# Patient Record
Sex: MALE | Race: WHITE | Employment: OTHER | URBAN - METROPOLITAN AREA
[De-identification: names, ages, dates, MRNs, and addresses within clinical notes are randomized per-mention and may not be internally consistent; named-entity substitution may affect disease eponyms.]

---

## 2017-02-20 DIAGNOSIS — Z00.00 WELL ADULT EXAM: Primary | ICD-10-CM

## 2017-02-20 DIAGNOSIS — E78.2 MIXED HYPERLIPIDEMIA: ICD-10-CM

## 2017-02-20 DIAGNOSIS — Z12.5 PROSTATE CANCER SCREENING: ICD-10-CM

## 2017-02-20 DIAGNOSIS — E55.9 VITAMIN D DEFICIENCY: ICD-10-CM

## 2017-02-21 LAB
A/G RATIO: 1.9 (ref 1.1–2.2)
ALBUMIN SERPL-MCNC: 4.3 G/DL (ref 3.4–5)
ALP BLD-CCNC: 76 U/L (ref 40–129)
ALT SERPL-CCNC: 23 U/L (ref 10–40)
ANION GAP SERPL CALCULATED.3IONS-SCNC: 15 MMOL/L (ref 3–16)
AST SERPL-CCNC: 19 U/L (ref 15–37)
BASOPHILS ABSOLUTE: 0 K/UL (ref 0–0.2)
BASOPHILS RELATIVE PERCENT: 0.6 %
BILIRUB SERPL-MCNC: 1 MG/DL (ref 0–1)
BUN BLDV-MCNC: 10 MG/DL (ref 7–20)
CALCIUM SERPL-MCNC: 9.5 MG/DL (ref 8.3–10.6)
CHLORIDE BLD-SCNC: 104 MMOL/L (ref 99–110)
CHOLESTEROL, TOTAL: 183 MG/DL (ref 0–199)
CO2: 25 MMOL/L (ref 21–32)
CREAT SERPL-MCNC: 0.9 MG/DL (ref 0.8–1.3)
EOSINOPHILS ABSOLUTE: 0.1 K/UL (ref 0–0.6)
EOSINOPHILS RELATIVE PERCENT: 1.2 %
GFR AFRICAN AMERICAN: >60
GFR NON-AFRICAN AMERICAN: >60
GLOBULIN: 2.3 G/DL
GLUCOSE BLD-MCNC: 96 MG/DL (ref 70–99)
HCT VFR BLD CALC: 45.4 % (ref 40.5–52.5)
HDLC SERPL-MCNC: 60 MG/DL (ref 40–60)
HEMOGLOBIN: 15.1 G/DL (ref 13.5–17.5)
LDL CHOLESTEROL CALCULATED: 97 MG/DL
LYMPHOCYTES ABSOLUTE: 1.3 K/UL (ref 1–5.1)
LYMPHOCYTES RELATIVE PERCENT: 26.6 %
MCH RBC QN AUTO: 30.1 PG (ref 26–34)
MCHC RBC AUTO-ENTMCNC: 33.2 G/DL (ref 31–36)
MCV RBC AUTO: 90.6 FL (ref 80–100)
MONOCYTES ABSOLUTE: 0.5 K/UL (ref 0–1.3)
MONOCYTES RELATIVE PERCENT: 10.9 %
NEUTROPHILS ABSOLUTE: 2.9 K/UL (ref 1.7–7.7)
NEUTROPHILS RELATIVE PERCENT: 60.7 %
PDW BLD-RTO: 13.6 % (ref 12.4–15.4)
PLATELET # BLD: 203 K/UL (ref 135–450)
PMV BLD AUTO: 7.8 FL (ref 5–10.5)
POTASSIUM SERPL-SCNC: 4.7 MMOL/L (ref 3.5–5.1)
PROSTATE SPECIFIC ANTIGEN: 0.88 NG/ML (ref 0–4)
RBC # BLD: 5.01 M/UL (ref 4.2–5.9)
SODIUM BLD-SCNC: 144 MMOL/L (ref 136–145)
TOTAL CK: 63 U/L (ref 39–308)
TOTAL PROTEIN: 6.6 G/DL (ref 6.4–8.2)
TRIGL SERPL-MCNC: 131 MG/DL (ref 0–150)
VITAMIN D 25-HYDROXY: 70.7 NG/ML
VLDLC SERPL CALC-MCNC: 26 MG/DL
WBC # BLD: 4.8 K/UL (ref 4–11)

## 2017-03-17 ENCOUNTER — OFFICE VISIT (OUTPATIENT)
Dept: FAMILY MEDICINE CLINIC | Age: 72
End: 2017-03-17

## 2017-03-17 VITALS
BODY MASS INDEX: 30.57 KG/M2 | HEIGHT: 66 IN | OXYGEN SATURATION: 97 % | DIASTOLIC BLOOD PRESSURE: 80 MMHG | WEIGHT: 190.2 LBS | SYSTOLIC BLOOD PRESSURE: 130 MMHG | HEART RATE: 75 BPM

## 2017-03-17 DIAGNOSIS — Z00.00 ANNUAL PHYSICAL EXAM: ICD-10-CM

## 2017-03-17 DIAGNOSIS — Z00.00 WELL ADULT EXAM: Primary | ICD-10-CM

## 2017-03-17 PROCEDURE — G0439 PPPS, SUBSEQ VISIT: HCPCS | Performed by: FAMILY MEDICINE

## 2017-03-17 PROCEDURE — 93000 ELECTROCARDIOGRAM COMPLETE: CPT | Performed by: FAMILY MEDICINE

## 2017-05-17 ENCOUNTER — NURSE ONLY (OUTPATIENT)
Dept: ORTHOPEDIC SURGERY | Age: 72
End: 2017-05-17

## 2017-05-17 DIAGNOSIS — M17.12 PRIMARY OSTEOARTHRITIS OF LEFT KNEE: Primary | ICD-10-CM

## 2017-05-17 PROCEDURE — 20611 DRAIN/INJ JOINT/BURSA W/US: CPT | Performed by: PHYSICIAN ASSISTANT

## 2017-06-23 ENCOUNTER — OFFICE VISIT (OUTPATIENT)
Dept: FAMILY MEDICINE CLINIC | Age: 72
End: 2017-06-23

## 2017-06-23 VITALS
SYSTOLIC BLOOD PRESSURE: 136 MMHG | OXYGEN SATURATION: 96 % | HEART RATE: 79 BPM | WEIGHT: 193.3 LBS | DIASTOLIC BLOOD PRESSURE: 80 MMHG | HEIGHT: 66 IN | BODY MASS INDEX: 31.07 KG/M2

## 2017-06-23 DIAGNOSIS — Z23 NEED FOR PROPHYLACTIC VACCINATION AGAINST DIPHTHERIA-TETANUS-PERTUSSIS (DTP): ICD-10-CM

## 2017-06-23 DIAGNOSIS — Z11.59 NEED FOR HEPATITIS C SCREENING TEST: ICD-10-CM

## 2017-06-23 DIAGNOSIS — R10.32 ABDOMINAL PAIN, LEFT LOWER QUADRANT: ICD-10-CM

## 2017-06-23 PROCEDURE — 99213 OFFICE O/P EST LOW 20 MIN: CPT | Performed by: FAMILY MEDICINE

## 2017-06-23 PROCEDURE — G8510 SCR DEP NEG, NO PLAN REQD: HCPCS | Performed by: FAMILY MEDICINE

## 2017-06-23 PROCEDURE — 3288F FALL RISK ASSESSMENT DOCD: CPT | Performed by: FAMILY MEDICINE

## 2017-06-23 ASSESSMENT — ENCOUNTER SYMPTOMS
DIARRHEA: 0
HEMATOCHEZIA: 0
VOMITING: 0
ABDOMINAL PAIN: 1
BELCHING: 0
FLATUS: 0
CONSTIPATION: 0
NAUSEA: 0

## 2017-06-23 ASSESSMENT — PATIENT HEALTH QUESTIONNAIRE - PHQ9
SUM OF ALL RESPONSES TO PHQ9 QUESTIONS 1 & 2: 0
SUM OF ALL RESPONSES TO PHQ QUESTIONS 1-9: 0
2. FEELING DOWN, DEPRESSED OR HOPELESS: 0
1. LITTLE INTEREST OR PLEASURE IN DOING THINGS: 0

## 2017-06-23 ASSESSMENT — CROHNS DISEASE ACTIVITY INDEX (CDAI): CDAI SCORE: 0

## 2017-07-26 ENCOUNTER — TELEPHONE (OUTPATIENT)
Dept: FAMILY MEDICINE CLINIC | Age: 72
End: 2017-07-26

## 2017-08-08 ENCOUNTER — NURSE ONLY (OUTPATIENT)
Dept: ORTHOPEDIC SURGERY | Age: 72
End: 2017-08-08

## 2017-08-08 VITALS
WEIGHT: 193.34 LBS | SYSTOLIC BLOOD PRESSURE: 145 MMHG | DIASTOLIC BLOOD PRESSURE: 85 MMHG | HEART RATE: 56 BPM | HEIGHT: 66 IN | BODY MASS INDEX: 31.07 KG/M2

## 2017-08-08 DIAGNOSIS — M17.12 PRIMARY OSTEOARTHRITIS OF LEFT KNEE: Primary | ICD-10-CM

## 2017-08-08 PROCEDURE — 73564 X-RAY EXAM KNEE 4 OR MORE: CPT | Performed by: PHYSICIAN ASSISTANT

## 2017-08-08 PROCEDURE — 99213 OFFICE O/P EST LOW 20 MIN: CPT | Performed by: PHYSICIAN ASSISTANT

## 2017-08-08 PROCEDURE — 20611 DRAIN/INJ JOINT/BURSA W/US: CPT | Performed by: PHYSICIAN ASSISTANT

## 2017-08-11 RX ORDER — PRAVASTATIN SODIUM 40 MG
TABLET ORAL
Qty: 90 TABLET | Refills: 2 | Status: SHIPPED | OUTPATIENT
Start: 2017-08-11 | End: 2018-01-18

## 2017-08-17 ENCOUNTER — TELEPHONE (OUTPATIENT)
Dept: FAMILY MEDICINE CLINIC | Age: 72
End: 2017-08-17

## 2017-10-02 ENCOUNTER — TELEPHONE (OUTPATIENT)
Dept: FAMILY MEDICINE CLINIC | Age: 72
End: 2017-10-02

## 2017-10-02 DIAGNOSIS — R79.89 LOW TESTOSTERONE: Primary | ICD-10-CM

## 2017-10-02 NOTE — TELEPHONE ENCOUNTER
Had health screening. Testosterone was low  Level is 263    Wants to know if he should take some sort of supplement? What do you recommend?     [phone]

## 2017-10-05 LAB
SEX HORMONE BINDING GLOBULIN: 106 NMOL/L (ref 11–80)
TESTOSTERONE FREE-NONMALE: 48.1 PG/ML (ref 47–244)
TESTOSTERONE TOTAL: 550 NG/DL (ref 220–1000)

## 2017-11-17 ENCOUNTER — HOSPITAL ENCOUNTER (OUTPATIENT)
Dept: ENDOSCOPY | Age: 72
Discharge: HOME | End: 2017-11-17
Attending: INTERNAL MEDICINE | Admitting: INTERNAL MEDICINE

## 2017-11-17 VITALS
SYSTOLIC BLOOD PRESSURE: 148 MMHG | WEIGHT: 185 LBS | TEMPERATURE: 97.7 F | DIASTOLIC BLOOD PRESSURE: 75 MMHG | HEIGHT: 66 IN | HEART RATE: 76 BPM | BODY MASS INDEX: 29.73 KG/M2 | RESPIRATION RATE: 20 BRPM | OXYGEN SATURATION: 96 %

## 2017-11-17 ASSESSMENT — PAIN - FUNCTIONAL ASSESSMENT: PAIN_FUNCTIONAL_ASSESSMENT: 0-10

## 2017-11-17 NOTE — PLAN OF CARE
ADMISSION PRE-PROCEDURE INTRA-PROCEDURE POST-PROCEDURE: RECOVERY/ DISCHARGE   ASSESSMENT &  EVALUATION CONSULTS [x] Verify patient identification, allergies, vital signs, NPO, IV, & SPO2  [x] Complete the VIKI SCORE  [x] Consent form to treat signed  [x] History and Physical [x] Reassess patient after pre- procedure medication given  [x] GI physician evaluates pt  [x] Verify patient's name, allergies [x] Continuous monitoring of vital  signs, SPO2, LOC  [x] Emotional status  [x] Patient comfort level [x] Total system admission assessment with appropriate intervention  [x] Pain evaluation and management  [x] Discharge criteria met  [x] Discharge assessment with appropriate intervention  [x] Compare with pre-procedure status  [x] Discharge by appropriate physician   DIAGNOSTIC / TESTS [x]  Lab work ordered  [x]  Obtain and attach lab work to patient's chart  [x]  Report abnormals and F/U with physician [x] Assure needed test results are present [x] Diagnostic testing as indicated  [x] Obtained specimens sent to lab [x] Diagnostic testing as indicated     MEDICATIONS [x]  Conscious sedation medications  explained to patient  [x]  Start IV per physician's order  and/or protocol  [x]  Verify compliance of the colon prep  []  Pre-procedure med. as ordered  [x] Verify compliance of colon prep. [x] Re-check IV access [x] Assist with administration of IV conscious sedation medication  [x] Start O2  per  nasal cannula, if needed   [x] IV fluids as indicated/ordered  [x] Administration of medications as ordered  [x] Medications as prescribed  [x] D/C O2 therapy as ordered   PROCEDURE/TREATMENT [x] Specific order by GI physician  [x] Specific procedure as scheduled  [x]  Verify procedure as ordered [x] Time out/procedure verification checklist complete.  [x] EGD/Colonoscopy and related procedures  [x] Assist physician with the procedure [x] Treatment as indicated   NUTRITION / DIET [x] NPO after midnight, as ordered [x] Verify NPO status [x] IV fluids as support [x] Clear liquids and/or ice chips as ordered  [x] Tolerating clear liquids  [x] Special diet as ordered  [x] D/C IV fluids   ACTIVITY  [x] Assess level of function  [x]  Specified by physician  [x]  Activity as tolerated [x] Position on left side [x] Position on left side and reposition patient as physician ordered [x] Gradually elevate HOB to vazquezs position  [x] Position changes as patient tolerated  [x] Ambulate as pre-procedure   PATIENT / S.O. EDUCATION [x] Pre, Intra Post-procedure  teaching appropriate to procedure  [x] Conscious Sedation Teaching  [x] Pain Management - instructed [x] Encourage questions  [x] Clarify any concerns [x] Safety devices maintain to  prevent patient injury  [x] Assist and support patient  [x] Observe standard precautions [x] Physician confers with the family/S.O. [x] Short visit from family in [place] area  [x] Physician specific post-procedure orders  [x] S/S complications with proper [x]F/U; office visits F/U  [x] Review discharge instructions, medicine to family /S. O. [x] Medication/ special diet information given to family/ S.O. [x]  Copy of discharge instructions givn to family/S.O.   OUTCOME PLANNING  DISCHARGE PLAN [x] Patient/S. O. will verbalize understanding of admission procedure & expectations of outcome in realistic terms   [x] Patient verbalize the role of family/S. O. in plan of care  [x] Patient will have designated responsible person available for discharge.  [x] Patient will demonstrate an  understanding of the planned  procedure and its related procedures and conscious sedation [x] Patient will:  - receive services according to the           standards of care  - receive standards for conscious       sedation  - remain free of injury [x] Patient will:  - have stable vital signs based on Naomi Score   - be pain free or tolerable, have no bleeding  - have minimal abdominal distention   -  return to pre-procedure level of orientation   -  tolerate fluid with no nausea and vomiting  -  ambulate as pre-procedure ADL   - verbalize understanding of the discharge instructions     Electronically signed by Deneen Zelaya RN on 11/17/2017 at 10:04 AM     Hailey Shaw

## 2017-12-27 ENCOUNTER — TELEPHONE (OUTPATIENT)
Dept: FAMILY MEDICINE CLINIC | Age: 72
End: 2017-12-27

## 2017-12-28 ENCOUNTER — TELEPHONE (OUTPATIENT)
Dept: FAMILY MEDICINE CLINIC | Age: 72
End: 2017-12-28

## 2017-12-28 NOTE — TELEPHONE ENCOUNTER
Pt went to ER on 12/27/17 he was told to follow up with Dr. Julien Jessica with in a week.     Please advise  Cassandra Escobar [phone]

## 2018-01-05 ENCOUNTER — OFFICE VISIT (OUTPATIENT)
Dept: FAMILY MEDICINE CLINIC | Age: 73
End: 2018-01-05

## 2018-01-05 VITALS
OXYGEN SATURATION: 95 % | SYSTOLIC BLOOD PRESSURE: 138 MMHG | DIASTOLIC BLOOD PRESSURE: 80 MMHG | BODY MASS INDEX: 31.47 KG/M2 | WEIGHT: 195.8 LBS | HEIGHT: 66 IN | HEART RATE: 91 BPM

## 2018-01-05 DIAGNOSIS — G43.009 MIGRAINE WITHOUT AURA AND WITHOUT STATUS MIGRAINOSUS, NOT INTRACTABLE: ICD-10-CM

## 2018-01-05 PROCEDURE — 99213 OFFICE O/P EST LOW 20 MIN: CPT | Performed by: FAMILY MEDICINE

## 2018-01-05 RX ORDER — HYDROCHLOROTHIAZIDE 25 MG/1
25 TABLET ORAL DAILY
COMMUNITY
End: 2021-02-09 | Stop reason: ALTCHOICE

## 2018-01-05 ASSESSMENT — ENCOUNTER SYMPTOMS
FACIAL SWEATING: 0
ABDOMINAL PAIN: 0
PHOTOPHOBIA: 0
RHINORRHEA: 0
BACK PAIN: 0
SWOLLEN GLANDS: 0
EYE PAIN: 0
VOMITING: 0
EYE REDNESS: 0
EYE WATERING: 0
SORE THROAT: 0
BLURRED VISION: 1
SCALP TENDERNESS: 0
NAUSEA: 1
VISUAL CHANGE: 0
COUGH: 0
SINUS PRESSURE: 0

## 2018-01-05 NOTE — PROGRESS NOTES
Constitutional: He is oriented to person, place, and time. He appears well-developed and well-nourished. No distress. HENT:   Mouth/Throat: Oropharynx is clear and moist.   Eyes: Conjunctivae are normal. Pupils are equal, round, and reactive to light. Neck: No JVD present. No tracheal deviation present. No thyromegaly present. Cardiovascular: Normal rate, regular rhythm, normal heart sounds and intact distal pulses. Exam reveals no gallop. No murmur heard. Pulmonary/Chest: Effort normal and breath sounds normal. No stridor. No respiratory distress. He has no wheezes. He has no rales. He exhibits no tenderness. Abdominal: Soft. Bowel sounds are normal. He exhibits no distension and no mass. There is no tenderness. Musculoskeletal: He exhibits no edema or tenderness. Lymphadenopathy:     He has no cervical adenopathy. Neurological: He is alert and oriented to person, place, and time. He displays normal reflexes. No cranial nerve deficit. He exhibits normal muscle tone. Coordination normal.   Skin: Skin is warm and dry. No rash noted. No erythema. No pallor. Psychiatric: He has a normal mood and affect. His behavior is normal. Judgment and thought content normal.   Vitals reviewed.       Assessment:      Migraine without aura and without status migrainosus, not intractable  PRN NSAID          Plan:      above

## 2018-01-18 ENCOUNTER — OFFICE VISIT (OUTPATIENT)
Dept: SURGERY | Age: 73
End: 2018-01-18

## 2018-01-18 VITALS — BODY MASS INDEX: 30.99 KG/M2 | WEIGHT: 192 LBS | SYSTOLIC BLOOD PRESSURE: 145 MMHG | DIASTOLIC BLOOD PRESSURE: 70 MMHG

## 2018-01-18 DIAGNOSIS — T14.8XXA MUSCLE STRAIN: Primary | ICD-10-CM

## 2018-01-18 DIAGNOSIS — Z87.19 HISTORY OF LEFT INGUINAL HERNIA: ICD-10-CM

## 2018-01-18 PROCEDURE — 99202 OFFICE O/P NEW SF 15 MIN: CPT | Performed by: SURGERY

## 2018-01-18 RX ORDER — RANITIDINE 300 MG/1
300 TABLET ORAL NIGHTLY
COMMUNITY
End: 2021-02-09 | Stop reason: ALTCHOICE

## 2018-01-18 RX ORDER — FLUTICASONE PROPIONATE 50 MCG
1 SPRAY, SUSPENSION (ML) NASAL DAILY PRN
COMMUNITY
End: 2021-02-22 | Stop reason: SDUPTHER

## 2018-01-18 ASSESSMENT — ENCOUNTER SYMPTOMS
RESPIRATORY NEGATIVE: 1
ABDOMINAL PAIN: 1
EYES NEGATIVE: 1
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
## 2018-02-06 ENCOUNTER — CARE COORDINATION (OUTPATIENT)
Dept: CARE COORDINATION | Age: 73
End: 2018-02-06

## 2018-02-06 NOTE — CARE COORDINATION
Pt calling to report BP this am after taking medications was 131/83. Also wanted to report went to Urgent Care on 1/26 and was diagnosed with the flu. Reports just wanted us to be aware of this.

## 2018-02-22 ENCOUNTER — CARE COORDINATION (OUTPATIENT)
Dept: CARE COORDINATION | Age: 73
End: 2018-02-22

## 2018-02-23 ENCOUNTER — CARE COORDINATION (OUTPATIENT)
Dept: CARE COORDINATION | Age: 73
End: 2018-02-23

## 2018-03-16 ENCOUNTER — CARE COORDINATION (OUTPATIENT)
Dept: CARE COORDINATION | Age: 73
End: 2018-03-16

## 2018-03-19 ENCOUNTER — CARE COORDINATOR VISIT (OUTPATIENT)
Dept: CARE COORDINATION | Age: 73
End: 2018-03-19

## 2018-03-19 DIAGNOSIS — Z12.5 SCREENING FOR MALIGNANT NEOPLASM OF PROSTATE: ICD-10-CM

## 2018-03-19 DIAGNOSIS — E78.2 MIXED HYPERLIPIDEMIA: Primary | ICD-10-CM

## 2018-03-19 LAB
A/G RATIO: 2.2 (ref 1.1–2.2)
ALBUMIN SERPL-MCNC: 4.7 G/DL (ref 3.4–5)
ALP BLD-CCNC: 74 U/L (ref 40–129)
ALT SERPL-CCNC: 23 U/L (ref 10–40)
ANION GAP SERPL CALCULATED.3IONS-SCNC: 17 MMOL/L (ref 3–16)
AST SERPL-CCNC: 31 U/L (ref 15–37)
BASOPHILS ABSOLUTE: 0 K/UL (ref 0–0.2)
BASOPHILS RELATIVE PERCENT: 0.5 %
BILIRUB SERPL-MCNC: 1.1 MG/DL (ref 0–1)
BUN BLDV-MCNC: 11 MG/DL (ref 7–20)
CALCIUM SERPL-MCNC: 9.1 MG/DL (ref 8.3–10.6)
CHLORIDE BLD-SCNC: 102 MMOL/L (ref 99–110)
CHOLESTEROL, TOTAL: 184 MG/DL (ref 0–199)
CO2: 25 MMOL/L (ref 21–32)
CREAT SERPL-MCNC: 0.8 MG/DL (ref 0.8–1.3)
EOSINOPHILS ABSOLUTE: 0.1 K/UL (ref 0–0.6)
EOSINOPHILS RELATIVE PERCENT: 1.7 %
GFR AFRICAN AMERICAN: >60
GFR NON-AFRICAN AMERICAN: >60
GLOBULIN: 2.1 G/DL
GLUCOSE BLD-MCNC: 92 MG/DL (ref 70–99)
HCT VFR BLD CALC: 45.2 % (ref 40.5–52.5)
HDLC SERPL-MCNC: 57 MG/DL (ref 40–60)
HEMOGLOBIN: 15.6 G/DL (ref 13.5–17.5)
LDL CHOLESTEROL CALCULATED: 104 MG/DL
LYMPHOCYTES ABSOLUTE: 1.7 K/UL (ref 1–5.1)
LYMPHOCYTES RELATIVE PERCENT: 36.7 %
MCH RBC QN AUTO: 30.5 PG (ref 26–34)
MCHC RBC AUTO-ENTMCNC: 34.5 G/DL (ref 31–36)
MCV RBC AUTO: 88.3 FL (ref 80–100)
MONOCYTES ABSOLUTE: 0.6 K/UL (ref 0–1.3)
MONOCYTES RELATIVE PERCENT: 13.8 %
NEUTROPHILS ABSOLUTE: 2.2 K/UL (ref 1.7–7.7)
NEUTROPHILS RELATIVE PERCENT: 47.3 %
PDW BLD-RTO: 13.6 % (ref 12.4–15.4)
PLATELET # BLD: 221 K/UL (ref 135–450)
PMV BLD AUTO: 8.1 FL (ref 5–10.5)
POTASSIUM SERPL-SCNC: 4.5 MMOL/L (ref 3.5–5.1)
RBC # BLD: 5.12 M/UL (ref 4.2–5.9)
SODIUM BLD-SCNC: 144 MMOL/L (ref 136–145)
TOTAL CK: 142 U/L (ref 39–308)
TOTAL PROTEIN: 6.8 G/DL (ref 6.4–8.2)
TRIGL SERPL-MCNC: 116 MG/DL (ref 0–150)
VLDLC SERPL CALC-MCNC: 23 MG/DL
WBC # BLD: 4.6 K/UL (ref 4–11)

## 2018-03-19 NOTE — CARE COORDINATION
Ambulatory Care Coordination Note  3/19/2018  CM Risk Score: 0  Aquilino Mortality Risk Score: 6.53    ACC: Elli Parker, RN    Summary Note: Pt came in today. Reports is doing good but c/o's eyeglasses he got from the [place]. Plans to take them back to see if they can be adjusted. Reports still wants to lose belly fat. Plans on starting senior softball in the next few weeks and this will help with weight loss as well. Care Coordination Interventions    Referral from Primary Care Provider:  No  Suggested Interventions and Community Resources  Disease Association:  Completed  Medication Assistance Program:  (Comment: Can get meds through [place])         Goals Addressed     None          Prior to Admission medications    Medication Sig Start Date End Date Taking? Authorizing Provider   ranitidine (ZANTAC) 300 MG tablet Take 300 mg by mouth nightly    Historical Provider, MD   fluticasone (FLONASE) 50 MCG/ACT nasal spray 1 spray by Nasal route daily    Historical Provider, MD   hydrochlorothiazide (HYDRODIURIL) 25 MG tablet Take 25 mg by mouth daily    Historical Provider, MD   pravastatin (PRAVACHOL) 40 MG tablet TAKE 1 TABLET BY MOUTH IN THE EVENING 8/9/16   Elana Rich MD   KRILL OIL PO Take by mouth    Historical Provider, MD   Cholecalciferol (VITAMIN D3) 2000 UNITS CAPS Take 5,000 Units by mouth     Historical Provider, MD   Coenzyme Q10 (CO Q-10) 200 MG CAPS Take  by mouth. Historical Provider, MD   CALCIUM LACTATE PO Take  by mouth.     Historical Provider, MD       Future Appointments  Date Time Provider Kristel Dangelo   4/18/2018 10:00 AM Elana Rich MD [facility]

## 2018-03-20 LAB — PROSTATE SPECIFIC ANTIGEN: 0.54 NG/ML (ref 0–4)

## 2018-04-18 ENCOUNTER — CARE COORDINATION (OUTPATIENT)
Dept: CARE COORDINATION | Age: 73
End: 2018-04-18

## 2018-04-18 ENCOUNTER — OFFICE VISIT (OUTPATIENT)
Dept: FAMILY MEDICINE CLINIC | Age: 73
End: 2018-04-18

## 2018-04-18 VITALS
DIASTOLIC BLOOD PRESSURE: 80 MMHG | OXYGEN SATURATION: 97 % | SYSTOLIC BLOOD PRESSURE: 120 MMHG | WEIGHT: 190.3 LBS | BODY MASS INDEX: 30.58 KG/M2 | HEART RATE: 60 BPM | HEIGHT: 66 IN

## 2018-04-18 DIAGNOSIS — E78.2 MIXED HYPERLIPIDEMIA: ICD-10-CM

## 2018-04-18 DIAGNOSIS — I70.0 ATHEROSCLEROSIS OF AORTA (HCC): ICD-10-CM

## 2018-04-18 DIAGNOSIS — Z00.00 WELL ADULT EXAM: Primary | ICD-10-CM

## 2018-04-18 DIAGNOSIS — I65.23 CAROTID STENOSIS, BILATERAL: ICD-10-CM

## 2018-04-18 PROCEDURE — G0439 PPPS, SUBSEQ VISIT: HCPCS | Performed by: FAMILY MEDICINE

## 2018-04-19 ENCOUNTER — CARE COORDINATION (OUTPATIENT)
Dept: CARE COORDINATION | Age: 73
End: 2018-04-19

## 2018-04-20 ENCOUNTER — HOSPITAL ENCOUNTER (OUTPATIENT)
Dept: VASCULAR LAB | Age: 73
Discharge: HOME | End: 2018-04-20
Attending: FAMILY MEDICINE | Admitting: FAMILY MEDICINE

## 2018-04-20 DIAGNOSIS — I65.23 OCCLUSION AND STENOSIS OF BILATERAL CAROTID ARTERIES: ICD-10-CM

## 2018-04-20 DIAGNOSIS — I65.23 CAROTID STENOSIS, BILATERAL: Primary | ICD-10-CM

## 2018-04-24 ENCOUNTER — TELEPHONE (OUTPATIENT)
Dept: FAMILY MEDICINE CLINIC | Age: 73
End: 2018-04-24

## 2018-04-24 ENCOUNTER — CARE COORDINATION (OUTPATIENT)
Dept: CARE COORDINATION | Age: 73
End: 2018-04-24

## 2018-04-27 ENCOUNTER — OFFICE VISIT (OUTPATIENT)
Dept: INTERNAL MEDICINE | Age: 73
End: 2018-04-27

## 2018-04-27 VITALS
HEIGHT: 66 IN | HEART RATE: 80 BPM | SYSTOLIC BLOOD PRESSURE: 124 MMHG | DIASTOLIC BLOOD PRESSURE: 76 MMHG | OXYGEN SATURATION: 98 % | WEIGHT: 187 LBS | BODY MASS INDEX: 30.05 KG/M2

## 2018-04-27 DIAGNOSIS — M54.31 SCIATICA OF RIGHT SIDE: Primary | ICD-10-CM

## 2018-04-27 PROCEDURE — 99213 OFFICE O/P EST LOW 20 MIN: CPT | Performed by: NURSE PRACTITIONER

## 2018-04-27 RX ORDER — PREDNISONE 10 MG/1
TABLET ORAL
Qty: 30 TABLET | Refills: 0 | Status: SHIPPED | OUTPATIENT
Start: 2018-04-27 | End: 2018-05-17 | Stop reason: ALTCHOICE

## 2018-04-27 ASSESSMENT — ENCOUNTER SYMPTOMS
GASTROINTESTINAL NEGATIVE: 1
WHEEZING: 0
SHORTNESS OF BREATH: 0
BACK PAIN: 1
COUGH: 0

## 2018-05-03 ENCOUNTER — TELEPHONE (OUTPATIENT)
Dept: FAMILY MEDICINE CLINIC | Age: 73
End: 2018-05-03

## 2018-05-08 ENCOUNTER — CARE COORDINATION (OUTPATIENT)
Dept: CARE COORDINATION | Age: 73
End: 2018-05-08

## 2018-05-10 ENCOUNTER — CARE COORDINATION (OUTPATIENT)
Dept: CARE COORDINATION | Age: 73
End: 2018-05-10

## 2018-05-11 RX ORDER — PRAVASTATIN SODIUM 40 MG
TABLET ORAL
Qty: 90 TABLET | Refills: 1 | Status: SHIPPED | OUTPATIENT
Start: 2018-05-11 | End: 2018-11-10 | Stop reason: SDUPTHER

## 2018-05-14 ENCOUNTER — TELEPHONE (OUTPATIENT)
Dept: PAIN MANAGEMENT | Age: 73
End: 2018-05-14

## 2018-05-14 ENCOUNTER — CARE COORDINATION (OUTPATIENT)
Dept: CARE COORDINATION | Age: 73
End: 2018-05-14

## 2018-05-16 ENCOUNTER — NURSE ONLY (OUTPATIENT)
Dept: ORTHOPEDIC SURGERY | Age: 73
End: 2018-05-16

## 2018-05-16 DIAGNOSIS — M17.12 PRIMARY OSTEOARTHRITIS OF LEFT KNEE: Primary | ICD-10-CM

## 2018-05-16 PROCEDURE — 20611 DRAIN/INJ JOINT/BURSA W/US: CPT | Performed by: PHYSICIAN ASSISTANT

## 2018-05-17 ENCOUNTER — OFFICE VISIT (OUTPATIENT)
Dept: PAIN MANAGEMENT | Age: 73
End: 2018-05-17

## 2018-05-17 ENCOUNTER — CARE COORDINATION (OUTPATIENT)
Dept: CARE COORDINATION | Age: 73
End: 2018-05-17

## 2018-05-17 ENCOUNTER — TELEPHONE (OUTPATIENT)
Dept: FAMILY MEDICINE CLINIC | Age: 73
End: 2018-05-17

## 2018-05-17 VITALS
DIASTOLIC BLOOD PRESSURE: 77 MMHG | SYSTOLIC BLOOD PRESSURE: 128 MMHG | HEART RATE: 62 BPM | HEIGHT: 66 IN | BODY MASS INDEX: 28.93 KG/M2 | WEIGHT: 180 LBS

## 2018-05-17 DIAGNOSIS — G89.4 CHRONIC PAIN SYNDROME: Primary | ICD-10-CM

## 2018-05-17 DIAGNOSIS — M48.061 SPINAL STENOSIS OF LUMBAR REGION, UNSPECIFIED WHETHER NEUROGENIC CLAUDICATION PRESENT: Primary | ICD-10-CM

## 2018-05-17 PROCEDURE — 99203 OFFICE O/P NEW LOW 30 MIN: CPT | Performed by: NURSE PRACTITIONER

## 2018-05-22 ENCOUNTER — CARE COORDINATION (OUTPATIENT)
Dept: CARE COORDINATION | Age: 73
End: 2018-05-22

## 2018-05-29 ENCOUNTER — HOSPITAL ENCOUNTER (OUTPATIENT)
Dept: CARDIAC CATH/INVASIVE PROCEDURES | Age: 73
Discharge: HOME | End: 2018-05-29
Attending: FAMILY MEDICINE | Admitting: FAMILY MEDICINE

## 2018-05-29 DIAGNOSIS — M48.061 BILATERAL STENOSIS OF LATERAL RECESS OF LUMBAR SPINE: ICD-10-CM

## 2018-06-08 ENCOUNTER — TELEPHONE (OUTPATIENT)
Dept: FAMILY MEDICINE CLINIC | Age: 73
End: 2018-06-08

## 2018-06-20 ENCOUNTER — CARE COORDINATION (OUTPATIENT)
Dept: CARE COORDINATION | Age: 73
End: 2018-06-20

## 2018-06-29 ENCOUNTER — OFFICE VISIT (OUTPATIENT)
Dept: FAMILY MEDICINE CLINIC | Age: 73
End: 2018-06-29

## 2018-06-29 VITALS
BODY MASS INDEX: 30.86 KG/M2 | WEIGHT: 192 LBS | SYSTOLIC BLOOD PRESSURE: 136 MMHG | HEART RATE: 64 BPM | HEIGHT: 66 IN | DIASTOLIC BLOOD PRESSURE: 78 MMHG | RESPIRATION RATE: 16 BRPM | OXYGEN SATURATION: 98 %

## 2018-06-29 DIAGNOSIS — N40.1 BENIGN PROSTATIC HYPERPLASIA WITH URINARY OBSTRUCTION: ICD-10-CM

## 2018-06-29 DIAGNOSIS — R53.83 OTHER FATIGUE: ICD-10-CM

## 2018-06-29 DIAGNOSIS — N13.8 BENIGN PROSTATIC HYPERPLASIA WITH URINARY OBSTRUCTION: ICD-10-CM

## 2018-06-29 PROCEDURE — G8510 SCR DEP NEG, NO PLAN REQD: HCPCS | Performed by: FAMILY MEDICINE

## 2018-06-29 PROCEDURE — 99213 OFFICE O/P EST LOW 20 MIN: CPT | Performed by: FAMILY MEDICINE

## 2018-06-29 RX ORDER — TAMSULOSIN HYDROCHLORIDE 0.4 MG/1
0.4 CAPSULE ORAL DAILY
Qty: 30 CAPSULE | Refills: 3 | Status: SHIPPED | OUTPATIENT
Start: 2018-06-29 | End: 2018-10-27 | Stop reason: SDUPTHER

## 2018-06-29 ASSESSMENT — PATIENT HEALTH QUESTIONNAIRE - PHQ9
SUM OF ALL RESPONSES TO PHQ QUESTIONS 1-9: 0
SUM OF ALL RESPONSES TO PHQ9 QUESTIONS 1 & 2: 0
1. LITTLE INTEREST OR PLEASURE IN DOING THINGS: 0
2. FEELING DOWN, DEPRESSED OR HOPELESS: 0

## 2018-06-29 ASSESSMENT — ENCOUNTER SYMPTOMS
VISUAL CHANGE: 0
NAUSEA: 0
COUGH: 0
SORE THROAT: 0
ABDOMINAL PAIN: 0
SWOLLEN GLANDS: 0
VOMITING: 0
CHANGE IN BOWEL HABIT: 0

## 2018-07-20 ENCOUNTER — CARE COORDINATION (OUTPATIENT)
Dept: CARE COORDINATION | Age: 73
End: 2018-07-20

## 2018-08-08 ENCOUNTER — CARE COORDINATION (OUTPATIENT)
Dept: CARE COORDINATION | Age: 73
End: 2018-08-08

## 2018-08-08 DIAGNOSIS — N52.9 ERECTILE DYSFUNCTION, UNSPECIFIED ERECTILE DYSFUNCTION TYPE: Primary | ICD-10-CM

## 2018-08-08 NOTE — CARE COORDINATION
Ambulatory Care Coordination Note  8/8/2018  CM Risk Score: 1  Aquilino Mortality Risk Score: 7.07    ACC: Elli Parker, RN    Summary Note: Pt calling to request a referral to urologist d/t sexual dysfunction. Spoke with Dr Stephany Galvan and recommending Falguni Francisco,urologist. informed Kandace and she will put in referral. Called pt and informed of same with information given. Reports no changes in medications. Offered dietician but declined at this time. Wants to try it on his own first.  Reports is cutting out bread, decreased carbs, and drinking unsweetened tea instead of pop. Pt has 2 lbs to go to reach his goal.    Encouraged to call with any questions. Care Coordination Interventions    Program Enrollment:  Rising Risk  Referral from Primary Care Provider:  No  Suggested Interventions and Community Resources  Disease Association:  Completed  Medication Assistance Program:  (Comment: Can get meds through [facility])  Registered Dietician:  Declined (Comment:  not yet.)         Goals Addressed             Most Recent     Patient Stated (pt-stated)   Improving (8/8/2018)             Wants to lose 10 #    Barriers: none  Plan for overcoming my barriers: Plans to decrease carbs  Confidence: 9/10  Anticipated Goal Completion Date: 11/8/18    Tried Nutri system but didn't work            Prior to Admission medications    Medication Sig Start Date End Date Taking?  Authorizing Provider   tamsulosin (FLOMAX) 0.4 MG capsule Take 1 capsule by mouth daily 6/29/18   Masood Dutton MD   Naproxen Sodium (ALEVE PO) Take by mouth daily    Historical Provider, MD   pravastatin (PRAVACHOL) 40 MG tablet TAKE 1 TABLET BY MOUTH IN THE EVENING  5/11/18   Masood Dutton MD   aspirin 81 MG tablet Take 81 mg by mouth daily    Historical Provider, MD   ranitidine (ZANTAC) 300 MG tablet Take 300 mg by mouth nightly    Historical Provider, MD   fluticasone (FLONASE) 50 MCG/ACT nasal spray 1 spray by Nasal route daily Historical Provider, MD   hydrochlorothiazide (HYDRODIURIL) 25 MG tablet Take 25 mg by mouth daily    Historical Provider, MD   KRILL OIL PO Take by mouth    Historical Provider, MD   Cholecalciferol (VITAMIN D3) 2000 UNITS CAPS Take 5,000 Units by mouth     Historical Provider, MD   Coenzyme Q10 (CO Q-10) 200 MG CAPS Take  by mouth. Historical Provider, MD   CALCIUM LACTATE PO Take  by mouth. Historical Provider, MD       No future appointments.

## 2018-08-22 ENCOUNTER — CARE COORDINATION (OUTPATIENT)
Dept: CARE COORDINATION | Age: 73
End: 2018-08-22

## 2018-09-14 ENCOUNTER — CARE COORDINATION (OUTPATIENT)
Dept: CARE COORDINATION | Age: 73
End: 2018-09-14

## 2018-09-14 NOTE — CARE COORDINATION
Ambulatory Care Coordination Note  9/14/2018  CM Risk Score: 1  Aquilino Mortality Risk Score: 7.07      ACC: Elli Parker RN    Summary Note: Returning call. Spoke with pt. Goals met and no c/o's needed at this time. Reports no changes in meds and [facility]. Will dc pt from [facility]. Care Coordination Interventions    Program Enrollment:  Rising Risk  Referral from Primary Care Provider:  No  Suggested Interventions and Community Resources  Disease Association:  Completed  Medication Assistance Program:  (Comment: Can get meds through [place])  Registered Dietician:  Declined (Comment:  not yet.)         Goals Addressed     None          Prior to Admission medications    Medication Sig Start Date End Date Taking? Authorizing Provider   tamsulosin (FLOMAX) 0.4 MG capsule Take 1 capsule by mouth daily 6/29/18   Hemant Gallegos MD   Naproxen Sodium (ALEVE PO) Take by mouth daily    Historical Provider, MD   pravastatin (PRAVACHOL) 40 MG tablet TAKE 1 TABLET BY MOUTH IN THE EVENING  5/11/18   Hemant Gallegos MD   aspirin 81 MG tablet Take 81 mg by mouth daily    Historical Provider, MD   ranitidine (ZANTAC) 300 MG tablet Take 300 mg by mouth nightly    Historical Provider, MD   fluticasone (FLONASE) 50 MCG/ACT nasal spray 1 spray by Nasal route daily    Historical Provider, MD   hydrochlorothiazide (HYDRODIURIL) 25 MG tablet Take 25 mg by mouth daily    Historical Provider, MD   KRILL OIL PO Take by mouth    Historical Provider, MD   Cholecalciferol (VITAMIN D3) 2000 UNITS CAPS Take 5,000 Units by mouth     Historical Provider, MD   Coenzyme Q10 (CO Q-10) 200 MG CAPS Take  by mouth. Historical Provider, MD   CALCIUM LACTATE PO Take  by mouth. Historical Provider, MD       No future appointments.

## 2018-09-14 NOTE — CARE COORDINATION
Ambulatory Care Coordination Note  9/14/2018  CM Risk Score: 1  Aquilino Mortality Risk Score: 7.07    ACC: Elli Parker RN    Summary Note: Attempted to call. Left message to return call. Pt dc'd from [facility] d/t goals met. Will mail graduation letter. Care Coordination Interventions    Program Enrollment:  Rising Risk  Referral from Primary Care Provider:  No  Suggested Interventions and Community Resources  Disease Association:  Completed  Medication Assistance Program:  (Comment: Can get meds through [place])  Registered Dietician:  Declined (Comment:  not yet.)         Goals Addressed     None          Prior to Admission medications    Medication Sig Start Date End Date Taking? Authorizing Provider   tamsulosin (FLOMAX) 0.4 MG capsule Take 1 capsule by mouth daily 6/29/18   Prashanth Walters MD   Naproxen Sodium (ALEVE PO) Take by mouth daily    Historical Provider, MD   pravastatin (PRAVACHOL) 40 MG tablet TAKE 1 TABLET BY MOUTH IN THE EVENING  5/11/18   Prashanth Walters MD   aspirin 81 MG tablet Take 81 mg by mouth daily    Historical Provider, MD   ranitidine (ZANTAC) 300 MG tablet Take 300 mg by mouth nightly    Historical Provider, MD   fluticasone (FLONASE) 50 MCG/ACT nasal spray 1 spray by Nasal route daily    Historical Provider, MD   hydrochlorothiazide (HYDRODIURIL) 25 MG tablet Take 25 mg by mouth daily    Historical Provider, MD   KRILL OIL PO Take by mouth    Historical Provider, MD   Cholecalciferol (VITAMIN D3) 2000 UNITS CAPS Take 5,000 Units by mouth     Historical Provider, MD   Coenzyme Q10 (CO Q-10) 200 MG CAPS Take  by mouth. Historical Provider, MD   CALCIUM LACTATE PO Take  by mouth. Historical Provider, MD       No future appointments.

## 2018-10-11 ENCOUNTER — OFFICE VISIT (OUTPATIENT)
Dept: ORTHOPEDIC SURGERY | Age: 73
End: 2018-10-11
Payer: COMMERCIAL

## 2018-10-11 VITALS — WEIGHT: 192.02 LBS | BODY MASS INDEX: 30.86 KG/M2 | HEIGHT: 66 IN

## 2018-10-11 DIAGNOSIS — M17.12 PRIMARY OSTEOARTHRITIS OF LEFT KNEE: ICD-10-CM

## 2018-10-11 DIAGNOSIS — M25.562 LEFT KNEE PAIN, UNSPECIFIED CHRONICITY: Primary | ICD-10-CM

## 2018-10-11 PROCEDURE — 99214 OFFICE O/P EST MOD 30 MIN: CPT | Performed by: PHYSICIAN ASSISTANT

## 2018-10-11 PROCEDURE — L1830 KO IMMOB CANVAS LONG PRE OTS: HCPCS | Performed by: PHYSICIAN ASSISTANT

## 2018-10-11 NOTE — PROGRESS NOTES
Chief Complaint    Knee Pain (LEFT global knee pain increased with ADL's; cortisone in May helpful for a month; brace also helps; would like to stay active in [place]; discuss TKR)      History of Present Illness:  Waqar Ortega III is a 68 y.o. male returns today for treatment regarding his left knee osteoarthritis. The patient reports continual left lateral knee pain secondary to the osteoarthritis. We've previously tried cortisone injections and viscous supplementations with the patient and perform the past and this provides temporary relief but unfortunately his symptoms are worsening. He is still a fairly active individual moist participate in softball but his level of activity has decreased significantly in the last year. He is a history of a left lateral tibial plateau fracture 11+ years ago. He reports that his pain at rest as a 4/10 but increases throughout the day with aggravating factors such as walking, standing, stairs, etc.  Reports mainly lateral knee pain with posterior knee pain is present as well. He also wears a knee brace which helps alleviate some of his symptoms. It does cause sleep disturbances at night. PAIN:   Pain Assessment  Location of Pain: Knee  Location Modifiers: Left, Lateral  Severity of Pain: 4  Quality of Pain: Dull, Aching  Duration of Pain: Persistent  Frequency of Pain: Intermittent  Aggravating Factors: Walking, Standing, Exercise, Stairs, Squatting  Limiting Behavior: Some  Relieving Factors: Rest    The patients chronic pain has gradually worsening over the last 12+ years. The patient rates pain on a level of 8/10. Pain impacts patients ability to drive, sleep and climb stairs. Medical History:     Past Medical History:   Diagnosis Date    Allergic rhinitis     Anxiety 9/15/2010    BPH (benign prostatic hypertrophy) with urinary obstruction     Chronic back pain     ?  SS    Colon polyp 08    Diverticulosis of colon 9/15/2010    Environmental

## 2018-10-11 NOTE — LETTER
Attention    These are medical screening labs, not pre-admission surgery labs. Via Michael Burnette M.D.  [phone]  [facility], [address] [place] [address]    Date:  10/11/2018    Name: Fabiola Araya III    : 1945    Please take this form with you.       THE FOLLOWING LABS NEED TO BE COMPLETED:    _x__UA  _x__URINE C/S (THIS NEEDS TO BE DONE EVEN IF THE UA IS NORMAL)  _x__CBC W/ DIFF  _x__PT/INR  _x__PTT  _x__TRANSFERRIN  _x__ALBUMIN  _x__CHEM 7  _x__HEMAGLOBIN A1-C  ____OTHER: _____________________________________________                         Diagnosis: LT KNEE OSTEOARTHRITIS            RT KNEE OA M17.11  LT KNEE OA M17.12         RT HIP OA  M16.11     LT HIP OA M16.12             Z01.812  (Pre-op examination code)      10/11/2018 4:44 PM  Mikala Ball PA-C
A) Patient is a minor ______________ years of age. B) Patient is unable to sign because_________________________________________________    The undersigned represents that he or she is duly authorized to execute to this consent for and on the behalf of the above named patient.    ________________________________             __________________________________________  Witness                                                                         Parent/Spouse/Guardian/Other:_________________    Medical Record#  Insurance  Smartphone:  Yes   Or   No  Email:                 You have signed a consent to have a total joint replacement surgery performed. Before you can proceed with surgery the following things must be done. Please use this form as a checklist.      _____   Please schedule your Physical Therapy functional evaluation. At the location on your script.    _____   Please take your lab orders and get your blood work done at a [facility].    _____  Yahir Gandara will need to go to [employer] to complete registration and the medical questionnaire prior to your physical therapy evaluation. Do not schedule an appointment with your primary care physician until you have a surgery date. This pre-op exam has to be within 30 days of the surgery. _____  CT Scans will be scheduled by our office.    _____  Information about the pre-op class will be in your surgery packet that will be mailed to you after you are scheduled for surgery. Once you have completed both the labs and the evaluation please call Nydia Law @ [phone] to let her know. Once verification of the PT Evaluation and completed labs has been determined you will be called and set up for surgery. This may take 1-2 days to check results and return your phone call.

## 2018-10-12 ENCOUNTER — OFFICE VISIT (OUTPATIENT)
Dept: FAMILY MEDICINE CLINIC | Age: 73
End: 2018-10-12
Payer: COMMERCIAL

## 2018-10-12 VITALS
WEIGHT: 196.8 LBS | HEIGHT: 66 IN | DIASTOLIC BLOOD PRESSURE: 80 MMHG | SYSTOLIC BLOOD PRESSURE: 130 MMHG | OXYGEN SATURATION: 96 % | HEART RATE: 91 BPM | BODY MASS INDEX: 31.63 KG/M2

## 2018-10-12 DIAGNOSIS — R19.5 LOOSE BOWEL MOVEMENTS: ICD-10-CM

## 2018-10-12 PROCEDURE — 99213 OFFICE O/P EST LOW 20 MIN: CPT | Performed by: FAMILY MEDICINE

## 2018-10-12 ASSESSMENT — ENCOUNTER SYMPTOMS
JAUNDICE: 0
CONSTIPATION: 0
TENESMUS: 0
HEMATEMESIS: 0
BACK PAIN: 0
BLOATING: 0
HEMATOCHEZIA: 0
DIARRHEA: 1
ABDOMINAL PAIN: 0
NAUSEA: 0
VOMITING: 0

## 2018-10-16 ENCOUNTER — CARE COORDINATION (OUTPATIENT)
Dept: FAMILY MEDICINE CLINIC | Age: 73
End: 2018-10-16

## 2018-10-16 NOTE — CARE COORDINATION
Ambulatory Care Coordination Note  10/16/2018  CM Risk Score: 1  Aquilino Mortality Risk Score: 7.07    ACC: Elli Parker, RN    Summary Note: Pt calling. Reports has had some bouts of loose stools. Discussed BRAT diet, increase fluids, and drink gatorade. Has not taken metamucil since Friday. Had 2 loose stools today. Reports plans to send Dr Selma Huang a message in my chart to update him if I can find my password. Informed to call office if unable to find password. Verbalizes understanding of same. Reports will be having a total knee replacement in January and hopes to be able to play baseball in April. Reports just wanted to call and talk with me. Goals Addressed     None          Prior to Admission medications    Medication Sig Start Date End Date Taking? Authorizing Provider   tamsulosin (FLOMAX) 0.4 MG capsule Take 1 capsule by mouth daily 6/29/18   Carter Smith MD   Naproxen Sodium (ALEVE PO) Take by mouth daily    Historical Provider, MD   pravastatin (PRAVACHOL) 40 MG tablet TAKE 1 TABLET BY MOUTH IN THE EVENING  5/11/18   Carter Smith MD   aspirin 81 MG tablet Take 81 mg by mouth daily    Historical Provider, MD   ranitidine (ZANTAC) 300 MG tablet Take 300 mg by mouth nightly    Historical Provider, MD   fluticasone (FLONASE) 50 MCG/ACT nasal spray 1 spray by Nasal route daily    Historical Provider, MD   hydrochlorothiazide (HYDRODIURIL) 25 MG tablet Take 25 mg by mouth daily    Historical Provider, MD   Cholecalciferol (VITAMIN D3) 2000 UNITS CAPS Take 5,000 Units by mouth     Historical Provider, MD   Coenzyme Q10 (CO Q-10) 200 MG CAPS Take  by mouth. Historical Provider, MD   CALCIUM LACTATE PO Take  by mouth.     Historical Provider, MD       Future Appointments  Date Time Provider Kristel Dangelo   11/1/2018 2:10 PM Alejandra Cockayne, MD [facility]

## 2018-10-25 DIAGNOSIS — M25.562 LEFT KNEE PAIN, UNSPECIFIED CHRONICITY: Primary | ICD-10-CM

## 2018-10-26 ENCOUNTER — HOSPITAL ENCOUNTER (OUTPATIENT)
Dept: PHYSICAL THERAPY | Age: 73
Setting detail: THERAPIES SERIES
Discharge: HOME | End: 2018-10-26
Payer: COMMERCIAL

## 2018-10-26 PROCEDURE — G8979 MOBILITY GOAL STATUS: HCPCS | Performed by: PHYSICAL THERAPIST

## 2018-10-26 PROCEDURE — 97110 THERAPEUTIC EXERCISES: CPT | Performed by: PHYSICAL THERAPIST

## 2018-10-26 PROCEDURE — G8978 MOBILITY CURRENT STATUS: HCPCS | Performed by: PHYSICAL THERAPIST

## 2018-10-26 PROCEDURE — G8980 MOBILITY D/C STATUS: HCPCS | Performed by: PHYSICAL THERAPIST

## 2018-10-26 PROCEDURE — 97161 PT EVAL LOW COMPLEX 20 MIN: CPT | Performed by: PHYSICAL THERAPIST

## 2018-10-26 NOTE — PLAN OF CARE
that may include: thermal agents, E-stim, Biofeedback, US, iontophoresis as indicated  [x] Patient education on joint protection, postural re-education, activity modification, progression of HEP. [x] Patient appears to be functionally prepared for surgery and will continue with a home exercise program until surgery date. [] Patient will be ready for surgery with a short period of structured physical therapy  [] Patient does not appear to be functionally ready for surgery and requires a prolonged  structure program    At this point, it appears patient's discharge status from hospital should be:   [x] Home with home exercise program  [] Home with home health care  [] Skilled nursing facility    HEP instruction: (see scanned forms)    GOALS:  Patient stated goal: prep for surgery. Therapist goals for Patient:   Short Term Goals: To be achieved in: 2 weeks  1. Independent in HEP and progression per patient tolerance, in order to prevent re-injury. 2. Patient will have a decrease in pain to facilitate improvement in movement, function, and ADLs as indicated by Functional Deficits.     Electronically signed by:  Collette Bart, PT

## 2018-10-26 NOTE — FLOWSHEET NOTE
purpose of modulating pain, promoting relaxation,  increasing ROM, reducing/eliminating soft tissue swelling/inflammation/restriction, improving soft tissue extensibility and allowing for proper ROM for normal function with self care, mobility, lifting and ambulation. Modalities:      Charges:  Timed Code Treatment Minutes: 20   Total Treatment Minutes: 50     [x] EVAL (LOW) 24825 (typically 20 minutes face-to-face)  [] EVAL (MOD) 42995 (typically 30 minutes face-to-face)  [] EVAL (HIGH) 70523 (typically 45 minutes face-to-face)  [] RE-EVAL     [x] OB(03629) x      [] IONTO  [] NMR (83064) x      [] VASO  [] Manual (23645) x       [] Other:  [] TA x       [] Mech Traction (38835)  [] ES(attended) (89103)      [] ES (un) (11710):     GOALS:   Patient stated goal: To be prepared for surgery      Therapist goals for Patient:    Short Term Goals: To be achieved in: 1 weeks  1. Independent in HEP and progression per patient tolerance, in order to prevent re-injury and to prepare for surgery by strength and flexibility therex . Progression Towards Functional goals:  [] Patient is progressing as expected towards functional goals listed. [] Progression is slowed due to complexities listed. [] Progression has been slowed due to co-morbidities.   [x] Plan just implemented, too soon to assess goals progression  [] Other:     ASSESSMENT:  See eval    Treatment/Activity Tolerance:  [x] Patient tolerated treatment well [] Patient limited by fatique  [] Patient limited by pain  [] Patient limited by other medical complications  [] Other:     Prognosis: [x] Good [] Fair  [] Poor          Patient Requires Follow-up: [x] Yes  [] No    PLAN: See eval       [] Continue per plan of care [] Alter current plan (see comments)  [x] Plan of care initiated [] Discharge     Electronically signed by: Filipe Becker PT

## 2018-10-29 RX ORDER — TAMSULOSIN HYDROCHLORIDE 0.4 MG/1
CAPSULE ORAL
Qty: 30 CAPSULE | Refills: 2 | Status: SHIPPED | OUTPATIENT
Start: 2018-10-29 | End: 2020-07-27 | Stop reason: ALTCHOICE

## 2018-11-01 ENCOUNTER — OFFICE VISIT (OUTPATIENT)
Dept: ORTHOPEDIC SURGERY | Age: 73
End: 2018-11-01

## 2018-11-01 DIAGNOSIS — M17.12 PRIMARY OSTEOARTHRITIS OF LEFT KNEE: Primary | ICD-10-CM

## 2018-11-01 PROCEDURE — 99999 PR OFFICE/OUTPT VISIT,PROCEDURE ONLY: CPT | Performed by: ORTHOPAEDIC SURGERY

## 2018-11-01 NOTE — PROGRESS NOTES
treated with NSAIDs, Steroids and Analgesics with Minimal relief for 12+ years.     Review of Systems:  Relevant review of systems reviewed and available in the patient's chart     Vital Signs: There were no vitals filed for this visit.     Body mass index is 31.01 kg/m².     Limitation in Activities of Daily Living (ADLs)  The patient is able to ambulate without the assistance of cane and walker for 6 - 10 steps  steps/feet.       Walking distance 1 to 2 blocks     Patient needs assistance with activities of daily living bathing and cooking.      [facility]: No     Safety Issues: Difficulty with daily activities and Risk of falls  Patient is at risk for falls/fall history: Yes     General Exam:   Constitutional: Patient is adequately groomed with no evidence of malnutrition  DTRs: Deep tendon reflexes are intact  Mental Status: The patient is oriented to time, place and person. The patient's mood and affect are appropriate. Lymphatic: The lymphatic examination bilaterally reveals all areas to be without enlargement or induration. Vascular: Examination reveals no swelling or calf tenderness. Peripheral pulses are palpable and 2+. Neurological: The patient has good coordination. There is no weakness or sensory deficit.     Left Knee Examination:     Inspection:  No erythema or signs of infection. Large lateral knee incision from previous surgery. Positive knee effusion. There are no cutaneous lesions     Palpation:  There is tenderness to palpation along the lateral joint line.     Range of Motion:  5 extension to 120 of active flexion. Crepitation present throughout range of motion     Strength:  4+/5 quadriceps and hamstrings     Special Tests: The knee is stable to varus valgus stressing/anterior posterior drawer.  Negative Homans test.                                  Skin: There are no rashes, ulcerations or lesions.     Gait: mildly antalgic favoring the left dystrophy and anesthetic complications that would include a stroke, cardiopulmonary pathology, and even death. We also discussed the rehabilitation process involved with this operation and options that involved not only the hospitalization but outpatient physical therapy and independent home exercise program.  The patient also realizes the need for a knee brace and ambulatory aids in the rehabilitation process as well as the very significant role that the patient plays in terms of rehabilitation after this type of operation. The patient also understands that although the patient typically functional by 6-8 weeks postop that it may take 9 months to year for full recovery.   All questions were answered.

## 2018-11-06 ENCOUNTER — TELEPHONE (OUTPATIENT)
Dept: FAMILY MEDICINE CLINIC | Age: 73
End: 2018-11-06

## 2018-11-12 ENCOUNTER — HOSPITAL ENCOUNTER (OUTPATIENT)
Age: 73
Discharge: HOME | End: 2018-11-12
Payer: COMMERCIAL

## 2018-11-12 LAB
ALBUMIN SERPL-MCNC: 4.5 G/DL (ref 3.4–5)
ANION GAP SERPL CALCULATED.3IONS-SCNC: 14 MMOL/L (ref 3–16)
APTT: 32.9 SEC (ref 26–36)
BASOPHILS ABSOLUTE: 0 K/UL (ref 0–0.2)
BASOPHILS RELATIVE PERCENT: 0.5 %
BILIRUBIN URINE: NEGATIVE
BLOOD, URINE: NEGATIVE
BUN BLDV-MCNC: 12 MG/DL (ref 7–20)
CALCIUM SERPL-MCNC: 9.7 MG/DL (ref 8.3–10.6)
CHLORIDE BLD-SCNC: 104 MMOL/L (ref 99–110)
CLARITY: CLEAR
CO2: 25 MMOL/L (ref 21–32)
COLOR: YELLOW
CREAT SERPL-MCNC: 0.9 MG/DL (ref 0.8–1.3)
EOSINOPHILS ABSOLUTE: 0.1 K/UL (ref 0–0.6)
EOSINOPHILS RELATIVE PERCENT: 1.9 %
ESTIMATED AVERAGE GLUCOSE: 108.3 MG/DL
GFR AFRICAN AMERICAN: >60
GFR NON-AFRICAN AMERICAN: >60
GLUCOSE BLD-MCNC: 98 MG/DL (ref 70–99)
GLUCOSE URINE: NEGATIVE MG/DL
HBA1C MFR BLD: 5.4 %
HCT VFR BLD CALC: 45.6 % (ref 40.5–52.5)
HEMOGLOBIN: 15.7 G/DL (ref 13.5–17.5)
INR BLD: 0.96 (ref 0.86–1.14)
KETONES, URINE: NEGATIVE MG/DL
LEUKOCYTE ESTERASE, URINE: NEGATIVE
LYMPHOCYTES ABSOLUTE: 1.4 K/UL (ref 1–5.1)
LYMPHOCYTES RELATIVE PERCENT: 31.6 %
MCH RBC QN AUTO: 29.9 PG (ref 26–34)
MCHC RBC AUTO-ENTMCNC: 34.5 G/DL (ref 31–36)
MCV RBC AUTO: 86.5 FL (ref 80–100)
MICROSCOPIC EXAMINATION: NORMAL
MONOCYTES ABSOLUTE: 0.7 K/UL (ref 0–1.3)
MONOCYTES RELATIVE PERCENT: 14.6 %
NEUTROPHILS ABSOLUTE: 2.3 K/UL (ref 1.7–7.7)
NEUTROPHILS RELATIVE PERCENT: 51.4 %
NITRITE, URINE: NEGATIVE
PDW BLD-RTO: 13 % (ref 12.4–15.4)
PH UA: 5.5
PLATELET # BLD: 203 K/UL (ref 135–450)
PMV BLD AUTO: 7.1 FL (ref 5–10.5)
POTASSIUM SERPL-SCNC: 4.8 MMOL/L (ref 3.5–5.1)
PROTEIN UA: NEGATIVE MG/DL
PROTHROMBIN TIME: 10.9 SEC (ref 9.8–13)
RBC # BLD: 5.27 M/UL (ref 4.2–5.9)
SODIUM BLD-SCNC: 143 MMOL/L (ref 136–145)
SPECIFIC GRAVITY UA: 1.01
TRANSFERRIN: 230 MG/DL (ref 200–360)
URINE TYPE: NORMAL
UROBILINOGEN, URINE: 0.2 E.U./DL
WBC # BLD: 4.5 K/UL (ref 4–11)

## 2018-11-12 PROCEDURE — 84466 ASSAY OF TRANSFERRIN: CPT

## 2018-11-12 PROCEDURE — 83036 HEMOGLOBIN GLYCOSYLATED A1C: CPT

## 2018-11-12 PROCEDURE — 85025 COMPLETE CBC W/AUTO DIFF WBC: CPT

## 2018-11-12 PROCEDURE — 85730 THROMBOPLASTIN TIME PARTIAL: CPT

## 2018-11-12 PROCEDURE — 85610 PROTHROMBIN TIME: CPT

## 2018-11-12 PROCEDURE — 87086 URINE CULTURE/COLONY COUNT: CPT

## 2018-11-12 PROCEDURE — 81003 URINALYSIS AUTO W/O SCOPE: CPT

## 2018-11-12 PROCEDURE — 80048 BASIC METABOLIC PNL TOTAL CA: CPT

## 2018-11-12 PROCEDURE — 82040 ASSAY OF SERUM ALBUMIN: CPT

## 2018-11-12 PROCEDURE — 36415 COLL VENOUS BLD VENIPUNCTURE: CPT

## 2018-11-12 RX ORDER — PRAVASTATIN SODIUM 40 MG
TABLET ORAL
Qty: 90 TABLET | Refills: 0 | Status: SHIPPED | OUTPATIENT
Start: 2018-11-12 | End: 2019-02-17

## 2018-11-12 RX ORDER — PRAVASTATIN SODIUM 40 MG
TABLET ORAL
Qty: 90 TABLET | Refills: 0 | Status: SHIPPED | OUTPATIENT
Start: 2018-11-12 | End: 2018-11-12

## 2018-11-13 LAB — URINE CULTURE, ROUTINE: NORMAL

## 2019-01-02 ENCOUNTER — OFFICE VISIT (OUTPATIENT)
Dept: FAMILY MEDICINE CLINIC | Age: 74
End: 2019-01-02
Payer: COMMERCIAL

## 2019-01-02 VITALS
SYSTOLIC BLOOD PRESSURE: 126 MMHG | BODY MASS INDEX: 31.98 KG/M2 | RESPIRATION RATE: 14 BRPM | DIASTOLIC BLOOD PRESSURE: 74 MMHG | WEIGHT: 199 LBS | HEIGHT: 66 IN | HEART RATE: 84 BPM | OXYGEN SATURATION: 96 %

## 2019-01-02 DIAGNOSIS — M17.12 PRIMARY OSTEOARTHRITIS OF LEFT KNEE: ICD-10-CM

## 2019-01-02 DIAGNOSIS — Z01.818 PRE-OP EXAM: ICD-10-CM

## 2019-01-02 DIAGNOSIS — I70.0 ATHEROSCLEROSIS OF AORTA (HCC): ICD-10-CM

## 2019-01-02 PROCEDURE — 99214 OFFICE O/P EST MOD 30 MIN: CPT | Performed by: FAMILY MEDICINE

## 2019-01-02 PROCEDURE — 93000 ELECTROCARDIOGRAM COMPLETE: CPT | Performed by: FAMILY MEDICINE

## 2019-01-04 ENCOUNTER — TELEPHONE (OUTPATIENT)
Dept: ORTHOPEDIC SURGERY | Age: 74
End: 2019-01-04

## 2019-01-07 ENCOUNTER — TELEPHONE (OUTPATIENT)
Dept: ORTHOPEDIC SURGERY | Age: 74
End: 2019-01-07

## 2019-01-08 ENCOUNTER — HOSPITAL ENCOUNTER (OUTPATIENT)
Dept: CT IMAGING | Age: 74
Discharge: HOME | End: 2019-01-08
Payer: COMMERCIAL

## 2019-01-08 ENCOUNTER — TELEPHONE (OUTPATIENT)
Dept: ORTHOPEDIC SURGERY | Age: 74
End: 2019-01-08

## 2019-01-08 DIAGNOSIS — M25.562 LEFT KNEE PAIN, UNSPECIFIED CHRONICITY: ICD-10-CM

## 2019-01-08 PROCEDURE — 73700 CT LOWER EXTREMITY W/O DYE: CPT

## 2019-01-10 ENCOUNTER — TELEPHONE (OUTPATIENT)
Dept: ORTHOPEDIC SURGERY | Age: 74
End: 2019-01-10

## 2019-01-21 ENCOUNTER — ANESTHESIA EVENT (OUTPATIENT)
Dept: OPERATING ROOM | Age: 74
DRG: 470 | End: 2019-01-21
Payer: COMMERCIAL

## 2019-01-22 ENCOUNTER — HOSPITAL ENCOUNTER (INPATIENT)
Age: 74
LOS: 2 days | Discharge: HOME | DRG: 470 | End: 2019-01-24
Attending: ORTHOPAEDIC SURGERY | Admitting: ORTHOPAEDIC SURGERY
Payer: COMMERCIAL

## 2019-01-22 ENCOUNTER — ANESTHESIA (OUTPATIENT)
Dept: OPERATING ROOM | Age: 74
DRG: 470 | End: 2019-01-22
Payer: COMMERCIAL

## 2019-01-22 VITALS — OXYGEN SATURATION: 98 % | SYSTOLIC BLOOD PRESSURE: 110 MMHG | DIASTOLIC BLOOD PRESSURE: 50 MMHG

## 2019-01-22 DIAGNOSIS — Z96.652 STATUS POST TOTAL LEFT KNEE REPLACEMENT: Primary | ICD-10-CM

## 2019-01-22 DIAGNOSIS — M17.12 PRIMARY OSTEOARTHRITIS OF LEFT KNEE: ICD-10-CM

## 2019-01-22 LAB
ABO/RH: NORMAL
ANION GAP SERPL CALCULATED.3IONS-SCNC: 11 MMOL/L (ref 3–16)
ANTIBODY SCREEN: NORMAL
BUN BLDV-MCNC: 11 MG/DL (ref 7–20)
CALCIUM SERPL-MCNC: 9 MG/DL (ref 8.3–10.6)
CHLORIDE BLD-SCNC: 103 MMOL/L (ref 99–110)
CO2: 24 MMOL/L (ref 21–32)
CREAT SERPL-MCNC: 0.7 MG/DL (ref 0.8–1.3)
GFR AFRICAN AMERICAN: >60
GFR NON-AFRICAN AMERICAN: >60
GLUCOSE BLD-MCNC: 106 MG/DL (ref 70–99)
GLUCOSE BLD-MCNC: 99 MG/DL (ref 70–99)
HCT VFR BLD CALC: 46.8 % (ref 40.5–52.5)
HEMOGLOBIN: 15.9 G/DL (ref 13.5–17.5)
MCH RBC QN AUTO: 29.9 PG (ref 26–34)
MCHC RBC AUTO-ENTMCNC: 34 G/DL (ref 31–36)
MCV RBC AUTO: 88 FL (ref 80–100)
PDW BLD-RTO: 13.1 % (ref 12.4–15.4)
PERFORMED ON: NORMAL
PLATELET # BLD: 212 K/UL (ref 135–450)
PMV BLD AUTO: 7 FL (ref 5–10.5)
POTASSIUM REFLEX MAGNESIUM: 4.5 MMOL/L (ref 3.5–5.1)
RBC # BLD: 5.32 M/UL (ref 4.2–5.9)
SODIUM BLD-SCNC: 138 MMOL/L (ref 136–145)
WBC # BLD: 4.5 K/UL (ref 4–11)

## 2019-01-22 PROCEDURE — S2900 ROBOTIC SURGICAL SYSTEM: HCPCS | Performed by: ORTHOPAEDIC SURGERY

## 2019-01-22 PROCEDURE — 0SRD0J9 REPLACEMENT OF LEFT KNEE JOINT WITH SYNTHETIC SUBSTITUTE, CEMENTED, OPEN APPROACH: ICD-10-PCS | Performed by: ORTHOPAEDIC SURGERY

## 2019-01-22 PROCEDURE — 6360000002 HC RX W HCPCS: Performed by: ORTHOPAEDIC SURGERY

## 2019-01-22 PROCEDURE — 80048 BASIC METABOLIC PNL TOTAL CA: CPT

## 2019-01-22 PROCEDURE — 6370000000 HC RX 637 (ALT 250 FOR IP): Performed by: INTERNAL MEDICINE

## 2019-01-22 PROCEDURE — 6360000002 HC RX W HCPCS: Performed by: NURSE ANESTHETIST, CERTIFIED REGISTERED

## 2019-01-22 PROCEDURE — 6370000000 HC RX 637 (ALT 250 FOR IP): Performed by: PHYSICIAN ASSISTANT

## 2019-01-22 PROCEDURE — 6360000002 HC RX W HCPCS: Performed by: PHYSICIAN ASSISTANT

## 2019-01-22 PROCEDURE — 88311 DECALCIFY TISSUE: CPT

## 2019-01-22 PROCEDURE — 1200000000 HC SEMI PRIVATE

## 2019-01-22 PROCEDURE — 85027 COMPLETE CBC AUTOMATED: CPT

## 2019-01-22 PROCEDURE — 6370000000 HC RX 637 (ALT 250 FOR IP): Performed by: ANESTHESIOLOGY

## 2019-01-22 PROCEDURE — C1776 JOINT DEVICE (IMPLANTABLE): HCPCS | Performed by: ORTHOPAEDIC SURGERY

## 2019-01-22 PROCEDURE — 6360000002 HC RX W HCPCS: Performed by: ANESTHESIOLOGY

## 2019-01-22 PROCEDURE — 7100000000 HC PACU RECOVERY - FIRST 15 MIN: Performed by: ORTHOPAEDIC SURGERY

## 2019-01-22 PROCEDURE — 86901 BLOOD TYPING SEROLOGIC RH(D): CPT

## 2019-01-22 PROCEDURE — 3600000019 HC SURGERY ROBOT ADDTL 15MIN: Performed by: ORTHOPAEDIC SURGERY

## 2019-01-22 PROCEDURE — 64999 UNLISTED PX NERVOUS SYSTEM: CPT | Performed by: ANESTHESIOLOGY

## 2019-01-22 PROCEDURE — 8E0Y0CZ ROBOTIC ASSISTED PROCEDURE OF LOWER EXTREMITY, OPEN APPROACH: ICD-10-PCS | Performed by: ORTHOPAEDIC SURGERY

## 2019-01-22 PROCEDURE — 7100000001 HC PACU RECOVERY - ADDTL 15 MIN: Performed by: ORTHOPAEDIC SURGERY

## 2019-01-22 PROCEDURE — 64447 NJX AA&/STRD FEMORAL NRV IMG: CPT | Performed by: ANESTHESIOLOGY

## 2019-01-22 PROCEDURE — 88305 TISSUE EXAM BY PATHOLOGIST: CPT

## 2019-01-22 PROCEDURE — 2720000010 HC SURG SUPPLY STERILE: Performed by: ORTHOPAEDIC SURGERY

## 2019-01-22 PROCEDURE — 2500000003 HC RX 250 WO HCPCS: Performed by: ORTHOPAEDIC SURGERY

## 2019-01-22 PROCEDURE — 3600000009 HC SURGERY ROBOT BASE: Performed by: ORTHOPAEDIC SURGERY

## 2019-01-22 PROCEDURE — C9290 INJ, BUPIVACAINE LIPOSOME: HCPCS | Performed by: ORTHOPAEDIC SURGERY

## 2019-01-22 PROCEDURE — 3700000001 HC ADD 15 MINUTES (ANESTHESIA): Performed by: ORTHOPAEDIC SURGERY

## 2019-01-22 PROCEDURE — 3700000000 HC ANESTHESIA ATTENDED CARE: Performed by: ORTHOPAEDIC SURGERY

## 2019-01-22 PROCEDURE — C1713 ANCHOR/SCREW BN/BN,TIS/BN: HCPCS | Performed by: ORTHOPAEDIC SURGERY

## 2019-01-22 PROCEDURE — 2580000003 HC RX 258: Performed by: ORTHOPAEDIC SURGERY

## 2019-01-22 PROCEDURE — 86850 RBC ANTIBODY SCREEN: CPT

## 2019-01-22 PROCEDURE — 2580000003 HC RX 258: Performed by: PHYSICIAN ASSISTANT

## 2019-01-22 PROCEDURE — 6370000000 HC RX 637 (ALT 250 FOR IP): Performed by: ORTHOPAEDIC SURGERY

## 2019-01-22 PROCEDURE — 2580000003 HC RX 258: Performed by: ANESTHESIOLOGY

## 2019-01-22 PROCEDURE — 86900 BLOOD TYPING SEROLOGIC ABO: CPT

## 2019-01-22 PROCEDURE — 2709999900 HC NON-CHARGEABLE SUPPLY: Performed by: ORTHOPAEDIC SURGERY

## 2019-01-22 PROCEDURE — 3E0T3BZ INTRODUCTION OF ANESTHETIC AGENT INTO PERIPHERAL NERVES AND PLEXI, PERCUTANEOUS APPROACH: ICD-10-PCS | Performed by: ANESTHESIOLOGY

## 2019-01-22 DEVICE — BASEPLATE TIB SZ 5 KNEE TRITANIUM CEM PRI LO PROF TRIATHLON: Type: IMPLANTABLE DEVICE | Site: KNEE | Status: FUNCTIONAL

## 2019-01-22 DEVICE — CEMENT BNE 40 GM RADIOPAQUE BA SIMPLEX P: Type: IMPLANTABLE DEVICE | Site: KNEE | Status: FUNCTIONAL

## 2019-01-22 DEVICE — COMPONENT PAT DIA33MM THK9MM KNEE X3 SYMMETRIC TRIATHLON: Type: IMPLANTABLE DEVICE | Site: KNEE | Status: FUNCTIONAL

## 2019-01-22 DEVICE — IMPLANTABLE DEVICE: Type: IMPLANTABLE DEVICE | Site: KNEE | Status: FUNCTIONAL

## 2019-01-22 DEVICE — INSERT TIB SZ 5 THK9MM KNEE X3 CNDYL STBL BEAR TECHNOLOGY: Type: IMPLANTABLE DEVICE | Site: KNEE | Status: FUNCTIONAL

## 2019-01-22 RX ORDER — TAMSULOSIN HYDROCHLORIDE 0.4 MG/1
0.4 CAPSULE ORAL DAILY
Status: DISCONTINUED | OUTPATIENT
Start: 2019-01-22 | End: 2019-01-24 | Stop reason: HOSPADM

## 2019-01-22 RX ORDER — FLUTICASONE PROPIONATE 50 MCG
1 SPRAY, SUSPENSION (ML) NASAL DAILY PRN
Status: DISCONTINUED | OUTPATIENT
Start: 2019-01-22 | End: 2019-01-24 | Stop reason: HOSPADM

## 2019-01-22 RX ORDER — KETOROLAC TROMETHAMINE 30 MG/ML
15 INJECTION, SOLUTION INTRAMUSCULAR; INTRAVENOUS EVERY 6 HOURS PRN
Status: DISCONTINUED | OUTPATIENT
Start: 2019-01-22 | End: 2019-01-24 | Stop reason: HOSPADM

## 2019-01-22 RX ORDER — HYDRALAZINE HYDROCHLORIDE 20 MG/ML
5 INJECTION INTRAMUSCULAR; INTRAVENOUS EVERY 10 MIN PRN
Status: DISCONTINUED | OUTPATIENT
Start: 2019-01-22 | End: 2019-01-22 | Stop reason: HOSPADM

## 2019-01-22 RX ORDER — CYCLOBENZAPRINE HCL 10 MG
10 TABLET ORAL 3 TIMES DAILY PRN
Status: DISCONTINUED | OUTPATIENT
Start: 2019-01-22 | End: 2019-01-24 | Stop reason: HOSPADM

## 2019-01-22 RX ORDER — SODIUM CHLORIDE 0.9 % (FLUSH) 0.9 %
10 SYRINGE (ML) INJECTION PRN
Status: DISCONTINUED | OUTPATIENT
Start: 2019-01-22 | End: 2019-01-24 | Stop reason: HOSPADM

## 2019-01-22 RX ORDER — MEPERIDINE HYDROCHLORIDE 50 MG/ML
12.5 INJECTION INTRAMUSCULAR; INTRAVENOUS; SUBCUTANEOUS EVERY 5 MIN PRN
Status: DISCONTINUED | OUTPATIENT
Start: 2019-01-22 | End: 2019-01-22 | Stop reason: HOSPADM

## 2019-01-22 RX ORDER — OXYCODONE HYDROCHLORIDE AND ACETAMINOPHEN 5; 325 MG/1; MG/1
1 TABLET ORAL PRN
Status: DISCONTINUED | OUTPATIENT
Start: 2019-01-22 | End: 2019-01-22 | Stop reason: HOSPADM

## 2019-01-22 RX ORDER — SODIUM CHLORIDE, SODIUM LACTATE, POTASSIUM CHLORIDE, CALCIUM CHLORIDE 600; 310; 30; 20 MG/100ML; MG/100ML; MG/100ML; MG/100ML
INJECTION, SOLUTION INTRAVENOUS CONTINUOUS
Status: DISCONTINUED | OUTPATIENT
Start: 2019-01-22 | End: 2019-01-24 | Stop reason: HOSPADM

## 2019-01-22 RX ORDER — PROPOFOL 10 MG/ML
INJECTION, EMULSION INTRAVENOUS CONTINUOUS PRN
Status: DISCONTINUED | OUTPATIENT
Start: 2019-01-22 | End: 2019-01-22 | Stop reason: SDUPTHER

## 2019-01-22 RX ORDER — CYCLOBENZAPRINE HCL 10 MG
5 TABLET ORAL ONCE
Status: COMPLETED | OUTPATIENT
Start: 2019-01-22 | End: 2019-01-22

## 2019-01-22 RX ORDER — MIDAZOLAM HYDROCHLORIDE 1 MG/ML
INJECTION INTRAMUSCULAR; INTRAVENOUS PRN
Status: DISCONTINUED | OUTPATIENT
Start: 2019-01-22 | End: 2019-01-22 | Stop reason: SDUPTHER

## 2019-01-22 RX ORDER — SENNA AND DOCUSATE SODIUM 50; 8.6 MG/1; MG/1
1 TABLET, FILM COATED ORAL DAILY
Status: DISCONTINUED | OUTPATIENT
Start: 2019-01-22 | End: 2019-01-24 | Stop reason: HOSPADM

## 2019-01-22 RX ORDER — MORPHINE SULFATE 4 MG/ML
4 INJECTION, SOLUTION INTRAMUSCULAR; INTRAVENOUS
Status: DISCONTINUED | OUTPATIENT
Start: 2019-01-22 | End: 2019-01-24 | Stop reason: HOSPADM

## 2019-01-22 RX ORDER — SODIUM CHLORIDE, SODIUM LACTATE, POTASSIUM CHLORIDE, CALCIUM CHLORIDE 600; 310; 30; 20 MG/100ML; MG/100ML; MG/100ML; MG/100ML
INJECTION, SOLUTION INTRAVENOUS CONTINUOUS
Status: DISCONTINUED | OUTPATIENT
Start: 2019-01-22 | End: 2019-01-22

## 2019-01-22 RX ORDER — SODIUM CHLORIDE 0.9 % (FLUSH) 0.9 %
10 SYRINGE (ML) INJECTION EVERY 12 HOURS SCHEDULED
Status: DISCONTINUED | OUTPATIENT
Start: 2019-01-22 | End: 2019-01-24 | Stop reason: HOSPADM

## 2019-01-22 RX ORDER — HYDROMORPHONE HCL 110MG/55ML
PATIENT CONTROLLED ANALGESIA SYRINGE INTRAVENOUS
Status: DISPENSED
Start: 2019-01-22 | End: 2019-01-23

## 2019-01-22 RX ORDER — OXYCODONE HYDROCHLORIDE AND ACETAMINOPHEN 5; 325 MG/1; MG/1
2 TABLET ORAL PRN
Status: DISCONTINUED | OUTPATIENT
Start: 2019-01-22 | End: 2019-01-22 | Stop reason: HOSPADM

## 2019-01-22 RX ORDER — NICOTINE POLACRILEX 4 MG
15 LOZENGE BUCCAL PRN
Status: DISCONTINUED | OUTPATIENT
Start: 2019-01-22 | End: 2019-01-24 | Stop reason: HOSPADM

## 2019-01-22 RX ORDER — LABETALOL HYDROCHLORIDE 5 MG/ML
5 INJECTION, SOLUTION INTRAVENOUS EVERY 10 MIN PRN
Status: DISCONTINUED | OUTPATIENT
Start: 2019-01-22 | End: 2019-01-22 | Stop reason: HOSPADM

## 2019-01-22 RX ORDER — OXYCODONE HYDROCHLORIDE AND ACETAMINOPHEN 5; 325 MG/1; MG/1
2 TABLET ORAL EVERY 4 HOURS PRN
Status: DISCONTINUED | OUTPATIENT
Start: 2019-01-22 | End: 2019-01-24 | Stop reason: HOSPADM

## 2019-01-22 RX ORDER — FAMOTIDINE 20 MG/1
40 TABLET, FILM COATED ORAL EVERY EVENING
Status: DISCONTINUED | OUTPATIENT
Start: 2019-01-22 | End: 2019-01-24 | Stop reason: HOSPADM

## 2019-01-22 RX ORDER — DOCUSATE SODIUM 100 MG/1
100 CAPSULE, LIQUID FILLED ORAL 2 TIMES DAILY
Status: DISCONTINUED | OUTPATIENT
Start: 2019-01-22 | End: 2019-01-24 | Stop reason: HOSPADM

## 2019-01-22 RX ORDER — HYDROCHLOROTHIAZIDE 25 MG/1
25 TABLET ORAL DAILY
Status: DISCONTINUED | OUTPATIENT
Start: 2019-01-22 | End: 2019-01-24 | Stop reason: HOSPADM

## 2019-01-22 RX ORDER — PRAVASTATIN SODIUM 40 MG
40 TABLET ORAL NIGHTLY
Status: DISCONTINUED | OUTPATIENT
Start: 2019-01-22 | End: 2019-01-24 | Stop reason: HOSPADM

## 2019-01-22 RX ORDER — DEXTROSE MONOHYDRATE 25 G/50ML
12.5 INJECTION, SOLUTION INTRAVENOUS PRN
Status: DISCONTINUED | OUTPATIENT
Start: 2019-01-22 | End: 2019-01-24 | Stop reason: HOSPADM

## 2019-01-22 RX ORDER — ONDANSETRON 2 MG/ML
4 INJECTION INTRAMUSCULAR; INTRAVENOUS EVERY 6 HOURS PRN
Status: DISCONTINUED | OUTPATIENT
Start: 2019-01-22 | End: 2019-01-24 | Stop reason: HOSPADM

## 2019-01-22 RX ORDER — ACETAMINOPHEN 325 MG/1
650 TABLET ORAL EVERY 4 HOURS PRN
Status: DISCONTINUED | OUTPATIENT
Start: 2019-01-22 | End: 2019-01-24 | Stop reason: HOSPADM

## 2019-01-22 RX ORDER — ACETAMINOPHEN 500 MG
1000 TABLET ORAL ONCE
Status: COMPLETED | OUTPATIENT
Start: 2019-01-22 | End: 2019-01-22

## 2019-01-22 RX ORDER — FAMOTIDINE 20 MG/1
20 TABLET, FILM COATED ORAL 2 TIMES DAILY
Status: DISCONTINUED | OUTPATIENT
Start: 2019-01-22 | End: 2019-01-22 | Stop reason: SDUPTHER

## 2019-01-22 RX ORDER — ONDANSETRON 2 MG/ML
4 INJECTION INTRAMUSCULAR; INTRAVENOUS EVERY 10 MIN PRN
Status: DISCONTINUED | OUTPATIENT
Start: 2019-01-22 | End: 2019-01-22 | Stop reason: HOSPADM

## 2019-01-22 RX ORDER — MORPHINE SULFATE 2 MG/ML
2 INJECTION, SOLUTION INTRAMUSCULAR; INTRAVENOUS
Status: DISCONTINUED | OUTPATIENT
Start: 2019-01-22 | End: 2019-01-24 | Stop reason: HOSPADM

## 2019-01-22 RX ORDER — OXYCODONE HYDROCHLORIDE AND ACETAMINOPHEN 5; 325 MG/1; MG/1
1 TABLET ORAL EVERY 4 HOURS PRN
Status: DISCONTINUED | OUTPATIENT
Start: 2019-01-22 | End: 2019-01-24 | Stop reason: HOSPADM

## 2019-01-22 RX ORDER — DEXTROSE MONOHYDRATE 50 MG/ML
100 INJECTION, SOLUTION INTRAVENOUS PRN
Status: DISCONTINUED | OUTPATIENT
Start: 2019-01-22 | End: 2019-01-24 | Stop reason: HOSPADM

## 2019-01-22 RX ADMIN — MUPIROCIN: 20 OINTMENT TOPICAL at 10:23

## 2019-01-22 RX ADMIN — Medication 2 G: at 19:26

## 2019-01-22 RX ADMIN — DOCUSATE SODIUM 100 MG: 100 CAPSULE, LIQUID FILLED ORAL at 21:19

## 2019-01-22 RX ADMIN — Medication 2 G: at 11:38

## 2019-01-22 RX ADMIN — CYCLOBENZAPRINE HYDROCHLORIDE 5 MG: 10 TABLET, FILM COATED ORAL at 10:22

## 2019-01-22 RX ADMIN — MAGNESIUM GLUCONATE 500 MG ORAL TABLET 400 MG: 500 TABLET ORAL at 21:19

## 2019-01-22 RX ADMIN — SODIUM CHLORIDE, POTASSIUM CHLORIDE, SODIUM LACTATE AND CALCIUM CHLORIDE: 600; 310; 30; 20 INJECTION, SOLUTION INTRAVENOUS at 16:41

## 2019-01-22 RX ADMIN — TAMSULOSIN HYDROCHLORIDE 0.4 MG: 0.4 CAPSULE ORAL at 16:41

## 2019-01-22 RX ADMIN — SODIUM CHLORIDE, POTASSIUM CHLORIDE, SODIUM LACTATE AND CALCIUM CHLORIDE: 600; 310; 30; 20 INJECTION, SOLUTION INTRAVENOUS at 10:54

## 2019-01-22 RX ADMIN — HYDROMORPHONE HYDROCHLORIDE 0.5 MG: 1 INJECTION, SOLUTION INTRAMUSCULAR; INTRAVENOUS; SUBCUTANEOUS at 14:45

## 2019-01-22 RX ADMIN — ACETAMINOPHEN 1000 MG: 500 TABLET ORAL at 10:23

## 2019-01-22 RX ADMIN — OXYCODONE AND ACETAMINOPHEN 2 TABLET: 5; 325 TABLET ORAL at 18:07

## 2019-01-22 RX ADMIN — MIDAZOLAM HYDROCHLORIDE 1 MG: 2 INJECTION, SOLUTION INTRAMUSCULAR; INTRAVENOUS at 11:41

## 2019-01-22 RX ADMIN — KETOROLAC TROMETHAMINE 15 MG: 30 INJECTION, SOLUTION INTRAMUSCULAR; INTRAVENOUS at 19:22

## 2019-01-22 RX ADMIN — PRAVASTATIN SODIUM 40 MG: 40 TABLET ORAL at 21:19

## 2019-01-22 RX ADMIN — SODIUM CHLORIDE, POTASSIUM CHLORIDE, SODIUM LACTATE AND CALCIUM CHLORIDE: 600; 310; 30; 20 INJECTION, SOLUTION INTRAVENOUS at 10:23

## 2019-01-22 RX ADMIN — STANDARDIZED SENNA CONCENTRATE AND DOCUSATE SODIUM 1 TABLET: 8.6; 5 TABLET, FILM COATED ORAL at 18:07

## 2019-01-22 RX ADMIN — FAMOTIDINE 40 MG: 20 TABLET ORAL at 18:07

## 2019-01-22 RX ADMIN — SODIUM CHLORIDE, POTASSIUM CHLORIDE, SODIUM LACTATE AND CALCIUM CHLORIDE: 600; 310; 30; 20 INJECTION, SOLUTION INTRAVENOUS at 12:46

## 2019-01-22 RX ADMIN — MIDAZOLAM HYDROCHLORIDE 1 MG: 2 INJECTION, SOLUTION INTRAMUSCULAR; INTRAVENOUS at 11:35

## 2019-01-22 RX ADMIN — PROPOFOL 50 MCG/KG/MIN: 10 INJECTION, EMULSION INTRAVENOUS at 11:57

## 2019-01-22 ASSESSMENT — PULMONARY FUNCTION TESTS
PIF_VALUE: 1
PIF_VALUE: 0
PIF_VALUE: 1
PIF_VALUE: 0
PIF_VALUE: 1
PIF_VALUE: 0
PIF_VALUE: 0
PIF_VALUE: 1
PIF_VALUE: 0
PIF_VALUE: 1
PIF_VALUE: 1
PIF_VALUE: 0
PIF_VALUE: 1
PIF_VALUE: 0
PIF_VALUE: 1
PIF_VALUE: 0
PIF_VALUE: 1
PIF_VALUE: 0
PIF_VALUE: 1
PIF_VALUE: 0
PIF_VALUE: 1
PIF_VALUE: 0
PIF_VALUE: 1
PIF_VALUE: 0
PIF_VALUE: 1
PIF_VALUE: 0
PIF_VALUE: 1
PIF_VALUE: 0
PIF_VALUE: 0
PIF_VALUE: 1
PIF_VALUE: 0
PIF_VALUE: 1
PIF_VALUE: 0
PIF_VALUE: 1
PIF_VALUE: 0
PIF_VALUE: 1
PIF_VALUE: 0
PIF_VALUE: 1
PIF_VALUE: 0
PIF_VALUE: 1
PIF_VALUE: 2
PIF_VALUE: 0
PIF_VALUE: 1
PIF_VALUE: 0
PIF_VALUE: 1
PIF_VALUE: 0
PIF_VALUE: 1
PIF_VALUE: 1
PIF_VALUE: 0
PIF_VALUE: 0
PIF_VALUE: 1
PIF_VALUE: 0
PIF_VALUE: 0

## 2019-01-22 ASSESSMENT — PAIN SCALES - GENERAL
PAINLEVEL_OUTOF10: 6
PAINLEVEL_OUTOF10: 2
PAINLEVEL_OUTOF10: 0
PAINLEVEL_OUTOF10: 10
PAINLEVEL_OUTOF10: 9

## 2019-01-22 ASSESSMENT — PAIN - FUNCTIONAL ASSESSMENT: PAIN_FUNCTIONAL_ASSESSMENT: 0-10

## 2019-01-22 ASSESSMENT — PAIN DESCRIPTION - PAIN TYPE: TYPE: SURGICAL PAIN

## 2019-01-22 ASSESSMENT — PAIN DESCRIPTION - ORIENTATION: ORIENTATION: LEFT

## 2019-01-22 ASSESSMENT — PAIN DESCRIPTION - DESCRIPTORS: DESCRIPTORS: DISCOMFORT

## 2019-01-22 ASSESSMENT — PAIN DESCRIPTION - LOCATION: LOCATION: KNEE

## 2019-01-23 PROBLEM — Z96.652 STATUS POST TOTAL LEFT KNEE REPLACEMENT: Status: ACTIVE | Noted: 2019-01-23

## 2019-01-23 LAB
ANION GAP SERPL CALCULATED.3IONS-SCNC: 9 MMOL/L (ref 3–16)
BASOPHILS ABSOLUTE: 0 K/UL (ref 0–0.2)
BASOPHILS RELATIVE PERCENT: 0.4 %
BUN BLDV-MCNC: 15 MG/DL (ref 7–20)
CALCIUM SERPL-MCNC: 7.9 MG/DL (ref 8.3–10.6)
CHLORIDE BLD-SCNC: 102 MMOL/L (ref 99–110)
CO2: 24 MMOL/L (ref 21–32)
CREAT SERPL-MCNC: 0.8 MG/DL (ref 0.8–1.3)
EOSINOPHILS ABSOLUTE: 0 K/UL (ref 0–0.6)
EOSINOPHILS RELATIVE PERCENT: 0.4 %
GFR AFRICAN AMERICAN: >60
GFR NON-AFRICAN AMERICAN: >60
GLUCOSE BLD-MCNC: 111 MG/DL (ref 70–99)
GLUCOSE BLD-MCNC: 112 MG/DL (ref 70–99)
GLUCOSE BLD-MCNC: 137 MG/DL (ref 70–99)
GLUCOSE BLD-MCNC: 139 MG/DL (ref 70–99)
GLUCOSE BLD-MCNC: 141 MG/DL (ref 70–99)
GLUCOSE BLD-MCNC: 144 MG/DL (ref 70–99)
GLUCOSE BLD-MCNC: 161 MG/DL (ref 70–99)
HCT VFR BLD CALC: 34.7 % (ref 40.5–52.5)
HEMOGLOBIN: 12 G/DL (ref 13.5–17.5)
LYMPHOCYTES ABSOLUTE: 1.2 K/UL (ref 1–5.1)
LYMPHOCYTES RELATIVE PERCENT: 17.7 %
MCH RBC QN AUTO: 30.3 PG (ref 26–34)
MCHC RBC AUTO-ENTMCNC: 34.6 G/DL (ref 31–36)
MCV RBC AUTO: 87.5 FL (ref 80–100)
MONOCYTES ABSOLUTE: 1.1 K/UL (ref 0–1.3)
MONOCYTES RELATIVE PERCENT: 17.3 %
NEUTROPHILS ABSOLUTE: 4.2 K/UL (ref 1.7–7.7)
NEUTROPHILS RELATIVE PERCENT: 64.2 %
PDW BLD-RTO: 12.9 % (ref 12.4–15.4)
PERFORMED ON: ABNORMAL
PLATELET # BLD: 160 K/UL (ref 135–450)
PMV BLD AUTO: 6.9 FL (ref 5–10.5)
POTASSIUM REFLEX MAGNESIUM: 4 MMOL/L (ref 3.5–5.1)
RBC # BLD: 3.96 M/UL (ref 4.2–5.9)
SODIUM BLD-SCNC: 135 MMOL/L (ref 136–145)
WBC # BLD: 6.6 K/UL (ref 4–11)

## 2019-01-23 PROCEDURE — 97530 THERAPEUTIC ACTIVITIES: CPT

## 2019-01-23 PROCEDURE — 96374 THER/PROPH/DIAG INJ IV PUSH: CPT

## 2019-01-23 PROCEDURE — 36415 COLL VENOUS BLD VENIPUNCTURE: CPT

## 2019-01-23 PROCEDURE — 85025 COMPLETE CBC W/AUTO DIFF WBC: CPT

## 2019-01-23 PROCEDURE — 2580000003 HC RX 258: Performed by: PHYSICIAN ASSISTANT

## 2019-01-23 PROCEDURE — 97161 PT EVAL LOW COMPLEX 20 MIN: CPT

## 2019-01-23 PROCEDURE — 97116 GAIT TRAINING THERAPY: CPT

## 2019-01-23 PROCEDURE — 97110 THERAPEUTIC EXERCISES: CPT

## 2019-01-23 PROCEDURE — 6370000000 HC RX 637 (ALT 250 FOR IP): Performed by: PHYSICIAN ASSISTANT

## 2019-01-23 PROCEDURE — 1200000000 HC SEMI PRIVATE

## 2019-01-23 PROCEDURE — APPSS15 APP SPLIT SHARED TIME 0-15 MINUTES: Performed by: PHYSICIAN ASSISTANT

## 2019-01-23 PROCEDURE — 97165 OT EVAL LOW COMPLEX 30 MIN: CPT

## 2019-01-23 PROCEDURE — 97535 SELF CARE MNGMENT TRAINING: CPT

## 2019-01-23 PROCEDURE — 80048 BASIC METABOLIC PNL TOTAL CA: CPT

## 2019-01-23 PROCEDURE — 6370000000 HC RX 637 (ALT 250 FOR IP): Performed by: ANESTHESIOLOGY

## 2019-01-23 PROCEDURE — 96372 THER/PROPH/DIAG INJ SC/IM: CPT

## 2019-01-23 PROCEDURE — 6360000002 HC RX W HCPCS: Performed by: PHYSICIAN ASSISTANT

## 2019-01-23 PROCEDURE — 6370000000 HC RX 637 (ALT 250 FOR IP): Performed by: INTERNAL MEDICINE

## 2019-01-23 RX ADMIN — SODIUM CHLORIDE, POTASSIUM CHLORIDE, SODIUM LACTATE AND CALCIUM CHLORIDE: 600; 310; 30; 20 INJECTION, SOLUTION INTRAVENOUS at 03:00

## 2019-01-23 RX ADMIN — TAMSULOSIN HYDROCHLORIDE 0.4 MG: 0.4 CAPSULE ORAL at 07:54

## 2019-01-23 RX ADMIN — Medication 10 ML: at 20:09

## 2019-01-23 RX ADMIN — DOCUSATE SODIUM 100 MG: 100 CAPSULE, LIQUID FILLED ORAL at 07:54

## 2019-01-23 RX ADMIN — DOCUSATE SODIUM 100 MG: 100 CAPSULE, LIQUID FILLED ORAL at 20:08

## 2019-01-23 RX ADMIN — MAGNESIUM GLUCONATE 500 MG ORAL TABLET 400 MG: 500 TABLET ORAL at 20:08

## 2019-01-23 RX ADMIN — OXYCODONE AND ACETAMINOPHEN 2 TABLET: 5; 325 TABLET ORAL at 20:08

## 2019-01-23 RX ADMIN — ENOXAPARIN SODIUM 30 MG: 30 INJECTION SUBCUTANEOUS at 07:54

## 2019-01-23 RX ADMIN — INSULIN LISPRO 1 UNITS: 100 INJECTION, SOLUTION INTRAVENOUS; SUBCUTANEOUS at 07:58

## 2019-01-23 RX ADMIN — OXYCODONE AND ACETAMINOPHEN 2 TABLET: 5; 325 TABLET ORAL at 07:10

## 2019-01-23 RX ADMIN — FAMOTIDINE 40 MG: 20 TABLET ORAL at 18:24

## 2019-01-23 RX ADMIN — KETOROLAC TROMETHAMINE 15 MG: 30 INJECTION, SOLUTION INTRAMUSCULAR; INTRAVENOUS at 10:12

## 2019-01-23 RX ADMIN — Medication 10 ML: at 07:56

## 2019-01-23 RX ADMIN — HYDROCHLOROTHIAZIDE 25 MG: 25 TABLET ORAL at 07:54

## 2019-01-23 RX ADMIN — ENOXAPARIN SODIUM 30 MG: 30 INJECTION SUBCUTANEOUS at 20:08

## 2019-01-23 RX ADMIN — PRAVASTATIN SODIUM 40 MG: 40 TABLET ORAL at 20:08

## 2019-01-23 RX ADMIN — OXYCODONE AND ACETAMINOPHEN 2 TABLET: 5; 325 TABLET ORAL at 14:21

## 2019-01-23 RX ADMIN — CYCLOBENZAPRINE HYDROCHLORIDE 10 MG: 10 TABLET, FILM COATED ORAL at 23:29

## 2019-01-23 RX ADMIN — Medication 2 G: at 03:06

## 2019-01-23 RX ADMIN — STANDARDIZED SENNA CONCENTRATE AND DOCUSATE SODIUM 1 TABLET: 8.6; 5 TABLET, FILM COATED ORAL at 07:54

## 2019-01-23 RX ADMIN — INSULIN LISPRO 1 UNITS: 100 INJECTION, SOLUTION INTRAVENOUS; SUBCUTANEOUS at 20:18

## 2019-01-23 ASSESSMENT — PAIN DESCRIPTION - ORIENTATION
ORIENTATION: LEFT
ORIENTATION: LEFT

## 2019-01-23 ASSESSMENT — PAIN SCALES - GENERAL
PAINLEVEL_OUTOF10: 7
PAINLEVEL_OUTOF10: 3
PAINLEVEL_OUTOF10: 2
PAINLEVEL_OUTOF10: 7

## 2019-01-23 ASSESSMENT — PAIN DESCRIPTION - LOCATION
LOCATION: KNEE
LOCATION: KNEE

## 2019-01-23 ASSESSMENT — PAIN DESCRIPTION - PAIN TYPE
TYPE: SURGICAL PAIN
TYPE: SURGICAL PAIN

## 2019-01-24 VITALS
HEIGHT: 66 IN | TEMPERATURE: 97.8 F | OXYGEN SATURATION: 94 % | SYSTOLIC BLOOD PRESSURE: 137 MMHG | RESPIRATION RATE: 16 BRPM | WEIGHT: 199 LBS | DIASTOLIC BLOOD PRESSURE: 74 MMHG | BODY MASS INDEX: 31.98 KG/M2 | HEART RATE: 82 BPM

## 2019-01-24 LAB
BASOPHILS ABSOLUTE: 0 K/UL (ref 0–0.2)
BASOPHILS RELATIVE PERCENT: 0.4 %
EOSINOPHILS ABSOLUTE: 0.1 K/UL (ref 0–0.6)
EOSINOPHILS RELATIVE PERCENT: 1.3 %
GLUCOSE BLD-MCNC: 113 MG/DL (ref 70–99)
GLUCOSE BLD-MCNC: 144 MG/DL (ref 70–99)
HCT VFR BLD CALC: 29.2 % (ref 40.5–52.5)
HEMOGLOBIN: 10.2 G/DL (ref 13.5–17.5)
LYMPHOCYTES ABSOLUTE: 1.3 K/UL (ref 1–5.1)
LYMPHOCYTES RELATIVE PERCENT: 20.5 %
MCH RBC QN AUTO: 30.3 PG (ref 26–34)
MCHC RBC AUTO-ENTMCNC: 34.9 G/DL (ref 31–36)
MCV RBC AUTO: 86.6 FL (ref 80–100)
MONOCYTES ABSOLUTE: 1.2 K/UL (ref 0–1.3)
MONOCYTES RELATIVE PERCENT: 18.5 %
NEUTROPHILS ABSOLUTE: 3.8 K/UL (ref 1.7–7.7)
NEUTROPHILS RELATIVE PERCENT: 59.3 %
PDW BLD-RTO: 12.7 % (ref 12.4–15.4)
PERFORMED ON: ABNORMAL
PERFORMED ON: ABNORMAL
PLATELET # BLD: 146 K/UL (ref 135–450)
PMV BLD AUTO: 7 FL (ref 5–10.5)
RBC # BLD: 3.38 M/UL (ref 4.2–5.9)
WBC # BLD: 6.4 K/UL (ref 4–11)

## 2019-01-24 PROCEDURE — 85025 COMPLETE CBC W/AUTO DIFF WBC: CPT

## 2019-01-24 PROCEDURE — G8989 SELF CARE D/C STATUS: HCPCS

## 2019-01-24 PROCEDURE — 2580000003 HC RX 258: Performed by: PHYSICIAN ASSISTANT

## 2019-01-24 PROCEDURE — 96376 TX/PRO/DX INJ SAME DRUG ADON: CPT

## 2019-01-24 PROCEDURE — 97535 SELF CARE MNGMENT TRAINING: CPT

## 2019-01-24 PROCEDURE — 97110 THERAPEUTIC EXERCISES: CPT

## 2019-01-24 PROCEDURE — 97530 THERAPEUTIC ACTIVITIES: CPT

## 2019-01-24 PROCEDURE — G8987 SELF CARE CURRENT STATUS: HCPCS

## 2019-01-24 PROCEDURE — 6360000002 HC RX W HCPCS: Performed by: PHYSICIAN ASSISTANT

## 2019-01-24 PROCEDURE — 36415 COLL VENOUS BLD VENIPUNCTURE: CPT

## 2019-01-24 PROCEDURE — 96372 THER/PROPH/DIAG INJ SC/IM: CPT

## 2019-01-24 PROCEDURE — 97116 GAIT TRAINING THERAPY: CPT

## 2019-01-24 PROCEDURE — 6370000000 HC RX 637 (ALT 250 FOR IP): Performed by: PHYSICIAN ASSISTANT

## 2019-01-24 PROCEDURE — APPSS45 APP SPLIT SHARED TIME 31-45 MINUTES: Performed by: PHYSICIAN ASSISTANT

## 2019-01-24 PROCEDURE — G8988 SELF CARE GOAL STATUS: HCPCS

## 2019-01-24 RX ORDER — MELOXICAM 7.5 MG/1
7.5 TABLET ORAL DAILY
Qty: 30 TABLET | Refills: 0 | Status: SHIPPED | OUTPATIENT
Start: 2019-01-24 | End: 2019-03-27 | Stop reason: ALTCHOICE

## 2019-01-24 RX ORDER — ASPIRIN 325 MG
325 TABLET, DELAYED RELEASE (ENTERIC COATED) ORAL 2 TIMES DAILY
Qty: 60 TABLET | Refills: 0 | Status: SHIPPED | OUTPATIENT
Start: 2019-01-24 | End: 2019-05-09 | Stop reason: ALTCHOICE

## 2019-01-24 RX ORDER — OXYCODONE HYDROCHLORIDE AND ACETAMINOPHEN 5; 325 MG/1; MG/1
1-2 TABLET ORAL EVERY 4 HOURS PRN
Qty: 40 TABLET | Refills: 0 | Status: SHIPPED | OUTPATIENT
Start: 2019-01-24 | End: 2019-01-31

## 2019-01-24 RX ADMIN — KETOROLAC TROMETHAMINE 15 MG: 30 INJECTION, SOLUTION INTRAMUSCULAR; INTRAVENOUS at 02:18

## 2019-01-24 RX ADMIN — OXYCODONE AND ACETAMINOPHEN 2 TABLET: 5; 325 TABLET ORAL at 09:24

## 2019-01-24 RX ADMIN — DOCUSATE SODIUM 100 MG: 100 CAPSULE, LIQUID FILLED ORAL at 09:23

## 2019-01-24 RX ADMIN — ENOXAPARIN SODIUM 30 MG: 30 INJECTION SUBCUTANEOUS at 09:24

## 2019-01-24 RX ADMIN — Medication 10 ML: at 09:24

## 2019-01-24 RX ADMIN — Medication 10 ML: at 02:19

## 2019-01-24 RX ADMIN — STANDARDIZED SENNA CONCENTRATE AND DOCUSATE SODIUM 1 TABLET: 8.6; 5 TABLET, FILM COATED ORAL at 09:23

## 2019-01-24 RX ADMIN — HYDROCHLOROTHIAZIDE 25 MG: 25 TABLET ORAL at 09:23

## 2019-01-24 RX ADMIN — OXYCODONE AND ACETAMINOPHEN 2 TABLET: 5; 325 TABLET ORAL at 14:32

## 2019-01-24 RX ADMIN — TAMSULOSIN HYDROCHLORIDE 0.4 MG: 0.4 CAPSULE ORAL at 09:23

## 2019-01-24 ASSESSMENT — PAIN SCALES - WONG BAKER: WONGBAKER_NUMERICALRESPONSE: 4

## 2019-01-24 ASSESSMENT — PAIN SCALES - GENERAL
PAINLEVEL_OUTOF10: 7
PAINLEVEL_OUTOF10: 5
PAINLEVEL_OUTOF10: 6
PAINLEVEL_OUTOF10: 7
PAINLEVEL_OUTOF10: 5
PAINLEVEL_OUTOF10: 5

## 2019-01-24 ASSESSMENT — PAIN DESCRIPTION - PAIN TYPE: TYPE: SURGICAL PAIN

## 2019-01-24 ASSESSMENT — PAIN DESCRIPTION - ORIENTATION: ORIENTATION: LEFT

## 2019-01-24 ASSESSMENT — PAIN DESCRIPTION - LOCATION: LOCATION: KNEE

## 2019-01-25 ENCOUNTER — TELEPHONE (OUTPATIENT)
Dept: ORTHOPEDIC SURGERY | Age: 74
End: 2019-01-25

## 2019-01-25 DIAGNOSIS — Z96.652 STATUS POST TOTAL LEFT KNEE REPLACEMENT: ICD-10-CM

## 2019-01-26 ENCOUNTER — HOSPITAL ENCOUNTER (OUTPATIENT)
Dept: PHYSICAL THERAPY | Age: 74
Setting detail: THERAPIES SERIES
Discharge: HOME | End: 2019-01-26
Payer: COMMERCIAL

## 2019-01-26 PROCEDURE — G8979 MOBILITY GOAL STATUS: HCPCS | Performed by: PHYSICAL THERAPIST

## 2019-01-26 PROCEDURE — 97110 THERAPEUTIC EXERCISES: CPT | Performed by: PHYSICAL THERAPIST

## 2019-01-26 PROCEDURE — 97140 MANUAL THERAPY 1/> REGIONS: CPT | Performed by: PHYSICAL THERAPIST

## 2019-01-26 PROCEDURE — 97161 PT EVAL LOW COMPLEX 20 MIN: CPT | Performed by: PHYSICAL THERAPIST

## 2019-01-26 PROCEDURE — 97016 VASOPNEUMATIC DEVICE THERAPY: CPT | Performed by: PHYSICAL THERAPIST

## 2019-01-26 PROCEDURE — G8978 MOBILITY CURRENT STATUS: HCPCS | Performed by: PHYSICAL THERAPIST

## 2019-01-29 ENCOUNTER — HOSPITAL ENCOUNTER (OUTPATIENT)
Dept: PHYSICAL THERAPY | Age: 74
Setting detail: THERAPIES SERIES
Discharge: HOME | End: 2019-01-29
Payer: COMMERCIAL

## 2019-01-29 ENCOUNTER — OFFICE VISIT (OUTPATIENT)
Dept: ORTHOPEDIC SURGERY | Age: 74
End: 2019-01-29

## 2019-01-29 VITALS — WEIGHT: 199.08 LBS | BODY MASS INDEX: 31.99 KG/M2 | HEIGHT: 66 IN

## 2019-01-29 DIAGNOSIS — M25.562 PAIN IN JOINT OF LEFT KNEE: ICD-10-CM

## 2019-01-29 DIAGNOSIS — Z96.652 S/P TOTAL KNEE ARTHROPLASTY, LEFT: Primary | ICD-10-CM

## 2019-01-29 PROCEDURE — 99024 POSTOP FOLLOW-UP VISIT: CPT | Performed by: PHYSICIAN ASSISTANT

## 2019-01-29 RX ORDER — CEPHALEXIN 500 MG/1
500 CAPSULE ORAL 4 TIMES DAILY
Qty: 28 CAPSULE | Refills: 0 | Status: SHIPPED | OUTPATIENT
Start: 2019-01-29 | End: 2019-02-05

## 2019-01-30 ENCOUNTER — TELEPHONE (OUTPATIENT)
Dept: ORTHOPEDIC SURGERY | Age: 74
End: 2019-01-30

## 2019-01-31 ENCOUNTER — OFFICE VISIT (OUTPATIENT)
Dept: ORTHOPEDIC SURGERY | Age: 74
End: 2019-01-31

## 2019-01-31 VITALS — BODY MASS INDEX: 31.99 KG/M2 | HEIGHT: 66 IN | WEIGHT: 199.08 LBS

## 2019-01-31 DIAGNOSIS — Z96.652 STATUS POST TOTAL LEFT KNEE REPLACEMENT: Primary | ICD-10-CM

## 2019-01-31 DIAGNOSIS — T14.8XXA HEMATOMA: ICD-10-CM

## 2019-01-31 DIAGNOSIS — M17.12 PRIMARY OSTEOARTHRITIS OF LEFT KNEE: ICD-10-CM

## 2019-01-31 PROCEDURE — 99024 POSTOP FOLLOW-UP VISIT: CPT | Performed by: ORTHOPAEDIC SURGERY

## 2019-01-31 RX ORDER — OXYCODONE HYDROCHLORIDE AND ACETAMINOPHEN 5; 325 MG/1; MG/1
1 TABLET ORAL EVERY 6 HOURS PRN
Qty: 28 TABLET | Refills: 0 | Status: SHIPPED | OUTPATIENT
Start: 2019-01-31 | End: 2019-02-03

## 2019-02-01 ENCOUNTER — TELEPHONE (OUTPATIENT)
Dept: ORTHOPEDIC SURGERY | Age: 74
End: 2019-02-01

## 2019-02-01 DIAGNOSIS — Z96.652 STATUS POST TOTAL LEFT KNEE REPLACEMENT: ICD-10-CM

## 2019-02-01 PROBLEM — Z01.818 PRE-OP EXAM: Status: RESOLVED | Noted: 2019-01-02 | Resolved: 2019-02-01

## 2019-02-04 ENCOUNTER — OFFICE VISIT (OUTPATIENT)
Dept: ORTHOPEDIC SURGERY | Age: 74
End: 2019-02-04

## 2019-02-04 DIAGNOSIS — Z96.652 STATUS POST TOTAL LEFT KNEE REPLACEMENT: ICD-10-CM

## 2019-02-04 DIAGNOSIS — M17.12 PRIMARY OSTEOARTHRITIS OF LEFT KNEE: Primary | ICD-10-CM

## 2019-02-04 PROCEDURE — 99024 POSTOP FOLLOW-UP VISIT: CPT | Performed by: ORTHOPAEDIC SURGERY

## 2019-02-05 ENCOUNTER — HOSPITAL ENCOUNTER (OUTPATIENT)
Dept: PHYSICAL THERAPY | Age: 74
Setting detail: THERAPIES SERIES
Discharge: HOME | End: 2019-02-05
Payer: COMMERCIAL

## 2019-02-05 PROCEDURE — 97140 MANUAL THERAPY 1/> REGIONS: CPT | Performed by: PHYSICAL THERAPIST

## 2019-02-05 PROCEDURE — 97110 THERAPEUTIC EXERCISES: CPT | Performed by: PHYSICAL THERAPIST

## 2019-02-05 PROCEDURE — 97016 VASOPNEUMATIC DEVICE THERAPY: CPT | Performed by: PHYSICAL THERAPIST

## 2019-02-08 ENCOUNTER — HOSPITAL ENCOUNTER (OUTPATIENT)
Dept: PHYSICAL THERAPY | Age: 74
Setting detail: THERAPIES SERIES
Discharge: HOME | End: 2019-02-08
Payer: COMMERCIAL

## 2019-02-08 PROCEDURE — 97016 VASOPNEUMATIC DEVICE THERAPY: CPT | Performed by: PHYSICAL THERAPIST

## 2019-02-08 PROCEDURE — 97110 THERAPEUTIC EXERCISES: CPT | Performed by: PHYSICAL THERAPIST

## 2019-02-08 PROCEDURE — 97140 MANUAL THERAPY 1/> REGIONS: CPT | Performed by: PHYSICAL THERAPIST

## 2019-02-11 ENCOUNTER — OFFICE VISIT (OUTPATIENT)
Dept: ORTHOPEDIC SURGERY | Age: 74
End: 2019-02-11

## 2019-02-11 DIAGNOSIS — Z96.652 STATUS POST TOTAL LEFT KNEE REPLACEMENT: Primary | ICD-10-CM

## 2019-02-11 PROCEDURE — 99024 POSTOP FOLLOW-UP VISIT: CPT | Performed by: ORTHOPAEDIC SURGERY

## 2019-02-12 ENCOUNTER — HOSPITAL ENCOUNTER (OUTPATIENT)
Dept: PHYSICAL THERAPY | Age: 74
Setting detail: THERAPIES SERIES
Discharge: HOME | End: 2019-02-12
Payer: COMMERCIAL

## 2019-02-12 PROCEDURE — 97110 THERAPEUTIC EXERCISES: CPT | Performed by: PHYSICAL THERAPIST

## 2019-02-12 PROCEDURE — 97016 VASOPNEUMATIC DEVICE THERAPY: CPT | Performed by: PHYSICAL THERAPIST

## 2019-02-12 PROCEDURE — 97140 MANUAL THERAPY 1/> REGIONS: CPT | Performed by: PHYSICAL THERAPIST

## 2019-02-14 ENCOUNTER — HOSPITAL ENCOUNTER (OUTPATIENT)
Dept: PHYSICAL THERAPY | Age: 74
Setting detail: THERAPIES SERIES
Discharge: HOME | End: 2019-02-14
Payer: COMMERCIAL

## 2019-02-14 PROCEDURE — 97016 VASOPNEUMATIC DEVICE THERAPY: CPT

## 2019-02-14 PROCEDURE — 97110 THERAPEUTIC EXERCISES: CPT

## 2019-02-14 PROCEDURE — 97140 MANUAL THERAPY 1/> REGIONS: CPT

## 2019-02-15 ENCOUNTER — APPOINTMENT (OUTPATIENT)
Dept: PHYSICAL THERAPY | Age: 74
End: 2019-02-15
Payer: COMMERCIAL

## 2019-02-17 RX ORDER — PRAVASTATIN SODIUM 40 MG
TABLET ORAL
Qty: 90 TABLET | Refills: 0 | Status: SHIPPED | OUTPATIENT
Start: 2019-02-17 | End: 2019-08-01

## 2019-02-18 ENCOUNTER — TELEPHONE (OUTPATIENT)
Dept: ORTHOPEDIC SURGERY | Age: 74
End: 2019-02-18

## 2019-02-18 DIAGNOSIS — Z96.652 STATUS POST TOTAL LEFT KNEE REPLACEMENT: ICD-10-CM

## 2019-02-19 ENCOUNTER — HOSPITAL ENCOUNTER (OUTPATIENT)
Dept: PHYSICAL THERAPY | Age: 74
Setting detail: THERAPIES SERIES
Discharge: HOME | End: 2019-02-19
Payer: COMMERCIAL

## 2019-02-19 ENCOUNTER — TELEPHONE (OUTPATIENT)
Dept: ORTHOPEDIC SURGERY | Age: 74
End: 2019-02-19

## 2019-02-19 PROCEDURE — 97110 THERAPEUTIC EXERCISES: CPT | Performed by: PHYSICAL THERAPIST

## 2019-02-19 PROCEDURE — 97016 VASOPNEUMATIC DEVICE THERAPY: CPT | Performed by: PHYSICAL THERAPIST

## 2019-02-19 PROCEDURE — 97140 MANUAL THERAPY 1/> REGIONS: CPT | Performed by: PHYSICAL THERAPIST

## 2019-02-22 ENCOUNTER — APPOINTMENT (OUTPATIENT)
Dept: PHYSICAL THERAPY | Age: 74
End: 2019-02-22
Payer: COMMERCIAL

## 2019-02-22 ENCOUNTER — TELEPHONE (OUTPATIENT)
Dept: ORTHOPEDIC SURGERY | Age: 74
End: 2019-02-22

## 2019-02-22 ENCOUNTER — HOSPITAL ENCOUNTER (OUTPATIENT)
Dept: PHYSICAL THERAPY | Age: 74
Setting detail: THERAPIES SERIES
Discharge: HOME | End: 2019-02-22
Payer: COMMERCIAL

## 2019-02-22 PROCEDURE — 97110 THERAPEUTIC EXERCISES: CPT | Performed by: PHYSICAL THERAPIST

## 2019-02-22 PROCEDURE — 97140 MANUAL THERAPY 1/> REGIONS: CPT | Performed by: PHYSICAL THERAPIST

## 2019-02-22 PROCEDURE — 97016 VASOPNEUMATIC DEVICE THERAPY: CPT | Performed by: PHYSICAL THERAPIST

## 2019-02-25 ENCOUNTER — HOSPITAL ENCOUNTER (OUTPATIENT)
Dept: PHYSICAL THERAPY | Age: 74
Setting detail: THERAPIES SERIES
Discharge: HOME | End: 2019-02-25
Payer: COMMERCIAL

## 2019-02-25 ENCOUNTER — OFFICE VISIT (OUTPATIENT)
Dept: ORTHOPEDIC SURGERY | Age: 74
End: 2019-02-25

## 2019-02-25 DIAGNOSIS — Z96.652 STATUS POST TOTAL LEFT KNEE REPLACEMENT: Primary | ICD-10-CM

## 2019-02-25 PROCEDURE — 97110 THERAPEUTIC EXERCISES: CPT | Performed by: PHYSICAL THERAPIST

## 2019-02-25 PROCEDURE — 97016 VASOPNEUMATIC DEVICE THERAPY: CPT | Performed by: PHYSICAL THERAPIST

## 2019-02-25 PROCEDURE — 97140 MANUAL THERAPY 1/> REGIONS: CPT | Performed by: PHYSICAL THERAPIST

## 2019-02-25 PROCEDURE — 99024 POSTOP FOLLOW-UP VISIT: CPT | Performed by: ORTHOPAEDIC SURGERY

## 2019-02-27 ENCOUNTER — HOSPITAL ENCOUNTER (OUTPATIENT)
Dept: PHYSICAL THERAPY | Age: 74
Setting detail: THERAPIES SERIES
Discharge: HOME | End: 2019-02-27
Payer: COMMERCIAL

## 2019-02-27 PROCEDURE — 97140 MANUAL THERAPY 1/> REGIONS: CPT | Performed by: PHYSICAL THERAPIST

## 2019-02-27 PROCEDURE — 97016 VASOPNEUMATIC DEVICE THERAPY: CPT | Performed by: PHYSICAL THERAPIST

## 2019-02-27 PROCEDURE — 97110 THERAPEUTIC EXERCISES: CPT | Performed by: PHYSICAL THERAPIST

## 2019-03-01 ENCOUNTER — TELEPHONE (OUTPATIENT)
Dept: ORTHOPEDIC SURGERY | Age: 74
End: 2019-03-01

## 2019-03-05 ENCOUNTER — HOSPITAL ENCOUNTER (OUTPATIENT)
Dept: PHYSICAL THERAPY | Age: 74
Setting detail: THERAPIES SERIES
Discharge: HOME | End: 2019-03-05
Payer: COMMERCIAL

## 2019-03-05 PROCEDURE — 97110 THERAPEUTIC EXERCISES: CPT | Performed by: PHYSICAL THERAPIST

## 2019-03-05 PROCEDURE — G8979 MOBILITY GOAL STATUS: HCPCS | Performed by: PHYSICAL THERAPIST

## 2019-03-05 PROCEDURE — 97016 VASOPNEUMATIC DEVICE THERAPY: CPT | Performed by: PHYSICAL THERAPIST

## 2019-03-05 PROCEDURE — G8978 MOBILITY CURRENT STATUS: HCPCS | Performed by: PHYSICAL THERAPIST

## 2019-03-05 PROCEDURE — 97140 MANUAL THERAPY 1/> REGIONS: CPT | Performed by: PHYSICAL THERAPIST

## 2019-03-07 DIAGNOSIS — D64.9 POSTOPERATIVE ANEMIA: Primary | ICD-10-CM

## 2019-03-07 DIAGNOSIS — D64.9 POSTOPERATIVE ANEMIA: ICD-10-CM

## 2019-03-07 LAB
ALBUMIN SERPL-MCNC: 4.5 G/DL (ref 3.4–5)
ANION GAP SERPL CALCULATED.3IONS-SCNC: 13 MMOL/L (ref 3–16)
BASOPHILS ABSOLUTE: 0.1 K/UL (ref 0–0.2)
BASOPHILS RELATIVE PERCENT: 2.7 %
BUN BLDV-MCNC: 14 MG/DL (ref 7–20)
CALCIUM SERPL-MCNC: 9.7 MG/DL (ref 8.3–10.6)
CHLORIDE BLD-SCNC: 101 MMOL/L (ref 99–110)
CO2: 25 MMOL/L (ref 21–32)
CREAT SERPL-MCNC: 0.9 MG/DL (ref 0.8–1.3)
EOSINOPHILS ABSOLUTE: 0.1 K/UL (ref 0–0.6)
EOSINOPHILS RELATIVE PERCENT: 2.2 %
GFR AFRICAN AMERICAN: >60
GFR NON-AFRICAN AMERICAN: >60
GLUCOSE BLD-MCNC: 115 MG/DL (ref 70–99)
HCT VFR BLD CALC: 42.8 % (ref 40.5–52.5)
HEMOGLOBIN: 14.2 G/DL (ref 13.5–17.5)
LYMPHOCYTES ABSOLUTE: 1.2 K/UL (ref 1–5.1)
LYMPHOCYTES RELATIVE PERCENT: 26.5 %
MCH RBC QN AUTO: 29.4 PG (ref 26–34)
MCHC RBC AUTO-ENTMCNC: 33.1 G/DL (ref 31–36)
MCV RBC AUTO: 88.8 FL (ref 80–100)
MONOCYTES ABSOLUTE: 0.6 K/UL (ref 0–1.3)
MONOCYTES RELATIVE PERCENT: 13 %
NEUTROPHILS ABSOLUTE: 2.5 K/UL (ref 1.7–7.7)
NEUTROPHILS RELATIVE PERCENT: 55.6 %
PDW BLD-RTO: 13.9 % (ref 12.4–15.4)
PHOSPHORUS: 3.7 MG/DL (ref 2.5–4.9)
PLATELET # BLD: 260 K/UL (ref 135–450)
PMV BLD AUTO: 7.4 FL (ref 5–10.5)
POTASSIUM SERPL-SCNC: 4.8 MMOL/L (ref 3.5–5.1)
RBC # BLD: 4.82 M/UL (ref 4.2–5.9)
SODIUM BLD-SCNC: 139 MMOL/L (ref 136–145)
WBC # BLD: 4.5 K/UL (ref 4–11)

## 2019-03-08 ENCOUNTER — HOSPITAL ENCOUNTER (OUTPATIENT)
Dept: PHYSICAL THERAPY | Age: 74
Setting detail: THERAPIES SERIES
Discharge: HOME | End: 2019-03-08
Payer: COMMERCIAL

## 2019-03-08 PROCEDURE — 97140 MANUAL THERAPY 1/> REGIONS: CPT | Performed by: PHYSICAL THERAPIST

## 2019-03-08 PROCEDURE — 97110 THERAPEUTIC EXERCISES: CPT | Performed by: PHYSICAL THERAPIST

## 2019-03-11 ENCOUNTER — OFFICE VISIT (OUTPATIENT)
Dept: ORTHOPEDIC SURGERY | Age: 74
End: 2019-03-11

## 2019-03-11 DIAGNOSIS — M17.12 PRIMARY OSTEOARTHRITIS OF LEFT KNEE: ICD-10-CM

## 2019-03-11 DIAGNOSIS — Z96.652 STATUS POST TOTAL LEFT KNEE REPLACEMENT: Primary | ICD-10-CM

## 2019-03-11 DIAGNOSIS — Z96.652 S/P TOTAL KNEE ARTHROPLASTY, LEFT: ICD-10-CM

## 2019-03-11 PROCEDURE — 99024 POSTOP FOLLOW-UP VISIT: CPT | Performed by: ORTHOPAEDIC SURGERY

## 2019-03-12 ENCOUNTER — HOSPITAL ENCOUNTER (OUTPATIENT)
Dept: PHYSICAL THERAPY | Age: 74
Setting detail: THERAPIES SERIES
Discharge: HOME | End: 2019-03-12
Payer: COMMERCIAL

## 2019-03-12 PROCEDURE — 97140 MANUAL THERAPY 1/> REGIONS: CPT | Performed by: PHYSICAL THERAPIST

## 2019-03-12 PROCEDURE — 97016 VASOPNEUMATIC DEVICE THERAPY: CPT | Performed by: PHYSICAL THERAPIST

## 2019-03-12 PROCEDURE — 97110 THERAPEUTIC EXERCISES: CPT | Performed by: PHYSICAL THERAPIST

## 2019-03-15 ENCOUNTER — APPOINTMENT (OUTPATIENT)
Dept: PHYSICAL THERAPY | Age: 74
End: 2019-03-15
Payer: COMMERCIAL

## 2019-03-19 ENCOUNTER — HOSPITAL ENCOUNTER (OUTPATIENT)
Dept: PHYSICAL THERAPY | Age: 74
Setting detail: THERAPIES SERIES
Discharge: HOME | End: 2019-03-19
Payer: COMMERCIAL

## 2019-03-19 PROCEDURE — 97140 MANUAL THERAPY 1/> REGIONS: CPT | Performed by: PHYSICAL THERAPIST

## 2019-03-19 PROCEDURE — 97110 THERAPEUTIC EXERCISES: CPT | Performed by: PHYSICAL THERAPIST

## 2019-03-22 ENCOUNTER — HOSPITAL ENCOUNTER (OUTPATIENT)
Dept: PHYSICAL THERAPY | Age: 74
Setting detail: THERAPIES SERIES
Discharge: HOME | End: 2019-03-22
Payer: COMMERCIAL

## 2019-03-22 PROCEDURE — 97110 THERAPEUTIC EXERCISES: CPT | Performed by: PHYSICAL THERAPIST

## 2019-03-22 PROCEDURE — 97140 MANUAL THERAPY 1/> REGIONS: CPT | Performed by: PHYSICAL THERAPIST

## 2019-03-22 PROCEDURE — 97016 VASOPNEUMATIC DEVICE THERAPY: CPT | Performed by: PHYSICAL THERAPIST

## 2019-03-26 ENCOUNTER — APPOINTMENT (OUTPATIENT)
Dept: PHYSICAL THERAPY | Age: 74
End: 2019-03-26
Payer: COMMERCIAL

## 2019-03-27 ENCOUNTER — OFFICE VISIT (OUTPATIENT)
Dept: FAMILY MEDICINE CLINIC | Age: 74
End: 2019-03-27
Payer: COMMERCIAL

## 2019-03-27 VITALS
DIASTOLIC BLOOD PRESSURE: 82 MMHG | OXYGEN SATURATION: 97 % | HEIGHT: 65 IN | BODY MASS INDEX: 31.09 KG/M2 | HEART RATE: 85 BPM | SYSTOLIC BLOOD PRESSURE: 132 MMHG | WEIGHT: 186.6 LBS

## 2019-03-27 DIAGNOSIS — R73.9 HYPERGLYCEMIA: ICD-10-CM

## 2019-03-27 DIAGNOSIS — Z12.5 PROSTATE CANCER SCREENING: ICD-10-CM

## 2019-03-27 DIAGNOSIS — Z96.652 STATUS POST TOTAL LEFT KNEE REPLACEMENT: ICD-10-CM

## 2019-03-27 DIAGNOSIS — Z00.00 WELL ADULT EXAM: Primary | ICD-10-CM

## 2019-03-27 DIAGNOSIS — I70.0 ATHEROSCLEROSIS OF AORTA (HCC): ICD-10-CM

## 2019-03-27 DIAGNOSIS — I65.23 CAROTID STENOSIS, BILATERAL: ICD-10-CM

## 2019-03-27 DIAGNOSIS — K21.9 GASTROESOPHAGEAL REFLUX DISEASE WITHOUT ESOPHAGITIS: ICD-10-CM

## 2019-03-27 PROBLEM — R19.5 LOOSE BOWEL MOVEMENTS: Status: RESOLVED | Noted: 2018-10-12 | Resolved: 2019-03-27

## 2019-03-27 LAB
CHOLESTEROL, TOTAL: 184 MG/DL (ref 0–199)
HDLC SERPL-MCNC: 53 MG/DL (ref 40–60)
LDL CHOLESTEROL CALCULATED: 107 MG/DL
PROSTATE SPECIFIC ANTIGEN: 0.48 NG/ML (ref 0–4)
TRIGL SERPL-MCNC: 118 MG/DL (ref 0–150)
VLDLC SERPL CALC-MCNC: 24 MG/DL

## 2019-03-27 PROCEDURE — G0439 PPPS, SUBSEQ VISIT: HCPCS | Performed by: FAMILY MEDICINE

## 2019-03-27 ASSESSMENT — PATIENT HEALTH QUESTIONNAIRE - PHQ9
SUM OF ALL RESPONSES TO PHQ9 QUESTIONS 1 & 2: 0
1. LITTLE INTEREST OR PLEASURE IN DOING THINGS: 0
2. FEELING DOWN, DEPRESSED OR HOPELESS: 0
SUM OF ALL RESPONSES TO PHQ QUESTIONS 1-9: 0
SUM OF ALL RESPONSES TO PHQ QUESTIONS 1-9: 0

## 2019-03-28 LAB
ESTIMATED AVERAGE GLUCOSE: 99.7 MG/DL
HBA1C MFR BLD: 5.1 %

## 2019-03-29 ENCOUNTER — HOSPITAL ENCOUNTER (OUTPATIENT)
Dept: PHYSICAL THERAPY | Age: 74
Setting detail: THERAPIES SERIES
Discharge: HOME | End: 2019-03-29
Payer: COMMERCIAL

## 2019-03-29 PROCEDURE — 97140 MANUAL THERAPY 1/> REGIONS: CPT | Performed by: PHYSICAL THERAPIST

## 2019-03-29 PROCEDURE — 97110 THERAPEUTIC EXERCISES: CPT | Performed by: PHYSICAL THERAPIST

## 2019-03-29 PROCEDURE — 97016 VASOPNEUMATIC DEVICE THERAPY: CPT | Performed by: PHYSICAL THERAPIST

## 2019-04-05 ENCOUNTER — HOSPITAL ENCOUNTER (OUTPATIENT)
Dept: PHYSICAL THERAPY | Age: 74
Setting detail: THERAPIES SERIES
Discharge: HOME | End: 2019-04-05
Payer: COMMERCIAL

## 2019-04-05 PROCEDURE — 97140 MANUAL THERAPY 1/> REGIONS: CPT | Performed by: PHYSICAL THERAPIST

## 2019-04-05 PROCEDURE — 97110 THERAPEUTIC EXERCISES: CPT | Performed by: PHYSICAL THERAPIST

## 2019-04-05 PROCEDURE — 97016 VASOPNEUMATIC DEVICE THERAPY: CPT | Performed by: PHYSICAL THERAPIST

## 2019-04-05 NOTE — FLOWSHEET NOTE
[facility],  [address] [place]  Phone: [phone]  Fax [phone]      ATHLETIC TRAINING [address]  Date:  2019    Patient Name:  Franki Martin    :  1945  MRN: [id number]  Restrictions/Precautions:    Medical/Treatment Diagnosis Information:  ·   Diagnosis: left knee OA  M16.11   left  knee pain M25.562   s/p left TKR   DOS: 19  · Treatment Diagnosis: left knee OA  M16.11   left  knee pain M25.562  Physician Information:  Dr. Heber Adam Post-op  8 wks  12 wks 16 wks 20 wks   24 wks                            Activity Log                                                  DOS/DOI: 19                                                   Date: 3/5/19 3/12/19 3/22/19 03/29/19 04/05/19   ATC communication Needs ROM - reformer  VC glute/hip control Released by MD to play senior softball in May. IH workout Tried some batting practice today, lost balance and landed on butt (ok)  LBP (ache) today  Has noticeable limp in gait at 25% jog - little to no pain   Bike        Elliptical        Treadmill        Airdyne                Gastroc stretch        Soleus stretch        Hamstring stretch        ITB stretch        Hip Flexor stretch        Quad stretch        Adductor stretch                Weight Shifting sp     Lxx step to balance 15x3\"                             fp     xWx step to balance 15x3\"                             tp        Lateral walking (with/w/o TB)                Balance: PEP/Maria R board                       SLS              Star excursion load/explode              Extremity reach UE/LE                Leg Press Gideon. 100# 2x15 100# 2x15 100# 3x12 120# 3x10 140# 3x12                     Ecc. 80# 2x15 80# 2x15 80# 3x12 100# 3x10 120# 3x12                     Inv. Calf Press Gideon.  80# 2x15 80# 2x15 80# 3x12 100# 3x10 120# 3x12                      Ecc. Inv.                Daun Judy 45# R/L 2x15 45# R/L 2x15 45# R/L 2x15 45# R/L 3x12 60# R/L 2x10              Add                  TKE 45# 30x5\" 60#  20x5\" 60# 20x5\" 60# 30x5\" 75# 20x5\"              Ext 45# R/L 2x15 45# R/L 2x15 45# R/L 2x15 45# R/L 3x12 60# R/L 2x10           Steps Up                   Up and Over                   Down                   Lateral                   Rotation                Squats  mini                      wall                     BOSU                Lunges:  Lunge to Balance  Stationary lunge ant/lat/post R/L 10x ea Stationary lunge ant/lat/post R/L 10x ea                    Balance to Lunge                       Walking                 Knee Extension Bilat. Ecc.                                   Inv. Hamstring Curls Bilat. Ecc. 20# 3x10 35# 3x10 35# 3x12 40# 3x10 40# 3x12                              Inv.                Soleus Press Bilat. Ecc.                               Inv.                                         Ladders                    Square                   Jump/Hop  Low                          Med.                          High                                      Reformer Walking        Reformer Parallel Heel/Toe 1R1B 10x each       Reformer Wide Heel/Toe 1R1B 10x each                                                                 Modality GR 15' GR 15' GR 15' GR 15' GR 15'   Initials                             DB/DF LDD/JLW JLW EP EP   Time spent one on one (workers comp)        Time spent with PT assistant

## 2019-04-05 NOTE — OP NOTE
[facility], [address] [place] [address] [place]  Phone: [phone]  Fax [phone]    Date: 4/5/2019  Physician: Kateryna Barrios  Patient: Elder Diallo III Diagnosis: left TKR    Patient has received 16 sessions of Physical Therapy over a  week period. Functional Questionnaire Score: Initial 89%, Current 3%  Pain reported has decreased from 9/10 to 0-3/10       Test used Initial score Current Score   Pain Summary VAS 9 0- 3   Functional questionnaire LEFS 89   3   ROM flexion 68 117    extension 0 0   Strength quad fair Fair +    SLR indp with lag SLR  No lag with cues. Functional Activity Checklist: The patient continues to have moderate difficulty with the following:   [] Personal care  [] Reaching / overhead  [] Standing    [] Housework chores  [] Climbing  [] Driving / riding in a vehicle    [] Work  [] Squatting  [] Bed / vehicle mobility    [] Lifting  [] Walking  [] Sleeping    [] Pushing / pulling  [] Sitting  [] Concentrating / reading     Specific Functional Improvement and Impression:  Pt can have pain on stairs. Pt cont to feel limpt. Summary:   [x] Patient is progressing as expected for this condition   [] Patient is progressing, but slower than expected for this condition   [] Patient is not progressing  Clinician Recommendations:   [x] Continue rehabilitation due to objective improvement and continued functional deficits. [] Follow up periodically to advance home exercise program to match level of function. [] Continue rehabitation due to objective improvement and continued functional deficits with progression to work conditioning. [] Discharge to post-rehab program secondary to maximizing \"medical necessity\" of physical / occupational therapy.    [] Discharge independent in a home program as:  [] All goals achieved  [] Maximized \"medical necessity\" of physical / occupational therapy  [] No subjective or objective improvements    Plan: Progress to [facility] [place] when ROM reaches 120.   Electronically signed by: Vani Tao PT   License #: [id number]    Physician Recommendations:  [] Follow treatment plan as above [] Discontinue physical therapy  [] Change plan to: _______________________________________________________________

## 2019-04-05 NOTE — FLOWSHEET NOTE
[facility], [address] [place] [address]  Phone: [phone]  Fax [phone]    Physical Therapy Daily Treatment Note  Date:  2019    Patient Name:  Mariela Boggs    :  1945  MRN: [id number]  Restrictions/Precautions:    Physician Information:  Referring Practitioner: Dr. Jerrell Rios  Medical/Treatment Diagnosis Information:  Diagnosis: left knee OA  M16.11   left  knee pain M25.562   s/p left TKR   DOS: 19  · Treatment Diagnosis: left knee OA  M16.11   left  knee pain M25.562  [] Conservative / [x] Surgical - DOS: 19  Therapy Diagnosis/Practice Pattern:  Practice Pattern H: Joint Arthroplasty  Insurance/Certification information:  PT Insurance Information:  [address] [place] [address]  - $40CP-100%-PT/OT MED NEC---- ? Plan of care signed: [] YES  [] NO  Number of Comorbidities:  [x]0     []1-2    []3+  Date of Patient follow up with Physician:     G-Code (if applicable):      Date G-Code Applied:  19  PT G-Codes  Functional Assessment Tool Used: LEFS  Score: 89%  Functional Limitation: Mobility: Walking and moving around  Mobility: Walking and Moving Around Current Status (): At least 80 percent but less than 100 percent impaired, limited or restricted  Mobility: Walking and Moving Around Goal Status (): At least 20 percent but less than 40 percent impaired, limited or restricted    Progress Note: [x]  Yes  []  No  Next due by: Visit #10        Latex Allergy:  []NO      [x]YES  Preferred Language for Healthcare:   [x]English       []other:    Visit # Insurance Allowable Reporting Period   16 BMN Begin Date: 2019               End Date:      RECERT DUE BY:     SUBJECTIVE:  Pt cont to have medial knee pain. Pt did ride bike. He had difficulty make fast stops.      OBJECTIVE: See eval  Observation:  Palpation:     Test used Initial score Current Score   Pain Summary VAS 9 0- 3   Functional questionnaire LEFS 89   3   ROM flexion 68 117    extension 0 0   Strength quad fair Fair +    SLR indp with lag SLR  No lag with cues. RESTRICTIONS/PRECAUTIONS: blister near wound. Latex allergy    Exercises/Interventions:   atc  Therapeutic Ex Sets/reps Notes   Bike 8 min Able to go around bike   Knee ext hang 5#   2 x  1 min. QS X 10  :10 hep      SLR 2#  3  X 10 hep   SL hip abd 2#  2 x  10    Prone TKE X 20  :05    Prone quad s 4x  ;20     Seated heel slides with towel X 20 hep   Seated HS s 4x  ;30 hep   LAQ 5#   X 20               SLS 3x  :30     Incline s 4x  ;30     Leg Press  Gideon/ single atc   tke  alvin atc        Manual Intervention     PROM  supine 5 min    Man HS/  Man gastroc s 3 min. Prone quad s  4x  :20               NMR re-education                             Therapeutic Exercise and NMR EXR  [x] (83768) Provided verbal/tactile cueing for activities related to strengthening, flexibility, endurance, ROM for improvements in LE, proximal hip, and core control with self care, mobility, lifting, ambulation.  [] (30463) Provided verbal/tactile cueing for activities related to improving balance, coordination, kinesthetic sense, posture, motor skill, proprioception  to assist with LE, proximal hip, and core control in self care, mobility, lifting, ambulation and eccentric single leg control.      NMR and Therapeutic Activities:    [x] (45966 or 19640) Provided verbal/tactile cueing for activities related to improving balance, coordination, kinesthetic sense, posture, motor skill, proprioception and motor activation to allow for proper function of core, proximal hip and LE with self care and ADLs  [] (73155) Gait Re-education- Provided training and instruction to the patient for proper LE, core and proximal hip recruitment and positioning and eccentric body weight control with ambulation re-education including up and down stairs     Home Exercise Program:    [x] (69400) Reviewed/Progressed HEP activities related to strengthening, flexibility, endurance, ROM of core, proximal hip and LE for functional self-care, mobility, lifting and ambulation/stair navigation   [] (89979)Reviewed/Progressed HEP activities related to improving balance, coordination, kinesthetic sense, posture, motor skill, proprioception of core, proximal hip and LE for self care, mobility, lifting, and ambulation/stair navigation      Manual Treatments:  PROM / STM / Oscillations-Mobs:  G-I, II, III, IV (PA's, Inf., Post.)  [x] (34014) Provided manual therapy to mobilize LE, proximal hip and/or LS spine soft tissue/joints for the purpose of modulating pain, promoting relaxation,  increasing ROM, reducing/eliminating soft tissue swelling/inflammation/restriction, improving soft tissue extensibility and allowing for proper ROM for normal function with self care, mobility, lifting and ambulation. Modalities:       [] GR/ESU 15 min    [x] GR 15 min  [] ESU     [] CP    [] MHP    [] declined     Charges:  Timed Code Treatment Minutes: 45   Total Treatment Minutes: 60     [] EVAL (LOW) 57806 (typically 20 minutes face-to-face)  [] EVAL (MOD) 32062 (typically 30 minutes face-to-face)  [] EVAL (HIGH) 54811 (typically 45 minutes face-to-face)  [] RE-EVAL     [x] FC(86842) x  2   [] IONTO  [] NMR (31216) x      [x] VASO  [x] Manual (50733) x  1    [] Other:  [] TA x       [] Mech Traction (75990)  [] ES(attended) (05992)      [] ES (un) (27450):     GOALS:   Patient stated goal: return to normal living. Therapist goals for Patient:   Short Term Goals: To be achieved in: 2 weeks  1. Independent in HEP and progression per patient tolerance, in order to prevent re-injury. 2. Patient will have a decrease in pain to facilitate improvement in movement, function, and ADLs as indicated by Functional Deficits. Long Term Goals: To be achieved in: 8 weeks  1.  Disability index score of 25% or less for the LEFS to assist with reaching prior level of function. 2. Patient will demonstrate increased AROM from 0 - 125 to allow for proper joint functioning as indicated by patients Functional Deficits. 3. Patient will demonstrate an increase in Strength to good proximal hip strength and control, within 5lb HHD in LE to allow for proper functional mobility as indicated by patients Functional Deficits. 4. Patient will return to amb with reciprocal gait with descending and ascending stairs without increased symptoms or restriction. 5. Able to sleep 7-8 hours at night. New or Updated Goals (if applicable):  [x] No change to goals established upon initial eval/last progress note:  New Goals:    Progression Towards Functional goals:   [] Patient is progressing as expected towards functional goals listed. [x] Progression is slowed due to complexities listed. [] Progression has been slowed due to co-morbidities. [] Plan just implemented, too soon to assess goals progression  [] Other:     ASSESSMENT:  Pt required B UE support with SLS. [] Improvement noted relative to goals:  [] No Improvement noted related to goals:  Summary/Patient's response to treatment: ROM is improving. Pt benefits from verbal cues for proper form for exercises.      Treatment/Activity Tolerance:  [x] Patient tolerated treatment well [] Patient limited by fatique  [] Patient limited by pain  [] Patient limited by other medical complications  [] Other:     Prognosis: [x] Good [] Fair  [] Poor    Patient Requires Follow-up: [x] Yes  [] No    PLAN: See eval  [x] Continue per plan of care [] Alter current plan (see comments)  [] Plan of care initiated [] Hold pending MD visit [] Discharge    Electronically signed by: Krysten Davila PT

## 2019-04-08 ENCOUNTER — OFFICE VISIT (OUTPATIENT)
Dept: ORTHOPEDIC SURGERY | Age: 74
End: 2019-04-08

## 2019-04-08 VITALS — WEIGHT: 186.51 LBS | HEIGHT: 65 IN | BODY MASS INDEX: 31.07 KG/M2

## 2019-04-08 DIAGNOSIS — Z96.652 STATUS POST TOTAL LEFT KNEE REPLACEMENT: Primary | ICD-10-CM

## 2019-04-08 PROCEDURE — 99024 POSTOP FOLLOW-UP VISIT: CPT | Performed by: ORTHOPAEDIC SURGERY

## 2019-04-08 NOTE — PROGRESS NOTES
History of present illness: The patient returns today after left total knee replacement. Surgery was on 1/22/2019 Pain control has been satisfactory with oral medications. He had an issue over the weekend where several scabs fell off the incision and he had some serosanguineous drainage. He had previous issues with the soft tissue between the surgical incision an old incision. There have been no fevers or chills. Physical examination: The incision is clean, dry. There is 2 areas one over the central portion of the incision and one towards the distal and were small scabs on off and there is some granulation tissue and some serosanguineous drainage. Range of motion is 0 degrees of extension to 110 degrees of flexion. There is expected swelling with no evidence of DVT and a negative Homans sign. Neurovascular exam is intact. Assessment/plan: This time would recommend local wound care with gentle cleansing of soap and water and a wet-to-dry dressing. He does not need any antibiotics at this time. We'll see him back in one week for repeat clinical exam and wound check.

## 2019-04-12 ENCOUNTER — HOSPITAL ENCOUNTER (OUTPATIENT)
Dept: PHYSICAL THERAPY | Age: 74
Setting detail: THERAPIES SERIES
Discharge: HOME | End: 2019-04-12
Payer: COMMERCIAL

## 2019-04-12 PROCEDURE — 97016 VASOPNEUMATIC DEVICE THERAPY: CPT | Performed by: PHYSICAL THERAPIST

## 2019-04-12 PROCEDURE — 97140 MANUAL THERAPY 1/> REGIONS: CPT | Performed by: PHYSICAL THERAPIST

## 2019-04-12 PROCEDURE — 97110 THERAPEUTIC EXERCISES: CPT | Performed by: PHYSICAL THERAPIST

## 2019-04-12 NOTE — FLOWSHEET NOTE
[facility], [address]  Phone: [phone]  Fax [phone]    Physical Therapy Daily Treatment Note  Date:  2019    Patient Name:  Babs Cloud    :  1945  MRN: [id number]  Restrictions/Precautions:    Physician Information:  Referring Practitioner: Dr. Diallo Mcdonough  Medical/Treatment Diagnosis Information:  Diagnosis: left knee OA  M16.11   left  knee pain M25.562   s/p left TKR   DOS: 19  · Treatment Diagnosis: left knee OA  M16.11   left  knee pain M25.562  [] Conservative / [x] Surgical - DOS: 19  Therapy Diagnosis/Practice Pattern:  Practice Pattern H: Joint Arthroplasty  Insurance/Certification information:  PT Insurance Information:   [facility]  - $40CP-100%-PT/OT MED NEC---- ? Plan of care signed: [] YES  [] NO  Number of Comorbidities:  [x]0     []1-2    []3+  Date of Patient follow up with Physician:     G-Code (if applicable):      Date G-Code Applied:  19  PT G-Codes  Functional Assessment Tool Used: LEFS  Score: 89%  Functional Limitation: Mobility: Walking and moving around  Mobility: Walking and Moving Around Current Status (): At least 80 percent but less than 100 percent impaired, limited or restricted  Mobility: Walking and Moving Around Goal Status (): At least 20 percent but less than 40 percent impaired, limited or restricted    Progress Note: [x]  Yes  []  No  Next due by: Visit #10        Latex Allergy:  []NO      [x]YES  Preferred Language for Healthcare:   [x]English       []other:    Visit # Insurance Allowable Reporting Period   17 BMN Begin Date: 2019               End Date:      RECERT DUE BY:     SUBJECTIVE:  Pt cont to have wound issues. Pt was given new medicated cream by the Dr. Tobar Ka has been working on ROM at home. Easier to don and doff shoe and sock. Descending stairs is easier.      OBJECTIVE: See eval  Observation:  Palpation:     Test used Initial score Current Score   Pain Summary VAS 9 0- 3   Functional questionnaire LEFS 89   3   ROM flexion 68 117    extension 0 0   Strength quad fair Fair +    SLR indp with lag SLR  No lag with cues. RESTRICTIONS/PRECAUTIONS: blister near wound. Latex allergy    Exercises/Interventions:   atc  Therapeutic Ex Sets/reps Notes   Bike 8 min Able to go around bike   Knee ext hang 5#   2 x  1 min. QS X 10  :10 hep      SLR  3  X 10 hep   SL hip abd   2 x  10    Prone TKE X 20  :05    Prone quad s 4x  ;20     Seated heel slides with towel X 20 hep   Seated HS s 4x  ;30 hep   LAQ 5#   X 20               SLS 3x  :30     Incline s 4x  ;30     Leg Press  Gideon/ single atc   tke  alvin atc        Manual Intervention     PROM  supine 5 min    Man HS/  Man gastroc s 3 min. Prone quad s  4x  :20               NMR re-education                             Therapeutic Exercise and NMR EXR  [x] (97617) Provided verbal/tactile cueing for activities related to strengthening, flexibility, endurance, ROM for improvements in LE, proximal hip, and core control with self care, mobility, lifting, ambulation.  [] (59153) Provided verbal/tactile cueing for activities related to improving balance, coordination, kinesthetic sense, posture, motor skill, proprioception  to assist with LE, proximal hip, and core control in self care, mobility, lifting, ambulation and eccentric single leg control.      NMR and Therapeutic Activities:    [x] (38096 or 23699) Provided verbal/tactile cueing for activities related to improving balance, coordination, kinesthetic sense, posture, motor skill, proprioception and motor activation to allow for proper function of core, proximal hip and LE with self care and ADLs  [] (70992) Gait Re-education- Provided training and instruction to the patient for proper LE, core and proximal hip recruitment and positioning and eccentric body weight control with ambulation re-education including up and down stairs     Home Exercise Program:    [x] (19929) Reviewed/Progressed HEP activities related to strengthening, flexibility, endurance, ROM of core, proximal hip and LE for functional self-care, mobility, lifting and ambulation/stair navigation   [] (19165)Reviewed/Progressed HEP activities related to improving balance, coordination, kinesthetic sense, posture, motor skill, proprioception of core, proximal hip and LE for self care, mobility, lifting, and ambulation/stair navigation      Manual Treatments:  PROM / STM / Oscillations-Mobs:  G-I, II, III, IV (PA's, Inf., Post.)  [x] (68954) Provided manual therapy to mobilize LE, proximal hip and/or LS spine soft tissue/joints for the purpose of modulating pain, promoting relaxation,  increasing ROM, reducing/eliminating soft tissue swelling/inflammation/restriction, improving soft tissue extensibility and allowing for proper ROM for normal function with self care, mobility, lifting and ambulation. Modalities:       [] GR/ESU 15 min    [x] GR 15 min  [] ESU     [] CP    [] MHP    [] declined     Charges:  Timed Code Treatment Minutes: 45   Total Treatment Minutes: 60     [] EVAL (LOW) 44579 (typically 20 minutes face-to-face)  [] EVAL (MOD) 79224 (typically 30 minutes face-to-face)  [] EVAL (HIGH) 64153 (typically 45 minutes face-to-face)  [] RE-EVAL     [x] WI(52801) x  2   [] IONTO  [] NMR (19618) x      [x] VASO  [x] Manual (60412) x  1    [] Other:  [] TA x       [] Mech Traction (58228)  [] ES(attended) (63140)      [] ES (un) (34421):     GOALS:   Patient stated goal: return to normal living. Therapist goals for Patient:   Short Term Goals: To be achieved in: 2 weeks  1. Independent in HEP and progression per patient tolerance, in order to prevent re-injury. 2. Patient will have a decrease in pain to facilitate improvement in movement, function, and ADLs as indicated by Functional Deficits.     Long Term Goals: To be achieved in: 8 weeks  1. Disability index score of 25% or less for the LEFS to assist with reaching prior level of function. 2. Patient will demonstrate increased AROM from 0 - 125 to allow for proper joint functioning as indicated by patients Functional Deficits. 3. Patient will demonstrate an increase in Strength to good proximal hip strength and control, within 5lb HHD in LE to allow for proper functional mobility as indicated by patients Functional Deficits. 4. Patient will return to amb with reciprocal gait with descending and ascending stairs without increased symptoms or restriction. 5. Able to sleep 7-8 hours at night. New or Updated Goals (if applicable):  [x] No change to goals established upon initial eval/last progress note:  New Goals:    Progression Towards Functional goals:   [] Patient is progressing as expected towards functional goals listed. [x] Progression is slowed due to complexities listed. [] Progression has been slowed due to co-morbidities. [] Plan just implemented, too soon to assess goals progression  [] Other:     ASSESSMENT:  Pt required B UE support with SLS. [] Improvement noted relative to goals:  [] No Improvement noted related to goals:  Summary/Patient's response to treatment: ROM is improving. Pt benefits from verbal cues for proper form for exercises.      Treatment/Activity Tolerance:  [x] Patient tolerated treatment well [] Patient limited by fatique  [] Patient limited by pain  [] Patient limited by other medical complications  [] Other:     Prognosis: [x] Good [] Fair  [] Poor    Patient Requires Follow-up: [x] Yes  [] No    PLAN: See eval  [x] Continue per plan of care [] Alter current plan (see comments)  [] Plan of care initiated [] Hold pending MD visit [] Discharge    Electronically signed by: Anastasiia Schneider PT

## 2019-04-12 NOTE — FLOWSHEET NOTE
[facility],  [address] [place]  Phone: [phone]  Fax [phone]      ATHLETIC TRAINING [address]  Date:  2019    Patient Name:  Blanca Fragoso    :  1945  MRN: [id number]  Restrictions/Precautions:    Medical/Treatment Diagnosis Information:  ·   Diagnosis: left knee OA  M16.11   left  knee pain M25.562   s/p left TKR   DOS: 19  · Treatment Diagnosis: left knee OA  M16.11   left  knee pain M25.562  Physician Information:  Dr. Beto Iyer Post-op  8 wks  12 wks 16 wks 20 wks   24 wks                            Activity Log                                                  DOS/DOI: 19                                                   Date: 3/12/19 3/22/19 03/29/19 04/05/19 4/12/19   ATC communication Released by MD to play senior softball in May. IH workout Tried some batting practice today, lost balance and landed on butt (ok)  LBP (ache) today  Has noticeable limp in gait at 25% jog - little to no pain    Bike        Elliptical        Treadmill        Airdyne                Gastroc stretch        Soleus stretch        Hamstring stretch        ITB stretch        Hip Flexor stretch        Quad stretch        Adductor stretch                Weight Shifting sp    Lxx step to balance 15x3\"                              fp    xWx step to balance 15x3\"                              tp        Lateral walking (with/w/o TB)                Balance: PEP/Maria R board                       SLS              Star excursion load/explode              Extremity reach UE/LE                Leg Press Gideon. 100# 2x15 100# 3x12 120# 3x10 140# 3x12 140# 3x12                     Ecc. 80# 2x15 80# 3x12 100# 3x10 120# 3x12 120# 3x12                     Inv. Calf Press Gideon.  80# 2x15 80# 3x12 100# 3x10 120# 3x12 140# 2x15                      Ecc.                            Inv.                [facility] Flex                   ABd 45# R/L 2x15 45# R/L 2x15 45# R/L 3x12 60# R/L 2x10 60# R/L 2x15              Add                  TKE 60#  20x5\" 60# 20x5\" 60# 30x5\" 75# 20x5\" 75# 25x5\"              Ext 45# R/L 2x15 45# R/L 2x15 45# R/L 3x12 60# R/L 2x10 60# R/L 2x15           Steps Up                   Up and Over                   Down                   Lateral                   Rotation                Squats  mini                      wall                     BOSU                Lunges:  Lunge to Balance Stationary lunge ant/lat/post R/L 10x ea Stationary lunge ant/lat/post R/L 10x ea                     Balance to Lunge                       Walking                 Knee Extension Bilat. Ecc.                                   Inv. Hamstring Curls Bilat. Ecc. 35# 3x10 35# 3x12 40# 3x10 40# 3x12 35# (south) 2x15                              Inv.                Soleus Press Bilat. Ecc.                               Inv.                                         Ladders                    Square                   Jump/Hop  Low                          Med.                          High                                      Reformer Walking        Reformer Parallel Heel/Toe        Reformer Wide Heel/Toe                                                                  Modality GR 15' GR 15' GR 15' GR 13' GR 15'   Initials                             LDD/RAÚLW JLW EP EP DTM   Time spent one on one (workers comp)        Time spent with PT assistant

## 2019-04-17 ENCOUNTER — OFFICE VISIT (OUTPATIENT)
Dept: ORTHOPEDIC SURGERY | Age: 74
End: 2019-04-17

## 2019-04-17 DIAGNOSIS — Z96.652 STATUS POST TOTAL LEFT KNEE REPLACEMENT: Primary | ICD-10-CM

## 2019-04-17 PROCEDURE — 99024 POSTOP FOLLOW-UP VISIT: CPT | Performed by: PHYSICIAN ASSISTANT

## 2019-04-19 ENCOUNTER — HOSPITAL ENCOUNTER (OUTPATIENT)
Dept: PHYSICAL THERAPY | Age: 74
Setting detail: THERAPIES SERIES
Discharge: HOME | End: 2019-04-19
Payer: COMMERCIAL

## 2019-04-19 PROCEDURE — 97110 THERAPEUTIC EXERCISES: CPT | Performed by: PHYSICAL THERAPIST

## 2019-04-19 PROCEDURE — 97140 MANUAL THERAPY 1/> REGIONS: CPT | Performed by: PHYSICAL THERAPIST

## 2019-04-19 NOTE — FLOWSHEET NOTE
[facility],  [address] [place]  Phone: [phone]  Fax [phone]      ATHLETIC TRAINING [address]  Date:  2019    Patient Name:  Antoni Meza    :  1945  MRN: [id number]  Restrictions/Precautions:    Medical/Treatment Diagnosis Information:  ·   Diagnosis: left knee OA  M16.11   left  knee pain M25.562   s/p left TKR   DOS: 19  · Treatment Diagnosis: left knee OA  M16.11   left  knee pain M25.562  Physician Information:  Dr. Skyler Santana Post-op  8 wks  12 wks 16 wks 20 wks   24 wks                            Activity Log                                                  DOS/DOI: 19                                                   Date: 3/22/19 03/29/19 04/05/19 4/12/19 4/19/19   ATC communication Tried some batting practice today, lost balance and landed on butt (ok)  LBP (ache) today  Has noticeable limp in gait at 25% jog - little to no pain  Superficial infection- may have to have debridement   Bike        Elliptical        Treadmill        Airdyne                Gastroc stretch        Soleus stretch        Hamstring stretch        ITB stretch        Hip Flexor stretch        Quad stretch        Adductor stretch                Weight Shifting sp   Lxx step to balance 15x3\"                               fp   xWx step to balance 15x3\"                               tp        Lateral walking (with/w/o TB)                Balance: PEP/Maria R board                       SLS              Star excursion load/explode              Extremity reach UE/LE                Leg Press Gideon. 100# 3x12 120# 3x10 140# 3x12 140# 3x12 140# 3x15                     Ecc. 80# 3x12 100# 3x10 120# 3x12 120# 3x12 120# 3x15                     Inv. Calf Press Gideon.  80# 3x12 100# 3x10 120# 3x12 140# 2x15 140# 3x15                      Ecc.                            Inv.                [facility] Flex                   ABd 45# R/L 2x15 45# R/L 3x12 60# R/L 2x10 60# R/L 2x15 60# R/L 2x15              Add                  TKE 60# 20x5\" 60# 30x5\" 75# 20x5\" 75# 25x5\" 75# 30x5\"              Ext 45# R/L 2x15 45# R/L 3x12 60# R/L 2x10 60# R/L 2x15 60# R/L 2x15           Steps Up                   Up and Over                   Down                   Lateral                   Rotation                Squats  mini                      wall                     BOSU                Lunges:  Lunge to Balance Stationary lunge ant/lat/post R/L 10x ea                      Balance to Lunge                       Walking                Knee Extension Bilat. Ecc.                                   Inv. Hamstring Curls Bilat. Ecc. 35# 3x12 40# 3x10 40# 3x12 35# (south) 2x15 45# (north) 2x15                              Inv.                Soleus Press Bilat. Ecc.                               Inv.                                         Ladders                    Square                   Jump/Hop  Low                          Med.                          High                                      Reformer Walking        Reformer Parallel Heel/Toe        Reformer Wide Heel/Toe                                                                  Modality GR 15' GR 15' GR 15' GR 15' declined   Initials                             JLW EP EP DTM DTM   Time spent one on one (workers comp)        Time spent with PT assistant

## 2019-04-19 NOTE — FLOWSHEET NOTE
[facility],  [address] [place]  Phone: [phone]  Fax [phone]    Physical Therapy Daily Treatment Note  Date:  2019    Patient Name:  Renee Rosales    :  1945  MRN: [id number]  Restrictions/Precautions:    Physician Information:  Referring Practitioner: Dr. Yue Hodges  Medical/Treatment Diagnosis Information:  Diagnosis: left knee OA  M16.11   left  knee pain M25.562   s/p left TKR   DOS: 19  · Treatment Diagnosis: left knee OA  M16.11   left  knee pain M25.562  [] Conservative / [x] Surgical - DOS: 19  Therapy Diagnosis/Practice Pattern:  Practice Pattern H: Joint Arthroplasty  Insurance/Certification information:  PT Insurance Information:   [facility]  - $40CP-100%-PT/OT MED NEC---- ? Plan of care signed: [] YES  [] NO  Number of Comorbidities:  [x]0     []1-2    []3+  Date of Patient follow up with Physician:     G-Code (if applicable):      Date G-Code Applied:  19  PT G-Codes  Functional Assessment Tool Used: LEFS  Score: 89%  Functional Limitation: Mobility: Walking and moving around  Mobility: Walking and Moving Around Current Status (): At least 80 percent but less than 100 percent impaired, limited or restricted  Mobility: Walking and Moving Around Goal Status (): At least 20 percent but less than 40 percent impaired, limited or restricted    Progress Note: [x]  Yes  []  No  Next due by: Visit #10        Latex Allergy:  []NO      [x]YES  Preferred Language for Healthcare:   [x]English       []other:    Visit # Insurance Allowable Reporting Period   18 BMN Begin Date: 2019               End Date:      RECERT DUE BY:     SUBJECTIVE:  Pt battles stiffness. Pt has been working motion, but he states that knee stiffens whenever resting. Wound healing is slow.     OBJECTIVE: See eval  Observation:  Palpation:     Test used Initial score Current Score Pain Summary VAS 9 0- 3   Functional questionnaire LEFS 89   3   ROM flexion 68 117    extension 0 0   Strength quad fair Fair +    SLR indp with lag SLR  No lag with cues. RESTRICTIONS/PRECAUTIONS: blister near wound. Latex allergy    Exercises/Interventions:   atc  Therapeutic Ex Sets/reps Notes   Bike 8 min Able to go around bike   Knee ext hang 10#   2 x  2 min. QS X 10  :10 hep      SLR  2  X 10 hep   SL hip abd   2 x  10    Prone TKE X 20  :05    Prone quad s 4x  ;20     Seated heel slides with towel X 20 hep   Seated HS s 4x  ;30 hep   LAQ 5#   X 20               SLS 3x  :30     Incline s 4x  ;30     Leg Press  Gideon/ single atc   tke  alvin atc        Manual Intervention     PROM  supine 5 min    Man HS/  Man gastroc s 3 min. Prone quad s  4x  :20               NMR re-education                             Therapeutic Exercise and NMR EXR  [x] (12761) Provided verbal/tactile cueing for activities related to strengthening, flexibility, endurance, ROM for improvements in LE, proximal hip, and core control with self care, mobility, lifting, ambulation.  [] (86283) Provided verbal/tactile cueing for activities related to improving balance, coordination, kinesthetic sense, posture, motor skill, proprioception  to assist with LE, proximal hip, and core control in self care, mobility, lifting, ambulation and eccentric single leg control.      NMR and Therapeutic Activities:    [x] (49716 or 04550) Provided verbal/tactile cueing for activities related to improving balance, coordination, kinesthetic sense, posture, motor skill, proprioception and motor activation to allow for proper function of core, proximal hip and LE with self care and ADLs  [] (33013) Gait Re-education- Provided training and instruction to the patient for proper LE, core and proximal hip recruitment and positioning and eccentric body weight control with ambulation re-education including up and down stairs     Home Exercise Program:    [x] (01339) Reviewed/Progressed HEP activities related to strengthening, flexibility, endurance, ROM of core, proximal hip and LE for functional self-care, mobility, lifting and ambulation/stair navigation   [] (96041)Reviewed/Progressed HEP activities related to improving balance, coordination, kinesthetic sense, posture, motor skill, proprioception of core, proximal hip and LE for self care, mobility, lifting, and ambulation/stair navigation      Manual Treatments:  PROM / STM / Oscillations-Mobs:  G-I, II, III, IV (PA's, Inf., Post.)  [x] (68260) Provided manual therapy to mobilize LE, proximal hip and/or LS spine soft tissue/joints for the purpose of modulating pain, promoting relaxation,  increasing ROM, reducing/eliminating soft tissue swelling/inflammation/restriction, improving soft tissue extensibility and allowing for proper ROM for normal function with self care, mobility, lifting and ambulation. Modalities:       [] GR/ESU 15 min    [x] GR 15 min  [] ESU     [] CP    [] MHP    [] declined     Charges:  Timed Code Treatment Minutes: 45   Total Treatment Minutes: 60     [] EVAL (LOW) 74837 (typically 20 minutes face-to-face)  [] EVAL (MOD) 93571 (typically 30 minutes face-to-face)  [] EVAL (HIGH) 73928 (typically 45 minutes face-to-face)  [] RE-EVAL     [x] MW(27491) x  2   [] IONTO  [] NMR (46039) x      [x] VASO  [x] Manual (11115) x  1    [] Other:  [] TA x       [] Mech Traction (00109)  [] ES(attended) (80235)      [] ES (un) (97085):     GOALS:   Patient stated goal: return to normal living. Therapist goals for Patient:   Short Term Goals: To be achieved in: 2 weeks  1. Independent in HEP and progression per patient tolerance, in order to prevent re-injury. 2. Patient will have a decrease in pain to facilitate improvement in movement, function, and ADLs as indicated by Functional Deficits. Long Term Goals: To be achieved in: 8 weeks  1.  Disability index score of 25% or less for the LEFS to assist with reaching prior level of function. 2. Patient will demonstrate increased AROM from 0 - 125 to allow for proper joint functioning as indicated by patients Functional Deficits. 3. Patient will demonstrate an increase in Strength to good proximal hip strength and control, within 5lb HHD in LE to allow for proper functional mobility as indicated by patients Functional Deficits. 4. Patient will return to amb with reciprocal gait with descending and ascending stairs without increased symptoms or restriction. 5. Able to sleep 7-8 hours at night. New or Updated Goals (if applicable):  [x] No change to goals established upon initial eval/last progress note:  New Goals:    Progression Towards Functional goals:   [] Patient is progressing as expected towards functional goals listed. [x] Progression is slowed due to complexities listed. [] Progression has been slowed due to co-morbidities. [] Plan just implemented, too soon to assess goals progression  [] Other:     ASSESSMENT:  Pt required B UE support with SLS. [] Improvement noted relative to goals:  [] No Improvement noted related to goals:  Summary/Patient's response to treatment: ROM is improving. Pt benefits from verbal cues for proper form for exercises.      Treatment/Activity Tolerance:  [x] Patient tolerated treatment well [] Patient limited by fatique  [] Patient limited by pain  [] Patient limited by other medical complications  [] Other:     Prognosis: [x] Good [] Fair  [] Poor    Patient Requires Follow-up: [x] Yes  [] No    PLAN: See eval  [x] Continue per plan of care [] Alter current plan (see comments)  [] Plan of care initiated [] Hold pending MD visit [] Discharge    Electronically signed by: Janice Keys PT

## 2019-04-22 ENCOUNTER — OFFICE VISIT (OUTPATIENT)
Dept: ORTHOPEDIC SURGERY | Age: 74
End: 2019-04-22

## 2019-04-22 DIAGNOSIS — T81.30XA WOUND DEHISCENCE: Primary | ICD-10-CM

## 2019-04-22 PROCEDURE — 99024 POSTOP FOLLOW-UP VISIT: CPT | Performed by: ORTHOPAEDIC SURGERY

## 2019-04-22 NOTE — PROGRESS NOTES
History of present illness: The patient returns today after left total knee replacement. Surgery was on 1/22/2019. He had some issues with some areas of small wound dehiscence or suture abscesses developing. He is in today for a follow-up. He has continued with some additional drainage. Patient did present to an urgent care over the weekend. He had increased drainage. They placed him on clindamycin. Pain Assessment  Location of Pain: Knee  Location Modifiers: Left  Severity of Pain: 5  Frequency of Pain: Intermittent  Aggravating Factors: Walking, Standing, Bending  Limiting Behavior: Yes  Result of Injury: No  Work-Related Injury: No  Are there other pain locations you wish to document?: No]    Physical examination: The incision is clean, dry. There is 2 areas one over the central portion of the incision and one towards the distal with small scabs and granulation tissue and some serosanguineous drainage. These areas have started to heal.  He does have a area that more proximal.  Continues to be open. Range of motion is 0 degrees of extension to 115 degrees of flexion. There is expected swelling with no evidence of DVT and a negative Homans sign. Neurovascular exam is intact. Assessment/plan: Wound dehiscence and arthrofibrosis. Patient will continue to wash daily with antibacterial soap and water. He will stay on his clindamycin. We will schedule him for formal irrigation and debridement in operating room with closure with manipulation under anesthesia. We discussed the risk, benefits, and potential complications of total knee replacement arthroplasty surgery. The patient voiced their understanding to concerns that include infection, deep vein thrombosis, neurological injury, and delayed rehabilitation. The patient also realizes that there are concerns regarding the potential need for manipulation under anesthesia if range of motion proves to be problematic.  The patient also understands that there is always a chance of dystrophy and anesthetic complications that would include a stroke, cardiopulmonary pathology, and even death. We also discussed the rehabilitation involved with this operation and options that involved not only the hospitalization but then the potential of either going home with visiting home therapy versus outpatient physical therapy. We talked about the nuances of each of these treatments. The patient also realizes the need for a knee brace and ambulatory aids in the rehabilitation process as well as the very significant role that the patient plays in terms of rehabilitation after this type of operation. All questions were answered.

## 2019-04-22 NOTE — LETTER
CONSENT TO SURGICAL OR MEDICAL PROCEDURE    PATIENTS NAMES: Ana Hannon III 1945  76 y.o. [phone] (home)   DATE/TIME: 4/22/2019 10:17 AM    1) I consent that Dr. Ronan Patel perform one or more surgical and or medical procedures on the above named patient at: [address] [place] [address]/[facility] to treat the condition(s) which appear indicated by the diagnostic studies already preformed. I have been told in general terms the nature and purpose of the procedure(s) and what the procedure(s) is/are expected to accomplish. They procedure(s) are as follows:   LT KNEE SUPERFICIAL INCISION AND DRAINAGE WITH MANIPULATION    2) It has been explained to me by the informing physician that during the course of any surgical or medical procedure unforeseen condition(s) may be revealed that necessitate an extension of the original procedure(s) or a different procedure(s) than set forth in Paragraph 1. I therefore consent that the above named physician perform such additional or different procedure(s) as are necessary or desirable in the exercise of his professional judgement. 3) I have been made aware by the informing physician of certain reasonably known risks that are associated with the procedure(s) set forth in Paragraph 1.  I understand the reasonably known risk to be: Including but not limited to: CVA, infection, M.I., Phlebitis, Cardiac Arrest and Pulmonary Embolism, Loss of Circulation, Nerve Injury, Delayed Healing, Recurrence, Loss of extremity/digit, R.S.D., Screw breakage, Arthritis, Pain, Swelling, Stiffness, Failure of Prothesis, Fracture, Leg length discrepancy, Wound complication/non-healing, need for further surgery and persistent pain.   4) I have also been informed by the informing physician that there are other risks, from both known and unknown causes, that are attendant to the

## 2019-04-23 ENCOUNTER — ANESTHESIA EVENT (OUTPATIENT)
Dept: OPERATING ROOM | Age: 74
End: 2019-04-23
Payer: COMMERCIAL

## 2019-04-24 ENCOUNTER — HOSPITAL ENCOUNTER (OUTPATIENT)
Age: 74
Setting detail: OUTPATIENT SURGERY
Discharge: HOME | End: 2019-04-24
Attending: ORTHOPAEDIC SURGERY | Admitting: ORTHOPAEDIC SURGERY
Payer: COMMERCIAL

## 2019-04-24 ENCOUNTER — ANESTHESIA (OUTPATIENT)
Dept: OPERATING ROOM | Age: 74
End: 2019-04-24
Payer: COMMERCIAL

## 2019-04-24 VITALS
TEMPERATURE: 97 F | OXYGEN SATURATION: 98 % | HEART RATE: 69 BPM | WEIGHT: 186 LBS | DIASTOLIC BLOOD PRESSURE: 70 MMHG | RESPIRATION RATE: 22 BRPM | BODY MASS INDEX: 29.89 KG/M2 | SYSTOLIC BLOOD PRESSURE: 120 MMHG | HEIGHT: 66 IN

## 2019-04-24 VITALS — OXYGEN SATURATION: 97 % | SYSTOLIC BLOOD PRESSURE: 99 MMHG | DIASTOLIC BLOOD PRESSURE: 69 MMHG

## 2019-04-24 DIAGNOSIS — Z96.652 STATUS POST TOTAL LEFT KNEE REPLACEMENT: Primary | ICD-10-CM

## 2019-04-24 PROCEDURE — 99024 POSTOP FOLLOW-UP VISIT: CPT | Performed by: PHYSICIAN ASSISTANT

## 2019-04-24 PROCEDURE — 2580000003 HC RX 258: Performed by: ORTHOPAEDIC SURGERY

## 2019-04-24 PROCEDURE — 2500000003 HC RX 250 WO HCPCS: Performed by: NURSE ANESTHETIST, CERTIFIED REGISTERED

## 2019-04-24 PROCEDURE — 7100000001 HC PACU RECOVERY - ADDTL 15 MIN: Performed by: ORTHOPAEDIC SURGERY

## 2019-04-24 PROCEDURE — 64447 NJX AA&/STRD FEMORAL NRV IMG: CPT | Performed by: ANESTHESIOLOGY

## 2019-04-24 PROCEDURE — 3700000001 HC ADD 15 MINUTES (ANESTHESIA): Performed by: ORTHOPAEDIC SURGERY

## 2019-04-24 PROCEDURE — 6360000002 HC RX W HCPCS: Performed by: ORTHOPAEDIC SURGERY

## 2019-04-24 PROCEDURE — 7100000011 HC PHASE II RECOVERY - ADDTL 15 MIN: Performed by: ORTHOPAEDIC SURGERY

## 2019-04-24 PROCEDURE — 2500000003 HC RX 250 WO HCPCS: Performed by: ANESTHESIOLOGY

## 2019-04-24 PROCEDURE — 2580000003 HC RX 258: Performed by: ANESTHESIOLOGY

## 2019-04-24 PROCEDURE — 7100000000 HC PACU RECOVERY - FIRST 15 MIN: Performed by: ORTHOPAEDIC SURGERY

## 2019-04-24 PROCEDURE — 3700000000 HC ANESTHESIA ATTENDED CARE: Performed by: ORTHOPAEDIC SURGERY

## 2019-04-24 PROCEDURE — 2709999900 HC NON-CHARGEABLE SUPPLY: Performed by: ORTHOPAEDIC SURGERY

## 2019-04-24 PROCEDURE — 6360000002 HC RX W HCPCS: Performed by: NURSE ANESTHETIST, CERTIFIED REGISTERED

## 2019-04-24 PROCEDURE — 3600000015 HC SURGERY LEVEL 5 ADDTL 15MIN: Performed by: ORTHOPAEDIC SURGERY

## 2019-04-24 PROCEDURE — 7100000010 HC PHASE II RECOVERY - FIRST 15 MIN: Performed by: ORTHOPAEDIC SURGERY

## 2019-04-24 PROCEDURE — 6370000000 HC RX 637 (ALT 250 FOR IP): Performed by: ORTHOPAEDIC SURGERY

## 2019-04-24 PROCEDURE — 3600000005 HC SURGERY LEVEL 5 BASE: Performed by: ORTHOPAEDIC SURGERY

## 2019-04-24 RX ORDER — FENTANYL CITRATE 50 UG/ML
INJECTION, SOLUTION INTRAMUSCULAR; INTRAVENOUS PRN
Status: DISCONTINUED | OUTPATIENT
Start: 2019-04-24 | End: 2019-04-24 | Stop reason: SDUPTHER

## 2019-04-24 RX ORDER — OXYCODONE HYDROCHLORIDE AND ACETAMINOPHEN 5; 325 MG/1; MG/1
1 TABLET ORAL PRN
Status: DISCONTINUED | OUTPATIENT
Start: 2019-04-24 | End: 2019-04-24 | Stop reason: HOSPADM

## 2019-04-24 RX ORDER — ONDANSETRON 2 MG/ML
4 INJECTION INTRAMUSCULAR; INTRAVENOUS EVERY 30 MIN PRN
Status: DISCONTINUED | OUTPATIENT
Start: 2019-04-24 | End: 2019-04-24 | Stop reason: HOSPADM

## 2019-04-24 RX ORDER — OXYCODONE HYDROCHLORIDE AND ACETAMINOPHEN 5; 325 MG/1; MG/1
1 TABLET ORAL EVERY 6 HOURS PRN
Qty: 28 TABLET | Refills: 0 | Status: SHIPPED | OUTPATIENT
Start: 2019-04-24 | End: 2019-05-01

## 2019-04-24 RX ORDER — PROPOFOL 10 MG/ML
INJECTION, EMULSION INTRAVENOUS PRN
Status: DISCONTINUED | OUTPATIENT
Start: 2019-04-24 | End: 2019-04-24 | Stop reason: SDUPTHER

## 2019-04-24 RX ORDER — HYDRALAZINE HYDROCHLORIDE 20 MG/ML
5 INJECTION INTRAMUSCULAR; INTRAVENOUS EVERY 30 MIN PRN
Status: DISCONTINUED | OUTPATIENT
Start: 2019-04-24 | End: 2019-04-24 | Stop reason: HOSPADM

## 2019-04-24 RX ORDER — BACITRACIN ZINC AND POLYMYXIN B SULFATE 500; 1000 [USP'U]/G; [USP'U]/G
OINTMENT TOPICAL PRN
Status: DISCONTINUED | OUTPATIENT
Start: 2019-04-24 | End: 2019-04-24 | Stop reason: ALTCHOICE

## 2019-04-24 RX ORDER — ONDANSETRON 2 MG/ML
INJECTION INTRAMUSCULAR; INTRAVENOUS PRN
Status: DISCONTINUED | OUTPATIENT
Start: 2019-04-24 | End: 2019-04-24 | Stop reason: SDUPTHER

## 2019-04-24 RX ORDER — DIPHENHYDRAMINE HYDROCHLORIDE 50 MG/ML
6.25 INJECTION INTRAMUSCULAR; INTRAVENOUS
Status: DISCONTINUED | OUTPATIENT
Start: 2019-04-24 | End: 2019-04-24 | Stop reason: HOSPADM

## 2019-04-24 RX ORDER — SODIUM CHLORIDE 0.9 % (FLUSH) 0.9 %
10 SYRINGE (ML) INJECTION EVERY 12 HOURS SCHEDULED
Status: DISCONTINUED | OUTPATIENT
Start: 2019-04-24 | End: 2019-04-24 | Stop reason: HOSPADM

## 2019-04-24 RX ORDER — OXYCODONE HYDROCHLORIDE AND ACETAMINOPHEN 5; 325 MG/1; MG/1
2 TABLET ORAL PRN
Status: DISCONTINUED | OUTPATIENT
Start: 2019-04-24 | End: 2019-04-24 | Stop reason: HOSPADM

## 2019-04-24 RX ORDER — LABETALOL HYDROCHLORIDE 5 MG/ML
5 INJECTION, SOLUTION INTRAVENOUS
Status: DISCONTINUED | OUTPATIENT
Start: 2019-04-24 | End: 2019-04-24 | Stop reason: HOSPADM

## 2019-04-24 RX ORDER — MEPERIDINE HYDROCHLORIDE 50 MG/ML
12.5 INJECTION INTRAMUSCULAR; INTRAVENOUS; SUBCUTANEOUS EVERY 5 MIN PRN
Status: DISCONTINUED | OUTPATIENT
Start: 2019-04-24 | End: 2019-04-24 | Stop reason: HOSPADM

## 2019-04-24 RX ORDER — SODIUM CHLORIDE, SODIUM LACTATE, POTASSIUM CHLORIDE, CALCIUM CHLORIDE 600; 310; 30; 20 MG/100ML; MG/100ML; MG/100ML; MG/100ML
INJECTION, SOLUTION INTRAVENOUS CONTINUOUS
Status: DISCONTINUED | OUTPATIENT
Start: 2019-04-24 | End: 2019-04-24 | Stop reason: HOSPADM

## 2019-04-24 RX ORDER — LIDOCAINE HYDROCHLORIDE 20 MG/ML
INJECTION, SOLUTION INFILTRATION; PERINEURAL PRN
Status: DISCONTINUED | OUTPATIENT
Start: 2019-04-24 | End: 2019-04-24 | Stop reason: SDUPTHER

## 2019-04-24 RX ORDER — SODIUM CHLORIDE 0.9 % (FLUSH) 0.9 %
10 SYRINGE (ML) INJECTION PRN
Status: DISCONTINUED | OUTPATIENT
Start: 2019-04-24 | End: 2019-04-24 | Stop reason: HOSPADM

## 2019-04-24 RX ADMIN — PROPOFOL 250 MG: 10 INJECTION, EMULSION INTRAVENOUS at 15:58

## 2019-04-24 RX ADMIN — FENTANYL CITRATE 50 MCG: 50 INJECTION INTRAMUSCULAR; INTRAVENOUS at 16:19

## 2019-04-24 RX ADMIN — FENTANYL CITRATE 100 MCG: 50 INJECTION INTRAMUSCULAR; INTRAVENOUS at 15:58

## 2019-04-24 RX ADMIN — LIDOCAINE HYDROCHLORIDE 60 MG: 20 INJECTION, SOLUTION INFILTRATION; PERINEURAL at 15:58

## 2019-04-24 RX ADMIN — LIDOCAINE HYDROCHLORIDE 0.1 ML: 10 INJECTION, SOLUTION EPIDURAL; INFILTRATION; INTRACAUDAL; PERINEURAL at 13:54

## 2019-04-24 RX ADMIN — ONDANSETRON 4 MG: 2 INJECTION INTRAMUSCULAR; INTRAVENOUS at 16:19

## 2019-04-24 RX ADMIN — Medication 2 G: at 15:55

## 2019-04-24 RX ADMIN — SODIUM CHLORIDE, POTASSIUM CHLORIDE, SODIUM LACTATE AND CALCIUM CHLORIDE: 600; 310; 30; 20 INJECTION, SOLUTION INTRAVENOUS at 13:54

## 2019-04-24 ASSESSMENT — PULMONARY FUNCTION TESTS
PIF_VALUE: 3
PIF_VALUE: 2
PIF_VALUE: 3
PIF_VALUE: 4
PIF_VALUE: 4
PIF_VALUE: 0
PIF_VALUE: 2
PIF_VALUE: 17
PIF_VALUE: 3
PIF_VALUE: 1
PIF_VALUE: 4
PIF_VALUE: 3
PIF_VALUE: 21
PIF_VALUE: 25
PIF_VALUE: 4
PIF_VALUE: 1
PIF_VALUE: 0
PIF_VALUE: 3
PIF_VALUE: 4
PIF_VALUE: 4
PIF_VALUE: 3
PIF_VALUE: 3
PIF_VALUE: 4
PIF_VALUE: 18
PIF_VALUE: 12
PIF_VALUE: 5
PIF_VALUE: 0
PIF_VALUE: 21
PIF_VALUE: 0
PIF_VALUE: 3
PIF_VALUE: 20
PIF_VALUE: 0
PIF_VALUE: 3
PIF_VALUE: 5
PIF_VALUE: 3
PIF_VALUE: 4
PIF_VALUE: 4
PIF_VALUE: 1
PIF_VALUE: 0
PIF_VALUE: 3
PIF_VALUE: 15

## 2019-04-24 ASSESSMENT — PAIN SCALES - GENERAL
PAINLEVEL_OUTOF10: 0
PAINLEVEL_OUTOF10: 0

## 2019-04-24 ASSESSMENT — PAIN - FUNCTIONAL ASSESSMENT: PAIN_FUNCTIONAL_ASSESSMENT: 0-10

## 2019-04-24 ASSESSMENT — PAIN DESCRIPTION - DESCRIPTORS: DESCRIPTORS: ACHING;CONSTANT

## 2019-04-24 NOTE — ANESTHESIA PRE PROCEDURE
Department of Anesthesiology  Preprocedure Note       Name:  Kelsie Reynolds   Age:  76 y.o.  :  1945                                          MRN:  [id number]         Date:  2019      Surgeon: Roel Rick):  Tristan Chadwick MD    Procedure: LEFT KNEE INCISION AND DRAINAGE WITH MANIPULATION        **LATEX SENSITIVE**  CPT CODE - 29443 (Left )    Medications prior to admission:   Prior to Admission medications    Medication Sig Start Date End Date Taking? Authorizing Provider   oxyCODONE-acetaminophen (PERCOCET) 5-325 MG per tablet Take 1 tablet by mouth every 6 hours as needed for Pain for up to 7 days. Intended supply: 7 days. Take lowest dose possible to manage pain 19 Yes Tristan Chadwick MD   pravastatin (PRAVACHOL) 40 MG tablet TAKE 1 TABLET BY MOUTH IN THE EVENING  19  Yes Alejandra Queen MD   Magnesium 400 MG CAPS Take by mouth nightly   Yes Historical Provider, MD   tamsulosin (FLOMAX) 0.4 MG capsule TAKE 1 CAPSULE BY MOUTH ONE TIME A DAY  10/29/18  Yes Alejandra Queen MD   ranitidine (ZANTAC) 300 MG tablet Take 300 mg by mouth nightly   Yes Historical Provider, MD   fluticasone (FLONASE) 50 MCG/ACT nasal spray 1 spray by Nasal route daily as needed    Yes Historical Provider, MD   hydrochlorothiazide (HYDRODIURIL) 25 MG tablet Take 25 mg by mouth daily   Yes Historical Provider, MD   Cholecalciferol (VITAMIN D3) 2000 UNITS CAPS Take 4,000 Units by mouth    Yes Historical Provider, MD   Coenzyme Q10 (CO Q-10) 200 MG CAPS Take  by mouth.    Yes Historical Provider, MD   aspirin 325 MG EC tablet Take 1 tablet by mouth 2 times daily 19  PATI Lozada       Current medications:    Current Facility-Administered Medications   Medication Dose Route Frequency Provider Last Rate Last Dose    lactated ringers infusion   Intravenous Continuous Javier Paz  mL/hr at 19 1354      sodium chloride flush 0.9 % injection 10 mL  10 mL Intravenous 2 times per Melva Hughes MD   4/24/2019

## 2019-04-24 NOTE — BRIEF OP NOTE
Brief Postoperative Note  ______________________________________________________________    Patient: Kodak Mantilla III  YOB: 1945  MRN: [id number]  Date of Procedure: 4/24/2019    Pre-Op Diagnosis: LEFT KNEE WOUND DEHISCENCE; ARTHROFIBROSIS     Post-Op Diagnosis: Same       Procedure(s):  LEFT KNEE INCISION AND DRAINAGE/ PRIMARY CLOSURE WITH MANIPULATION        Anesthesia: Regional, General    Surgeon(s):  Arti Gonzalez MD    Assistant: Katlheen Desai PA-C    Estimated Blood Loss (mL): less than 50     Complications: None    Specimens:   * No specimens in log *    Implants:  * No implants in log *      Drains: * No LDAs found *    Findings: AS ABOVE    Gregory Sharp MD  Date: 4/24/2019  Time: 4:20 PM

## 2019-04-24 NOTE — H&P
I have reviewed the history and physical and examined the patient and find no relevant changes. I have reviewed with the patient and/or family the risks, benefits, and alternatives to the procedure.    H&P REVIEWED AND IS IN CHART/EPIC   IT WILL NOT BE DUPLICATED
supply: 7 days. Take lowest dose possible to manage pain 4/24/19 5/1/19 Yes Santos Haas MD   pravastatin (PRAVACHOL) 40 MG tablet TAKE 1 TABLET BY MOUTH IN THE EVENING  2/17/19  Yes Ana Martin MD   Magnesium 400 MG CAPS Take by mouth nightly   Yes Historical Provider, MD   tamsulosin (FLOMAX) 0.4 MG capsule TAKE 1 CAPSULE BY MOUTH ONE TIME A DAY  10/29/18  Yes Ana Martin MD   ranitidine (ZANTAC) 300 MG tablet Take 300 mg by mouth nightly   Yes Historical Provider, MD   fluticasone (FLONASE) 50 MCG/ACT nasal spray 1 spray by Nasal route daily as needed    Yes Historical Provider, MD   hydrochlorothiazide (HYDRODIURIL) 25 MG tablet Take 25 mg by mouth daily   Yes Historical Provider, MD   Cholecalciferol (VITAMIN D3) 2000 UNITS CAPS Take 4,000 Units by mouth    Yes Historical Provider, MD   Coenzyme Q10 (CO Q-10) 200 MG CAPS Take  by mouth. Yes Historical Provider, MD   aspirin 325 MG EC tablet Take 1 tablet by mouth 2 times daily 1/24/19 2/23/19  PATI Trevizo       Allergies:  Latex; Bactrim [sulfamethoxazole-trimethoprim]; Sulfa antibiotics; and Zocor [simvastatin]    Social History:    Tobacco:  reports that he quit smoking about 23 years ago. His smoking use included cigarettes. He has a 10.00 pack-year smoking history. He has never used smokeless tobacco.   Alcohol:  reports that he drinks about 0.6 oz of alcohol per week.    Illicit Drug: No  Family History:       Problem Relation Age of Onset    Heart Disease Mother     Heart Disease Father     Heart Disease Son     Heart Disease Brother 79        CABG X2    Heart Disease Brother     Heart Disease Brother      REVIEW OF SYSTEMS:  CONSTITUTIONAL:  negative  MUSCULOSKELETAL:  positive for  pain    PHYSICAL EXAM:  Admission weight: 186 lb (84.4 kg)  5' 6\" (167.6 cm)  VITALS:  BP (!) 140/84   Pulse 61   Temp 98.6 °F (37 °C) (Temporal)   Resp 14   Ht 5' 6\" (1.676 m)   Wt 186 lb (84.4 kg)   SpO2 98%   BMI 30.02 kg/m²

## 2019-04-24 NOTE — ANESTHESIA PROCEDURE NOTES
Peripheral Block    Patient location during procedure: pre-op  Start time: 4/24/2019 3:14 PM  End time: 4/24/2019 3:19 PM  Staffing  Anesthesiologist: Fausto Barrios MD  Performed: anesthesiologist   Preanesthetic Checklist  Completed: patient identified, site marked, surgical consent, pre-op evaluation, timeout performed, IV checked, risks and benefits discussed, monitors and equipment checked, anesthesia consent given, oxygen available and patient being monitored  Peripheral Block  Patient position: supine  Prep: ChloraPrep  Patient monitoring: continuous pulse ox  Block type: Femoral  Laterality: left  Injection technique: single-shot  Procedures: nerve stimulator  Femoral crease  Provider prep: sterile gloves  Needle  Needle type: combined needle/nerve stimulator   Needle gauge: 22 G  Needle length: 5 cm  Needle localization: nerve stimulator  Test dose: negative  Assessment  Injection assessment: negative aspiration for heme and no paresthesia on injection  Paresthesia pain: none  Slow fractionated injection: yes  Hemodynamics: stable  Additional Notes  BPV 0.25% 20cc in divided doses    IVS:  Propofol 30mg  Reason for block: post-op pain management and at surgeon's request

## 2019-04-24 NOTE — ANESTHESIA POSTPROCEDURE EVALUATION
Department of Anesthesiology  Postprocedure Note    Patient: Toshia Becker  MRN: [id number]  YOB: 1945  Date of evaluation: 4/24/2019  Time:  6:18 PM     Procedure Summary     Date:  04/24/19 Room / Location:  [facility] OR 04 / [facility] OR    Anesthesia Start:  7084 Anesthesia Stop:  5982    Procedure:  LEFT KNEE DEHISCENCE INCISION AND DRAINAGE WITH CLOSURE AND MANIPULATION OF LEFT KNEE ARTHROFIBROSIS        **LATEX SENSITIVE**  CPT CODE - 40716 (Left Knee) Diagnosis:       Wound dehiscence      (LEFT KNEE WOUND DEHISCENCE)    Surgeon:  Be Matos MD Responsible Provider:  Sydney Leong MD    Anesthesia Type:  general, regional ASA Status:  3          Anesthesia Type: general, regional    Naomi Phase I: Naomi Score: 9    Naomi Phase II: Naomi Score: 10    Last vitals: Reviewed and per EMR flowsheets.        Anesthesia Post Evaluation    Comments: Postoperative Anesthesia Note    Name:    Toshia Becker  MRN:      [id number]    Patient Vitals in the past 12 hrs:  04/24/19 1715, BP:120/70, Temp:97 °F (36.1 °C), Temp src:Temporal, Pulse:69, Resp:22, SpO2:98 %  04/24/19 1655, BP:122/71, Pulse:70, Resp:14, SpO2:96 %  04/24/19 1650, BP:111/68, Pulse:70, Resp:12, SpO2:93 %  04/24/19 1648, BP:111/68, Pulse:76, Resp:10, SpO2:92 %  04/24/19 1640, Temp:96.8 °F (36 °C), Temp src:Temporal  04/24/19 1635, BP:104/62, Pulse:70, Resp:12, SpO2:93 %  04/24/19 1527, Pulse:61, SpO2:98 %  04/24/19 1520, Pulse:64, SpO2:99 %  04/24/19 1347, BP:(!) 140/84, Temp:98.6 °F (37 °C), Temp src:Temporal, Pulse:65, Resp:14, SpO2:98 %, Height:5' 6\" (1.676 m), Weight:186 lb (84.4 kg)     LABS:    CBC  Lab Results       Component                Value               Date/Time                  WBC                      4.5                 03/07/2019 09:42 AM        HGB                      14.2                03/07/2019 09:42 AM        HCT                      42.8                03/07/2019 09:42 AM        PLT 260                 03/07/2019 09:42 AM   RENAL  Lab Results       Component                Value               Date/Time                  NA                       139                 03/07/2019 09:42 AM        K                        4.8                 03/07/2019 09:42 AM        K                        4.0                 01/23/2019 06:01 AM        CL                       101                 03/07/2019 09:42 AM        CO2                      25                  03/07/2019 09:42 AM        BUN                      14                  03/07/2019 09:42 AM        CREATININE               0.9                 03/07/2019 09:42 AM        GLUCOSE                  115 (H)             03/07/2019 09:42 AM   COAGS  Lab Results       Component                Value               Date/Time                  PROTIME                  10.9                11/12/2018 08:59 AM        INR                      0.96                11/12/2018 08:59 AM        APTT                     32.9                11/12/2018 08:59 AM     Intake & Output: In: 0974 (P.O.:240; I.V.:1000)  Out: -     Nausea & Vomiting:  No    Level of Consciousness:  Awake    Pain Assessment:  Adequate analgesia    Anesthesia Complications:  No apparent anesthetic complications    SUMMARY      Vital signs stable  OK to discharge from Stage I post anesthesia care.   Care transferred from Anesthesiology department on discharge from perioperative area

## 2019-04-25 ENCOUNTER — HOSPITAL ENCOUNTER (OUTPATIENT)
Dept: VASCULAR LAB | Age: 74
Discharge: HOME | End: 2019-04-25
Payer: COMMERCIAL

## 2019-04-25 PROCEDURE — 93880 EXTRACRANIAL BILAT STUDY: CPT

## 2019-04-26 ENCOUNTER — TELEPHONE (OUTPATIENT)
Dept: FAMILY MEDICINE CLINIC | Age: 74
End: 2019-04-26

## 2019-04-26 ENCOUNTER — APPOINTMENT (OUTPATIENT)
Dept: PHYSICAL THERAPY | Age: 74
End: 2019-04-26
Payer: COMMERCIAL

## 2019-04-26 RX ORDER — SILDENAFIL 100 MG/1
100 TABLET, FILM COATED ORAL DAILY PRN
Qty: 30 TABLET | Refills: 12 | Status: SHIPPED | OUTPATIENT
Start: 2019-04-26 | End: 2020-07-27

## 2019-04-26 NOTE — TELEPHONE ENCOUNTER
Pt called wanting a written prescription for Viagra. He said that a pharmacy in [facility]) can give it to him for $28.    Please Advise.

## 2019-05-09 ENCOUNTER — OFFICE VISIT (OUTPATIENT)
Dept: ORTHOPEDIC SURGERY | Age: 74
End: 2019-05-09

## 2019-05-09 DIAGNOSIS — Z96.652 STATUS POST TOTAL LEFT KNEE REPLACEMENT: Primary | ICD-10-CM

## 2019-05-09 DIAGNOSIS — T81.30XA WOUND DEHISCENCE: ICD-10-CM

## 2019-05-09 PROCEDURE — 99024 POSTOP FOLLOW-UP VISIT: CPT | Performed by: ORTHOPAEDIC SURGERY

## 2019-05-09 NOTE — PROGRESS NOTES
History of present illness: The patient returns today after irrigation and debridement of superficial left total knee replacement wound dehiscence. Pain control has been satisfactory with oral medications. There have been no fevers or chills. Pain Assessment  Location of Pain: Knee  Location Modifiers: Left  Severity of Pain: 1  Quality of Pain: Dull  Duration of Pain: Persistent  Frequency of Pain: Intermittent  Aggravating Factors: Bending, Squatting  Limiting Behavior: Some  Relieving Factors: Rest]    Physical examination: The incision is clean, dry, and intact with no drainage or signs of infection. Range of motion is 0 degrees of extension to 100 degrees of flexion. There is expected swelling with no evidence of DVT and a negative Homans sign. Neurovascular exam is intact. Assessment/plan: Patient sutures are removed today. Local wound care given. Continue to ice and elevate for pain and swelling control. If he does well follow-up 1 year from his total knee arthroplasty.

## 2019-05-16 RX ORDER — PRAVASTATIN SODIUM 40 MG
TABLET ORAL
Qty: 90 TABLET | Refills: 0 | Status: SHIPPED | OUTPATIENT
Start: 2019-05-16 | End: 2019-08-14

## 2019-05-23 ENCOUNTER — OFFICE VISIT (OUTPATIENT)
Dept: ORTHOPEDIC SURGERY | Age: 74
End: 2019-05-23
Payer: COMMERCIAL

## 2019-05-23 DIAGNOSIS — Z96.652 STATUS POST TOTAL LEFT KNEE REPLACEMENT: Primary | ICD-10-CM

## 2019-05-23 DIAGNOSIS — M17.12 PRIMARY OSTEOARTHRITIS OF LEFT KNEE: ICD-10-CM

## 2019-05-23 PROCEDURE — 99212 OFFICE O/P EST SF 10 MIN: CPT | Performed by: ORTHOPAEDIC SURGERY

## 2019-05-23 NOTE — PROGRESS NOTES
History of present illness: The patient returns today after irrigation and debridement of superficial left total knee replacement wound dehiscence approximately 4 weeks ago. 4 months status post left total knee arthroplasty. Pain control has been satisfactory with oral medications. There have been no fevers or chills. Pain Assessment  Location of Pain: Knee  Location Modifiers: Left  Severity of Pain: 3  Frequency of Pain: Intermittent  Limiting Behavior: Some  Result of Injury: No  Work-Related Injury: No  Are there other pain locations you wish to document?: No]    Physical examination: The incision is clean, dry, and intact with no drainage or signs of infection. Range of motion is 0 degrees of extension to 100 degrees of flexion. There is expected swelling with no evidence of DVT and a negative Homans sign. Neurovascular exam is intact. Assessment/plan: Patient continues to do well. We have discussed predental improving invasive procedure antibiotics. Continue to ice and elevate for pain and swelling control. If he does well follow-up 1 year from his total knee arthroplasty.

## 2019-07-19 RX ORDER — AMOXICILLIN AND CLAVULANATE POTASSIUM 875; 125 MG/1; MG/1
1 TABLET, FILM COATED ORAL 2 TIMES DAILY
Qty: 6 TABLET | Refills: 0 | Status: SHIPPED | OUTPATIENT
Start: 2019-07-19 | End: 2019-07-22

## 2019-07-23 ENCOUNTER — OFFICE VISIT (OUTPATIENT)
Dept: ORTHOPEDIC SURGERY | Age: 74
End: 2019-07-23
Payer: COMMERCIAL

## 2019-07-23 VITALS — WEIGHT: 186.07 LBS | HEIGHT: 66 IN | BODY MASS INDEX: 29.9 KG/M2

## 2019-07-23 DIAGNOSIS — M25.562 LEFT KNEE PAIN, UNSPECIFIED CHRONICITY: Primary | ICD-10-CM

## 2019-07-23 DIAGNOSIS — Z96.652 STATUS POST TOTAL LEFT KNEE REPLACEMENT: ICD-10-CM

## 2019-07-23 PROCEDURE — 99214 OFFICE O/P EST MOD 30 MIN: CPT | Performed by: PHYSICIAN ASSISTANT

## 2019-07-24 ENCOUNTER — TELEPHONE (OUTPATIENT)
Dept: ORTHOPEDIC SURGERY | Age: 74
End: 2019-07-24

## 2019-07-24 ENCOUNTER — HOSPITAL ENCOUNTER (OUTPATIENT)
Age: 74
Discharge: HOME | End: 2019-07-24
Payer: COMMERCIAL

## 2019-07-24 DIAGNOSIS — M25.562 LEFT KNEE PAIN, UNSPECIFIED CHRONICITY: ICD-10-CM

## 2019-07-24 DIAGNOSIS — Z96.652 STATUS POST TOTAL LEFT KNEE REPLACEMENT: ICD-10-CM

## 2019-07-24 LAB
BASOPHILS ABSOLUTE: 0 K/UL (ref 0–0.2)
BASOPHILS RELATIVE PERCENT: 0.6 %
C-REACTIVE PROTEIN: 3.5 MG/L (ref 0–5.1)
EOSINOPHILS ABSOLUTE: 0.1 K/UL (ref 0–0.6)
EOSINOPHILS RELATIVE PERCENT: 2.2 %
HCT VFR BLD CALC: 42 % (ref 40.5–52.5)
HEMOGLOBIN: 14.5 G/DL (ref 13.5–17.5)
LYMPHOCYTES ABSOLUTE: 1.5 K/UL (ref 1–5.1)
LYMPHOCYTES RELATIVE PERCENT: 31.9 %
MCH RBC QN AUTO: 29.6 PG (ref 26–34)
MCHC RBC AUTO-ENTMCNC: 34.4 G/DL (ref 31–36)
MCV RBC AUTO: 85.8 FL (ref 80–100)
MONOCYTES ABSOLUTE: 0.7 K/UL (ref 0–1.3)
MONOCYTES RELATIVE PERCENT: 13.9 %
NEUTROPHILS ABSOLUTE: 2.4 K/UL (ref 1.7–7.7)
NEUTROPHILS RELATIVE PERCENT: 51.4 %
PDW BLD-RTO: 14.2 % (ref 12.4–15.4)
PLATELET # BLD: 227 K/UL (ref 135–450)
PMV BLD AUTO: 7.8 FL (ref 5–10.5)
RBC # BLD: 4.89 M/UL (ref 4.2–5.9)
WBC # BLD: 4.7 K/UL (ref 4–11)

## 2019-07-24 PROCEDURE — 86140 C-REACTIVE PROTEIN: CPT

## 2019-07-24 PROCEDURE — 85025 COMPLETE CBC W/AUTO DIFF WBC: CPT

## 2019-07-24 PROCEDURE — 36415 COLL VENOUS BLD VENIPUNCTURE: CPT

## 2019-07-24 PROCEDURE — 85652 RBC SED RATE AUTOMATED: CPT

## 2019-07-24 NOTE — PROGRESS NOTES
Subjective    Patient ID: Cr Castellon is a 76 y.o..  male. Chief Complaint   Patient presents with    Knee Pain     LEFT KNEE. HX OF TKR & I&D. PT STATES NOTICED REDNESS & BUMP ABOUT 1 WEEK AGO        Pain Assessment  Location of Pain: Knee  Location Modifiers: Left  Severity of Pain: 2  Quality of Pain: Dull, Aching  Duration of Pain: A few minutes  Frequency of Pain: Several times daily  Aggravating Factors: Bending, Stretching  Limiting Behavior: Some  Relieving Factors: Rest  Result of Injury: No  Work-Related Injury: No  Are there other pain locations you wish to document?: No    Knee Pain  Patient complains of left knee pain. This is evaluated as a personal injury. There was not a history of injury. The pain began a few days ago. The pain is located anterior. He describes the symptoms as aching. Symptoms improve with rest, ice. The symptoms are worse with activity, stair climbing. The knee has not given out or felt unstable. The patient can bend and straighten the knee fully. The patient is active in none. Treatment to date has been ice, Tylenol, without significant relief. The patient is not working. The patients occupation is retired. Patient had initial total knee replacement in January 2019 had a eventual infection arise around incision site around the mid mid incision in April 2019 which required washout of the knee patient is unsure if total replacement was done or transitional implant was inserted. Patient notes that over the past few days a small lesion has popped up at the distal end of the incision site on the left knee there is erythema surrounding the the spot and there is a darkening almost bruise-like look in the central portion of the site. Patient is concerned for infection as this is how it presented the first time when it appeared in the mid incision site. Patient denies fevers chills nausea fatigue.     Patient's medications, allergies, past medical, surgical, social

## 2019-07-25 ENCOUNTER — OFFICE VISIT (OUTPATIENT)
Dept: ORTHOPEDIC SURGERY | Age: 74
End: 2019-07-25

## 2019-07-25 DIAGNOSIS — T81.30XA WOUND DEHISCENCE: Primary | ICD-10-CM

## 2019-07-25 DIAGNOSIS — Z96.652 STATUS POST TOTAL LEFT KNEE REPLACEMENT: ICD-10-CM

## 2019-07-25 LAB — SEDIMENTATION RATE, ERYTHROCYTE: 14 MM/HR (ref 0–20)

## 2019-07-25 PROCEDURE — 99024 POSTOP FOLLOW-UP VISIT: CPT | Performed by: ORTHOPAEDIC SURGERY

## 2019-08-01 ENCOUNTER — OFFICE VISIT (OUTPATIENT)
Dept: ORTHOPEDIC SURGERY | Age: 74
End: 2019-08-01
Payer: COMMERCIAL

## 2019-08-01 DIAGNOSIS — Z96.652 STATUS POST TOTAL LEFT KNEE REPLACEMENT: Primary | ICD-10-CM

## 2019-08-01 DIAGNOSIS — T81.30XA WOUND DEHISCENCE: ICD-10-CM

## 2019-08-01 PROCEDURE — 99213 OFFICE O/P EST LOW 20 MIN: CPT | Performed by: ORTHOPAEDIC SURGERY

## 2019-08-01 NOTE — PROGRESS NOTES
Chief Complaint    Follow-up (LEFT inferior knee incision drainage; scap is soft with polysporin, had pus coming out when squeezing it last night)    History of Present Illness:  Hailey Santos III is a 76 y.o. male who presents with an area of swelling over the distal aspect of his total knee incision. Patient was seen in the office last week. Patient had a superficial I&D at the distal portion of his incision. He has been applying Polysporin. He states that there is no significant drainage and things are going well. For reminder, patient had his initial total knee replacement back in January 2019 and was taken back in April 2019 for a wound irrigation and debridement. Patient is concerned that this new lesion is a repeat infection. He denies any fevers, chills, nausea, or fatigue. Pain Assessment  Location of Pain: Knee  Location Modifiers: Left, Inferior, Anterior  Severity of Pain: 2  Quality of Pain: Throbbing  Duration of Pain: Persistent  Frequency of Pain: Intermittent  Aggravating Factors: Other (Comment)(laying down at night)  Limiting Behavior: Some  Relieving Factors: Rest    Medical History:  Patient's medications, allergies, past medical, surgical, social and family histories were reviewed and updated as appropriate. Review of Systems:  Pertinent items are noted in HPI  Review of systems reviewed from Patient History Form dated on 7/25/2019 and available in the patient's chart under the Media tab. Vital Signs: There were no vitals taken for this visit. General Exam:   Constitutional: Patient is adequately groomed with no evidence of malnutrition  DTRs: Deep tendon reflexes are intact  Mental Status: The patient is oriented to time, place and person. The patient's mood and affect are appropriate. Lymphatic: The lymphatic examination bilaterally reveals all areas to be without enlargement or induration. Vascular: Examination reveals no swelling or calf tenderness.   Peripheral

## 2019-08-14 RX ORDER — PRAVASTATIN SODIUM 40 MG
TABLET ORAL
Qty: 90 TABLET | Refills: 0 | Status: SHIPPED | OUTPATIENT
Start: 2019-08-14 | End: 2019-09-19

## 2019-09-19 ENCOUNTER — OFFICE VISIT (OUTPATIENT)
Dept: FAMILY MEDICINE CLINIC | Age: 74
End: 2019-09-19
Payer: COMMERCIAL

## 2019-09-19 VITALS
WEIGHT: 191 LBS | DIASTOLIC BLOOD PRESSURE: 74 MMHG | SYSTOLIC BLOOD PRESSURE: 116 MMHG | HEART RATE: 72 BPM | OXYGEN SATURATION: 98 % | BODY MASS INDEX: 30.84 KG/M2

## 2019-09-19 DIAGNOSIS — F41.9 ANXIETY: ICD-10-CM

## 2019-09-19 DIAGNOSIS — Z23 NEED FOR INFLUENZA VACCINATION: ICD-10-CM

## 2019-09-19 DIAGNOSIS — E78.2 MIXED HYPERLIPIDEMIA: ICD-10-CM

## 2019-09-19 DIAGNOSIS — E87.6 HYPOKALEMIA: ICD-10-CM

## 2019-09-19 DIAGNOSIS — I63.81 LACUNAR INFARCT, ACUTE (HCC): Primary | ICD-10-CM

## 2019-09-19 PROCEDURE — 90653 IIV ADJUVANT VACCINE IM: CPT | Performed by: NURSE PRACTITIONER

## 2019-09-19 PROCEDURE — 1111F DSCHRG MED/CURRENT MED MERGE: CPT | Performed by: NURSE PRACTITIONER

## 2019-09-19 PROCEDURE — 99214 OFFICE O/P EST MOD 30 MIN: CPT | Performed by: NURSE PRACTITIONER

## 2019-09-19 PROCEDURE — G0008 ADMIN INFLUENZA VIRUS VAC: HCPCS | Performed by: NURSE PRACTITIONER

## 2019-09-19 RX ORDER — ATORVASTATIN CALCIUM 80 MG/1
80 TABLET, FILM COATED ORAL DAILY
COMMUNITY
End: 2019-12-11

## 2019-09-19 NOTE — PROGRESS NOTES
 aspirin 81 MG tablet Take 81 mg by mouth daily      sildenafil (VIAGRA) 100 MG tablet Take 1 tablet by mouth daily as needed for Erectile Dysfunction 30 tablet 12    Magnesium 400 MG CAPS Take by mouth nightly      tamsulosin (FLOMAX) 0.4 MG capsule TAKE 1 CAPSULE BY MOUTH ONE TIME A DAY  30 capsule 2    ranitidine (ZANTAC) 300 MG tablet Take 300 mg by mouth nightly      fluticasone (FLONASE) 50 MCG/ACT nasal spray 1 spray by Nasal route daily as needed       hydrochlorothiazide (HYDRODIURIL) 25 MG tablet Take 25 mg by mouth daily      Cholecalciferol (VITAMIN D3) 2000 UNITS CAPS Take 4,000 Units by mouth       Coenzyme Q10 (CO Q-10) 200 MG CAPS Take  by mouth. Medications patient taking as of now reconciled against medications ordered at time of hospital discharge: Yes    Chief Complaint   Patient presents with    Follow-Up from [address] [place] [address]  9/15-9/16  stroke       History of Present illness - Follow up of Hospital diagnosis(es): Lacunar infarct    Inpatient course: Discharge summary reviewed- see chart. Interval history/Current status: Deficit resolved, stable    Vitals:    09/19/19 1044   BP: 116/74   Pulse: 72   SpO2: 98%   Weight: 191 lb (86.6 kg)     Body mass index is 30.84 kg/m². Wt Readings from Last 3 Encounters:   09/19/19 191 lb (86.6 kg)   07/23/19 186 lb 1.1 oz (84.4 kg)   04/24/19 186 lb (84.4 kg)     BP Readings from Last 3 Encounters:   09/19/19 116/74   04/24/19 99/69   04/24/19 120/70       A comprehensive review of systems was negative except for what was noted in the HPI.     Physical Exam:  General Appearance: alert and oriented to person, place and time, well developed and well- nourished, in no acute distress  Skin: warm and dry, no rash or erythema  Head: normocephalic and atraumatic  Eyes: pupils equal, round, and reactive to light, extraocular eye movements intact, conjunctivae normal  ENT: tympanic membrane, external ear and ear canal normal bilaterally,

## 2019-09-26 ENCOUNTER — TELEPHONE (OUTPATIENT)
Dept: FAMILY MEDICINE CLINIC | Age: 74
End: 2019-09-26

## 2019-10-02 DIAGNOSIS — E87.6 HYPOKALEMIA: ICD-10-CM

## 2019-10-02 LAB
ANION GAP SERPL CALCULATED.3IONS-SCNC: 14 MMOL/L (ref 3–16)
BUN BLDV-MCNC: 10 MG/DL (ref 7–20)
CALCIUM SERPL-MCNC: 9.3 MG/DL (ref 8.3–10.6)
CHLORIDE BLD-SCNC: 100 MMOL/L (ref 99–110)
CO2: 26 MMOL/L (ref 21–32)
CREAT SERPL-MCNC: 0.8 MG/DL (ref 0.8–1.3)
GFR AFRICAN AMERICAN: >60
GFR NON-AFRICAN AMERICAN: >60
GLUCOSE BLD-MCNC: 99 MG/DL (ref 70–99)
POTASSIUM SERPL-SCNC: 4.7 MMOL/L (ref 3.5–5.1)
SODIUM BLD-SCNC: 140 MMOL/L (ref 136–145)

## 2019-10-19 PROBLEM — Z23 NEED FOR INFLUENZA VACCINATION: Status: RESOLVED | Noted: 2019-09-19 | Resolved: 2019-10-19

## 2019-10-28 ENCOUNTER — TELEPHONE (OUTPATIENT)
Dept: FAMILY MEDICINE CLINIC | Age: 74
End: 2019-10-28

## 2019-11-11 ENCOUNTER — TELEPHONE (OUTPATIENT)
Dept: FAMILY MEDICINE CLINIC | Age: 74
End: 2019-11-11

## 2019-12-11 ENCOUNTER — OFFICE VISIT (OUTPATIENT)
Dept: FAMILY MEDICINE CLINIC | Age: 74
End: 2019-12-11
Payer: COMMERCIAL

## 2019-12-11 VITALS
SYSTOLIC BLOOD PRESSURE: 124 MMHG | WEIGHT: 199.2 LBS | BODY MASS INDEX: 32.02 KG/M2 | HEART RATE: 93 BPM | OXYGEN SATURATION: 98 % | HEIGHT: 66 IN | DIASTOLIC BLOOD PRESSURE: 80 MMHG

## 2019-12-11 DIAGNOSIS — I63.81 LACUNAR INFARCT, ACUTE (HCC): ICD-10-CM

## 2019-12-11 DIAGNOSIS — E78.2 MIXED HYPERLIPIDEMIA: ICD-10-CM

## 2019-12-11 PROCEDURE — 99213 OFFICE O/P EST LOW 20 MIN: CPT | Performed by: FAMILY MEDICINE

## 2019-12-11 RX ORDER — ROSUVASTATIN CALCIUM 10 MG/1
10 TABLET, COATED ORAL DAILY
Qty: 30 TABLET | Refills: 3 | Status: SHIPPED | OUTPATIENT
Start: 2019-12-11 | End: 2020-04-06

## 2019-12-11 ASSESSMENT — ENCOUNTER SYMPTOMS: SHORTNESS OF BREATH: 0

## 2019-12-23 ENCOUNTER — NURSE ONLY (OUTPATIENT)
Dept: CARDIOLOGY CLINIC | Age: 74
End: 2019-12-23

## 2020-01-23 ENCOUNTER — OFFICE VISIT (OUTPATIENT)
Dept: ORTHOPEDIC SURGERY | Age: 75
End: 2020-01-23
Payer: COMMERCIAL

## 2020-01-23 PROCEDURE — 99213 OFFICE O/P EST LOW 20 MIN: CPT | Performed by: ORTHOPAEDIC SURGERY

## 2020-01-23 NOTE — PROGRESS NOTES
Chief Complaint    Knee Pain (1 yr s/p lt tkr thomas, doing well)    History of Present Illness:  Huy Sapp III is a 76 y.o. male who presents 1 year status post left total knee arthroplasty with Thomas. The patient had a superficial I&D at the distal portion of his incision 3 months after surgery. He has been doing very well and has no concerns. He is happy with his range of motion. Pain Assessment  Location of Pain: Knee  Location Modifiers: Left  Severity of Pain: 0  Limiting Behavior: Some  Result of Injury: No  Work-Related Injury: No  Are there other pain locations you wish to document?: No    Medical History:  Patient's medications, allergies, past medical, surgical, social and family histories were reviewed and updated as appropriate. Review of Systems:  Pertinent items are noted in HPI  Review of systems reviewed from Patient History Form dated on 7/25/2019 and available in the patient's chart under the Media tab. Vital Signs: There were no vitals taken for this visit. General Exam:   Constitutional: Patient is adequately groomed with no evidence of malnutrition  DTRs: Deep tendon reflexes are intact  Mental Status: The patient is oriented to time, place and person. The patient's mood and affect are appropriate. Lymphatic: The lymphatic examination bilaterally reveals all areas to be without enlargement or induration. Vascular: Examination reveals no swelling or calf tenderness. Peripheral pulses are palpable and 2+. Neurological: The patient has good coordination. There is no weakness or sensory deficit. Left Knee Examination:    Inspection: There are no signs of infection. No erythema, no fluctuance, no drainage. Palpation: No significant tenderness to palpation about the knee. Range of Motion: Full knee range of motion 0-110. Strength: 5 out of 5 quadriceps strength. Skin:   No other skin abnormalities noted.     Radiology:     3 views of the left knee dated 1/23/2020  AP lateral and skyline view of the left knee  Status post total knee arthroplasty without any hardware complications and no lucency seen around the implants    Assessment: 77-year-old male 1 year s/p  left TKA and subsequent I&D doing well    Impression:  Encounter Diagnosis   Name Primary?  History of total knee replacement, left Yes     Office Procedures:  None    Treatment Plan:  He is doing very well overall at 1 year postop. We discussed continuing dental prophylaxis. He will follow-up in 4 to 5 years.

## 2020-01-27 ENCOUNTER — OFFICE VISIT (OUTPATIENT)
Dept: FAMILY MEDICINE CLINIC | Age: 75
End: 2020-01-27
Payer: COMMERCIAL

## 2020-01-27 ENCOUNTER — NURSE TRIAGE (OUTPATIENT)
Dept: OTHER | Facility: CLINIC | Age: 75
End: 2020-01-27

## 2020-01-27 VITALS
BODY MASS INDEX: 31.98 KG/M2 | SYSTOLIC BLOOD PRESSURE: 126 MMHG | WEIGHT: 198 LBS | HEART RATE: 80 BPM | DIASTOLIC BLOOD PRESSURE: 62 MMHG | OXYGEN SATURATION: 95 %

## 2020-01-27 PROCEDURE — 3288F FALL RISK ASSESSMENT DOCD: CPT | Performed by: FAMILY MEDICINE

## 2020-01-27 PROCEDURE — G8510 SCR DEP NEG, NO PLAN REQD: HCPCS | Performed by: FAMILY MEDICINE

## 2020-01-27 PROCEDURE — 99214 OFFICE O/P EST MOD 30 MIN: CPT | Performed by: FAMILY MEDICINE

## 2020-01-27 RX ORDER — CLOPIDOGREL BISULFATE 75 MG/1
75 TABLET ORAL DAILY
Qty: 30 TABLET | Refills: 5 | Status: SHIPPED | OUTPATIENT
Start: 2020-01-27 | End: 2020-07-24 | Stop reason: SDUPTHER

## 2020-01-27 ASSESSMENT — PATIENT HEALTH QUESTIONNAIRE - PHQ9
SUM OF ALL RESPONSES TO PHQ QUESTIONS 1-9: 0
SUM OF ALL RESPONSES TO PHQ QUESTIONS 1-9: 0
2. FEELING DOWN, DEPRESSED OR HOPELESS: 0
SUM OF ALL RESPONSES TO PHQ9 QUESTIONS 1 & 2: 0
1. LITTLE INTEREST OR PLEASURE IN DOING THINGS: 0

## 2020-01-27 ASSESSMENT — ENCOUNTER SYMPTOMS
VOMITING: 0
SHORTNESS OF BREATH: 0
RHINORRHEA: 0
TROUBLE SWALLOWING: 0
PHOTOPHOBIA: 0
BLOOD IN STOOL: 0
DIARRHEA: 0
NAUSEA: 0
COUGH: 0
CHEST TIGHTNESS: 0
BACK PAIN: 0
SORE THROAT: 0
CONSTIPATION: 0
ABDOMINAL PAIN: 0
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
## 2020-01-28 ENCOUNTER — TELEPHONE (OUTPATIENT)
Dept: FAMILY MEDICINE CLINIC | Age: 75
End: 2020-01-28

## 2020-01-28 PROCEDURE — 93272 ECG/REVIEW INTERPRET ONLY: CPT | Performed by: INTERNAL MEDICINE

## 2020-01-28 NOTE — TELEPHONE ENCOUNTER
Pt wants to know if he should take the baby Asprin and Plavix together?  Pls contact pt at [phone]

## 2020-01-29 ENCOUNTER — TELEPHONE (OUTPATIENT)
Dept: CARDIOLOGY CLINIC | Age: 75
End: 2020-01-29

## 2020-01-29 NOTE — TELEPHONE ENCOUNTER
The final report for the event monitor has been read and scanned under the media tab. Thank you for your referral. Please feel free to contact our office with any questions at  [phone].

## 2020-03-03 ENCOUNTER — TELEPHONE (OUTPATIENT)
Dept: INTERNAL MEDICINE CLINIC | Age: 75
End: 2020-03-03

## 2020-03-03 NOTE — TELEPHONE ENCOUNTER
Can you put a lab order in [facility] - he has a physical this month   He will go downstairs one day this week fasting

## 2020-03-05 DIAGNOSIS — Z12.5 SCREENING FOR MALIGNANT NEOPLASM OF PROSTATE: ICD-10-CM

## 2020-03-05 DIAGNOSIS — E78.2 MIXED HYPERLIPIDEMIA: ICD-10-CM

## 2020-03-05 DIAGNOSIS — E55.9 VITAMIN D DEFICIENCY: ICD-10-CM

## 2020-03-05 DIAGNOSIS — E87.6 HYPOKALEMIA: ICD-10-CM

## 2020-03-05 LAB
A/G RATIO: 1.8 (ref 1.1–2.2)
ALBUMIN SERPL-MCNC: 4.4 G/DL (ref 3.4–5)
ALP BLD-CCNC: 86 U/L (ref 40–129)
ALT SERPL-CCNC: 21 U/L (ref 10–40)
ANION GAP SERPL CALCULATED.3IONS-SCNC: 12 MMOL/L (ref 3–16)
AST SERPL-CCNC: 22 U/L (ref 15–37)
BASOPHILS ABSOLUTE: 0 K/UL (ref 0–0.2)
BASOPHILS RELATIVE PERCENT: 0.7 %
BILIRUB SERPL-MCNC: 1.1 MG/DL (ref 0–1)
BUN BLDV-MCNC: 12 MG/DL (ref 7–20)
CALCIUM SERPL-MCNC: 9.7 MG/DL (ref 8.3–10.6)
CHLORIDE BLD-SCNC: 101 MMOL/L (ref 99–110)
CHOLESTEROL, FASTING: 157 MG/DL (ref 0–199)
CO2: 27 MMOL/L (ref 21–32)
CREAT SERPL-MCNC: 0.7 MG/DL (ref 0.8–1.3)
EOSINOPHILS ABSOLUTE: 0.1 K/UL (ref 0–0.6)
EOSINOPHILS RELATIVE PERCENT: 1.5 %
GFR AFRICAN AMERICAN: >60
GFR NON-AFRICAN AMERICAN: >60
GLOBULIN: 2.5 G/DL
GLUCOSE FASTING: 109 MG/DL (ref 70–99)
HCT VFR BLD CALC: 45 % (ref 40.5–52.5)
HDLC SERPL-MCNC: 45 MG/DL (ref 40–60)
HEMOGLOBIN: 15.7 G/DL (ref 13.5–17.5)
LDL CHOLESTEROL CALCULATED: 87 MG/DL
LYMPHOCYTES ABSOLUTE: 1.3 K/UL (ref 1–5.1)
LYMPHOCYTES RELATIVE PERCENT: 30.3 %
MCH RBC QN AUTO: 30.2 PG (ref 26–34)
MCHC RBC AUTO-ENTMCNC: 34.9 G/DL (ref 31–36)
MCV RBC AUTO: 86.7 FL (ref 80–100)
MONOCYTES ABSOLUTE: 0.6 K/UL (ref 0–1.3)
MONOCYTES RELATIVE PERCENT: 14.3 %
NEUTROPHILS ABSOLUTE: 2.3 K/UL (ref 1.7–7.7)
NEUTROPHILS RELATIVE PERCENT: 53.2 %
PDW BLD-RTO: 12.9 % (ref 12.4–15.4)
PLATELET # BLD: 203 K/UL (ref 135–450)
PMV BLD AUTO: 7.2 FL (ref 5–10.5)
POTASSIUM SERPL-SCNC: 4.8 MMOL/L (ref 3.5–5.1)
PROSTATE SPECIFIC ANTIGEN: 0.46 NG/ML (ref 0–4)
RBC # BLD: 5.2 M/UL (ref 4.2–5.9)
SODIUM BLD-SCNC: 140 MMOL/L (ref 136–145)
TOTAL CK: 101 U/L (ref 39–308)
TOTAL PROTEIN: 6.9 G/DL (ref 6.4–8.2)
TRIGLYCERIDE, FASTING: 124 MG/DL (ref 0–150)
VITAMIN D 25-HYDROXY: 65.9 NG/ML
VLDLC SERPL CALC-MCNC: 25 MG/DL
WBC # BLD: 4.4 K/UL (ref 4–11)

## 2020-07-24 RX ORDER — CLOPIDOGREL BISULFATE 75 MG/1
75 TABLET ORAL DAILY
Qty: 30 TABLET | Refills: 5 | Status: SHIPPED | OUTPATIENT
Start: 2020-07-24 | End: 2020-12-28

## 2020-07-27 ENCOUNTER — OFFICE VISIT (OUTPATIENT)
Dept: FAMILY MEDICINE CLINIC | Age: 75
End: 2020-07-27
Payer: COMMERCIAL

## 2020-07-27 VITALS
DIASTOLIC BLOOD PRESSURE: 68 MMHG | WEIGHT: 186 LBS | BODY MASS INDEX: 29.89 KG/M2 | OXYGEN SATURATION: 98 % | HEART RATE: 99 BPM | HEIGHT: 66 IN | TEMPERATURE: 99.5 F | SYSTOLIC BLOOD PRESSURE: 118 MMHG

## 2020-07-27 PROBLEM — Z00.00 WELL ADULT EXAM: Status: ACTIVE | Noted: 2020-07-27

## 2020-07-27 PROCEDURE — G0439 PPPS, SUBSEQ VISIT: HCPCS | Performed by: FAMILY MEDICINE

## 2020-07-27 RX ORDER — HYDROCHLOROTHIAZIDE 12.5 MG/1
12.5 TABLET ORAL EVERY MORNING
COMMUNITY

## 2020-07-27 RX ORDER — FAMOTIDINE 40 MG/1
TABLET, FILM COATED ORAL
COMMUNITY
Start: 2020-05-27

## 2020-07-27 ASSESSMENT — PATIENT HEALTH QUESTIONNAIRE - PHQ9
SUM OF ALL RESPONSES TO PHQ QUESTIONS 1-9: 0
2. FEELING DOWN, DEPRESSED OR HOPELESS: 0
1. LITTLE INTEREST OR PLEASURE IN DOING THINGS: 0
SUM OF ALL RESPONSES TO PHQ9 QUESTIONS 1 & 2: 0
SUM OF ALL RESPONSES TO PHQ QUESTIONS 1-9: 0

## 2020-07-27 NOTE — PROGRESS NOTES
Medicare Annual Wellness Visit  Name: Gretchen Dyson Date: 2020   MRN: <[id number]> Sex: Male   Age: 76 y.o. Ethnicity: Non-/Non    : 1945 Race: Berkley Murrieta III is here for Medicare AWV    Screenings for behavioral, psychosocial and functional/safety risks, and cognitive dysfunction are all negative except as indicated below. These results, as well as other patient data from the [address] form, are documented in Flowsheets linked to this Encounter. Allergies   Allergen Reactions    Latex Rash     Blisters and rash    Bactrim [Sulfamethoxazole-Trimethoprim] Other (See Comments)     Blister on end of penis    Sulfa Antibiotics Dermatitis    Zocor [Simvastatin] Other (See Comments)     myositis       Prior to Visit Medications    Medication Sig Taking? Authorizing Provider   Tetanus-Diphth-Acell Pertussis (BOOSTRIX) 5-2.5-18.5 LF-MCG/0.5 injection Inject 0.5 mLs into the muscle once for 1 dose Yes Daina Kawasaki, MD   clopidogrel (PLAVIX) 75 MG tablet Take 1 tablet by mouth daily Yes Daina Kawasaki, MD   rosuvastatin (CRESTOR) 10 MG tablet TAKE 1 TABLET BY MOUTH ONE TIME A DAY  Yes Daina Kawasaki, MD   aspirin 81 MG tablet Take 81 mg by mouth daily Yes Historical Provider, MD   Magnesium 400 MG CAPS Take by mouth nightly Yes Historical Provider, MD   fluticasone (FLONASE) 50 MCG/ACT nasal spray 1 spray by Nasal route daily as needed  Yes Historical Provider, MD   hydrochlorothiazide (HYDRODIURIL) 25 MG tablet Take 25 mg by mouth daily Yes Historical Provider, MD   Cholecalciferol (VITAMIN D3) 2000 UNITS CAPS Take 4,000 Units by mouth  Yes Historical Provider, MD   Coenzyme Q10 (CO Q-10) 200 MG CAPS Take  by mouth.  Yes Historical Provider, MD   hydrochlorothiazide (HYDRODIURIL) 12.5 MG tablet Take 12.5 mg by mouth every morning  Historical Provider, MD   famotidine (PEPCID) 40 MG tablet   Historical Provider, MD   ranitidine (ZANTAC) screening values have been reviewed and are found in Flowsheets. The following problems were reviewed today and where indicated follow up appointments were made and/or referrals ordered. No visits with results within 1 Month(s) from this visit. Latest known visit with results is:   Orders Only on 03/05/2020   Component Date Value Ref Range Status    Vit D, 25-Hydroxy 03/05/2020 65.9  >=30 ng/mL Final    PSA 03/05/2020 0.46  0.00 - 4.00 ng/mL Final    Total CK 03/05/2020 101  39 - 308 U/L Final    Cholesterol, Fasting 03/05/2020 157  0 - 199 mg/dL Final    Triglyceride, Fasting 03/05/2020 124  0 - 150 mg/dL Final    HDL 03/05/2020 45  40 - 60 mg/dL Final    LDL Calculated 03/05/2020 87  <100 mg/dL Final    VLDL Cholesterol Calculated 03/05/2020 25  Not Established mg/dL Final    Sodium 03/05/2020 140  136 - 145 mmol/L Final    Potassium 03/05/2020 4.8  3.5 - 5.1 mmol/L Final    Chloride 03/05/2020 101  99 - 110 mmol/L Final    CO2 03/05/2020 27  21 - 32 mmol/L Final    Anion Gap 03/05/2020 12  3 - 16 Final    Glucose, Fasting 03/05/2020 109* 70 - 99 mg/dL Final    BUN 03/05/2020 12  7 - 20 mg/dL Final    CREATININE 03/05/2020 0.7* 0.8 - 1.3 mg/dL Final    GFR Non- 03/05/2020 >60  >60 Final    GFR  03/05/2020 >60  >60 Final    Calcium 03/05/2020 9.7  8.3 - 10.6 mg/dL Final    Total Protein 03/05/2020 6.9  6.4 - 8.2 g/dL Final    Alb 03/05/2020 4.4  3.4 - 5.0 g/dL Final    Albumin/Globulin Ratio 03/05/2020 1.8  1.1 - 2.2 Final    Total Bilirubin 03/05/2020 1.1* 0.0 - 1.0 mg/dL Final    Alkaline Phosphatase 03/05/2020 86  40 - 129 U/L Final    ALT 03/05/2020 21  10 - 40 U/L Final    AST 03/05/2020 22  15 - 37 U/L Final    Globulin 03/05/2020 2.5  g/dL Final    WBC 03/05/2020 4.4  4.0 - 11.0 K/uL Final    RBC 03/05/2020 5.20  4. 20 - 5.90 M/uL Final    Hemoglobin 03/05/2020 15.7  13.5 - 17.5 g/dL Final    Hematocrit 03/05/2020 45.0  40.5 - 52.5 % Final    MCV 03/05/2020 86.7  80.0 - 100.0 fL Final    MCH 03/05/2020 30.2  26.0 - 34.0 pg Final    MCHC 03/05/2020 34.9  31.0 - 36.0 g/dL Final    RDW 03/05/2020 12.9  12.4 - 15.4 % Final    Platelets 60/84/4002 203  135 - 450 K/uL Final    MPV 03/05/2020 7.2  5.0 - 10.5 fL Final    Neutrophils % 03/05/2020 53.2  % Final    Lymphocytes % 03/05/2020 30.3  % Final    Monocytes % 03/05/2020 14.3  % Final    Eosinophils % 03/05/2020 1.5  % Final    Basophils % 03/05/2020 0.7  % Final    Neutrophils Absolute 03/05/2020 2.3  1.7 - 7.7 K/uL Final    Lymphocytes Absolute 03/05/2020 1.3  1.0 - 5.1 K/uL Final    Monocytes Absolute 03/05/2020 0.6  0.0 - 1.3 K/uL Final    Eosinophils Absolute 03/05/2020 0.1  0.0 - 0.6 K/uL Final    Basophils Absolute 03/05/2020 0.0  0.0 - 0.2 K/uL Final       Positive Risk Factor Screenings with Interventions:     Health Habits/Nutrition:     Body mass index is 30.02 kg/m².   Health Habits/Nutrition Interventions:  · none    Personalized Preventive Plan   Current Health Maintenance Status  Immunization History   Administered Date(s) Administered    Influenza 11/09/2010    Influenza Virus Vaccine 11/14/2011, 10/05/2012, 08/26/2015    Influenza Whole 10/05/2012, 08/26/2015    Influenza, High Dose (Fluzone 65 yrs and older) 09/27/2018, 01/14/2019    Influenza, Triv, inactivated, subunit, adjuvanted, IM (Fluad 65 yrs and older) 09/19/2019    Pneumococcal Conjugate 13-valent ([id number]) 03/04/2015    Pneumococcal Polysaccharide ([id number]) 01/31/2012    Tetanus 08/11/2008    Zoster Live (Zostavax) 03/14/2015        Health Maintenance   Topic Date Due    DTaP/Tdap/Td vaccine (1 - Tdap) 03/31/1964    Shingles Vaccine (2 of 3) 05/09/2015    Annual Wellness Visit (AWV)  05/29/2019    Flu vaccine (1) 09/01/2020    Lipid screen  03/05/2021    Potassium monitoring  03/05/2021    Creatinine monitoring  03/05/2021    Colon cancer screen colonoscopy  11/17/2022    Pneumococcal 65+ years Vaccine  Completed    AAA screen  Completed    Hepatitis C screen  Completed    Hepatitis A vaccine  Aged Out    Hepatitis B vaccine  Aged Out    Hib vaccine  Aged Out    Meningococcal (ACWY) vaccine  Aged Out     Recommendations for [employer] Due: see orders and patient instructions/AVS.  . Recommended screening schedule for the next 5-10 years is provided to the patient in written form: see Patient Instructions/AVS.    There are no diagnoses linked to this encounter.

## 2020-08-26 PROBLEM — Z00.00 WELL ADULT EXAM: Status: RESOLVED | Noted: 2020-07-27 | Resolved: 2020-08-26

## 2020-09-30 RX ORDER — PRAVASTATIN SODIUM 40 MG
TABLET ORAL
Qty: 90 TABLET | Refills: 0 | Status: SHIPPED | OUTPATIENT
Start: 2020-09-30 | End: 2020-10-02 | Stop reason: SINTOL

## 2020-10-20 ENCOUNTER — TELEPHONE (OUTPATIENT)
Dept: FAMILY MEDICINE CLINIC | Age: 75
End: 2020-10-20

## 2020-10-20 NOTE — TELEPHONE ENCOUNTER
----- Message from Damari Deanna sent at 10/20/2020  4:08 PM EDT -----  Subject: Message to Provider    QUESTIONS  Information for Provider? Pt had diverticulosis years ago. Believes that he is experiencing the same symptoms again. Every time that he eats he has   to go to the bathroom. Pt would like to know if he could be prescribed something or if he needs to come in for an appt.   ---------------------------------------------------------------------------  --------------  CALL BACK INFO  What is the best way for the office to contact you? OK to leave message on   voicemail  Preferred Call Back Phone Number? [phone]  ---------------------------------------------------------------------------  --------------  SCRIPT ANSWERS  Relationship to Patient?  Self

## 2020-10-23 ENCOUNTER — OFFICE VISIT (OUTPATIENT)
Dept: FAMILY MEDICINE CLINIC | Age: 75
End: 2020-10-23
Payer: COMMERCIAL

## 2020-10-23 VITALS
TEMPERATURE: 97.1 F | OXYGEN SATURATION: 97 % | SYSTOLIC BLOOD PRESSURE: 132 MMHG | BODY MASS INDEX: 31.15 KG/M2 | HEART RATE: 75 BPM | DIASTOLIC BLOOD PRESSURE: 74 MMHG | WEIGHT: 193 LBS

## 2020-10-23 PROCEDURE — 99213 OFFICE O/P EST LOW 20 MIN: CPT | Performed by: FAMILY MEDICINE

## 2020-10-23 ASSESSMENT — ENCOUNTER SYMPTOMS
ABDOMINAL PAIN: 0
COUGH: 0
VOMITING: 0
DIARRHEA: 1
FLATUS: 0
BLOATING: 0
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
## 2020-12-28 RX ORDER — CLOPIDOGREL BISULFATE 75 MG/1
TABLET ORAL
Qty: 30 TABLET | Refills: 0 | Status: SHIPPED | OUTPATIENT
Start: 2020-12-28 | End: 2020-12-29

## 2020-12-29 RX ORDER — CLOPIDOGREL BISULFATE 75 MG/1
TABLET ORAL
Qty: 30 TABLET | Refills: 0 | Status: SHIPPED | OUTPATIENT
Start: 2020-12-29 | End: 2021-02-22 | Stop reason: SDUPTHER

## 2020-12-29 RX ORDER — PRAVASTATIN SODIUM 40 MG
TABLET ORAL
Qty: 90 TABLET | Refills: 0 | Status: SHIPPED | OUTPATIENT
Start: 2020-12-29 | End: 2021-02-11 | Stop reason: ALTCHOICE

## 2021-02-01 ENCOUNTER — TELEPHONE (OUTPATIENT)
Dept: FAMILY MEDICINE CLINIC | Age: 76
End: 2021-02-01

## 2021-02-01 NOTE — TELEPHONE ENCOUNTER
Yesterday thumb right hand felt numb/funny feeling. It was all day. Today a little on his fingers and his bottom lip feels tingly. /85  Started Prevagen - side effect said it could cause a mini stroke. Got Pravastatin 40 mgs instead of Crestor 10 - said you called that in. Since he had a mini stroke in Sept. 2019 and Jan. 2020.

## 2021-02-03 ENCOUNTER — APPOINTMENT (OUTPATIENT)
Dept: CT IMAGING | Age: 76
End: 2021-02-03
Payer: COMMERCIAL

## 2021-02-03 ENCOUNTER — HOSPITAL ENCOUNTER (EMERGENCY)
Age: 76
Discharge: HOME | End: 2021-02-03
Attending: EMERGENCY MEDICINE
Payer: COMMERCIAL

## 2021-02-03 VITALS
WEIGHT: 193 LBS | HEART RATE: 61 BPM | BODY MASS INDEX: 27.63 KG/M2 | TEMPERATURE: 97.6 F | HEIGHT: 70 IN | OXYGEN SATURATION: 99 % | RESPIRATION RATE: 20 BRPM | DIASTOLIC BLOOD PRESSURE: 63 MMHG | SYSTOLIC BLOOD PRESSURE: 118 MMHG

## 2021-02-03 DIAGNOSIS — R20.2 PARESTHESIAS: Primary | ICD-10-CM

## 2021-02-03 LAB
ANION GAP SERPL CALCULATED.3IONS-SCNC: 10 MMOL/L (ref 3–16)
BASOPHILS ABSOLUTE: 0 K/UL (ref 0–0.2)
BASOPHILS RELATIVE PERCENT: 0.3 %
BUN BLDV-MCNC: 15 MG/DL (ref 7–20)
CALCIUM SERPL-MCNC: 9.3 MG/DL (ref 8.3–10.6)
CHLORIDE BLD-SCNC: 102 MMOL/L (ref 99–110)
CO2: 25 MMOL/L (ref 21–32)
CREAT SERPL-MCNC: 0.8 MG/DL (ref 0.8–1.3)
EKG ATRIAL RATE: 71 BPM
EKG DIAGNOSIS: NORMAL
EKG P AXIS: -19 DEGREES
EKG P-R INTERVAL: 170 MS
EKG Q-T INTERVAL: 390 MS
EKG QRS DURATION: 102 MS
EKG QTC CALCULATION (BAZETT): 423 MS
EKG R AXIS: -10 DEGREES
EKG T AXIS: 39 DEGREES
EKG VENTRICULAR RATE: 71 BPM
EOSINOPHILS ABSOLUTE: 0.1 K/UL (ref 0–0.6)
EOSINOPHILS RELATIVE PERCENT: 2.1 %
GFR AFRICAN AMERICAN: >60
GFR NON-AFRICAN AMERICAN: >60
GLUCOSE BLD-MCNC: 112 MG/DL (ref 70–99)
HCT VFR BLD CALC: 43.7 % (ref 40.5–52.5)
HEMOGLOBIN: 15.4 G/DL (ref 13.5–17.5)
LYMPHOCYTES ABSOLUTE: 1.8 K/UL (ref 1–5.1)
LYMPHOCYTES RELATIVE PERCENT: 35.6 %
MCH RBC QN AUTO: 30.5 PG (ref 26–34)
MCHC RBC AUTO-ENTMCNC: 35.4 G/DL (ref 31–36)
MCV RBC AUTO: 86.3 FL (ref 80–100)
MONOCYTES ABSOLUTE: 0.7 K/UL (ref 0–1.3)
MONOCYTES RELATIVE PERCENT: 14.2 %
NEUTROPHILS ABSOLUTE: 2.5 K/UL (ref 1.7–7.7)
NEUTROPHILS RELATIVE PERCENT: 47.8 %
PDW BLD-RTO: 12.9 % (ref 12.4–15.4)
PLATELET # BLD: 213 K/UL (ref 135–450)
PMV BLD AUTO: 6.8 FL (ref 5–10.5)
POTASSIUM REFLEX MAGNESIUM: 4.1 MMOL/L (ref 3.5–5.1)
PRO-BNP: 82 PG/ML (ref 0–449)
RBC # BLD: 5.06 M/UL (ref 4.2–5.9)
SODIUM BLD-SCNC: 137 MMOL/L (ref 136–145)
TROPONIN: <0.01 NG/ML
TROPONIN: <0.01 NG/ML
VITAMIN B-12: 493 PG/ML (ref 211–911)
WBC # BLD: 5.2 K/UL (ref 4–11)

## 2021-02-03 PROCEDURE — 83880 ASSAY OF NATRIURETIC PEPTIDE: CPT

## 2021-02-03 PROCEDURE — 84484 ASSAY OF TROPONIN QUANT: CPT

## 2021-02-03 PROCEDURE — 70450 CT HEAD/BRAIN W/O DYE: CPT

## 2021-02-03 PROCEDURE — 85025 COMPLETE CBC W/AUTO DIFF WBC: CPT

## 2021-02-03 PROCEDURE — 6360000004 HC RX CONTRAST MEDICATION: Performed by: EMERGENCY MEDICINE

## 2021-02-03 PROCEDURE — 93005 ELECTROCARDIOGRAM TRACING: CPT | Performed by: EMERGENCY MEDICINE

## 2021-02-03 PROCEDURE — 70498 CT ANGIOGRAPHY NECK: CPT

## 2021-02-03 PROCEDURE — 82607 VITAMIN B-12: CPT

## 2021-02-03 PROCEDURE — 80048 BASIC METABOLIC PNL TOTAL CA: CPT

## 2021-02-03 PROCEDURE — 99283 EMERGENCY DEPT VISIT LOW MDM: CPT

## 2021-02-03 RX ADMIN — IOPAMIDOL 80 ML: 755 INJECTION, SOLUTION INTRAVENOUS at 14:31

## 2021-02-03 NOTE — ED PROVIDER NOTES
[address] [facility] [address] ENCOUNTER          PHYSICIAN ASSISTANT NOTE       Date of evaluation: 2/3/2021    Chief Complaint     Numbness (right face)      History of Present Illness     Quita Sotelo III is a 76 y.o. male with a PMHx of CAD, TIA, Carotid stenosis, Lumbar spinal stenosis, BPH, Diverticulosis, GERD, who presents today with a 3 day hx of R sided oral, palmar/digits, and medial calf numbness. He was taking OTC Prevagen, contacted his PCP when his symptoms began, and was instructed to stop taking this medication. States that he did stop this medication, but symptoms have persisted. Does also state that he feels his R leg is slightly weaker than left. Is still able to ambulate, but states he occasionally feels off balance. Denies fevers, chills, headache, new vision changes, chest pain, sob, confusion, abd pain, n/v, vertigo. Review of Systems     Review of Systems   See HPI, all others negative. 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 He has a past surgical history that includes Tonsillectomy and adenoidectomy; nasal/sinus endoscopy; Cholecystectomy (94); Rotator cuff repair (Bilateral); Femur fracture surgery; hernia repair (06); Colonoscopy (08,11/12,11/17/17); Cardiac surgery; Cardiac catheterization; Total knee arthroplasty (Left, 1/22/2019); joint replacement; knee surgery; and incision and drainage (Left, 4/24/2019). His family history includes Heart Disease in his brother, brother, father, mother, and son;  Heart Disease (age of onset: 79) in his brother. He reports that he quit smoking about 25 years ago. His smoking use included cigarettes. He has a 10.00 pack-year smoking history. He has never used smokeless tobacco. He reports current alcohol use of about 1.0 standard drinks of alcohol per week. He reports that he does not use drugs. Medications     Previous Medications    ASPIRIN 81 MG TABLET    Take 81 mg by mouth daily    CHOLECALCIFEROL (VITAMIN D3) 2000 UNITS CAPS    Take 4,000 Units by mouth     CLOPIDOGREL (PLAVIX) 75 MG TABLET    TAKE 1 TABLET BY MOUTH ONE TIME A DAY     COENZYME Q10 (CO Q-10) 200 MG CAPS    Take  by mouth. FAMOTIDINE (PEPCID) 40 MG TABLET        FLUTICASONE (FLONASE) 50 MCG/ACT NASAL SPRAY    1 spray by Nasal route daily as needed     HYDROCHLOROTHIAZIDE (HYDRODIURIL) 12.5 MG TABLET    Take 12.5 mg by mouth every morning    HYDROCHLOROTHIAZIDE (HYDRODIURIL) 25 MG TABLET    Take 25 mg by mouth daily    MAGNESIUM 400 MG CAPS    Take by mouth nightly    PRAVASTATIN (PRAVACHOL) 40 MG TABLET    TAKE 1 TABLET BY MOUTH IN THE EVENING     RANITIDINE (ZANTAC) 300 MG TABLET    Take 300 mg by mouth nightly    ROSUVASTATIN (CRESTOR) 10 MG TABLET    TAKE 1 TABLET BY MOUTH ONE TIME A DAY        Allergies     He is allergic to latex; bactrim [sulfamethoxazole-trimethoprim]; sulfa antibiotics; and zocor [simvastatin]. Physical Exam     INITIAL VITALS: BP: (!) 169/87, Temp: 97.6 °F (36.4 °C), Pulse: 71, Resp: 20, SpO2: 97 %  Physical Exam  Constitutional:       General: He is not in acute distress. Appearance: Normal appearance. He is normal weight. He is not ill-appearing, toxic-appearing or diaphoretic. HENT:      Head: Normocephalic and atraumatic. Mouth/Throat:      Mouth: Mucous membranes are moist.      Pharynx: Oropharynx is clear. Eyes:      Extraocular Movements: Extraocular movements intact.       Conjunctiva/sclera: Conjunctivae normal.      Pupils: Pupils are equal, round, and reactive to light.   Neck:      Musculoskeletal: Normal range of motion. No muscular tenderness. Cardiovascular:      Rate and Rhythm: Normal rate. Pulmonary:      Effort: Pulmonary effort is normal. No respiratory distress. Abdominal:      General: There is no distension. Palpations: Abdomen is soft. Tenderness: There is no abdominal tenderness. Musculoskeletal: Normal range of motion. Skin:     General: Skin is warm and dry. Neurological:      General: No focal deficit present. Mental Status: He is alert and oriented to person, place, and time. Cranial Nerves: No cranial nerve deficit. Motor: No weakness. Coordination: Coordination normal.      Gait: Gait normal.      Comments: Normal speech. Reported decreased sensation to right side of lips, on aspect of right fingers only, and medial aspect of right calf. No overlying skin changes noted. 5/5 strength to bilateral biceps, triceps, hip flexion, plantarflexion. Finger-nose-finger normal.  Ambulates without difficulty. Diagnostic Results     RADIOLOGY:  CTA HEAD NECK W CONTRAST   Final Result      No acute vascular abnormality. No significant stenosis or occlusion. CT Head WO Contrast   Final Result      No acute intracranial hemorrhage or mass effect. Suspected mild chronic small vessel ischemic change.                 LABS:   Results for orders placed or performed during the hospital encounter of 02/03/21   CBC Auto Differential   Result Value Ref Range    WBC 5.2 4.0 - 11.0 K/uL    RBC 5.06 4.20 - 5.90 M/uL    Hemoglobin 15.4 13.5 - 17.5 g/dL    Hematocrit 43.7 40.5 - 52.5 %    MCV 86.3 80.0 - 100.0 fL    MCH 30.5 26.0 - 34.0 pg    MCHC 35.4 31.0 - 36.0 g/dL    RDW 12.9 12.4 - 15.4 %    Platelets 448 943 - 297 K/uL    MPV 6.8 5.0 - 10.5 fL    Neutrophils % 47.8 %    Lymphocytes % 35.6 %    Monocytes % 14.2 %    Eosinophils % 2.1 %    Basophils % 0.3 %    Neutrophils Absolute 2.5 1.7 - 7.7 K/uL    Lymphocytes Absolute 1.8 1.0 - 5.1 K/uL    Monocytes Absolute 0.7 0.0 - 1.3 K/uL    Eosinophils Absolute 0.1 0.0 - 0.6 K/uL    Basophils Absolute 0.0 0.0 - 0.2 K/uL   Basic Metabolic Panel w/ Reflex to MG   Result Value Ref Range    Sodium 137 136 - 145 mmol/L    Potassium reflex Magnesium 4.1 3.5 - 5.1 mmol/L    Chloride 102 99 - 110 mmol/L    CO2 25 21 - 32 mmol/L    Anion Gap 10 3 - 16    Glucose 112 (H) 70 - 99 mg/dL    BUN 15 7 - 20 mg/dL    CREATININE 0.8 0.8 - 1.3 mg/dL    GFR Non-African American >60 >60    GFR African American >60 >60    Calcium 9.3 8.3 - 10.6 mg/dL   Troponin   Result Value Ref Range    Troponin <0.01 <0.01 ng/mL   Brain Natriuretic Peptide   Result Value Ref Range    Pro-BNP 82 0 - 449 pg/mL   Troponin   Result Value Ref Range    Troponin <0.01 <0.01 ng/mL       RECENT VITALS:  BP: 123/71, Temp: 97.6 °F (36.4 °C), Pulse: 66, Resp: 20, SpO2: 99 %     Procedures       ED Course     Nursing Notes, Past Medical Hx,Past Surgical Hx, Social Hx, Allergies, and Family Hx were reviewed. The patient was given the following medications:  Orders Placed This Encounter   Medications    iopamidol (ISOVUE-370) 76 % injection 80 mL       CONSULTS:  [address] [facility] / ASSESSMENT / Todd Fabien PHOENIX is a 76 y.o. male presenting with vague complaints of decreased sensation to right side of mouth, palmar aspect of right digits, and medial aspect of right calf x2 days. With the exception of reported decreased sensation, patient has an otherwise benign neurologic exam, with good strength throughout and able to ambulate without difficulty. Basic blood work was performed including CBC, BMP, troponin, BNP and all resulted within normal limits. CT head and CTA head and neck were ordered to assess for any signs of stroke or artery stenosis/occlusion and resulted:    CTA HEAD NECK W CONTRAST   Final Result      No acute vascular abnormality.  No significant stenosis or occlusion. CT Head WO Contrast   Final Result      No acute intracranial hemorrhage or mass effect. Suspected mild chronic small vessel ischemic change. Delta troponin again resulted within normal limits. Upon reevaluation, patient continues to have a largely unremarkable neurologic exam with no other complaints. Spoke with patient's primary care physician who agreed with follow-up as an outpatient. Patient was reassured and was discharged in stable condition with normal vitals and able to ambulate out of the emergency department without any difficulty. This patient was also evaluated by the attending physician. All care plans were discussed and agreed upon. Clinical Impression     1. Paresthesias        Disposition     PATIENT REFERRED TO:  No follow-up provider specified.     DISCHARGE MEDICATIONS:  New Prescriptions    No medications on file       [address] [facility]-  02/03/21 4455

## 2021-02-03 NOTE — ED PROVIDER NOTES
ED Attending Attestation Note     Date of evaluation: 2/3/2021    This patient was seen by the advance practice provider. I have seen and examined the patient, agree with the workup, evaluation, management and diagnosis. The care plan has been discussed. I have reviewed the ECG and concur with the MARKO's interpretation. My assessment reveals a 76year old male with a past medical history of spinal stenosis, arthritis, carotid stenosis, migraine, HTN, HLD, CAD, and prior lacunar infarct who presents with right sided lip numbness as well numbness in his right fingertips for several days. No numbness in the V1-V3 distribution of his face. No numbness of forearm or upper arm. He otherwise had a normal nonfocal neurologic examination. Cranial nerves and strength were intact. He is on DAPT. At this time his symptoms are atypical for stroke. CTA shows no significant stenosis. CT head shows no acute ICH or mass effect. Discussed with Dr. Edwardo Mishra and will be discharged to follow-up with PCP.         Severo Knack, MD  02/03/21 2001

## 2021-02-03 NOTE — ED TRIAGE NOTES
Awoke Monday am with right face and thumb numbness and fatigue. Called his PCP who told him to quit taking  prevagen because that can cause strokes. States that he quit taking prevagen Monday. Continued to have it today so he came in.   Has had TIA in past. No other neuro deficits noted

## 2021-02-04 ENCOUNTER — TELEPHONE (OUTPATIENT)
Dept: FAMILY MEDICINE CLINIC | Age: 76
End: 2021-02-04

## 2021-02-09 ENCOUNTER — OFFICE VISIT (OUTPATIENT)
Dept: FAMILY MEDICINE CLINIC | Age: 76
End: 2021-02-09
Payer: COMMERCIAL

## 2021-02-09 VITALS
OXYGEN SATURATION: 95 % | HEART RATE: 85 BPM | WEIGHT: 201.2 LBS | TEMPERATURE: 97.5 F | HEIGHT: 70 IN | DIASTOLIC BLOOD PRESSURE: 80 MMHG | SYSTOLIC BLOOD PRESSURE: 120 MMHG | BODY MASS INDEX: 28.8 KG/M2

## 2021-02-09 DIAGNOSIS — Z12.5 PROSTATE CANCER SCREENING: ICD-10-CM

## 2021-02-09 DIAGNOSIS — R20.2 NUMBNESS AND TINGLING IN RIGHT HAND: ICD-10-CM

## 2021-02-09 DIAGNOSIS — I70.0 ATHEROSCLEROSIS OF AORTA (HCC): ICD-10-CM

## 2021-02-09 DIAGNOSIS — Z00.00 WELL ADULT EXAM: Primary | ICD-10-CM

## 2021-02-09 DIAGNOSIS — R20.0 NUMBNESS AND TINGLING IN RIGHT HAND: ICD-10-CM

## 2021-02-09 DIAGNOSIS — E78.2 MIXED HYPERLIPIDEMIA: ICD-10-CM

## 2021-02-09 DIAGNOSIS — E55.9 VITAMIN D DEFICIENCY: ICD-10-CM

## 2021-02-09 PROCEDURE — 99214 OFFICE O/P EST MOD 30 MIN: CPT | Performed by: FAMILY MEDICINE

## 2021-02-09 PROCEDURE — 1111F DSCHRG MED/CURRENT MED MERGE: CPT | Performed by: FAMILY MEDICINE

## 2021-02-09 ASSESSMENT — PATIENT HEALTH QUESTIONNAIRE - PHQ9
SUM OF ALL RESPONSES TO PHQ QUESTIONS 1-9: 0
SUM OF ALL RESPONSES TO PHQ9 QUESTIONS 1 & 2: 0
SUM OF ALL RESPONSES TO PHQ QUESTIONS 1-9: 0

## 2021-02-09 NOTE — PROGRESS NOTES
Post-Discharge Transitional Care Management Services or Hospital Follow Up      Robina Rivero III   YOB: 1945    Date of Office Visit:  2/9/2021  Date of Hospital Admission: 2/3/21  Date of Hospital Discharge: 2/3/21  Risk of hospital readmission (high >=14%.  Medium >=10%) :No data recorded    Care management risk score Rising risk (score 2-5) and Complex Care (Scores >=6): 1     Non face to face  following discharge, date last encounter closed (first attempt may have been earlier): *No documented post hospital discharge outreach found in the last 14 days    Call initiated 2 business days of discharge: *No response recorded in the last 14 days    Patient Active Problem List   Diagnosis    Anxiety    Hyperlipidemia    Diverticulosis of colon    Restless leg syndrome    Benign prostatic hyperplasia with urinary obstruction    Lumbar spinal stenosis    Hearing impairment    Allergic rhinitis    GERD (gastroesophageal reflux disease)    Spondylolisthesis at L4-L5 level    Fatty liver    Atherosclerosis of aorta (HCC)    Primary osteoarthritis of both knees    Carotid stenosis, bilateral    Functional diarrhea    ED (erectile dysfunction)    Primary osteoarthritis of left knee    Migraine without aura and without status migrainosus, not intractable    Osteoarthritis of left knee    Status post total left knee replacement    Hypokalemia    Lacunar infarct, acute (HCC)    Numbness and tingling in right hand       Allergies   Allergen Reactions    Latex Rash     Blisters and rash    Bactrim [Sulfamethoxazole-Trimethoprim] Other (See Comments)     Blister on end of penis    Sulfa Antibiotics Dermatitis    Zocor [Simvastatin] Other (See Comments)     myositis       Medications listed as ordered at the time of discharge from hospital   Josh Kaufman III   Home Medication Instructions BLADIMIR:    Printed on:02/09/21 6513   Medication Information                      aspirin 81 MG tablet  Take 81 mg by mouth daily             Cholecalciferol (VITAMIN D3) 2000 UNITS CAPS  Take 4,000 Units by mouth              clopidogrel (PLAVIX) 75 MG tablet  TAKE 1 TABLET BY MOUTH ONE TIME A DAY              Coenzyme Q10 (CO Q-10) 200 MG CAPS  Take  by mouth. famotidine (PEPCID) 40 MG tablet               fluticasone (FLONASE) 50 MCG/ACT nasal spray  1 spray by Nasal route daily as needed              hydrochlorothiazide (HYDRODIURIL) 12.5 MG tablet  Take 12.5 mg by mouth every morning             Magnesium 400 MG CAPS  Take by mouth nightly             pravastatin (PRAVACHOL) 40 MG tablet  TAKE 1 TABLET BY MOUTH IN THE EVENING                    Medications marked \"taking\" at this time  Outpatient Medications Marked as Taking for the 2/9/21 encounter (Office Visit) with Nissa Zurita MD   Medication Sig Dispense Refill    pravastatin (PRAVACHOL) 40 MG tablet TAKE 1 TABLET BY MOUTH IN THE EVENING  90 tablet 0    clopidogrel (PLAVIX) 75 MG tablet TAKE 1 TABLET BY MOUTH ONE TIME A DAY  30 tablet 0    hydrochlorothiazide (HYDRODIURIL) 12.5 MG tablet Take 12.5 mg by mouth every morning      famotidine (PEPCID) 40 MG tablet       aspirin 81 MG tablet Take 81 mg by mouth daily      Magnesium 400 MG CAPS Take by mouth nightly      fluticasone (FLONASE) 50 MCG/ACT nasal spray 1 spray by Nasal route daily as needed       Cholecalciferol (VITAMIN D3) 2000 UNITS CAPS Take 4,000 Units by mouth       Coenzyme Q10 (CO Q-10) 200 MG CAPS Take  by mouth.           Medications patient taking as of now reconciled against medications ordered at time of hospital discharge: Yes    Chief Complaint   Patient presents with    Follow-Up from Hospital     numbness on right side       History of Present illness - Follow up of Hospital diagnosis(es): Atherosclerosis of aorta ([address]  On ASA/lipids    Numbness and tingling in right hand  Will get MRI of brain    Hyperlipidemia  Will do labs        Inpatient course: Discharge summary reviewed- see chart. Interval history/Current status: unchanged    A comprehensive review of systems was negative except for what was noted in the HPI. Vitals:    02/09/21 1049   BP: 120/80   Pulse: 85   Temp: 97.5 °F (36.4 °C)   SpO2: 95%   Weight: 201 lb 3.2 oz (91.3 kg)   Height: 5' 10\" (1.778 m)     Body mass index is 28.87 kg/m².    Wt Readings from Last 3 Encounters:   02/09/21 201 lb 3.2 oz (91.3 kg)   02/03/21 193 lb (87.5 kg)   10/23/20 193 lb (87.5 kg)     BP Readings from Last 3 Encounters:   02/09/21 120/80   02/03/21 118/63   10/23/20 132/74        Physical Exam:  General Appearance: alert and oriented to person, place and time, well developed and well- nourished, in no acute distress  Skin: warm and dry, no rash or erythema  Head: normocephalic and atraumatic  Eyes: pupils equal, round, and reactive to light, extraocular eye movements intact, conjunctivae normal  ENT: tympanic membrane, external ear and ear canal normal bilaterally, nose without deformity, nasal mucosa and turbinates normal without polyps  Neck: supple and non-tender without mass, no thyromegaly or thyroid nodules, no cervical lymphadenopathy  Pulmonary/Chest: clear to auscultation bilaterally- no wheezes, rales or rhonchi, normal air movement, no respiratory distress  Cardiovascular: normal rate, regular rhythm, normal S1 and S2, no murmurs, rubs, clicks, or gallops, distal pulses intact, no carotid bruits  Abdomen: soft, non-tender, non-distended, normal bowel sounds, no masses or organomegaly  Extremities: no cyanosis, clubbing or edema  Musculoskeletal: normal range of motion, no joint swelling, deformity or tenderness  Neurologic: reflexes normal and symmetric, no cranial nerve deficit, gait, coordination and speech normal    Assessment/Plan:  Atherosclerosis of aorta (HCC)  On ASA/lipids    Numbness and tingling in right hand  Will get MRI of brain    Hyperlipidemia  Will do labs          Medical Decision Making: moderate complexity

## 2021-02-10 DIAGNOSIS — Z00.00 WELL ADULT EXAM: ICD-10-CM

## 2021-02-10 DIAGNOSIS — Z12.5 PROSTATE CANCER SCREENING: ICD-10-CM

## 2021-02-10 DIAGNOSIS — E55.9 VITAMIN D DEFICIENCY: ICD-10-CM

## 2021-02-10 LAB
A/G RATIO: 1.7 (ref 1.1–2.2)
ALBUMIN SERPL-MCNC: 4.3 G/DL (ref 3.4–5)
ALP BLD-CCNC: 96 U/L (ref 40–129)
ALT SERPL-CCNC: 19 U/L (ref 10–40)
ANION GAP SERPL CALCULATED.3IONS-SCNC: 9 MMOL/L (ref 3–16)
AST SERPL-CCNC: 22 U/L (ref 15–37)
BASOPHILS ABSOLUTE: 0 K/UL (ref 0–0.2)
BASOPHILS RELATIVE PERCENT: 0.5 %
BILIRUB SERPL-MCNC: 1.2 MG/DL (ref 0–1)
BUN BLDV-MCNC: 9 MG/DL (ref 7–20)
CALCIUM SERPL-MCNC: 9.7 MG/DL (ref 8.3–10.6)
CHLORIDE BLD-SCNC: 102 MMOL/L (ref 99–110)
CHOLESTEROL, TOTAL: 219 MG/DL (ref 0–199)
CO2: 26 MMOL/L (ref 21–32)
CREAT SERPL-MCNC: 0.8 MG/DL (ref 0.8–1.3)
EOSINOPHILS ABSOLUTE: 0.1 K/UL (ref 0–0.6)
EOSINOPHILS RELATIVE PERCENT: 1.6 %
GFR AFRICAN AMERICAN: >60
GFR NON-AFRICAN AMERICAN: >60
GLOBULIN: 2.5 G/DL
GLUCOSE BLD-MCNC: 96 MG/DL (ref 70–99)
HCT VFR BLD CALC: 46.2 % (ref 40.5–52.5)
HDLC SERPL-MCNC: 58 MG/DL (ref 40–60)
HEMOGLOBIN: 15.9 G/DL (ref 13.5–17.5)
LDL CHOLESTEROL CALCULATED: 136 MG/DL
LYMPHOCYTES ABSOLUTE: 1.6 K/UL (ref 1–5.1)
LYMPHOCYTES RELATIVE PERCENT: 33.7 %
MCH RBC QN AUTO: 29.8 PG (ref 26–34)
MCHC RBC AUTO-ENTMCNC: 34.4 G/DL (ref 31–36)
MCV RBC AUTO: 86.5 FL (ref 80–100)
MONOCYTES ABSOLUTE: 0.6 K/UL (ref 0–1.3)
MONOCYTES RELATIVE PERCENT: 12.8 %
NEUTROPHILS ABSOLUTE: 2.4 K/UL (ref 1.7–7.7)
NEUTROPHILS RELATIVE PERCENT: 51.4 %
PDW BLD-RTO: 12.8 % (ref 12.4–15.4)
PLATELET # BLD: 214 K/UL (ref 135–450)
PMV BLD AUTO: 7.1 FL (ref 5–10.5)
POTASSIUM SERPL-SCNC: 4.1 MMOL/L (ref 3.5–5.1)
PROSTATE SPECIFIC ANTIGEN: 0.54 NG/ML (ref 0–4)
RBC # BLD: 5.34 M/UL (ref 4.2–5.9)
SODIUM BLD-SCNC: 137 MMOL/L (ref 136–145)
TOTAL PROTEIN: 6.8 G/DL (ref 6.4–8.2)
TRIGL SERPL-MCNC: 124 MG/DL (ref 0–150)
VITAMIN D 25-HYDROXY: 82.6 NG/ML
VLDLC SERPL CALC-MCNC: 25 MG/DL
WBC # BLD: 4.7 K/UL (ref 4–11)

## 2021-02-11 RX ORDER — ROSUVASTATIN CALCIUM 10 MG/1
10 TABLET, COATED ORAL DAILY
Qty: 90 TABLET | Refills: 3 | Status: SHIPPED | OUTPATIENT
Start: 2021-02-11 | End: 2022-01-24 | Stop reason: SDUPTHER

## 2021-02-15 ENCOUNTER — HOSPITAL ENCOUNTER (OUTPATIENT)
Dept: MRI IMAGING | Age: 76
Discharge: HOME | End: 2021-02-15
Payer: COMMERCIAL

## 2021-02-15 DIAGNOSIS — R20.2 NUMBNESS AND TINGLING IN RIGHT HAND: ICD-10-CM

## 2021-02-15 DIAGNOSIS — R20.0 NUMBNESS AND TINGLING IN RIGHT HAND: ICD-10-CM

## 2021-02-15 PROCEDURE — 70551 MRI BRAIN STEM W/O DYE: CPT

## 2021-02-19 ENCOUNTER — NURSE TRIAGE (OUTPATIENT)
Dept: OTHER | Facility: CLINIC | Age: 76
End: 2021-02-19

## 2021-02-19 NOTE — TELEPHONE ENCOUNTER
[facility]  with red flag complaint. Brief description of triage: Past note from ED visit on 2/3/21: Awoke Monday am with right face and thumb numbness and fatigue. Called his PCP who told him to quit taking  prevagen because that can cause strokes. States that he quit taking prevagen Monday. Continued to have it today so he came in. Has had TIA in past. No other neuro deficits noted    3 weeks ago thinking he had a stroke at [facility] [employer] They thought it was a pinched nerve and small toe and the next toe of the right foot. This started around 2/9/2021. Tongue is still numb on right side. Lip (middle to corner) on right side is still numb as well. Saw Dr Janusz Aguilar the next week and he had a MRI yesterday. Triage indicates for patient to follow up within 3 days with pcp. Care advice provided, patient verbalizes understanding; denies any other questions or concerns; instructed to call back for any new or worsening symptoms. Writer provided warm transfer to [facility] at [facility] for appointment scheduling. Attention Provider: Thank you for allowing me to participate in the care of your patient. The patient was connected to triage in response to information provided to the ECC. Please do not respond through this encounter as the response is not directed to a shared pool. Reason for Disposition   Numbness or tingling in one or both feet is a chronic symptom (recurrent or ongoing problem lasting > 4 weeks)    Answer Assessment - Initial Assessment Questions  1. SYMPTOM: \"What is the main symptom you are concerned about? \" (e.g., weakness, numbness)      Numbness of on tip and side of right side of tongue. Right foot from last two toes goes up the ankle to where the sock stops, thought at first his sock was too tight. 2. ONSET: \"When did this start? \" (minutes, hours, days; while sleeping)      3 weeks approximately   3.  LAST NORMAL: \"When was the last time you were normal (no symptoms)? \"      First of the year. 4. PATTERN \"Does this come and go, or has it been constant since it started? \"  \"Is it present now? \"      Constant since he went to the ED  5. CARDIAC SYMPTOMS: \"Have you had any of the following symptoms: chest pain, difficulty breathing, palpitations? \"      None  6. NEUROLOGIC SYMPTOMS: \"Have you had any of the following symptoms: headache, dizziness, vision loss, double vision, changes in speech, unsteady on your feet? \"      Dizzy every once in a while when he gets up very quickly but it resolves quickly. 7. OTHER SYMPTOMS: \"Do you have any other symptoms? \"      none  8. PREGNANCY: \"Is there any chance you are pregnant? \" \"When was your last menstrual period? \"      na    Protocols used: NEUROLOGIC DEFICIT-ADULT-[place]

## 2021-02-22 ENCOUNTER — OFFICE VISIT (OUTPATIENT)
Dept: FAMILY MEDICINE CLINIC | Age: 76
End: 2021-02-22
Payer: COMMERCIAL

## 2021-02-22 VITALS
BODY MASS INDEX: 27.49 KG/M2 | HEART RATE: 74 BPM | WEIGHT: 192 LBS | OXYGEN SATURATION: 98 % | TEMPERATURE: 97.8 F | DIASTOLIC BLOOD PRESSURE: 80 MMHG | HEIGHT: 70 IN | SYSTOLIC BLOOD PRESSURE: 158 MMHG

## 2021-02-22 DIAGNOSIS — M54.17 LUMBOSACRAL RADICULOPATHY AT S1: ICD-10-CM

## 2021-02-22 DIAGNOSIS — I63.81 LACUNAR INFARCTION (HCC): ICD-10-CM

## 2021-02-22 DIAGNOSIS — R20.0 NUMBNESS AND TINGLING IN RIGHT HAND: ICD-10-CM

## 2021-02-22 DIAGNOSIS — M43.16 SPONDYLOLISTHESIS AT L4-L5 LEVEL: Primary | ICD-10-CM

## 2021-02-22 DIAGNOSIS — R20.2 NUMBNESS AND TINGLING IN RIGHT HAND: ICD-10-CM

## 2021-02-22 PROCEDURE — 99214 OFFICE O/P EST MOD 30 MIN: CPT | Performed by: NURSE PRACTITIONER

## 2021-02-22 RX ORDER — FLUTICASONE PROPIONATE 50 MCG
1 SPRAY, SUSPENSION (ML) NASAL DAILY PRN
Qty: 1 BOTTLE | Refills: 2 | Status: SHIPPED | OUTPATIENT
Start: 2021-02-22

## 2021-02-22 RX ORDER — CLOPIDOGREL BISULFATE 75 MG/1
TABLET ORAL
Qty: 90 TABLET | Refills: 2 | Status: SHIPPED | OUTPATIENT
Start: 2021-02-22 | End: 2021-10-28

## 2021-02-22 NOTE — PROGRESS NOTES
Hector Mayer III (:  1945) is a 76 y.o. male,Established patient, here for evaluation of the following chief complaint(s): Other (right hand finger and thumb numbness) and Other (right leg numbness)      ASSESSMENT/PLAN:  1. Spondylolisthesis at L4-L5 level  Assessment & Plan:  I suspect this sensation to the side of his foot is coming from S1 sacral nerve root, as a sensation is relieved when he is standing this is likely compression positional  At this time it is not causing him difficulty with ambulating or instability, he denies any incontinence or severe pain. No imaging to be done at this time. He has a physical with his PCP in 2 weeks and will discuss all of these issues. 2. Lacunar infarction [facility])  Assessment & Plan:  Resolved unlikely related to his foot numbness  Orders:  -     clopidogrel (PLAVIX) 75 MG tablet; TAKE 1 TABLET BY MOUTH ONE TIME A DAY, Disp-90 tablet, R-2Normal  3. Lumbosacral radiculopathy at S1  4. Numbness and tingling in right hand  Assessment & Plan: This is previously been evaluated. MRI has been completed  1.  No acute intracranial abnormality. 2.  Mild age-appropriate cerebral atrophy and chronic small vessel ischemic disease. No follow-ups on file. SUBJECTIVE/OBJECTIVE:  Mr. Diana Krishnamurthy is a 60-year-old gentleman with a past history of lumbar spondylolisthesis, OA bilateral knees, lacunar infarct. Reports today of numbness on the lateral edge of his right foot including his fourth and fifth toes. Denies pain in this area, denies trauma, denies pain in his right leg right hip however does have occasional pain in his right lower back. Reports this numbness is intermittent denies any skin color changes in his foot at this time. Reports the numbness is most prominent when he is sitting however seems to resolve slightly when he stands.       Current Outpatient Medications   Medication Sig Dispense Refill    Magnesium Oxide (MAGNESIUM-OXIDE) 250 MG TABS tablet TAKE ONE TABLET BY MOUTH QHS      clopidogrel (PLAVIX) 75 MG tablet TAKE 1 TABLET BY MOUTH ONE TIME A DAY 90 tablet 2    fluticasone (FLONASE) 50 MCG/ACT nasal spray 1 spray by Nasal route daily as needed for Rhinitis 1 Bottle 2    rosuvastatin (CRESTOR) 10 MG tablet Take 1 tablet by mouth daily 90 tablet 3    hydrochlorothiazide (HYDRODIURIL) 12.5 MG tablet Take 12.5 mg by mouth every morning      famotidine (PEPCID) 40 MG tablet       aspirin 81 MG tablet Take 81 mg by mouth daily      Magnesium 400 MG CAPS Take by mouth nightly      Cholecalciferol (VITAMIN D3) 2000 UNITS CAPS Take 4,000 Units by mouth       Coenzyme Q10 (CO Q-10) 200 MG CAPS Take  by mouth. No current facility-administered medications for this visit. Review of Systems   All other systems reviewed and are negative. Vitals:    02/22/21 1140   BP: (!) 158/80   Site: Left Upper Arm   Position: Sitting   Cuff Size: Medium Adult   Pulse: 74   Temp: 97.8 °F (36.6 °C)   SpO2: 98%   Weight: 192 lb (87.1 kg)   Height: 5' 10\" (1.778 m)       Physical Exam  Musculoskeletal: Normal range of motion. Comments: Right foot  Skin unremarkable  Skin warm, dry, no injury noted  Right fourth and fifth toes sensation to light touch intact however he reports it is slightly less and that of the rest of his foot. Skin:     General: Skin is warm and dry. An electronic signature was used to authenticate this note.     --Austin Mckeon, APRN - CNP

## 2021-02-23 PROBLEM — M54.17 LUMBOSACRAL RADICULOPATHY AT S1: Status: ACTIVE | Noted: 2021-02-23

## 2021-02-23 RX ORDER — MULTIVITAMIN/IRON/FOLIC ACID 18MG-0.4MG
TABLET ORAL
COMMUNITY
Start: 2020-09-16

## 2021-02-23 NOTE — ASSESSMENT & PLAN NOTE
I suspect this sensation to the side of his foot is coming from S1 sacral nerve root, as a sensation is relieved when he is standing this is likely compression positional  At this time it is not causing him difficulty with ambulating or instability, he denies any incontinence or severe pain. No imaging to be done at this time. He has a physical with his PCP in 2 weeks and will discuss all of these issues.

## 2021-02-23 NOTE — ASSESSMENT & PLAN NOTE
This is previously been evaluated. MRI has been completed  1.  No acute intracranial abnormality. 2.  Mild age-appropriate cerebral atrophy and chronic small vessel ischemic disease.

## 2021-03-10 ENCOUNTER — OFFICE VISIT (OUTPATIENT)
Dept: FAMILY MEDICINE CLINIC | Age: 76
End: 2021-03-10
Payer: COMMERCIAL

## 2021-03-10 VITALS
DIASTOLIC BLOOD PRESSURE: 80 MMHG | HEIGHT: 70 IN | TEMPERATURE: 97.1 F | SYSTOLIC BLOOD PRESSURE: 120 MMHG | OXYGEN SATURATION: 96 % | HEART RATE: 76 BPM | WEIGHT: 198.2 LBS | BODY MASS INDEX: 28.37 KG/M2

## 2021-03-10 DIAGNOSIS — M81.8 OTHER OSTEOPOROSIS WITHOUT CURRENT PATHOLOGICAL FRACTURE: Primary | ICD-10-CM

## 2021-03-10 DIAGNOSIS — Z00.00 WELL ADULT EXAM: ICD-10-CM

## 2021-03-10 DIAGNOSIS — M81.0 AGE-RELATED OSTEOPOROSIS WITHOUT CURRENT PATHOLOGICAL FRACTURE: ICD-10-CM

## 2021-03-10 DIAGNOSIS — Z96.652 STATUS POST TOTAL LEFT KNEE REPLACEMENT: ICD-10-CM

## 2021-03-10 DIAGNOSIS — Z00.00 ROUTINE GENERAL MEDICAL EXAMINATION AT A HEALTH CARE FACILITY: ICD-10-CM

## 2021-03-10 DIAGNOSIS — I63.81 LACUNAR INFARCTION (HCC): ICD-10-CM

## 2021-03-10 DIAGNOSIS — E78.2 MIXED HYPERLIPIDEMIA: ICD-10-CM

## 2021-03-10 DIAGNOSIS — I70.0 ATHEROSCLEROSIS OF AORTA (HCC): ICD-10-CM

## 2021-03-10 PROBLEM — E87.6 HYPOKALEMIA: Status: RESOLVED | Noted: 2019-09-19 | Resolved: 2021-03-10

## 2021-03-10 PROCEDURE — G0439 PPPS, SUBSEQ VISIT: HCPCS | Performed by: FAMILY MEDICINE

## 2021-03-10 ASSESSMENT — LIFESTYLE VARIABLES
HOW OFTEN DURING THE LAST YEAR HAVE YOU BEEN UNABLE TO REMEMBER WHAT HAPPENED THE NIGHT BEFORE BECAUSE YOU HAD BEEN DRINKING: 0
HOW OFTEN DURING THE LAST YEAR HAVE YOU FOUND THAT YOU WERE NOT ABLE TO STOP DRINKING ONCE YOU HAD STARTED: 0
AUDIT-C TOTAL SCORE: 1
HOW OFTEN DO YOU HAVE A DRINK CONTAINING ALCOHOL: 1
HAVE YOU OR SOMEONE ELSE BEEN INJURED AS A RESULT OF YOUR DRINKING: 0
HOW OFTEN DURING THE LAST YEAR HAVE YOU HAD A FEELING OF GUILT OR REMORSE AFTER DRINKING: 0

## 2021-03-10 ASSESSMENT — PATIENT HEALTH QUESTIONNAIRE - PHQ9
SUM OF ALL RESPONSES TO PHQ QUESTIONS 1-9: 0
SUM OF ALL RESPONSES TO PHQ QUESTIONS 1-9: 0
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
## 2021-03-10 NOTE — PATIENT INSTRUCTIONS
Personalized Preventive Plan for Una Rojas III - 3/10/2021  Medicare offers a range of preventive health benefits. Some of the tests and screenings are paid in full while other may be subject to a deductible, co-insurance, and/or copay. Some of these benefits include a comprehensive review of your medical history including lifestyle, illnesses that may run in your family, and various assessments and screenings as appropriate. After reviewing your medical record and screening and assessments performed today your provider may have ordered immunizations, labs, imaging, and/or referrals for you. A list of these orders (if applicable) as well as your Preventive Care list are included within your After Visit Summary for your review. Other Preventive Recommendations:    · A preventive eye exam performed by an eye specialist is recommended every 1-2 years to screen for glaucoma; cataracts, macular degeneration, and other eye disorders. · A preventive dental visit is recommended every 6 months. · Try to get at least 150 minutes of exercise per week or 10,000 steps per day on a pedometer . · Order or download the FREE \"Exercise & Physical Activity: Your Everyday Guide\" from [employer]. Call 1-889.278.7838 or search [employer] online. · You need 0554-5072 mg of calcium and 3413-4043 IU of vitamin D per day. It is possible to meet your calcium requirement with diet alone, but a vitamin D supplement is usually necessary to meet this goal.  · When exposed to the sun, use a sunscreen that protects against both UVA and UVB radiation with an SPF of 30 or greater. Reapply every 2 to 3 hours or after sweating, drying off with a towel, or swimming. · Always wear a seat belt when traveling in a car. Always wear a helmet when riding a bicycle or motorcycle.

## 2021-03-11 LAB
ALBUMIN SERPL-MCNC: 4.6 G/DL (ref 3.4–5)
ALP BLD-CCNC: 93 U/L (ref 40–129)
ALT SERPL-CCNC: 19 U/L (ref 10–40)
AST SERPL-CCNC: 23 U/L (ref 15–37)
BILIRUB SERPL-MCNC: 0.9 MG/DL (ref 0–1)
BILIRUBIN DIRECT: <0.2 MG/DL (ref 0–0.3)
BILIRUBIN, INDIRECT: NORMAL MG/DL (ref 0–1)
CHOLESTEROL, TOTAL: 164 MG/DL (ref 0–199)
HDLC SERPL-MCNC: 64 MG/DL (ref 40–60)
LDL CHOLESTEROL CALCULATED: 86 MG/DL
TOTAL CK: 173 U/L (ref 39–308)
TOTAL PROTEIN: 6.9 G/DL (ref 6.4–8.2)
TRIGL SERPL-MCNC: 71 MG/DL (ref 0–150)
VLDLC SERPL CALC-MCNC: 14 MG/DL

## 2021-03-18 ENCOUNTER — HOSPITAL ENCOUNTER (OUTPATIENT)
Dept: GENERAL RADIOLOGY | Age: 76
Discharge: HOME | End: 2021-03-18
Payer: COMMERCIAL

## 2021-03-18 DIAGNOSIS — M81.8 OTHER OSTEOPOROSIS WITHOUT CURRENT PATHOLOGICAL FRACTURE: ICD-10-CM

## 2021-03-18 DIAGNOSIS — M81.0 AGE-RELATED OSTEOPOROSIS WITHOUT CURRENT PATHOLOGICAL FRACTURE: Primary | ICD-10-CM

## 2021-03-18 PROCEDURE — 77080 DXA BONE DENSITY AXIAL: CPT

## 2021-03-23 ENCOUNTER — TELEPHONE (OUTPATIENT)
Dept: ENDOCRINOLOGY | Age: 76
End: 2021-03-23

## 2021-03-26 RX ORDER — PRAVASTATIN SODIUM 40 MG
TABLET ORAL
Qty: 90 TABLET | Refills: 0 | Status: SHIPPED | OUTPATIENT
Start: 2021-03-26 | End: 2021-10-29

## 2021-04-01 ENCOUNTER — TELEPHONE (OUTPATIENT)
Dept: ENDOCRINOLOGY | Age: 76
End: 2021-04-01

## 2021-04-01 RX ORDER — AMOXICILLIN AND CLAVULANATE POTASSIUM 875; 125 MG/1; MG/1
TABLET, FILM COATED ORAL
COMMUNITY
Start: 2021-03-10 | End: 2022-03-14

## 2021-04-01 SDOH — HEALTH STABILITY: MENTAL HEALTH: HOW OFTEN DO YOU HAVE A DRINK CONTAINING ALCOHOL?: NOT ASKED

## 2021-04-01 SDOH — HEALTH STABILITY: MENTAL HEALTH: HOW MANY STANDARD DRINKS CONTAINING ALCOHOL DO YOU HAVE ON A TYPICAL DAY?: NOT ASKED

## 2021-04-01 NOTE — TELEPHONE ENCOUNTER
New patient, [facility] rec'd. Please schedule and let me know when appt is. Thanks. DXA needed? No    Please ask them to bring any and all vitamins and supplements to appointment.

## 2021-04-09 PROBLEM — Z00.00 WELL ADULT EXAM: Status: RESOLVED | Noted: 2020-07-27 | Resolved: 2021-04-09

## 2021-04-27 NOTE — TELEPHONE ENCOUNTER
Patient called and cancelled appt due to him needing a hearing test to keep his benefits that is scheduled the same day and time.  He will call back to r/s

## 2021-04-27 NOTE — TELEPHONE ENCOUNTER
If/when he calls to reschedule, that will be his last chance. This is the second time he has canceled.

## 2021-05-06 ENCOUNTER — OFFICE VISIT (OUTPATIENT)
Dept: ENDOCRINOLOGY | Age: 76
End: 2021-05-06
Payer: COMMERCIAL

## 2021-05-06 VITALS
DIASTOLIC BLOOD PRESSURE: 78 MMHG | WEIGHT: 191.2 LBS | BODY MASS INDEX: 30.73 KG/M2 | HEIGHT: 66 IN | SYSTOLIC BLOOD PRESSURE: 138 MMHG

## 2021-05-06 DIAGNOSIS — M81.0 AGE-RELATED OSTEOPOROSIS WITHOUT CURRENT PATHOLOGICAL FRACTURE: Primary | ICD-10-CM

## 2021-05-06 PROCEDURE — 99205 OFFICE O/P NEW HI 60 MIN: CPT | Performed by: INTERNAL MEDICINE

## 2021-05-06 RX ORDER — ALENDRONATE SODIUM 70 MG/1
70 TABLET ORAL WEEKLY
Qty: 12 TABLET | Refills: 4 | Status: SHIPPED | OUTPATIENT
Start: 2021-05-06 | End: 2022-03-14

## 2021-05-06 NOTE — LETTER
[address] and Osteoporosis  [address] [facility] [address] [facility], [address] [place] [address],[place] [address]  Phone [phone]  Fax [phone]         May 6, 2021         Cheryle Marine MD                              Re:  Yehuda Monae III,  1945    Dear Dr. Fariba Briceno:     Thank you for asking me to see Yehuda Monae III in consultation. As you know, Mr. Karina Shepherd is a 68 y.o. man recently found to have osteoporosis (T-score -2.5 in the left femoral neck). We reviewed life-style issues (calcium, vitamin D and physical activity). Without effective treatment, fracture risk is high. We discussed long-term treatment options (alendronate, zoledronate, Prolia). I recommended alendronate 70 mg weekly and he agreed. Rx sent, dosing instructions reviewed. Enclosed is a copy of my consultation note. Please let me know if you have any questions. Sincerely,    Jordan Michaud. Buck SALEEM     Encl.  Copy of consult note
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
## 2021-05-12 ENCOUNTER — NURSE TRIAGE (OUTPATIENT)
Dept: OTHER | Facility: CLINIC | Age: 76
End: 2021-05-12

## 2021-05-12 NOTE — TELEPHONE ENCOUNTER
Received call from Kathreen Pallas at pre-service center [facility]) Gera with Red Flag Complaint. Brief description of triage: right leg pain with tingling ongoing sciatica pain an nerve compression     Triage indicates for patient to be seen today or tomorrow     Care advice provided, patient verbalizes understanding; denies any other questions or concerns; instructed to call back for any new or worsening symptoms. Writer provided warm transfer to Marianne Rowe at [facility] for appointment scheduling. Attention Provider: Thank you for allowing me to participate in the care of your patient. The patient was connected to triage in response to information provided to the [facility]. Please do not respond through this encounter as the response is not directed to a shared pool. Reason for Disposition   Numbness in a leg or foot (i.e., loss of sensation)    Answer Assessment - Initial Assessment Questions  1. ONSET: \"When did the pain start? \"       He had numbness down one side around february they thought he had a stroke but it was a pinched nerve that caused numbness at the time he did not have to have anything done and he had a sciatica nerve pain and had a shot for it and it worked well     2. LOCATION: \"Where is the pain located? \"       Right leg   3. PAIN: \"How bad is the pain? \"    (Scale 1-10; or mild, moderate, severe)    -  MILD (1-3): doesn't interfere with normal activities     -  MODERATE (4-7): interferes with normal activities (e.g., work or school) or awakens from sleep, limping     -  SEVERE (8-10): excruciating pain, unable to do any normal activities, unable to walk      With sitting the pain is down back and down leg and if he moves it hurt   5-6 out of 10     4. WORK OR EXERCISE: \"Has there been any recent work or exercise that involved this part of the body? \"       No     5. CAUSE: \"What do you think is causing the leg pain? \"      Sciatica and never damaged     6.  OTHER SYMPTOMS: \"Do you have any other symptoms? \" (e.g., chest pain, back pain, breathing difficulty, swelling, rash, fever, numbness, weakness)      *No Answer*  7. PREGNANCY: \"Is there any chance you are pregnant? \" \"When was your last menstrual period? \"      *No Answer*    Protocols used: LEG PAIN-ADULT-OH    Answers to number 6-No numbness no tingling no chest pain he does have back pain with the sciatica and he no weakness,no swelling, no rash, Feet may be swelling a bit not sure     7- N/A

## 2021-05-13 ENCOUNTER — OFFICE VISIT (OUTPATIENT)
Dept: FAMILY MEDICINE CLINIC | Age: 76
End: 2021-05-13
Payer: COMMERCIAL

## 2021-05-13 VITALS
TEMPERATURE: 97.3 F | DIASTOLIC BLOOD PRESSURE: 80 MMHG | HEART RATE: 76 BPM | BODY MASS INDEX: 30.86 KG/M2 | HEIGHT: 66 IN | OXYGEN SATURATION: 98 % | SYSTOLIC BLOOD PRESSURE: 139 MMHG | WEIGHT: 192 LBS

## 2021-05-13 DIAGNOSIS — M54.17 LUMBOSACRAL RADICULOPATHY: Primary | ICD-10-CM

## 2021-05-13 PROCEDURE — 99214 OFFICE O/P EST MOD 30 MIN: CPT | Performed by: NURSE PRACTITIONER

## 2021-05-13 RX ORDER — PREDNISONE 20 MG/1
20 TABLET ORAL 2 TIMES DAILY
Qty: 10 TABLET | Refills: 0 | Status: SHIPPED | OUTPATIENT
Start: 2021-05-13 | End: 2021-05-18

## 2021-05-13 ASSESSMENT — PATIENT HEALTH QUESTIONNAIRE - PHQ9
SUM OF ALL RESPONSES TO PHQ QUESTIONS 1-9: 0
SUM OF ALL RESPONSES TO PHQ QUESTIONS 1-9: 0

## 2021-05-13 NOTE — PROGRESS NOTES
Kasie Chua III (:  1945) is a 68 y.o. male,Established patient, here for evaluation of the following chief complaint(s): Other (lower back pain shooting down right leg)      ASSESSMENT/PLAN:  1. Lumbosacral radiculopathy  Assessment & Plan:   Uncontrolled, continue current medications, medication adherence emphasized, lifestyle modifications recommended and Acute on chronic L5-S1 radiculopathy, will do 5-day steroid burst and continue with home physical therapy as he is familiar with the exercises. If pain continues, worsens or does not resolve we will schedule MRI and refer for epidural steroid injection. At this time he would prefer to remain conservative in management. To follow-up with me in 2 weeks. Orders:  -     predniSONE (DELTASONE) 20 MG tablet; Take 1 tablet by mouth 2 times daily for 5 days, Disp-10 tablet, R-0Normal  -     MRI LUMBAR SPINE WO CONTRAST; Future  2015:  Given patient's symptoms I suspect his problems have continued and are worsening. Will encourage MRI. IMPRESSION:   Bilateral spondylolysis of L5 with anterior spondylolisthesis of   L5 and S1.  Pseudobulging the disc is causing stenosis of the   neural foramina bilaterally.        Degenerative changes are seen in the L3-L4 disc.  Disc bulging and   ligamentous hypertrophy are causing moderate central canal   stenosis at L3-L4 level           No follow-ups on file. SUBJECTIVE/OBJECTIVE:  Andrew is a very pleasant 80-year-old male with history significant for lumbosacral radiculopathy at L1 spondylolisthesis at L4-L5 in addition to primary osteoarthritis of bilateral knees and age-related osteoporosis without fracture. He presents today with ongoing x4 months right lower back pain radiating to the right buttock to the right lateral thigh down the lateral calf and into dorsum of right foot.   He has tried taking Tylenol with no relief, he does do stretching and physical therapy that he had been trained years ago when he was initially diagnosed. His last epidural steroid injection was in 2015 he reports it was very successful at that time. He would like to try medication prior to an epidural steroid. Reports occasional difficulty walking at times when his right leg and foot feel weak  Denies incontinence denies falls      Current Outpatient Medications   Medication Sig Dispense Refill    predniSONE (DELTASONE) 20 MG tablet Take 1 tablet by mouth 2 times daily for 5 days 10 tablet 0    alendronate (FOSAMAX) 70 MG tablet Take 1 tablet by mouth once a week 12 tablet 4    pravastatin (PRAVACHOL) 40 MG tablet TAKE 1 TABLET BY MOUTH IN THE EVENING  90 tablet 0    Magnesium Oxide (MAGNESIUM-OXIDE) 250 MG TABS tablet TAKE ONE TABLET BY MOUTH QHS      clopidogrel (PLAVIX) 75 MG tablet TAKE 1 TABLET BY MOUTH ONE TIME A DAY 90 tablet 2    fluticasone (FLONASE) 50 MCG/ACT nasal spray 1 spray by Nasal route daily as needed for Rhinitis 1 Bottle 2    rosuvastatin (CRESTOR) 10 MG tablet Take 1 tablet by mouth daily 90 tablet 3    hydrochlorothiazide (HYDRODIURIL) 12.5 MG tablet Take 12.5 mg by mouth every morning      famotidine (PEPCID) 40 MG tablet       aspirin 81 MG tablet Take 81 mg by mouth daily      Cholecalciferol (VITAMIN D3) 2000 UNITS CAPS Take 4,000 Units by mouth       amoxicillin-clavulanate (AUGMENTIN) 875-125 MG per tablet TAKE 1 TABLET BY MOUTH TWO TIMES A DAY FOR 3 DAYS. STARTING 1 DAY PRIOR TO DENTAL APPOINTMENT       No current facility-administered medications for this visit. Review of Systems   All other systems reviewed and are negative. Vitals:    05/13/21 0849   BP: 139/80   Pulse: 76   Temp: 97.3 °F (36.3 °C)   SpO2: 98%   Weight: 192 lb (87.1 kg)   Height: 5' 6\" (1.676 m)       Physical Exam  Musculoskeletal: Normal range of motion. General: No swelling, tenderness, deformity or signs of injury. Back:       Right upper leg: Normal.      Right lower leg: No edema.       Left lower leg: No edema. Legs:       Comments: Strength R<L  Patellar DTR intact  Positive Rt leg lift pain  Sensation and pulses intact                 An electronic signature was used to authenticate this note.     --YAYA Moon - CNP

## 2021-05-13 NOTE — ASSESSMENT & PLAN NOTE
Uncontrolled, continue current medications, medication adherence emphasized, lifestyle modifications recommended and Acute on chronic L5-S1 radiculopathy, will do 5-day steroid burst and continue with home physical therapy as he is familiar with the exercises. If pain continues, worsens or does not resolve we will schedule MRI and refer for epidural steroid injection. At this time he would prefer to remain conservative in management. To follow-up with me in 2 weeks.

## 2021-05-17 ENCOUNTER — TELEPHONE (OUTPATIENT)
Dept: FAMILY MEDICINE CLINIC | Age: 76
End: 2021-05-17

## 2021-06-10 ENCOUNTER — HOSPITAL ENCOUNTER (OUTPATIENT)
Dept: MRI IMAGING | Age: 76
Discharge: HOME | End: 2021-06-10
Payer: COMMERCIAL

## 2021-06-10 DIAGNOSIS — M54.17 LUMBOSACRAL RADICULOPATHY: ICD-10-CM

## 2021-06-10 PROCEDURE — 72148 MRI LUMBAR SPINE W/O DYE: CPT

## 2021-06-21 DIAGNOSIS — M54.17 LUMBOSACRAL RADICULOPATHY: Primary | ICD-10-CM

## 2021-06-21 DIAGNOSIS — M48.061 SPINAL STENOSIS OF LUMBAR REGION, UNSPECIFIED WHETHER NEUROGENIC CLAUDICATION PRESENT: ICD-10-CM

## 2021-07-14 ENCOUNTER — HOSPITAL ENCOUNTER (OUTPATIENT)
Dept: INTERVENTIONAL RADIOLOGY/VASCULAR | Age: 76
Discharge: HOME | End: 2021-07-14
Payer: COMMERCIAL

## 2021-07-14 DIAGNOSIS — M54.16 LUMBAR RADICULOPATHY: ICD-10-CM

## 2021-07-14 PROCEDURE — 62323 NJX INTERLAMINAR LMBR/SAC: CPT

## 2021-07-14 PROCEDURE — 6360000004 HC RX CONTRAST MEDICATION: Performed by: RADIOLOGY

## 2021-07-14 PROCEDURE — 2709999900 HC NON-CHARGEABLE SUPPLY

## 2021-07-14 RX ADMIN — IOHEXOL 10 ML: 180 INJECTION INTRAVENOUS at 12:45

## 2021-08-11 ENCOUNTER — TELEPHONE (OUTPATIENT)
Dept: FAMILY MEDICINE CLINIC | Age: 76
End: 2021-08-11

## 2021-08-11 NOTE — TELEPHONE ENCOUNTER
Patient received a letter & phone call about RSV vaccination research for seniors & young children, pays $80 & he would like your opinion on whether or not he should do this research? Pls advise.

## 2021-08-24 ENCOUNTER — HOSPITAL ENCOUNTER (EMERGENCY)
Age: 76
Discharge: HOME | End: 2021-08-24
Attending: EMERGENCY MEDICINE
Payer: COMMERCIAL

## 2021-08-24 ENCOUNTER — APPOINTMENT (OUTPATIENT)
Dept: CT IMAGING | Age: 76
End: 2021-08-24
Payer: COMMERCIAL

## 2021-08-24 VITALS
HEART RATE: 75 BPM | DIASTOLIC BLOOD PRESSURE: 82 MMHG | WEIGHT: 185 LBS | RESPIRATION RATE: 18 BRPM | HEIGHT: 66 IN | OXYGEN SATURATION: 99 % | TEMPERATURE: 98 F | BODY MASS INDEX: 29.73 KG/M2 | SYSTOLIC BLOOD PRESSURE: 155 MMHG

## 2021-08-24 DIAGNOSIS — S09.90XD CLOSED HEAD INJURY, SUBSEQUENT ENCOUNTER: Primary | ICD-10-CM

## 2021-08-24 DIAGNOSIS — S06.0X0A CONCUSSION WITHOUT LOSS OF CONSCIOUSNESS, INITIAL ENCOUNTER: ICD-10-CM

## 2021-08-24 PROCEDURE — 99283 EMERGENCY DEPT VISIT LOW MDM: CPT

## 2021-08-24 PROCEDURE — 70450 CT HEAD/BRAIN W/O DYE: CPT

## 2021-08-24 ASSESSMENT — PAIN DESCRIPTION - LOCATION: LOCATION: HEAD

## 2021-08-24 ASSESSMENT — ENCOUNTER SYMPTOMS
NAUSEA: 0
TROUBLE SWALLOWING: 0
ABDOMINAL PAIN: 0
SINUS PRESSURE: 0
PHOTOPHOBIA: 0
BACK PAIN: 0
SINUS PAIN: 0
DIARRHEA: 0
SORE THROAT: 0
EYE REDNESS: 0
COUGH: 0
EYE PAIN: 0
SHORTNESS OF BREATH: 0
VOMITING: 0
FACIAL SWELLING: 0

## 2021-08-24 ASSESSMENT — PAIN DESCRIPTION - FREQUENCY: FREQUENCY: CONTINUOUS

## 2021-08-24 ASSESSMENT — PAIN SCALES - GENERAL: PAINLEVEL_OUTOF10: 5

## 2021-08-24 ASSESSMENT — PAIN DESCRIPTION - ORIENTATION: ORIENTATION: POSTERIOR

## 2021-08-24 ASSESSMENT — PAIN DESCRIPTION - DESCRIPTORS: DESCRIPTORS: ACHING

## 2021-08-24 NOTE — ED PROVIDER NOTES
[facility] RESIDENT NOTE       Date of evaluation: 8/24/2021    Chief Complaint     Fall (had fall 8/20, hit back of head went to urgent care) and Blurred Vision (neck and shoulder pain, blurred vision started monday)      History of Present Illness     Brennan Gee III is a 68 y.o. male who presents following a fall with head injury x3 days. Patient states he was laying on a folding chair on Friday when it collapsed and hit the back of his head. He states that he was bleeding from the posterior aspect of his scalp and felt \"semi-foggy\" for the rest of the day. Patient states that he to urgent care the next day where they told him that he needed to come to the emergency department if he did not improve. States that when he woke up today he had some blurry vision. He had a slow pitch softball game this evening and came in because he was afraid his blurry vision would affect his game. Denies headaches, weakness, numbness or tingling. States he no longer feels fuzzy and feels back to his baseline. Review of Systems     Review of Systems   Constitutional: Negative for activity change, chills, fatigue and fever. HENT: Negative for ear discharge, ear pain, facial swelling, hearing loss, sinus pressure, sinus pain, sore throat and trouble swallowing. Eyes: Positive for visual disturbance. Negative for photophobia, pain and redness. Respiratory: Negative for cough and shortness of breath. Cardiovascular: Negative for chest pain and palpitations. Gastrointestinal: Negative for abdominal pain, diarrhea, nausea and vomiting. Genitourinary: Negative for dysuria, frequency and hematuria. Musculoskeletal: Negative for back pain and gait problem. Skin: Positive for wound (well healing abrasion on posterior scalp). Neurological: Negative for dizziness, facial asymmetry, speech difficulty, weakness, numbness and headaches.        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 He has a past surgical history that includes Tonsillectomy and adenoidectomy; nasal/sinus endoscopy; Cholecystectomy (94); Rotator cuff repair (Bilateral); Femur fracture surgery; hernia repair (06); Colonoscopy (08,11/12,11/17/17); Cardiac surgery; Cardiac catheterization; Total knee arthroplasty (Left, 1/22/2019); joint replacement; knee surgery; and incision and drainage (Left, 4/24/2019). His family history includes Heart Disease in his brother, brother, father, mother, and son; Heart Disease (age of onset: 79) in his brother. He reports that he quit smoking about 25 years ago. His smoking use included cigarettes. He has a 10.00 pack-year smoking history. He has never used smokeless tobacco. He reports current alcohol use of about 1.0 standard drinks of alcohol per week. He reports that he does not use drugs. Medications     Discharge Medication List as of 8/24/2021  5:56 PM      CONTINUE these medications which have NOT CHANGED    Details   alendronate (FOSAMAX) 70 MG tablet Take 1 tablet by mouth once a week, Disp-12 tablet, R-4Normal      amoxicillin-clavulanate (AUGMENTIN) 875-125 MG per tablet TAKE 1 TABLET BY MOUTH TWO TIMES A DAY FOR 3 DAYS.  STARTING 1 DAY PRIOR TO DENTAL APPOINTMENTHistorical Med      pravastatin (PRAVACHOL) 40 MG tablet TAKE 1 TABLET BY MOUTH IN THE EVENING , Disp-90 tablet, R-0Normal      Magnesium Oxide (MAGNESIUM-OXIDE) 250 MG TABS tablet TAKE ONE TABLET BY MOUTH QHSHistorical Med      clopidogrel (PLAVIX) 75 MG General: No swelling or tenderness. Normal range of motion. Cervical back: Normal range of motion and neck supple. No rigidity. Tenderness: mild musculoskeletal tenderness of trapezius. Right lower leg: No edema. Left lower leg: No edema. Skin:     General: Skin is warm and dry. Capillary Refill: Capillary refill takes less than 2 seconds. Findings: Lesion (posterior scalp abrasion as described above) present. Neurological:      Mental Status: He is alert and oriented to person, place, and time. Cranial Nerves: Cranial nerves are intact. No dysarthria or facial asymmetry. Sensory: Sensation is intact. Motor: No weakness or tremor. Coordination: Romberg sign negative. Coordination normal. Finger-Nose-Finger Test and Heel to [place] fabio Test normal.      Gait: Gait is intact. Comments: Strength 5/5 in all extremities. DiagnosticResults     EKG   n/a    RADIOLOGY:  CT HEAD WO CONTRAST   Final Result      No acute intracranial pathology                     LABS:   No results found for this visit on 08/24/21. ED BEDSIDE ULTRASOUND:  N/A    RECENT VITALS:  BP: (!) 155/82, Temp: 98 °F (36.7 °C), Pulse: 75,Resp: 18, SpO2: 99 %     Procedures     N/A    ED Course     Nursing Notes, Past Medical Hx, Past Surgical Hx, Social Hx, Allergies, and Family Hx were reviewed. The patient was given the followingmedications:  No orders of the defined types were placed in this encounter. CONSULTS:  None    MEDICAL DECISION MAKING / ASSESSMENT / [facility] Fernanda PHOENIX is a 68 y.o. male who presents following a fall and head injury x3 days ago. Patient on aspirin and Plavix with fall and head injury from about 1 ft. Patient without loss of consciousness at the event and with episode of blurry vision isolated to this morning.  No neurological deficits on exam which is reassuring, but given questionable history of anticoagulation and age, further evaluation deemed

## 2021-09-03 ENCOUNTER — OFFICE VISIT (OUTPATIENT)
Dept: FAMILY MEDICINE CLINIC | Age: 76
End: 2021-09-03
Payer: COMMERCIAL

## 2021-09-03 VITALS
HEART RATE: 73 BPM | DIASTOLIC BLOOD PRESSURE: 78 MMHG | OXYGEN SATURATION: 98 % | SYSTOLIC BLOOD PRESSURE: 130 MMHG | BODY MASS INDEX: 31.44 KG/M2 | WEIGHT: 194.8 LBS | TEMPERATURE: 97.3 F

## 2021-09-03 DIAGNOSIS — S09.90XD CLOSED HEAD INJURY, SUBSEQUENT ENCOUNTER: ICD-10-CM

## 2021-09-03 DIAGNOSIS — S06.0X0D CONCUSSION WITHOUT LOSS OF CONSCIOUSNESS, SUBSEQUENT ENCOUNTER: ICD-10-CM

## 2021-09-03 PROBLEM — S06.0X0A CONCUSSION WITHOUT LOSS OF CONSCIOUSNESS: Status: ACTIVE | Noted: 2021-09-03

## 2021-09-03 PROBLEM — S09.90XA CLOSED HEAD INJURY: Status: ACTIVE | Noted: 2021-09-03

## 2021-09-03 PROCEDURE — 1111F DSCHRG MED/CURRENT MED MERGE: CPT | Performed by: FAMILY MEDICINE

## 2021-09-03 PROCEDURE — 99213 OFFICE O/P EST LOW 20 MIN: CPT | Performed by: FAMILY MEDICINE

## 2021-09-03 NOTE — PROGRESS NOTES
Post-Discharge Transitional Care Management Services or Hospital Follow Up      Miguel Vázquez III   YOB: 1945    Date of Office Visit:  9/3/2021  Date of Hospital Admission: 8/24/21  Date of Hospital Discharge: 8/24/21  Risk of hospital readmission (high >=14%.  Medium >=10%) :No data recorded    Care management risk score Rising risk (score 2-5) and Complex Care (Scores >=6): 5     Non face to face  following discharge, date last encounter closed (first attempt may have been earlier): *No documented post hospital discharge outreach found in the last 14 days    Call initiated 2 business days of discharge: *No response recorded in the last 14 days    Patient Active Problem List   Diagnosis    Anxiety    Hyperlipidemia    Diverticulosis of colon    Restless leg syndrome    Benign prostatic hyperplasia with urinary obstruction    Lumbar spinal stenosis    Hearing impairment    Allergic rhinitis    GERD (gastroesophageal reflux disease)    Spondylolisthesis at L4-L5 level    Fatty liver    Atherosclerosis of aorta (HCC)    Primary osteoarthritis of both knees    Carotid stenosis, bilateral    Functional diarrhea    ED (erectile dysfunction)    Primary osteoarthritis of left knee    Migraine without aura and without status migrainosus, not intractable    Osteoarthritis of left knee    Status post total left knee replacement    Lacunar infarction (HCC)    Numbness and tingling in right hand    Lumbosacral radiculopathy    Age-related osteoporosis without current pathological fracture    Closed head injury    Concussion without loss of consciousness       Allergies   Allergen Reactions    Latex Rash     Blisters and rash    Bactrim [Sulfamethoxazole-Trimethoprim] Other (See Comments)     Blister on end of penis    Sulfa Antibiotics Dermatitis    Zocor [Simvastatin] Other (See Comments)     myositis       Medications listed as ordered at the time of discharge from hospital Lloyd Ortega III   Home Medication Instructions BLADIMIR:    Printed on:09/03/21 6510   Medication Information                      alendronate (FOSAMAX) 70 MG tablet  Take 1 tablet by mouth once a week             amoxicillin-clavulanate (AUGMENTIN) 875-125 MG per tablet  TAKE 1 TABLET BY MOUTH TWO TIMES A DAY FOR 3 DAYS. STARTING 1 DAY PRIOR TO DENTAL APPOINTMENT             aspirin 81 MG tablet  Take 81 mg by mouth daily             Cholecalciferol (VITAMIN D3) 2000 UNITS CAPS  Take 4,000 Units by mouth              clopidogrel (PLAVIX) 75 MG tablet  TAKE 1 TABLET BY MOUTH ONE TIME A DAY             famotidine (PEPCID) 40 MG tablet               fluticasone (FLONASE) 50 MCG/ACT nasal spray  1 spray by Nasal route daily as needed for Rhinitis             hydrochlorothiazide (HYDRODIURIL) 12.5 MG tablet  Take 12.5 mg by mouth every morning             Magnesium Oxide (MAGNESIUM-OXIDE) 250 MG TABS tablet  TAKE ONE TABLET BY MOUTH QHS             pravastatin (PRAVACHOL) 40 MG tablet  TAKE 1 TABLET BY MOUTH IN THE EVENING              rosuvastatin (CRESTOR) 10 MG tablet  Take 1 tablet by mouth daily                   Medications marked \"taking\" at this time  Outpatient Medications Marked as Taking for the 9/3/21 encounter (Office Visit) with Miguelito Dozier MD   Medication Sig Dispense Refill    alendronate (FOSAMAX) 70 MG tablet Take 1 tablet by mouth once a week 12 tablet 4    amoxicillin-clavulanate (AUGMENTIN) 875-125 MG per tablet TAKE 1 TABLET BY MOUTH TWO TIMES A DAY FOR 3 DAYS.  STARTING 1 DAY PRIOR TO DENTAL APPOINTMENT      Magnesium Oxide (MAGNESIUM-OXIDE) 250 MG TABS tablet TAKE ONE TABLET BY MOUTH QHS      clopidogrel (PLAVIX) 75 MG tablet TAKE 1 TABLET BY MOUTH ONE TIME A DAY 90 tablet 2    fluticasone (FLONASE) 50 MCG/ACT nasal spray 1 spray by Nasal route daily as needed for Rhinitis 1 Bottle 2    rosuvastatin (CRESTOR) 10 MG tablet Take 1 tablet by mouth daily 90 tablet 3    hydrochlorothiazide (HYDRODIURIL) 12.5 MG tablet Take 12.5 mg by mouth every morning      famotidine (PEPCID) 40 MG tablet       aspirin 81 MG tablet Take 81 mg by mouth daily      Cholecalciferol (VITAMIN D3) 2000 UNITS CAPS Take 4,000 Units by mouth           Medications patient taking as of now reconciled against medications ordered at time of hospital discharge: Yes    Chief Complaint   Patient presents with   [address] from [facility]     8/24/21 from a fall       History of Present illness - Follow up of Hospital diagnosis(es): Closed head injury   resolved    Concussion without loss of consciousness   controlled        Inpatient course: Discharge summary reviewed- see chart. Interval history/Current status: improved    A comprehensive review of systems was negative except for what was noted in the HPI. Vitals:    09/03/21 1353   BP: 130/78   Site: Left Upper Arm   Position: Sitting   Cuff Size: Large Adult   Pulse: 73   Temp: 97.3 °F (36.3 °C)   SpO2: 98%   Weight: 194 lb 12.8 oz (88.4 kg)     Body mass index is 31.44 kg/m².    Wt Readings from Last 3 Encounters:   09/03/21 194 lb 12.8 oz (88.4 kg)   08/24/21 185 lb (83.9 kg)   05/13/21 192 lb (87.1 kg)     BP Readings from Last 3 Encounters:   09/03/21 130/78   08/24/21 (!) 155/82   05/13/21 139/80        Physical Exam:  General Appearance: alert and oriented to person, place and time, well developed and well- nourished, in no acute distress  Skin: warm and dry, no rash or erythema  Head: normocephalic and atraumatic  Eyes: pupils equal, round, and reactive to light, extraocular eye movements intact, conjunctivae normal  ENT: tympanic membrane, external ear and ear canal normal bilaterally, nose without deformity, nasal mucosa and turbinates normal without polyps  Neck: supple and non-tender without mass, no thyromegaly or thyroid nodules, no cervical lymphadenopathy  Pulmonary/Chest: clear to auscultation bilaterally- no wheezes, rales or rhonchi,

## 2021-10-28 DIAGNOSIS — I63.81 LACUNAR INFARCTION (HCC): ICD-10-CM

## 2021-10-28 RX ORDER — CLOPIDOGREL BISULFATE 75 MG/1
TABLET ORAL
Qty: 90 TABLET | Refills: 0 | Status: SHIPPED | OUTPATIENT
Start: 2021-10-28 | End: 2021-10-29

## 2021-10-29 DIAGNOSIS — I63.81 LACUNAR INFARCTION (HCC): ICD-10-CM

## 2021-10-29 RX ORDER — CLOPIDOGREL BISULFATE 75 MG/1
TABLET ORAL
Qty: 90 TABLET | Refills: 0 | Status: SHIPPED | OUTPATIENT
Start: 2021-10-29 | End: 2022-01-24 | Stop reason: SDUPTHER

## 2021-10-29 RX ORDER — PRAVASTATIN SODIUM 40 MG
TABLET ORAL
Qty: 90 TABLET | Refills: 0 | Status: SHIPPED | OUTPATIENT
Start: 2021-10-29 | End: 2022-01-25

## 2021-11-22 ENCOUNTER — OFFICE VISIT (OUTPATIENT)
Dept: FAMILY MEDICINE CLINIC | Age: 76
End: 2021-11-22
Payer: COMMERCIAL

## 2021-11-22 ENCOUNTER — HOSPITAL ENCOUNTER (OUTPATIENT)
Age: 76
Discharge: HOME | End: 2021-11-22
Payer: COMMERCIAL

## 2021-11-22 ENCOUNTER — HOSPITAL ENCOUNTER (OUTPATIENT)
Dept: GENERAL RADIOLOGY | Age: 76
Discharge: HOME | End: 2021-11-22
Payer: COMMERCIAL

## 2021-11-22 VITALS
OXYGEN SATURATION: 96 % | BODY MASS INDEX: 31.5 KG/M2 | HEART RATE: 82 BPM | HEIGHT: 66 IN | WEIGHT: 196 LBS | SYSTOLIC BLOOD PRESSURE: 120 MMHG | DIASTOLIC BLOOD PRESSURE: 80 MMHG

## 2021-11-22 DIAGNOSIS — M25.551 RIGHT HIP PAIN: ICD-10-CM

## 2021-11-22 PROCEDURE — 73502 X-RAY EXAM HIP UNI 2-3 VIEWS: CPT

## 2021-11-22 PROCEDURE — 99213 OFFICE O/P EST LOW 20 MIN: CPT | Performed by: FAMILY MEDICINE

## 2021-11-22 SDOH — ECONOMIC STABILITY: FOOD INSECURITY: WITHIN THE PAST 12 MONTHS, YOU WORRIED THAT YOUR FOOD WOULD RUN OUT BEFORE YOU GOT MONEY TO BUY MORE.: NEVER TRUE

## 2021-11-22 SDOH — ECONOMIC STABILITY: FOOD INSECURITY: WITHIN THE PAST 12 MONTHS, THE FOOD YOU BOUGHT JUST DIDN'T LAST AND YOU DIDN'T HAVE MONEY TO GET MORE.: NEVER TRUE

## 2021-11-22 ASSESSMENT — SOCIAL DETERMINANTS OF HEALTH (SDOH): HOW HARD IS IT FOR YOU TO PAY FOR THE VERY BASICS LIKE FOOD, HOUSING, MEDICAL CARE, AND HEATING?: NOT HARD AT ALL

## 2021-11-22 ASSESSMENT — PATIENT HEALTH QUESTIONNAIRE - PHQ9
1. LITTLE INTEREST OR PLEASURE IN DOING THINGS: 0
SUM OF ALL RESPONSES TO PHQ QUESTIONS 1-9: 0
SUM OF ALL RESPONSES TO PHQ QUESTIONS 1-9: 0
SUM OF ALL RESPONSES TO PHQ9 QUESTIONS 1 & 2: 0
2. FEELING DOWN, DEPRESSED OR HOPELESS: 0
SUM OF ALL RESPONSES TO PHQ QUESTIONS 1-9: 0

## 2021-11-22 ASSESSMENT — ENCOUNTER SYMPTOMS
BOWEL INCONTINENCE: 0
ABDOMINAL PAIN: 0
BACK PAIN: 1
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
## 2022-01-24 DIAGNOSIS — I63.81 LACUNAR INFARCTION (HCC): ICD-10-CM

## 2022-01-24 RX ORDER — CLOPIDOGREL BISULFATE 75 MG/1
TABLET ORAL
Qty: 90 TABLET | Refills: 3 | Status: SHIPPED | OUTPATIENT
Start: 2022-01-24

## 2022-01-24 RX ORDER — ROSUVASTATIN CALCIUM 10 MG/1
10 TABLET, COATED ORAL DAILY
Qty: 90 TABLET | Refills: 3 | Status: SHIPPED | OUTPATIENT
Start: 2022-01-24

## 2022-01-25 RX ORDER — PRAVASTATIN SODIUM 40 MG
TABLET ORAL
Qty: 90 TABLET | Refills: 0 | Status: SHIPPED | OUTPATIENT
Start: 2022-01-25 | End: 2022-03-14

## 2022-02-28 ENCOUNTER — TELEPHONE (OUTPATIENT)
Dept: FAMILY MEDICINE CLINIC | Age: 77
End: 2022-02-28

## 2022-02-28 DIAGNOSIS — M54.17 LUMBOSACRAL RADICULOPATHY AT S1: ICD-10-CM

## 2022-02-28 DIAGNOSIS — E78.2 MIXED HYPERLIPIDEMIA: Primary | ICD-10-CM

## 2022-02-28 DIAGNOSIS — I63.81 LACUNAR INFARCTION (HCC): ICD-10-CM

## 2022-02-28 DIAGNOSIS — M81.8 OTHER OSTEOPOROSIS WITHOUT CURRENT PATHOLOGICAL FRACTURE: ICD-10-CM

## 2022-02-28 DIAGNOSIS — Z12.5 PROSTATE CANCER SCREENING: ICD-10-CM

## 2022-02-28 NOTE — TELEPHONE ENCOUNTER
Pt asking for lab orders to be placed prior to his 3/14 AWV  He would like to have the labs drawn Tuesday or Wednesday

## 2022-03-03 DIAGNOSIS — I63.81 LACUNAR INFARCTION (HCC): ICD-10-CM

## 2022-03-03 DIAGNOSIS — E78.2 MIXED HYPERLIPIDEMIA: ICD-10-CM

## 2022-03-03 DIAGNOSIS — Z12.5 PROSTATE CANCER SCREENING: ICD-10-CM

## 2022-03-03 LAB
A/G RATIO: 2.2 (ref 1.1–2.2)
ALBUMIN SERPL-MCNC: 4.9 G/DL (ref 3.4–5)
ALP BLD-CCNC: 70 U/L (ref 40–129)
ALT SERPL-CCNC: 21 U/L (ref 10–40)
ANION GAP SERPL CALCULATED.3IONS-SCNC: 14 MMOL/L (ref 3–16)
AST SERPL-CCNC: 25 U/L (ref 15–37)
BASOPHILS ABSOLUTE: 0 K/UL (ref 0–0.2)
BASOPHILS RELATIVE PERCENT: 0.5 %
BILIRUB SERPL-MCNC: 0.9 MG/DL (ref 0–1)
BUN BLDV-MCNC: 13 MG/DL (ref 7–20)
CALCIUM SERPL-MCNC: 9.6 MG/DL (ref 8.3–10.6)
CHLORIDE BLD-SCNC: 102 MMOL/L (ref 99–110)
CHOLESTEROL, TOTAL: 187 MG/DL (ref 0–199)
CO2: 24 MMOL/L (ref 21–32)
CREAT SERPL-MCNC: 0.8 MG/DL (ref 0.8–1.3)
EOSINOPHILS ABSOLUTE: 0.1 K/UL (ref 0–0.6)
EOSINOPHILS RELATIVE PERCENT: 1.3 %
GFR AFRICAN AMERICAN: >60
GFR NON-AFRICAN AMERICAN: >60
GLUCOSE BLD-MCNC: 104 MG/DL (ref 70–99)
HCT VFR BLD CALC: 45 % (ref 40.5–52.5)
HDLC SERPL-MCNC: 63 MG/DL (ref 40–60)
HEMOGLOBIN: 15.4 G/DL (ref 13.5–17.5)
LDL CHOLESTEROL CALCULATED: 104 MG/DL
LYMPHOCYTES ABSOLUTE: 1.4 K/UL (ref 1–5.1)
LYMPHOCYTES RELATIVE PERCENT: 26.6 %
MCH RBC QN AUTO: 29.8 PG (ref 26–34)
MCHC RBC AUTO-ENTMCNC: 34.4 G/DL (ref 31–36)
MCV RBC AUTO: 86.7 FL (ref 80–100)
MONOCYTES ABSOLUTE: 0.7 K/UL (ref 0–1.3)
MONOCYTES RELATIVE PERCENT: 13.7 %
NEUTROPHILS ABSOLUTE: 3.1 K/UL (ref 1.7–7.7)
NEUTROPHILS RELATIVE PERCENT: 57.9 %
PDW BLD-RTO: 12.9 % (ref 12.4–15.4)
PLATELET # BLD: 212 K/UL (ref 135–450)
PMV BLD AUTO: 7.1 FL (ref 5–10.5)
POTASSIUM SERPL-SCNC: 4.6 MMOL/L (ref 3.5–5.1)
PROSTATE SPECIFIC ANTIGEN: 0.64 NG/ML (ref 0–4)
RBC # BLD: 5.19 M/UL (ref 4.2–5.9)
SODIUM BLD-SCNC: 140 MMOL/L (ref 136–145)
TOTAL PROTEIN: 7.1 G/DL (ref 6.4–8.2)
TRIGL SERPL-MCNC: 102 MG/DL (ref 0–150)
VLDLC SERPL CALC-MCNC: 20 MG/DL
WBC # BLD: 5.3 K/UL (ref 4–11)

## 2022-03-11 SDOH — HEALTH STABILITY: PHYSICAL HEALTH: ON AVERAGE, HOW MANY DAYS PER WEEK DO YOU ENGAGE IN MODERATE TO STRENUOUS EXERCISE (LIKE A BRISK WALK)?: 4 DAYS

## 2022-03-11 SDOH — HEALTH STABILITY: PHYSICAL HEALTH: ON AVERAGE, HOW MANY MINUTES DO YOU ENGAGE IN EXERCISE AT THIS LEVEL?: 40 MIN

## 2022-03-11 ASSESSMENT — LIFESTYLE VARIABLES
HOW MANY STANDARD DRINKS CONTAINING ALCOHOL DO YOU HAVE ON A TYPICAL DAY: 1 OR 2
HOW OFTEN DO YOU HAVE SIX OR MORE DRINKS ON ONE OCCASION: 1
HOW OFTEN DO YOU HAVE A DRINK CONTAINING ALCOHOL: 2-4 TIMES A MONTH
HOW MANY STANDARD DRINKS CONTAINING ALCOHOL DO YOU HAVE ON A TYPICAL DAY: 1
HOW OFTEN DO YOU HAVE A DRINK CONTAINING ALCOHOL: 3

## 2022-03-11 ASSESSMENT — PATIENT HEALTH QUESTIONNAIRE - PHQ9
1. LITTLE INTEREST OR PLEASURE IN DOING THINGS: 0
SUM OF ALL RESPONSES TO PHQ QUESTIONS 1-9: 0
SUM OF ALL RESPONSES TO PHQ QUESTIONS 1-9: 0
SUM OF ALL RESPONSES TO PHQ9 QUESTIONS 1 & 2: 0
SUM OF ALL RESPONSES TO PHQ QUESTIONS 1-9: 0
SUM OF ALL RESPONSES TO PHQ QUESTIONS 1-9: 0
2. FEELING DOWN, DEPRESSED OR HOPELESS: 0

## 2022-03-14 ENCOUNTER — OFFICE VISIT (OUTPATIENT)
Dept: FAMILY MEDICINE CLINIC | Age: 77
End: 2022-03-14
Payer: MEDICARE

## 2022-03-14 VITALS
TEMPERATURE: 97.2 F | DIASTOLIC BLOOD PRESSURE: 78 MMHG | BODY MASS INDEX: 31.69 KG/M2 | OXYGEN SATURATION: 97 % | SYSTOLIC BLOOD PRESSURE: 130 MMHG | HEART RATE: 72 BPM | WEIGHT: 197.2 LBS | HEIGHT: 66 IN

## 2022-03-14 DIAGNOSIS — K21.9 GASTROESOPHAGEAL REFLUX DISEASE WITHOUT ESOPHAGITIS: ICD-10-CM

## 2022-03-14 DIAGNOSIS — Z00.00 MEDICARE ANNUAL WELLNESS VISIT, SUBSEQUENT: Primary | ICD-10-CM

## 2022-03-14 DIAGNOSIS — I63.81 LACUNAR INFARCTION (HCC): ICD-10-CM

## 2022-03-14 DIAGNOSIS — E78.2 MIXED HYPERLIPIDEMIA: ICD-10-CM

## 2022-03-14 DIAGNOSIS — I65.23 CAROTID STENOSIS, BILATERAL: ICD-10-CM

## 2022-03-14 DIAGNOSIS — I70.0 ATHEROSCLEROSIS OF AORTA (HCC): ICD-10-CM

## 2022-03-14 PROCEDURE — G0439 PPPS, SUBSEQ VISIT: HCPCS | Performed by: FAMILY MEDICINE

## 2022-03-14 NOTE — PROGRESS NOTES
Medicare Annual Wellness Visit    Kathleen Wilson III is here for Medicare AWV    Assessment & Plan   Medicare annual wellness visit, subsequent  Atherosclerosis of aorta [facility])  Assessment & Plan:   Well-controlled, continue current medications  Carotid stenosis, bilateral  Assessment & Plan:   recheck this year  Mixed hyperlipidemia  Assessment & Plan:   Well-controlled, continue current medications  Lacunar infarction [facility])  Assessment & Plan:   resolved  Gastroesophageal reflux disease without esophagitis  Assessment & Plan:   Well-controlled, continue current medications(pepcid)      Recommendations for Preventive Services Due: see orders and patient instructions/AVS.  Recommended screening schedule for the next 5-10 years is provided to the patient in written form: see Patient Instructions/AVS.     Return for Medicare Annual Wellness Visit in 1 year. Subjective   The following acute and/or chronic problems were also addressed today: Atherosclerosis of aorta (HCC)   Well-controlled, continue current medications    Carotid stenosis, bilateral   recheck this year    Hyperlipidemia   Well-controlled, continue current medications    Lacunar infarction ([address]   resolved    GERD (gastroesophageal reflux disease)   Well-controlled, continue current medications(pepcid)        Patient's complete Health Risk Assessment and screening values have been reviewed and are found in Flowsheets. The following problems were reviewed today and where indicated follow up appointments were made and/or referrals ordered.     Positive Risk Factor Screenings with Interventions:               General Health and ACP:  General  In general, how would you say your health is?: Good  In the past 7 days, have you experienced any of the following: New or Increased Pain, New or Increased Fatigue, Loneliness, Social Isolation, Stress or Anger?: (!) Yes  Select all that apply: (!) New or Increased Pain  Do you get the social and emotional support that you need?: Yes  Do you have a Living Will?: Yes    Advance Directives     Power of  Living Will ACP-Advance Directive ACP-Power of     Not on File Not on File Not on File Not on File      General Health Risk Interventions:  · none    Health Habits/Nutrition:     Physical Activity: Sufficiently Active    Days of Exercise per Week: 4 days    Minutes of Exercise per Session: 40 min     Have you lost any weight without trying in the past 3 months?: No  Body mass index: (!) 31.83  Have you seen the dentist within the past year?: Yes    Health Habits/Nutrition Interventions:  · none    Hearing/Vision:  Do you or your family notice any trouble with your hearing that hasn't been managed with hearing aids?: (!) Yes  Do you have difficulty driving, watching TV, or doing any of your daily activities because of your eyesight?: No  Have you had an eye exam within the past year?: Yes  No exam data present    Hearing/Vision Interventions:  · -            Objective   Vitals:    03/14/22 1256 03/14/22 1323   BP:  130/78   Pulse: 72    Temp: 97.2 °F (36.2 °C)    SpO2: 97%    Weight: 197 lb 3.2 oz (89.4 kg)    Height: 5' 6\" (1.676 m)       Body mass index is 31.83 kg/m².         General Appearance: alert and oriented to person, place and time, well developed and well- nourished, in no acute distress  Skin: warm and dry, no rash or erythema  Head: normocephalic and atraumatic  Eyes: pupils equal, round, and reactive to light, extraocular eye movements intact, conjunctivae normal  ENT: tympanic membrane, external ear and ear canal normal bilaterally, nose without deformity, nasal mucosa and turbinates normal without polyps  Neck: supple and non-tender without mass, no thyromegaly or thyroid nodules, no cervical lymphadenopathy  Pulmonary/Chest: clear to auscultation bilaterally- no wheezes, rales or rhonchi, normal air movement, no respiratory distress  Cardiovascular: normal rate, regular rhythm, normal S1 and S2, no murmurs, rubs, clicks, or gallops, distal pulses intact, no carotid bruits  Abdomen: soft, non-tender, non-distended, normal bowel sounds, no masses or organomegaly  Extremities: no cyanosis, clubbing or edema  Musculoskeletal: normal range of motion, no joint swelling, deformity or tenderness  Neurologic: reflexes normal and symmetric, no cranial nerve deficit, gait, coordination and speech normal     Orders Only on 03/03/2022   Component Date Value Ref Range Status    Sodium 03/03/2022 140  136 - 145 mmol/L Final    Potassium 03/03/2022 4.6  3.5 - 5.1 mmol/L Final    Chloride 03/03/2022 102  99 - 110 mmol/L Final    CO2 03/03/2022 24  21 - 32 mmol/L Final    Anion Gap 03/03/2022 14  3 - 16 Final    Glucose 03/03/2022 104* 70 - 99 mg/dL Final    BUN 03/03/2022 13  7 - 20 mg/dL Final    CREATININE 03/03/2022 0.8  0.8 - 1.3 mg/dL Final    GFR Non- 03/03/2022 >60  >60 Final    GFR  03/03/2022 >60  >60 Final    Calcium 03/03/2022 9.6  8.3 - 10.6 mg/dL Final    Total Protein 03/03/2022 7.1  6.4 - 8.2 g/dL Final    Albumin 03/03/2022 4.9  3.4 - 5.0 g/dL Final    Albumin/Globulin Ratio 03/03/2022 2.2  1.1 - 2.2 Final    Total Bilirubin 03/03/2022 0.9  0.0 - 1.0 mg/dL Final    Alkaline Phosphatase 03/03/2022 70  40 - 129 U/L Final    ALT 03/03/2022 21  10 - 40 U/L Final    AST 03/03/2022 25  15 - 37 U/L Final    WBC 03/03/2022 5.3  4.0 - 11.0 K/uL Final    RBC 03/03/2022 5.19  4.20 - 5.90 M/uL Final    Hemoglobin 03/03/2022 15.4  13.5 - 17.5 g/dL Final    Hematocrit 03/03/2022 45.0  40.5 - 52.5 % Final    MCV 03/03/2022 86.7  80.0 - 100.0 fL Final    MCH 03/03/2022 29.8  26.0 - 34.0 pg Final    MCHC 03/03/2022 34.4  31.0 - 36.0 g/dL Final    RDW 03/03/2022 12.9  12.4 - 15.4 % Final    Platelets 64/56/9239 212  135 - 450 K/uL Final    MPV 03/03/2022 7.1  5.0 - 10.5 fL Final    Neutrophils % 03/03/2022 57.9  % Final    Lymphocytes % 03/03/2022 26.6  % Final    Monocytes % 03/03/2022 13.7  % Final    Eosinophils % 03/03/2022 1.3  % Final    Basophils % 03/03/2022 0.5  % Final    Neutrophils Absolute 03/03/2022 3.1  1.7 - 7.7 K/uL Final    Lymphocytes Absolute 03/03/2022 1.4  1.0 - 5.1 K/uL Final    Monocytes Absolute 03/03/2022 0.7  0.0 - 1.3 K/uL Final    Eosinophils Absolute 03/03/2022 0.1  0.0 - 0.6 K/uL Final    Basophils Absolute 03/03/2022 0.0  0.0 - 0.2 K/uL Final    Cholesterol, Total 03/03/2022 187  0 - 199 mg/dL Final    Triglycerides 03/03/2022 102  0 - 150 mg/dL Final    HDL 03/03/2022 63* 40 - 60 mg/dL Final    LDL Calculated 03/03/2022 104* <100 mg/dL Final    VLDL Cholesterol Calculated 03/03/2022 20  Not Established mg/dL Final    PSA 03/03/2022 0.64  0.00 - 4.00 ng/mL Final           Allergies   Allergen Reactions    Latex Rash     Blisters and rash    Bactrim [Sulfamethoxazole-Trimethoprim] Other (See Comments)     Blister on end of penis    Sulfa Antibiotics Dermatitis    Zocor [Simvastatin] Other (See Comments)     myositis     Prior to Visit Medications    Medication Sig Taking?  Authorizing Provider   rosuvastatin (CRESTOR) 10 MG tablet Take 1 tablet by mouth daily Yes Ry Chacon MD   clopidogrel (PLAVIX) 75 MG tablet TAKE 1 TABLET BY MOUTH EVERY DAY Yes Ry Chacon MD   Magnesium Oxide (MAGNESIUM-OXIDE) 250 MG TABS tablet TAKE ONE TABLET BY MOUTH QHS Yes Historical Provider, MD   fluticasone (FLONASE) 50 MCG/ACT nasal spray 1 spray by Nasal route daily as needed for Rhinitis Yes YAYA Wells - JUSTIN   hydrochlorothiazide (HYDRODIURIL) 12.5 MG tablet Take 12.5 mg by mouth every morning Yes Historical Provider, MD   famotidine (PEPCID) 40 MG tablet  Yes Historical Provider, MD   aspirin 81 MG tablet Take 81 mg by mouth daily Yes Historical Provider, MD   Cholecalciferol (VITAMIN D3) 2000 UNITS CAPS Take 4,000 Units by mouth  Yes Historical Provider, MD       [facility] (Including outside providers/suppliers regularly involved in providing care):   Patient Care Team:  Paris Wilkes MD as PCP - Gladys Linares MD as PCP - [facility] Empaneled Provider  June Posada MD as Surgeon (General Surgery)    Reviewed and updated this visit:  Tobacco  Allergies  Meds  Problems  Med Hx  Surg Hx  Soc Hx  Fam Hx

## 2022-03-14 NOTE — PATIENT INSTRUCTIONS
Personalized Preventive Plan for Kathleen Wilson III - 3/14/2022  Medicare offers a range of preventive health benefits. Some of the tests and screenings are paid in full while other may be subject to a deductible, co-insurance, and/or copay. Some of these benefits include a comprehensive review of your medical history including lifestyle, illnesses that may run in your family, and various assessments and screenings as appropriate. After reviewing your medical record and screening and assessments performed today your provider may have ordered immunizations, labs, imaging, and/or referrals for you. A list of these orders (if applicable) as well as your Preventive Care list are included within your After Visit Summary for your review. Other Preventive Recommendations:    · A preventive eye exam performed by an eye specialist is recommended every 1-2 years to screen for glaucoma; cataracts, macular degeneration, and other eye disorders. · A preventive dental visit is recommended every 6 months. · Try to get at least 150 minutes of exercise per week or 10,000 steps per day on a pedometer . · Order or download the FREE \"Exercise & Physical Activity: Your Everyday Guide\" from [employer]. Call 9-305.367.8613 or search [employer] online. · You need 9304-9009 mg of calcium and 8106-5801 IU of vitamin D per day. It is possible to meet your calcium requirement with diet alone, but a vitamin D supplement is usually necessary to meet this goal.  · When exposed to the sun, use a sunscreen that protects against both UVA and UVB radiation with an SPF of 30 or greater. Reapply every 2 to 3 hours or after sweating, drying off with a towel, or swimming. · Always wear a seat belt when traveling in a car. Always wear a helmet when riding a bicycle or motorcycle.

## 2022-04-09 PROBLEM — Z51.81 MEDICATION MONITORING ENCOUNTER: Status: ACTIVE | Noted: 2022-04-09

## 2022-04-09 PROBLEM — E55.9 VITAMIN D DEFICIENCY: Status: ACTIVE | Noted: 2022-04-09

## 2022-07-13 DIAGNOSIS — M81.0 AGE-RELATED OSTEOPOROSIS WITHOUT CURRENT PATHOLOGICAL FRACTURE: Primary | ICD-10-CM

## 2022-07-13 RX ORDER — ALENDRONATE SODIUM 70 MG/1
70 TABLET ORAL WEEKLY
Qty: 12 TABLET | Refills: 0 | OUTPATIENT
Start: 2022-07-13

## 2022-07-18 RX ORDER — ALENDRONATE SODIUM 70 MG/1
70 TABLET ORAL WEEKLY
Qty: 12 TABLET | Refills: 0 | OUTPATIENT
Start: 2022-07-18

## 2022-07-18 NOTE — TELEPHONE ENCOUNTER
Medication:   Requested Prescriptions     Pending Prescriptions Disp Refills    alendronate (FOSAMAX) 70 MG tablet [Pharmacy Med Name: Alendronate Sodium Oral Tablet 70 MG] 12 tablet 0     Sig: TAKE 1 TABLET BY MOUTH ONCE A WEEK        Last Filled:      Patient Phone Number: [phone] (home)     Last appt: 5/6/2021   Next appt: Visit date not found    Last OARRS:   RX Monitoring 5/18/2018   Attestation The Prescription Monitoring Report for this patient was reviewed today.

## 2022-07-21 DIAGNOSIS — M81.0 AGE-RELATED OSTEOPOROSIS WITHOUT CURRENT PATHOLOGICAL FRACTURE: Primary | ICD-10-CM

## 2022-07-21 RX ORDER — ALENDRONATE SODIUM 70 MG/1
70 TABLET ORAL
Qty: 12 TABLET | Refills: 0 | Status: SHIPPED | OUTPATIENT
Start: 2022-07-21 | End: 2022-10-04 | Stop reason: SDUPTHER

## 2022-07-21 NOTE — TELEPHONE ENCOUNTER
Pt calling back and states his rx for Fosamax was not sent to the pharmacy.  He did schedule fu appt    # [phone]

## 2022-07-24 ENCOUNTER — APPOINTMENT (OUTPATIENT)
Dept: GENERAL RADIOLOGY | Age: 77
End: 2022-07-24
Payer: MEDICARE

## 2022-07-24 ENCOUNTER — HOSPITAL ENCOUNTER (EMERGENCY)
Age: 77
Discharge: HOME | End: 2022-07-24
Attending: EMERGENCY MEDICINE
Payer: MEDICARE

## 2022-07-24 VITALS
DIASTOLIC BLOOD PRESSURE: 82 MMHG | SYSTOLIC BLOOD PRESSURE: 147 MMHG | WEIGHT: 198.1 LBS | HEIGHT: 66 IN | RESPIRATION RATE: 19 BRPM | OXYGEN SATURATION: 97 % | HEART RATE: 62 BPM | BODY MASS INDEX: 31.84 KG/M2 | TEMPERATURE: 98.3 F

## 2022-07-24 DIAGNOSIS — R07.9 CHEST PAIN, UNSPECIFIED TYPE: Primary | ICD-10-CM

## 2022-07-24 LAB
A/G RATIO: 1.8 (ref 1.1–2.2)
ALBUMIN SERPL-MCNC: 4.2 G/DL (ref 3.4–5)
ALP BLD-CCNC: 70 U/L (ref 40–129)
ALT SERPL-CCNC: 20 U/L (ref 10–40)
ANION GAP SERPL CALCULATED.3IONS-SCNC: 11 MMOL/L (ref 3–16)
AST SERPL-CCNC: 24 U/L (ref 15–37)
BASOPHILS ABSOLUTE: 0 K/UL (ref 0–0.2)
BASOPHILS RELATIVE PERCENT: 0.4 %
BILIRUB SERPL-MCNC: 0.8 MG/DL (ref 0–1)
BUN BLDV-MCNC: 9 MG/DL (ref 7–20)
CALCIUM SERPL-MCNC: 9.1 MG/DL (ref 8.3–10.6)
CHLORIDE BLD-SCNC: 98 MMOL/L (ref 99–110)
CO2: 25 MMOL/L (ref 21–32)
CREAT SERPL-MCNC: 0.7 MG/DL (ref 0.8–1.3)
EKG ATRIAL RATE: 77 BPM
EKG DIAGNOSIS: NORMAL
EKG P AXIS: 58 DEGREES
EKG P-R INTERVAL: 160 MS
EKG Q-T INTERVAL: 388 MS
EKG QRS DURATION: 94 MS
EKG QTC CALCULATION (BAZETT): 439 MS
EKG R AXIS: 3 DEGREES
EKG T AXIS: 22 DEGREES
EKG VENTRICULAR RATE: 77 BPM
EOSINOPHILS ABSOLUTE: 0.1 K/UL (ref 0–0.6)
EOSINOPHILS RELATIVE PERCENT: 1.5 %
GFR AFRICAN AMERICAN: >60
GFR NON-AFRICAN AMERICAN: >60
GLUCOSE BLD-MCNC: 100 MG/DL (ref 70–99)
HCT VFR BLD CALC: 42.3 % (ref 40.5–52.5)
HEMOGLOBIN: 14.4 G/DL (ref 13.5–17.5)
LIPASE: 45 U/L (ref 13–60)
LYMPHOCYTES ABSOLUTE: 1 K/UL (ref 1–5.1)
LYMPHOCYTES RELATIVE PERCENT: 24.4 %
MCH RBC QN AUTO: 29.6 PG (ref 26–34)
MCHC RBC AUTO-ENTMCNC: 33.9 G/DL (ref 31–36)
MCV RBC AUTO: 87.3 FL (ref 80–100)
MONOCYTES ABSOLUTE: 0.6 K/UL (ref 0–1.3)
MONOCYTES RELATIVE PERCENT: 15 %
NEUTROPHILS ABSOLUTE: 2.4 K/UL (ref 1.7–7.7)
NEUTROPHILS RELATIVE PERCENT: 58.7 %
PDW BLD-RTO: 13.1 % (ref 12.4–15.4)
PLATELET # BLD: 233 K/UL (ref 135–450)
PMV BLD AUTO: 6.8 FL (ref 5–10.5)
POTASSIUM SERPL-SCNC: 4 MMOL/L (ref 3.5–5.1)
RBC # BLD: 4.85 M/UL (ref 4.2–5.9)
SODIUM BLD-SCNC: 134 MMOL/L (ref 136–145)
TOTAL PROTEIN: 6.6 G/DL (ref 6.4–8.2)
TROPONIN: <0.01 NG/ML
TROPONIN: <0.01 NG/ML
WBC # BLD: 4.2 K/UL (ref 4–11)

## 2022-07-24 PROCEDURE — 6370000000 HC RX 637 (ALT 250 FOR IP): Performed by: STUDENT IN AN ORGANIZED HEALTH CARE EDUCATION/TRAINING PROGRAM

## 2022-07-24 PROCEDURE — 85025 COMPLETE CBC W/AUTO DIFF WBC: CPT

## 2022-07-24 PROCEDURE — 80053 COMPREHEN METABOLIC PANEL: CPT

## 2022-07-24 PROCEDURE — 93005 ELECTROCARDIOGRAM TRACING: CPT | Performed by: STUDENT IN AN ORGANIZED HEALTH CARE EDUCATION/TRAINING PROGRAM

## 2022-07-24 PROCEDURE — 84484 ASSAY OF TROPONIN QUANT: CPT

## 2022-07-24 PROCEDURE — 71045 X-RAY EXAM CHEST 1 VIEW: CPT

## 2022-07-24 PROCEDURE — 83690 ASSAY OF LIPASE: CPT

## 2022-07-24 PROCEDURE — 99285 EMERGENCY DEPT VISIT HI MDM: CPT

## 2022-07-24 PROCEDURE — 36415 COLL VENOUS BLD VENIPUNCTURE: CPT

## 2022-07-24 RX ORDER — VITAMIN B COMPLEX
TABLET ORAL
COMMUNITY

## 2022-07-24 RX ORDER — ASPIRIN 81 MG/1
324 TABLET, CHEWABLE ORAL ONCE
Status: COMPLETED | OUTPATIENT
Start: 2022-07-24 | End: 2022-07-24

## 2022-07-24 RX ADMIN — ASPIRIN 81 MG 324 MG: 81 TABLET ORAL at 11:54

## 2022-07-24 ASSESSMENT — ENCOUNTER SYMPTOMS
VOMITING: 0
ABDOMINAL PAIN: 0
SORE THROAT: 0
NAUSEA: 0
EYES NEGATIVE: 1
ALLERGIC/IMMUNOLOGIC NEGATIVE: 1
SHORTNESS OF BREATH: 1
COUGH: 0

## 2022-07-24 ASSESSMENT — PAIN - FUNCTIONAL ASSESSMENT: PAIN_FUNCTIONAL_ASSESSMENT: NONE - DENIES PAIN

## 2022-07-24 NOTE — DISCHARGE INSTRUCTIONS
You were seen in the Emergency Department on 7/24 with chest pain. Your evaluation here today included a physical exam, blood work, a chest x-ray, and an electrocardiogram. These were overall reassuring and did not identify an obvious cause for your symptoms today. As we discussed, you should have another stress test in the near future to further evaluate your pain. We discussed bringing you in to the hospital to have this done, or having you follow-up with Dr. Travon Marsh and getting an outpatient stress test, and you  preferred to have it done on an outpatient basis, which I feel is reasonable. Please call Dr. Valdez Garcia office tomorrow morning to schedule a follow-up appointment.      Return to the Emergency Department if you develop any of the following:  - New or worsening chest pain or difficulty breathing  - You become sweaty or nauseated when you are having chest pain  - You pass out or feel like you are going to pass out  - Or you develop any other new concerning symptoms

## 2022-07-24 NOTE — ED PROVIDER NOTES
[id number]          EM RESIDENT NOTE     ED Attending Attestation Note     Date of evaluation: 7/24/2022    This patient was seen by the resident. I have seen and examined the patient, agree with the workup, evaluation, management and diagnosis. The care plan has been discussed. I have reviewed the ECG and concur with the resident's interpretation. My assessment reveals   ED Course as of 08/09/22 1546   Sun Jul 24, 2022   1122 Attending note: 69 yo M with h/o previous iCVA, HTN, HLD who presents with chest pain. Active, plays soft ball 1 x a week, active with gardening. Notes intermittent chest pain, present with exertion and rest. Radiates across the chest with sob. Self resolve with rest. Last about 10 mins. No associated nausea, vomiting, diaphoresis. Most recent event this AM.  Bedside evaluation patient denies any active chest pain at this time. He denies associated nausea vomiting diarrhea or diaphoresis with the chest pain but does endorse some shortness of breath and associated with that. Examination he is mildly hypertensive but remainder vitals are stable saturating well on room air. He is grossly neurologically intact moving all extremities with symmetrical strength sensation intact light touch no neglect. He has moist mucous membranes. No scleral icterus. His abdomen is obese but nontender to palpation bowel sounds are present in all 4 quadrants. His heart has a regular rate and rhythm is 2+ symmetrical bilateral radial pulses. No edema of the bilateral lower extremities. Lungs are clear to auscultation bilaterally and he is in no acute respiratory distress. The patient given his risk factors for acute coronary syndrome appropriate follow-up inpatient versus outpatient. Patient is time is agreeable with checking a second troponin and monitoring symptoms. He did receive a full dose of aspirin as well in the emergency department.   He states that if lab work is reassuring he will active consider outpatient follow-up with his primary care physician. But we will reevaluate after his labs have finished and resulted    Examination I have very low concern for acute aortic pathology or pericardial effusion, have low suspicion for pneumonia or pulmonary etiology such as pulmonary embolism. Possible acute coronary syndrome given patient's age and comorbidities as well as history of symptoms. Pt states he does not want admission, prefers outpt f/u [NM]      ED Course User Index  [NM] Amisha Rooney MD       Date of evaluation: 7/24/2022    Chief Complaint     Chest Pain and Shortness of Breath (This morning states he wasn't doing much and was having difficulty breathing, states it took him a while to catch his breath and also felt palpitations in chest)      History of Present Illness     Genevieve Farias III is a 68 y.o. male with a PMHx of HTN, HLD, and prior stroke (lacunar infarct, 09/2019) who presents with chest pain. Over the last week he has experienced episodic chest pain. The first episode happened while he was at rest; he describes that he was sitting, not exerting himself in any way, when he developed a pressured pain in the left side of his chest that radiated across the chest to the right side. It did not radiate into his back, neck, or jaw. This pain is associated with shortness of breath. It lasted approximately 10 minutes before resolving without intervention. Since then, he has experienced similar episodes of pain every 1-2 days; sometimes these will happen at rest, sometimes they will happen with light exertion, such as gardening; however, he notes that he was able to heavily exert himself by lifting bags of concrete and this did not incite any chest pain.   This morning, he was gardening and pulling weeds when he experienced yet another episode of similar chest pain pain and shortness of breath, however this episode was more severe, which concerned him and prompted him to present to the ED today. It resolved after approximately 10 minutes and he is pain-free at this time. He also notes that occasionally his heart feels like it is \"skipping a beat\", this has been going on for quite some time and is not temporally associated with the episodes of chest pain/shortness of breath. He otherwise has been in his normal state of health, and denies any recent fevers or chills or recent illnesses. Denies any associated diaphoresis, cough, cold symptoms, abdominal pain, nausea/vomiting, leg pain or swelling, urinary symptoms, numbness/tingling, headache, or any other new concerning symptoms. Review of Systems     Review of Systems   Constitutional:  Negative for chills, diaphoresis and fever. HENT:  Negative for congestion and sore throat. Eyes: Negative. Negative for visual disturbance. Respiratory:  Positive for shortness of breath. Negative for cough. Cardiovascular:  Positive for chest pain and palpitations. Negative for leg swelling. Gastrointestinal:  Negative for abdominal pain, nausea and vomiting. Endocrine: Negative. Genitourinary:  Negative for dysuria and flank pain. Musculoskeletal:  Negative for arthralgias and myalgias. Skin:  Negative for rash. Allergic/Immunologic: Negative. Neurological:  Negative for weakness, numbness and headaches. Hematological: Negative. Psychiatric/Behavioral: Negative. All other systems reviewed and are negative.     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 He has a past surgical history that includes Tonsillectomy and adenoidectomy; nasal/sinus endoscopy; Cholecystectomy (94); Rotator cuff repair (Bilateral); Femur fracture surgery; hernia repair (06); Colonoscopy (08,11/12,11/17/17); Cardiac surgery; Cardiac catheterization; Total knee arthroplasty (Left, 1/22/2019); joint replacement; knee surgery; and incision and drainage (Left, 4/24/2019). His family history includes Heart Disease in his brother, brother, father, mother, and son; Heart Disease (age of onset: 79) in his brother. He reports that he quit smoking about 26 years ago. His smoking use included cigarettes. He has a 10.00 pack-year smoking history. He has never used smokeless tobacco. He reports current alcohol use of about 1.0 standard drink per week. He reports that he does not use drugs.     Medications     Discharge Medication List as of 7/24/2022  2:17 PM        CONTINUE these medications which have NOT CHANGED    Details   Coenzyme Q10 (COQ10) 100 MG CAPS Take by mouthHistorical Med      alendronate (FOSAMAX) 70 MG tablet Take 1 tablet by mouth every 7 days, Disp-12 tablet, R-0Normal      rosuvastatin (CRESTOR) 10 MG tablet Take 1 tablet by mouth daily, Disp-90 tablet, R-3Print      clopidogrel (PLAVIX) 75 MG tablet TAKE 1 TABLET BY MOUTH EVERY DAY, Disp-90 tablet, R-3Print      Magnesium Oxide (MAGNESIUM-OXIDE) 250 MG TABS tablet TAKE ONE TABLET BY MOUTH QHSHistorical Med      fluticasone (FLONASE) 50 MCG/ACT nasal spray 1 spray by Nasal route daily as needed for Rhinitis, Disp-1 Bottle, R-2Normal      hydrochlorothiazide (HYDRODIURIL) 12.5 MG tablet Take 12.5 mg by mouth every morningHistorical Med      famotidine (PEPCID) 40 MG tablet Historical Med      aspirin 81 MG tablet Take 81 mg by mouth dailyHistorical Med      Cholecalciferol (VITAMIN D3) 2000 UNITS CAPS Take 4,000 Units by mouth Historical Med             Allergies     He is allergic to latex, bactrim [sulfamethoxazole-trimethoprim], sulfa antibiotics, and zocor [simvastatin]. Physical Exam     INITIAL VITALS: BP: (!) 144/85, Temp: 98.3 °F (36.8 °C), Heart Rate: 67, Resp: 16, SpO2: 97 %   Physical Exam  Constitutional:       General: He is not in acute distress. Appearance: Normal appearance. He is not ill-appearing or toxic-appearing. HENT:      Head: Normocephalic and atraumatic. Right Ear: External ear normal.      Left Ear: External ear normal.      Mouth/Throat:      Mouth: Mucous membranes are moist.      Pharynx: Oropharynx is clear. Eyes:      General: No scleral icterus. Conjunctiva/sclera: Conjunctivae normal.   Cardiovascular:      Rate and Rhythm: Normal rate and regular rhythm. Pulses: Normal pulses. Heart sounds: Normal heart sounds. Comments: 2+, symmetric radial pulses  Pulmonary:      Effort: Pulmonary effort is normal. No respiratory distress. Breath sounds: Normal breath sounds. No wheezing or rhonchi. Chest:      Chest wall: No tenderness. Abdominal:      General: Abdomen is flat. There is no distension. Palpations: Abdomen is soft. Tenderness: There is no abdominal tenderness. There is no guarding or rebound. Musculoskeletal:         General: No deformity or signs of injury. Cervical back: Normal range of motion and neck supple. Comments: No calf pain or tenderness on calf squeeze. Trace peripheral edema to the pretibial region bilaterally that is symmetric   Skin:     General: Skin is warm and dry. Capillary Refill: Capillary refill takes less than 2 seconds. Coloration: Skin is not jaundiced. Findings: No rash. Neurological:      General: No focal deficit present. Mental Status: He is alert and oriented to person, place, and time. Mental status is at baseline.        DiagnosticResults     EKG   Interpreted in conjunction with emergencydepartment physician No att. providers found  Rhythm: normal sinus Rate: 77 bpm  Axis: left  Ectopy: none  Conduction: normal  ST Segments: No ST elevations or depressions noted  T Waves: There appear to be T wave inversions in lead III, although this is somewhat difficult to interpret due to artifact. These do not appear to be new. Q Waves: none  Clinical Impression: There is some artifact, but no apparent ST elevations or depressions. No T wave changes compared to prior. Comparison: No significant change from prior EKG dated 1/2/2019    RADIOLOGY:  XR CHEST PORTABLE   Final Result      No evidence for acute cardiopulmonary disease.                  LABS:   Results for orders placed or performed during the hospital encounter of 07/24/22   CBC with Auto Differential   Result Value Ref Range    WBC 4.2 4.0 - 11.0 K/uL    RBC 4.85 4.20 - 5.90 M/uL    Hemoglobin 14.4 13.5 - 17.5 g/dL    Hematocrit 42.3 40.5 - 52.5 %    MCV 87.3 80.0 - 100.0 fL    MCH 29.6 26.0 - 34.0 pg    MCHC 33.9 31.0 - 36.0 g/dL    RDW 13.1 12.4 - 15.4 %    Platelets 628 171 - 916 K/uL    MPV 6.8 5.0 - 10.5 fL    Neutrophils % 58.7 %    Lymphocytes % 24.4 %    Monocytes % 15.0 %    Eosinophils % 1.5 %    Basophils % 0.4 %    Neutrophils Absolute 2.4 1.7 - 7.7 K/uL    Lymphocytes Absolute 1.0 1.0 - 5.1 K/uL    Monocytes Absolute 0.6 0.0 - 1.3 K/uL    Eosinophils Absolute 0.1 0.0 - 0.6 K/uL    Basophils Absolute 0.0 0.0 - 0.2 K/uL   CMP   Result Value Ref Range    Sodium 134 (L) 136 - 145 mmol/L    Potassium 4.0 3.5 - 5.1 mmol/L    Chloride 98 (L) 99 - 110 mmol/L    CO2 25 21 - 32 mmol/L    Anion Gap 11 3 - 16    Glucose 100 (H) 70 - 99 mg/dL    BUN 9 7 - 20 mg/dL    Creatinine 0.7 (L) 0.8 - 1.3 mg/dL    GFR Non-African American >60 >60    GFR African American >60 >60    Calcium 9.1 8.3 - 10.6 mg/dL    Total Protein 6.6 6.4 - 8.2 g/dL    Albumin 4.2 3.4 - 5.0 g/dL    Albumin/Globulin Ratio 1.8 1.1 - 2.2    Total Bilirubin 0.8 0.0 - 1.0 mg/dL    Alkaline Phosphatase 70 40 - 129 U/L    ALT 20 10 - 40 U/L    AST rate and rhythm is 2+ symmetrical bilateral radial pulses. No edema of the bilateral lower extremities. Lungs are clear to auscultation bilaterally and he is in no acute respiratory distress. The patient given his risk factors for acute coronary syndrome appropriate follow-up inpatient versus outpatient. Patient is time is agreeable with checking a second troponin and monitoring symptoms. He did receive a full dose of aspirin as well in the emergency department. He states that if lab work is reassuring he will active consider outpatient follow-up with his primary care physician. But we will reevaluate after his labs have finished and resulted    Examination I have very low concern for acute aortic pathology or pericardial effusion, have low suspicion for pneumonia or pulmonary etiology such as pulmonary embolism. Possible acute coronary syndrome given patient's age and comorbidities as well as history of symptoms. Pt states he does not want admission, prefers outpt f/u [NM]      ED Course User Index  [NM] Unruly Almanza MD       The patient was given the followingmedications:  Orders Placed This Encounter   Medications    aspirin chewable tablet 324 mg       CONSULTS:  None    MEDICAL DECISION MAKING / ASSESSMENT / Angela Collado III is a 68 y.o. male with a PMHx that is significant for HTN, HLD, and prior stroke, as well as other comorbidities described above. The patient was evaluated by myself and the ED Attending Physician, Dr. Chacha Jeff. All management and disposition plans were discussed and agreed upon. Appropriate labs and diagnostic studies were reviewed as they were made available. Pertinent laboratory studies in medical decision making are listed below. Upon presentation, the patient was alert and oriented, afebrile, and hemodynamically stable, resting comfortably in no acute distress.  He is presenting with a one-week history of episodic chest pain that has occurred both at rest and with light exertion, but notable was not triggered by significant exertion such as playing softball or lifting heavy bags of cement. He is currently pain-free. His work-up here is overall reassuring, including negative troponin x2 and CXR. EKG is unchanged from priors without ischemic or conduction changes. No obvious cause for his symptoms was identified today. I considered aortic pathology given his history of HTN but felt this to be less likely given the episodic nature of his pain, symmetric pulses,  and lack of additional symptoms. Do not feel this represents an intra-abdominal process given his soft, nontender abdomen and reassuring lab work. He certainly has risk factors for ACS; his HEART score is 5, placing him at moderate risk. Given this, I recommended he undergo a cardiac stress test for further risk stratification; he has not had one since 2015. We discussed admission for inpatient stress versus outpatient, and he would prefer to follow-up with his PCP Dr. Janet Millard and have this done on an outpatient basis. I feel this is reasonable and we discussed strict return precautions. He feels comfortable with this plan and all questions were answered. Risks, benefits, and alternatives were discussed. At this time the patient has been deemed safe for discharge. My customary discharge instructions including strict return precautions for worsening or new symptoms have been communicated. Clinical Impression     1.  Chest pain, unspecified type        Disposition     PATIENT REFERRED TO:  Leonardo Rapp MD  [address]  [address] [place] [address]  [phone]    Schedule an appointment as soon as possible for a visit in 3 days      DISCHARGE MEDICATIONS:  Discharge Medication List as of 7/24/2022  2:17 PM          DISPOSITION Decision To Discharge 07/24/2022 02:03:11 Marta Serrato MD  Resident  07/24/22 2212       83 Zahida Flores MD  08/09/22 2247

## 2022-07-25 ENCOUNTER — TELEPHONE (OUTPATIENT)
Dept: FAMILY MEDICINE CLINIC | Age: 77
End: 2022-07-25

## 2022-07-25 DIAGNOSIS — R07.9 CHEST PAIN, UNSPECIFIED TYPE: Primary | ICD-10-CM

## 2022-07-25 NOTE — TELEPHONE ENCOUNTER
Pt was in ER yesterday with chest pain. Everything checked out, but pt wanted to check with Dr. Barak Rice before having the recommended stress test.    Please call pt.     LOV 03/14/2022

## 2022-08-03 ENCOUNTER — HOSPITAL ENCOUNTER (OUTPATIENT)
Dept: CARDIOLOGY | Age: 77
Discharge: HOME | End: 2022-08-03
Payer: MEDICARE

## 2022-08-03 DIAGNOSIS — R07.9 CHEST PAIN, UNSPECIFIED TYPE: ICD-10-CM

## 2022-08-03 LAB
LV EF: 50 %
LVEF MODALITY: NORMAL

## 2022-08-03 PROCEDURE — 93351 STRESS TTE COMPLETE: CPT

## 2022-10-04 ENCOUNTER — OFFICE VISIT (OUTPATIENT)
Dept: ENDOCRINOLOGY | Age: 77
End: 2022-10-04
Payer: MEDICARE

## 2022-10-04 ENCOUNTER — PROCEDURE VISIT (OUTPATIENT)
Dept: ENDOCRINOLOGY | Age: 77
End: 2022-10-04

## 2022-10-04 ENCOUNTER — HOSPITAL ENCOUNTER (OUTPATIENT)
Dept: GENERAL RADIOLOGY | Age: 77
Discharge: HOME | End: 2022-10-04
Payer: MEDICARE

## 2022-10-04 VITALS
HEIGHT: 66 IN | DIASTOLIC BLOOD PRESSURE: 72 MMHG | BODY MASS INDEX: 31.08 KG/M2 | SYSTOLIC BLOOD PRESSURE: 136 MMHG | WEIGHT: 193.4 LBS

## 2022-10-04 DIAGNOSIS — M81.0 AGE-RELATED OSTEOPOROSIS WITHOUT CURRENT PATHOLOGICAL FRACTURE: Primary | ICD-10-CM

## 2022-10-04 DIAGNOSIS — M81.0 AGE-RELATED OSTEOPOROSIS WITHOUT CURRENT PATHOLOGICAL FRACTURE: ICD-10-CM

## 2022-10-04 DIAGNOSIS — Z51.81 MEDICATION MONITORING ENCOUNTER: ICD-10-CM

## 2022-10-04 PROCEDURE — 99214 OFFICE O/P EST MOD 30 MIN: CPT | Performed by: INTERNAL MEDICINE

## 2022-10-04 PROCEDURE — 77080 DXA BONE DENSITY AXIAL: CPT

## 2022-10-04 PROCEDURE — 1123F ACP DISCUSS/DSCN MKR DOCD: CPT | Performed by: INTERNAL MEDICINE

## 2022-10-04 PROCEDURE — 77080 DXA BONE DENSITY AXIAL: CPT | Performed by: INTERNAL MEDICINE

## 2022-10-04 RX ORDER — ALENDRONATE SODIUM 70 MG/1
70 TABLET ORAL
Qty: 12 TABLET | Refills: 4 | Status: SHIPPED | OUTPATIENT
Start: 2022-10-04
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
## 2022-10-04 NOTE — RESULT ENCOUNTER NOTE
[facility]) Osteoporosis and [address] [place] [address] [address] [facility], [address] [place] [address],[place] [address]  Phone [phone]  Fax [phone]    NAME: Maritza Flores III   : 1945   STUDY DATE: 10/04/2022     REFERRING PROVIDER: Elvis Bonilla MD    INDICATION(S) FOR PERFORMING THE STUDY:  osteoporosis, age related (M81.0)    CLINICAL INFORMATION PROVIDED BY THE PATIENT: 72-year-old man. No history of fragility fractures. No long-term corticosteroid use. Current treatment is alendronate started 2021. EQUIPMENT: Hologic [employer]. POSITIONING: Good. REGIONS OF INTEREST: Correct. ARTIFACTS: None. STUDY VALID? Yes. Spine BMD is invalid due to generalized degenerative change    T-scores   Initial study: 2021 L1-L4 Not valid left fem. neck -2.5   Current study: 10/04/2022 L1-L4 Not valid left fem. neck -2.3     The table below shows bone mineral density (grams/cm2), the appropriate measure for comparing serial scans. A significant increase or decrease is based on precision studies done at our center according to the ISCD protocol with a least significant change of 0.030 g/cm2. Proximal Femur (left)   Date Fem. neck Trochanter Total hip   2021 0.592 0.609 0.781   10/04/2022 0.616 0.613 0.802     IMPRESSION:  BONE DENSITY IS LOW, CONSISTENT WITH OSTEOPOROSIS. SINCE THE LAST DXA, BMD DID NOT CHANGE SIGNIFICANTLY IN THE LEFT HIP BUT IS TRENDING UP IN THE FEMORAL NECK AND TOTAL HIP. Consider repeating this study in 1-2 years to assess the patient's progress. _________________________________________________   Roverto Jane MD, Director, [facility]) Osteoporosis and [address] [place] [address]

## 2022-10-13 ENCOUNTER — TELEPHONE (OUTPATIENT)
Dept: FAMILY MEDICINE CLINIC | Age: 77
End: 2022-10-13

## 2022-10-13 NOTE — TELEPHONE ENCOUNTER
I do not suspect that those medications are causing high blood pressures. What were his blood pressure readings? Was this one time or multiple elevated blood pressures? Has he been taking his blood pressure medicine?

## 2022-10-13 NOTE — TELEPHONE ENCOUNTER
Pt is taking clopidogrel (PLAVIX) 75 MG tablet and aspirin 81 MG tablet   He's been having headaches and high BP  [facility]   They told him taking both could be the cause

## 2022-10-14 NOTE — TELEPHONE ENCOUNTER
Spoke with pt and he said he had experienced the high BP twice but it wasn't back to back or consistently high BP. [facility] for  A week and let them know what his readings are. [facility] also old him they believed he should of only had to take the blood thinner for a year. So he wants to know how long he should be taking it?

## 2022-10-17 RX ORDER — PRAVASTATIN SODIUM 40 MG
TABLET ORAL
Qty: 30 TABLET | Refills: 0 | Status: SHIPPED | OUTPATIENT
Start: 2022-10-17

## 2022-11-07 ENCOUNTER — OFFICE VISIT (OUTPATIENT)
Dept: FAMILY MEDICINE CLINIC | Age: 77
End: 2022-11-07
Payer: MEDICARE

## 2022-11-07 VITALS
SYSTOLIC BLOOD PRESSURE: 126 MMHG | HEIGHT: 66 IN | BODY MASS INDEX: 33.27 KG/M2 | WEIGHT: 207 LBS | OXYGEN SATURATION: 98 % | DIASTOLIC BLOOD PRESSURE: 88 MMHG | TEMPERATURE: 97.3 F | HEART RATE: 69 BPM

## 2022-11-07 DIAGNOSIS — G45.9 TIA (TRANSIENT ISCHEMIC ATTACK): ICD-10-CM

## 2022-11-07 PROCEDURE — 99213 OFFICE O/P EST LOW 20 MIN: CPT | Performed by: NURSE PRACTITIONER

## 2022-11-07 PROCEDURE — 1123F ACP DISCUSS/DSCN MKR DOCD: CPT | Performed by: NURSE PRACTITIONER

## 2022-11-07 ASSESSMENT — ENCOUNTER SYMPTOMS
COUGH: 0
SORE THROAT: 0
CONSTIPATION: 0
NAUSEA: 0
CHEST TIGHTNESS: 0
ABDOMINAL PAIN: 0
EYE REDNESS: 0
DIARRHEA: 0
BACK PAIN: 0
BLOOD IN STOOL: 0
COLOR CHANGE: 0
RHINORRHEA: 0
EYE ITCHING: 0
WHEEZING: 0
SINUS PRESSURE: 0
VOMITING: 0
SHORTNESS OF BREATH: 0

## 2022-11-07 NOTE — PROGRESS NOTES
Rosy Carolina III (:  1945) is a 68 y.o. male,Established patient, here for evaluation of the following chief complaint(s): Other (Discuss heart issues)      ASSESSMENT/PLAN:  1. 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 No follow-ups on file. SUBJECTIVE/OBJECTIVE:  Patient the office today for follow-up and discuss his medications. [facility] however he comes here for routine follow-up. He has been taking his Plavix and aspirin and Crestor as prescribed. He had a lunar infarct and then also a TIA which is when the Plavix was started he just wants to be sure he should continue taking this. He reports that he has no issues with the medications and denies any excessive bleeding or bruising. Denies any blood in the urine or stool. Overall he is feeling very well today and has no issues or concerns.     Current Outpatient Medications   Medication Sig Dispense Refill    pravastatin (PRAVACHOL) 40 MG tablet TAKE 1 TABLET BY MOUTH IN THE EVENING 30 tablet 0    alendronate (FOSAMAX) 70 MG tablet Take 1 tablet by mouth every 7 days 12 tablet 4    Coenzyme Q10 (COQ10) 100 MG CAPS Take by mouth      rosuvastatin (CRESTOR) 10 MG tablet Take 1 tablet by mouth daily 90 tablet 3    clopidogrel (PLAVIX) 75 MG tablet TAKE 1 TABLET BY MOUTH EVERY DAY 90 tablet 3    Magnesium Oxide (MAGNESIUM-OXIDE) 250 MG TABS tablet TAKE ONE TABLET BY MOUTH QHS      fluticasone (FLONASE) 50 MCG/ACT nasal spray 1 spray by Nasal route daily as needed for Rhinitis 1 Bottle 2    hydrochlorothiazide (HYDRODIURIL) 12.5 MG tablet Take 12.5 mg by mouth every morning      famotidine (PEPCID) 40 MG tablet       aspirin 81 MG tablet Take 81 mg by mouth daily      Cholecalciferol (VITAMIN D3) 2000 UNITS CAPS Take 4,000 Units by mouth        No current facility-administered medications for this visit. Review of Systems   Constitutional:  Negative for chills, fatigue and fever. HENT:  Negative for congestion, ear pain, postnasal drip, rhinorrhea, sinus pressure, sneezing and sore throat. Eyes:  Negative for redness and itching. Respiratory:  Negative for cough, chest tightness, shortness of breath and wheezing. Cardiovascular:  Negative for chest pain and palpitations. Gastrointestinal:  Negative for abdominal pain, blood in stool, constipation, diarrhea, nausea and vomiting. Endocrine: Negative for cold intolerance and heat intolerance. Genitourinary:  Negative for difficulty urinating, dysuria, flank pain, frequency, hematuria and urgency. Musculoskeletal:  Negative for arthralgias, back pain, joint swelling and myalgias. Skin:  Negative for color change, pallor, rash and wound. Allergic/Immunologic: Negative for environmental allergies and food allergies. Neurological:  Negative for dizziness, seizures, syncope, weakness, light-headedness, numbness and headaches. Hematological:  Negative for adenopathy. Does not bruise/bleed easily. Psychiatric/Behavioral:  Negative for confusion, sleep disturbance and suicidal ideas. The patient is not nervous/anxious and is not hyperactive. Vitals:    11/07/22 1414   BP: 126/88   Site: Left Upper Arm   Position: Sitting   Cuff Size: Medium Adult   Pulse: 69   Temp: 97.3 °F (36.3 °C)   SpO2: 98%   Weight: 207 lb (93.9 kg)   Height: 5' 6\" (1.676 m)       Physical Exam  Constitutional:       Appearance: Normal appearance. He is well-developed. HENT:      Head: Normocephalic and atraumatic. Right Ear: Hearing normal.      Left Ear: Hearing normal.      Nose: No mucosal edema. Right Sinus: No maxillary sinus tenderness or frontal sinus tenderness. Left Sinus: No maxillary sinus tenderness or frontal sinus tenderness.       Mouth/Throat: Tonsils: No tonsillar abscesses. Eyes:      Extraocular Movements: Extraocular movements intact. Pupils: Pupils are equal, round, and reactive to light. Cardiovascular:      Rate and Rhythm: Normal rate and regular rhythm. Pulses: Normal pulses. Heart sounds: Normal heart sounds. Pulmonary:      Effort: Pulmonary effort is normal.      Breath sounds: Normal breath sounds. Lymphadenopathy:      Head:      Right side of head: No submental, submandibular, tonsillar, preauricular, posterior auricular or occipital adenopathy. Left side of head: No submental, submandibular, tonsillar, preauricular, posterior auricular or occipital adenopathy. Skin:     General: Skin is warm and dry. Neurological:      Mental Status: He is alert. Psychiatric:         Mood and Affect: Mood normal.         Behavior: Behavior normal.               An electronic signature was used to authenticate this note.     --YAYA Jackson - CNP

## 2022-11-07 NOTE — ASSESSMENT & PLAN NOTE
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

## 2023-01-13 RX ORDER — PRAVASTATIN SODIUM 40 MG
TABLET ORAL
Qty: 90 TABLET | Refills: 0 | Status: SHIPPED | OUTPATIENT
Start: 2023-01-13

## 2023-01-18 ENCOUNTER — HOSPITAL ENCOUNTER (INPATIENT)
Age: 78
LOS: 13 days | Discharge: HOME HEALTH | DRG: 234 | End: 2023-01-31
Attending: EMERGENCY MEDICINE | Admitting: INTERNAL MEDICINE
Payer: MEDICARE

## 2023-01-18 ENCOUNTER — APPOINTMENT (OUTPATIENT)
Dept: GENERAL RADIOLOGY | Age: 78
DRG: 234 | End: 2023-01-18
Payer: MEDICARE

## 2023-01-18 DIAGNOSIS — R94.39 ABNORMAL STRESS TEST: ICD-10-CM

## 2023-01-18 DIAGNOSIS — R07.9 EXERTIONAL CHEST PAIN: Primary | ICD-10-CM

## 2023-01-18 PROBLEM — I20.0 UNSTABLE ANGINA (HCC): Status: ACTIVE | Noted: 2023-01-18

## 2023-01-18 LAB
ANION GAP SERPL CALCULATED.3IONS-SCNC: 13 MMOL/L (ref 3–16)
BASOPHILS ABSOLUTE: 0 K/UL (ref 0–0.2)
BASOPHILS RELATIVE PERCENT: 0.4 %
BUN BLDV-MCNC: 14 MG/DL (ref 7–20)
CALCIUM SERPL-MCNC: 9.3 MG/DL (ref 8.3–10.6)
CHLORIDE BLD-SCNC: 99 MMOL/L (ref 99–110)
CO2: 22 MMOL/L (ref 21–32)
CREAT SERPL-MCNC: 0.8 MG/DL (ref 0.8–1.3)
EKG ATRIAL RATE: 90 BPM
EKG DIAGNOSIS: NORMAL
EKG P AXIS: 80 DEGREES
EKG P-R INTERVAL: 178 MS
EKG Q-T INTERVAL: 384 MS
EKG QRS DURATION: 100 MS
EKG QTC CALCULATION (BAZETT): 469 MS
EKG R AXIS: -12 DEGREES
EKG T AXIS: 70 DEGREES
EKG VENTRICULAR RATE: 90 BPM
EOSINOPHILS ABSOLUTE: 0.1 K/UL (ref 0–0.6)
EOSINOPHILS RELATIVE PERCENT: 1.4 %
GFR SERPL CREATININE-BSD FRML MDRD: >60 ML/MIN/{1.73_M2}
GLUCOSE BLD-MCNC: 107 MG/DL (ref 70–99)
HCT VFR BLD CALC: 43.8 % (ref 40.5–52.5)
HEMOGLOBIN: 15.3 G/DL (ref 13.5–17.5)
LV EF: 58 %
LVEF MODALITY: NORMAL
LYMPHOCYTES ABSOLUTE: 1.2 K/UL (ref 1–5.1)
LYMPHOCYTES RELATIVE PERCENT: 14.7 %
MCH RBC QN AUTO: 29.9 PG (ref 26–34)
MCHC RBC AUTO-ENTMCNC: 34.9 G/DL (ref 31–36)
MCV RBC AUTO: 85.6 FL (ref 80–100)
MONOCYTES ABSOLUTE: 1 K/UL (ref 0–1.3)
MONOCYTES RELATIVE PERCENT: 11.9 %
NEUTROPHILS ABSOLUTE: 5.9 K/UL (ref 1.7–7.7)
NEUTROPHILS RELATIVE PERCENT: 71.6 %
PDW BLD-RTO: 13.2 % (ref 12.4–15.4)
PLATELET # BLD: 243 K/UL (ref 135–450)
PMV BLD AUTO: 6.8 FL (ref 5–10.5)
POTASSIUM REFLEX MAGNESIUM: 4.1 MMOL/L (ref 3.5–5.1)
RBC # BLD: 5.12 M/UL (ref 4.2–5.9)
SODIUM BLD-SCNC: 134 MMOL/L (ref 136–145)
TROPONIN: <0.01 NG/ML
TROPONIN: <0.01 NG/ML
WBC # BLD: 8.3 K/UL (ref 4–11)

## 2023-01-18 PROCEDURE — 36415 COLL VENOUS BLD VENIPUNCTURE: CPT

## 2023-01-18 PROCEDURE — 93306 TTE W/DOPPLER COMPLETE: CPT

## 2023-01-18 PROCEDURE — 93005 ELECTROCARDIOGRAM TRACING: CPT | Performed by: EMERGENCY MEDICINE

## 2023-01-18 PROCEDURE — 6370000000 HC RX 637 (ALT 250 FOR IP): Performed by: INTERNAL MEDICINE

## 2023-01-18 PROCEDURE — 84484 ASSAY OF TROPONIN QUANT: CPT

## 2023-01-18 PROCEDURE — 85025 COMPLETE CBC W/AUTO DIFF WBC: CPT

## 2023-01-18 PROCEDURE — 2580000003 HC RX 258: Performed by: INTERNAL MEDICINE

## 2023-01-18 PROCEDURE — 71046 X-RAY EXAM CHEST 2 VIEWS: CPT

## 2023-01-18 PROCEDURE — 80048 BASIC METABOLIC PNL TOTAL CA: CPT

## 2023-01-18 PROCEDURE — 1200000000 HC SEMI PRIVATE

## 2023-01-18 PROCEDURE — 99285 EMERGENCY DEPT VISIT HI MDM: CPT

## 2023-01-18 PROCEDURE — 99223 1ST HOSP IP/OBS HIGH 75: CPT | Performed by: INTERNAL MEDICINE

## 2023-01-18 PROCEDURE — 6370000000 HC RX 637 (ALT 250 FOR IP): Performed by: EMERGENCY MEDICINE

## 2023-01-18 RX ORDER — ROSUVASTATIN CALCIUM 5 MG/1
10 TABLET, COATED ORAL DAILY
Status: DISCONTINUED | OUTPATIENT
Start: 2023-01-19 | End: 2023-01-26 | Stop reason: ALTCHOICE

## 2023-01-18 RX ORDER — ACETAMINOPHEN 325 MG/1
650 TABLET ORAL EVERY 6 HOURS PRN
Status: DISCONTINUED | OUTPATIENT
Start: 2023-01-18 | End: 2023-01-27

## 2023-01-18 RX ORDER — CLOPIDOGREL BISULFATE 75 MG/1
75 TABLET ORAL DAILY
Status: DISCONTINUED | OUTPATIENT
Start: 2023-01-19 | End: 2023-01-23

## 2023-01-18 RX ORDER — POLYETHYLENE GLYCOL 3350 17 G/17G
17 POWDER, FOR SOLUTION ORAL DAILY PRN
Status: DISCONTINUED | OUTPATIENT
Start: 2023-01-18 | End: 2023-01-30

## 2023-01-18 RX ORDER — NITROGLYCERIN 0.4 MG/1
0.4 TABLET SUBLINGUAL EVERY 5 MIN PRN
Status: DISCONTINUED | OUTPATIENT
Start: 2023-01-18 | End: 2023-01-27

## 2023-01-18 RX ORDER — SODIUM CHLORIDE 0.9 % (FLUSH) 0.9 %
5-40 SYRINGE (ML) INJECTION EVERY 12 HOURS SCHEDULED
Status: DISCONTINUED | OUTPATIENT
Start: 2023-01-18 | End: 2023-01-27

## 2023-01-18 RX ORDER — ONDANSETRON 4 MG/1
4 TABLET, ORALLY DISINTEGRATING ORAL EVERY 8 HOURS PRN
Status: DISCONTINUED | OUTPATIENT
Start: 2023-01-18 | End: 2023-01-26 | Stop reason: SDUPTHER

## 2023-01-18 RX ORDER — SODIUM CHLORIDE 9 MG/ML
INJECTION, SOLUTION INTRAVENOUS PRN
Status: DISCONTINUED | OUTPATIENT
Start: 2023-01-18 | End: 2023-01-27

## 2023-01-18 RX ORDER — ASPIRIN 81 MG/1
81 TABLET ORAL DAILY
Status: DISCONTINUED | OUTPATIENT
Start: 2023-01-19 | End: 2023-01-26 | Stop reason: ALTCHOICE

## 2023-01-18 RX ORDER — LANOLIN ALCOHOL/MO/W.PET/CERES
400 CREAM (GRAM) TOPICAL NIGHTLY
Status: DISCONTINUED | OUTPATIENT
Start: 2023-01-18 | End: 2023-01-26 | Stop reason: ALTCHOICE

## 2023-01-18 RX ORDER — ACETAMINOPHEN 650 MG/1
650 SUPPOSITORY RECTAL EVERY 6 HOURS PRN
Status: DISCONTINUED | OUTPATIENT
Start: 2023-01-18 | End: 2023-01-27

## 2023-01-18 RX ORDER — VITAMIN B COMPLEX
4000 TABLET ORAL DAILY
Status: DISCONTINUED | OUTPATIENT
Start: 2023-01-19 | End: 2023-01-31 | Stop reason: HOSPADM

## 2023-01-18 RX ORDER — ONDANSETRON 2 MG/ML
4 INJECTION INTRAMUSCULAR; INTRAVENOUS EVERY 6 HOURS PRN
Status: DISCONTINUED | OUTPATIENT
Start: 2023-01-18 | End: 2023-01-26 | Stop reason: SDUPTHER

## 2023-01-18 RX ORDER — SODIUM CHLORIDE 0.9 % (FLUSH) 0.9 %
5-40 SYRINGE (ML) INJECTION PRN
Status: DISCONTINUED | OUTPATIENT
Start: 2023-01-18 | End: 2023-01-27

## 2023-01-18 RX ORDER — FAMOTIDINE 20 MG/1
20 TABLET, FILM COATED ORAL 2 TIMES DAILY
Status: DISCONTINUED | OUTPATIENT
Start: 2023-01-18 | End: 2023-01-27

## 2023-01-18 RX ORDER — ASPIRIN 325 MG
325 TABLET ORAL ONCE
Status: COMPLETED | OUTPATIENT
Start: 2023-01-18 | End: 2023-01-18

## 2023-01-18 RX ADMIN — SODIUM CHLORIDE, PRESERVATIVE FREE 10 ML: 5 INJECTION INTRAVENOUS at 20:31

## 2023-01-18 RX ADMIN — ASPIRIN 325 MG: 325 TABLET ORAL at 12:28

## 2023-01-18 RX ADMIN — METOPROLOL TARTRATE 25 MG: 25 TABLET, FILM COATED ORAL at 20:31

## 2023-01-18 ASSESSMENT — PAIN - FUNCTIONAL ASSESSMENT
PAIN_FUNCTIONAL_ASSESSMENT: NONE - DENIES PAIN
PAIN_FUNCTIONAL_ASSESSMENT: 0-10
PAIN_FUNCTIONAL_ASSESSMENT: 0-10

## 2023-01-18 ASSESSMENT — PAIN SCALES - GENERAL
PAINLEVEL_OUTOF10: 0

## 2023-01-18 NOTE — ED PROVIDER NOTES
[facility]          ATTENDING PHYSICIAN NOTE       Date of evaluation: 1/18/2023    Chief Complaint     Chest Pain (Arrives by EMS with episode of chest pain. Currently pain free. )      History of Present Illness     Mauri Armenta III is a 68 y.o. male with history of CAD, hypertension, former smoker, TIA on aspirin and Plavix, multiple other comorbidities presenting to the emergency department today for chest pain. Patient states he had an episode of chest pain yesterday while loading recyclables into his vehicle that lasted approximately 10 minutes. It was in his central chest and felt what he describes as \"hard. \"  Did not radiate elsewhere. Resolved in approximately 10 minutes with rest.  He had a similar episode today with minimal exertion in his home that again resolved. However he did call EMS during the episode and he was transported here for further evaluation. Denies any fevers, has had a recent nonproductive cough. No lower extremity edema. Notes some chronic low back and right leg pain that is unchanged from baseline. Physical Exam     INITIAL VITALS: BP: (!) 147/85, Temp: 98.1 °F (36.7 °C), Heart Rate: 84, Resp: 16, SpO2: 96 %   Physical Exam      Nursing note and vitals reviewed. General:  Adult male, alert and appropriately interactive. In no distress. HENT: Normocephalic and atraumatic. External ears normal. Nose appears normal externally. Eyes: Conjunctivae normal. No scleral icterus. Neck: Neck supple. No tracheal deviation present. CV: Normal rate. Regular rhythm. S1/S2 auscultated. No murmurs, gallops or rubs. Pulm: Effort normal. Breath sounds clear to auscultation bilaterally. No wheezes. No rales or rhonchi. GI: Soft. No distension. No tenderness. No rebound or guarding. No masses. No peritoneal signs. Musculoskeletal: No edema. No gross deformities. Neurological: Alert and appropriately interactive.  Face symmetric, speech without dysarthria or obvious aphasia. Moving all extremities spontaneously. Skin: Warm, dry. No rash. No diaphoresis or erythema. Psychiatric: Calm and cooperative with appropriate mood and affect. ASSESSMENT / PLAN  (MEDICAL DECISION MAKING)   Dov Washington III is a 68 y.o. male who presents to the emergency department today for chest pain. On arrival, he is in no distress and vital signs are reassuring. EKG without acute ischemic changes, he does have some subtle ST depressions in multiple leads, however not significantly changed from prior. Certainly no ST elevation MI. His exam is reassuring. I do note that he had a stress test in August 2022 that was read as moderate risk with EKG changes and regional wall motion abnormality. He did not have any further cardiology follow-up or evaluation following this test.  His symptoms are concerning for progressive angina. Labs are overall reassuring including negative troponin x2. Cardiology was consulted, they have ordered an echo, but also recommend admission for cardiac catheterization tomorrow. Patient remained chest pain-free in the emergency department. Will discuss with hospitalist for further care and management. Medical Decision Making  Amount and/or Complexity of Data Reviewed  External Data Reviewed: labs, radiology, ECG and notes. Details: Stress echo  Labs: ordered. Radiology: ordered. ECG/medicine tests: ordered. Discussion of management or test interpretation with external provider(s): Cardiology, hospitalist    Risk  OTC drugs. Decision regarding hospitalization. Clinical Impression     1. Exertional chest pain    2. Abnormal stress test        Disposition     PATIENT REFERRED TO:  No follow-up provider specified.     DISCHARGE MEDICATIONS:  New Prescriptions    No medications on file       DISPOSITION Decision To Admit 01/18/2023 03:45:38 PM        Johanna Hough MD                 Diagnostic Results and Other Data EKG   Interpreted by: Madie Salgado MD  Sinus rhythm, ventricular rate 90. Intervals normal, axis normal.  Subtle ST depressions in V4-V6 as well as leads II, III, and aVF, all less than 1 mm. Not significantly changed from previous EKG dated 7/24/2022    RADIOLOGY:  XR CHEST (2 VW)   Final Result   No acute cardiopulmonary abnormality. LABS:   Results for orders placed or performed during the hospital encounter of 01/18/23   BMP w/ Reflex to MG   Result Value Ref Range    Sodium 134 (L) 136 - 145 mmol/L    Potassium reflex Magnesium 4.1 3.5 - 5.1 mmol/L    Chloride 99 99 - 110 mmol/L    CO2 22 21 - 32 mmol/L    Anion Gap 13 3 - 16    Glucose 107 (H) 70 - 99 mg/dL    BUN 14 7 - 20 mg/dL    Creatinine 0.8 0.8 - 1.3 mg/dL    Est, Glom Filt Rate >60 >60    Calcium 9.3 8.3 - 10.6 mg/dL   CBC with Auto Differential   Result Value Ref Range    WBC 8.3 4.0 - 11.0 K/uL    RBC 5.12 4.20 - 5.90 M/uL    Hemoglobin 15.3 13.5 - 17.5 g/dL    Hematocrit 43.8 40.5 - 52.5 %    MCV 85.6 80.0 - 100.0 fL    MCH 29.9 26.0 - 34.0 pg    MCHC 34.9 31.0 - 36.0 g/dL    RDW 13.2 12.4 - 15.4 %    Platelets 807 774 - 032 K/uL    MPV 6.8 5.0 - 10.5 fL    Neutrophils % 71.6 %    Lymphocytes % 14.7 %    Monocytes % 11.9 %    Eosinophils % 1.4 %    Basophils % 0.4 %    Neutrophils Absolute 5.9 1.7 - 7.7 K/uL    Lymphocytes Absolute 1.2 1.0 - 5.1 K/uL    Monocytes Absolute 1.0 0.0 - 1.3 K/uL    Eosinophils Absolute 0.1 0.0 - 0.6 K/uL    Basophils Absolute 0.0 0.0 - 0.2 K/uL   Troponin   Result Value Ref Range    Troponin <0.01 <0.01 ng/mL   Troponin   Result Value Ref Range    Troponin <0.01 <0.01 ng/mL   EKG 12 Lead   Result Value Ref Range    Ventricular Rate 90 BPM    Atrial Rate 90 BPM    P-R Interval 178 ms    QRS Duration 100 ms    Q-T Interval 384 ms    QTc Calculation (Bazett) 469 ms    P Axis 80 degrees    R Axis -12 degrees    T Axis 70 degrees    Diagnosis       EKG performed in ER and to be interpreted by ER physician. Confirmed by MD, ER ([id number]),  Kaley Yang ([id number]) on 1/18/2023 12:48:22 PM       MOST RECENT VITALS:  BP: 130/78,Temp: 98.1 °F (36.7 °C), Heart Rate: 71, Resp: 13, SpO2: 95 %     Procedures     Procedures    ED Course     Nursing Notes, Past Medical Hx, Past Surgical Hx, Social Hx,Allergies, and Family Hx were reviewed. The patient was given the following medications:  Orders Placed This Encounter   Medications    aspirin tablet 325 mg    perflutren lipid microspheres (DEFINITY) injection 1.5 mL         CONSULTS:  IP CONSULT TO CARDIOLOGY  IP CONSULT TO HOSPITALIST    Review of Systems     Pertinent positive and negative findings as documented in the HPI. 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 He has a past surgical history that includes Tonsillectomy and adenoidectomy; nasal/sinus endoscopy; Cholecystectomy (94); Rotator cuff repair (Bilateral); Femur fracture surgery; hernia repair (06); Colonoscopy (08,11/12,11/17/17); Cardiac surgery; Cardiac catheterization; Total knee arthroplasty (Left, 1/22/2019); joint replacement; knee surgery; and incision and drainage (Left, 4/24/2019). His family history includes Heart Disease in his brother, brother, father, mother, and son; Heart Disease (age of onset: 79) in his brother. He reports that he quit smoking about 27 years ago. His smoking use included cigarettes. He has a 10.00 pack-year smoking history. He has never used smokeless tobacco. He reports current alcohol use of about 1.0 standard drink per week.  He reports that he does not use drugs. Medications     Previous Medications    ALENDRONATE (FOSAMAX) 70 MG TABLET    Take 1 tablet by mouth every 7 days    ASPIRIN 81 MG TABLET    Take 81 mg by mouth daily    CHOLECALCIFEROL (VITAMIN D3) 2000 UNITS CAPS    Take 4,000 Units by mouth     CLOPIDOGREL (PLAVIX) 75 MG TABLET    TAKE 1 TABLET BY MOUTH EVERY DAY    COENZYME Q10 (COQ10) 100 MG CAPS    Take by mouth    FAMOTIDINE (PEPCID) 40 MG TABLET        FLUTICASONE (FLONASE) 50 MCG/ACT NASAL SPRAY    1 spray by Nasal route daily as needed for Rhinitis    HYDROCHLOROTHIAZIDE (HYDRODIURIL) 12.5 MG TABLET    Take 12.5 mg by mouth every morning    MAGNESIUM OXIDE (MAGNESIUM-OXIDE) 250 MG TABS TABLET    TAKE ONE TABLET BY MOUTH QHS    PRAVASTATIN (PRAVACHOL) 40 MG TABLET    TAKE 1 TABLET BY MOUTH IN THE EVENING    ROSUVASTATIN (CRESTOR) 10 MG TABLET    Take 1 tablet by mouth daily       Allergies     He is allergic to latex, bactrim [sulfamethoxazole-trimethoprim], sulfa antibiotics, and zocor [simvastatin].                    Benito Guaman MD  01/18/23 8351

## 2023-01-18 NOTE — CONSULTS
Cardiology Consultation/History and Physical                                                                  Pt Name: Agueda Aguayo III  Age: 68 y.o. Sex: male  : 1945  Location: [place] [address]    Referring Physician: Thang Morley MD        Reason for Consult:     Reason for Consultation/Chief Complaint: Chest pain    HPI:      Amanda Velez is a 68 y.o. male with chest pain is referred for cardiac evaluation. Has had several episodes of midsternal chest discomfort for the last several days. There was some concern that this was reflux at some point. But he presented to the emergency department due to recurrent pain. A stress echo back in August of this year that was abnormal.  LVEF was 55% but the patient had hypokinesis of the apical septal and mid anteroseptal wall post stress. Histories     Past Medical History:   has a past medical history of Allergic rhinitis, Anxiety, BPH (benign prostatic hypertrophy) with urinary obstruction, CAD (coronary artery disease), Chronic back pain, Colon polyp, Diverticulosis of colon, Environmental allergies, FH: CAD (coronary artery disease), Fractures, GERD (gastroesophageal reflux disease), Headache(784.0), Hearing impairment, Hyperlipidemia, Hypertension, Osteoarthritis, and Restless leg syndrome. Surgical History:   has a past surgical history that includes Tonsillectomy and adenoidectomy; nasal/sinus endoscopy; Cholecystectomy (94); Rotator cuff repair (Bilateral); Femur fracture surgery; hernia repair (06); Colonoscopy (08,,17); Cardiac surgery; Cardiac catheterization; Total knee arthroplasty (Left, 2019); joint replacement; knee surgery; and incision and drainage (Left, 2019). Social History:   reports that he quit smoking about 27 years ago. His smoking use included cigarettes. He has a 10.00 pack-year smoking history.  He has never used smokeless tobacco. He reports current alcohol use of about 1.0 standard drink per week. He reports that he does not use drugs. Family History:  No evidence for sudden cardiac death or premature CAD      Medications:       Home Medications  Were reviewed and are listed in nursing record. and/or listed below  Prior to Admission medications    Medication Sig Start Date End Date Taking? Authorizing Provider   pravastatin (PRAVACHOL) 40 MG tablet TAKE 1 TABLET BY MOUTH IN THE EVENING  Patient not taking: Reported on 1/18/2023 1/13/23   Debra Navarrete MD   alendronate (FOSAMAX) 70 MG tablet Take 1 tablet by mouth every 7 days 10/4/22   Ryan Bell MD   Coenzyme Q10 (COQ10) 100 MG CAPS Take by mouth    Historical Provider, MD   rosuvastatin (CRESTOR) 10 MG tablet Take 1 tablet by mouth daily 1/24/22   Debra Navarrete MD   clopidogrel (PLAVIX) 75 MG tablet TAKE 1 TABLET BY MOUTH EVERY DAY 1/24/22   Debra Navarrete MD   Magnesium Oxide (MAGNESIUM-OXIDE) 250 MG TABS tablet TAKE ONE TABLET BY MOUTH QHS 9/16/20   Historical Provider, MD   fluticasone (FLONASE) 50 MCG/ACT nasal spray 1 spray by Nasal route daily as needed for Rhinitis 2/22/21   YAYA Layton CNP   hydrochlorothiazide (HYDRODIURIL) 12.5 MG tablet Take 12.5 mg by mouth every morning    Historical Provider, MD   famotidine (PEPCID) 40 MG tablet  5/27/20   Historical Provider, MD   aspirin 81 MG tablet Take 81 mg by mouth daily    Historical Provider, MD   Cholecalciferol (VITAMIN D3) 2000 UNITS CAPS Take 4,000 Units by mouth     Historical Provider, MD          Inpatient Medications:      IV drips:      PRN:  perflutren lipid microspheres    Allergy:     Latex, Bactrim [sulfamethoxazole-trimethoprim], Sulfa antibiotics, and Zocor [simvastatin]       Review of Systems:     All 12 point review of symptoms completed. Pertinent positives identified in the HPI, all other review of symptoms negative as below.     Physical Examination:     Vitals:    01/18/23 1159 01/18/23 1345 01/18/23 1449   BP: (!) 147/85 138/74 130/78   Pulse: 84 78 71   Resp: 16 18 13   Temp: 98.1 °F (36.7 °C)     TempSrc: Oral     SpO2: 96% 98% 95%   Weight: 200 lb 9.9 oz (91 kg)     Height: 5' 6\" (1.676 m)         Wt Readings from Last 3 Encounters:   01/18/23 200 lb 9.9 oz (91 kg)   11/07/22 207 lb (93.9 kg)   10/04/22 193 lb 6.4 oz (87.7 kg)         General Appearance:  Alert, cooperative, no distress, appears stated age Appropriate weight   Head:  Normocephalic, without obvious abnormality, atraumatic   Eyes:  PERRL, conjunctiva/corneas clear EOM intact  Ears normal   Throat no lesions       Nose: Nares normal, no drainage or sinus tenderness   Throat: Lips, mucosa, and tongue normal   Neck: Supple, symmetrical, trachea midline, no adenopathy, thyroid: not enlarged, symmetric, no tenderness/mass/nodules, no carotid bruit. Lungs:   Respirations unlabored, clear to auscultation bilaterally, without any wheezes, rubs or ronchi. Chest Wall:  No tenderness or deformity   Heart:  Regular rhythm, rate is controlled, S1, S2 normal, there is no murmur, there is no rub or gallop, cannot assess jvd, no bilateral lower extremity edema   Abdomen:   Soft, non-tender, bowel sounds active all four quadrants,  no masses, no organomegaly       Extremities: Extremities normal, atraumatic, no cyanosis. Pulses: 2+ and symmetric   Skin: Skin color, texture, turgor normal, no rashes or lesions   Pysch: Normal mood and affect   Neurologic: Normal gross motor and sensory exam.  Cranial nerves intact        Labs:     Recent Labs     01/18/23  1204   *   K 4.1   BUN 14   CREATININE 0.8   CL 99   CO2 22   GLUCOSE 107*   CALCIUM 9.3     Recent Labs     01/18/23  1204   WBC 8.3   HGB 15.3   HCT 43.8      MCV 85.6     No results for input(s): CHOLTOT, TRIG, HDL, CHOLHDL, LDL in the last 72 hours. Invalid input(s): Maikel Gtz  No results for input(s): PTT, INR in the last 72 hours.     Invalid input(s): PT  Recent Labs     01/18/23  1204 01/18/23  1439   TROPONINI <0.01 <0.01     No results for input(s): BNP in the last 72 hours. No results for input(s): TSH in the last 72 hours. No results for input(s): CHOL, HDL, LDLCALC, TRIG in the last 72 hours.]    Lab Results   Component Value Date    CKTOTAL 173 03/10/2021    TROPONINI <0.01 01/18/2023         Imaging:     I have personally reviewed patient's ECG which shows NSR, NSST changes    Assessment / Plan:   Chest pain, possible unstable angina  Abnormal stress test  History of TIA  Hypertension    Continue dual antiplatelet therapy with aspirin and Plavix, beta-blocker as tolerated  Left heart cath, possible PCI. Risk, benefits and alternatives to cardiac catheterization possible intervention were discussed with the patient including heart attack, stroke, kidney failure and limb loss. He wishes to proceed    I have personally reviewed the reports and images of labs, radiological studies, cardiac studies including ECG's and telemetry, current and old medical records. The note was completed using EMR and Dragon dictation system. Every effort was made to ensure accuracy; however, inadvertent computerized transcription errors may be present. All questions and concerns were addressed to the patient/family. Alternatives to my treatment were discussed. I would like to thank you for providing me the opportunity to participate in the care of your patient. If you have any questions, please do not hesitate to contact me.      Janina Chavez MD, [facility]  The [address]  [address] [place] [address] [place] [address]  Ph: [phone]  Fax: [phone]

## 2023-01-18 NOTE — ED NOTES
SBAR report called to [facility] HIEU ZAMAN RN   Questions answered to [id number]  01/18/23 8871
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
## 2023-01-19 LAB
ANION GAP SERPL CALCULATED.3IONS-SCNC: 12 MMOL/L (ref 3–16)
BASOPHILS ABSOLUTE: 0 K/UL (ref 0–0.2)
BASOPHILS RELATIVE PERCENT: 0.6 %
BILIRUBIN URINE: NEGATIVE
BLOOD, URINE: NEGATIVE
BUN BLDV-MCNC: 15 MG/DL (ref 7–20)
CALCIUM SERPL-MCNC: 9.1 MG/DL (ref 8.3–10.6)
CHLORIDE BLD-SCNC: 100 MMOL/L (ref 99–110)
CHOLESTEROL, TOTAL: 160 MG/DL (ref 0–199)
CLARITY: CLEAR
CO2: 23 MMOL/L (ref 21–32)
COLOR: YELLOW
CREAT SERPL-MCNC: 0.7 MG/DL (ref 0.8–1.3)
EOSINOPHILS ABSOLUTE: 0.1 K/UL (ref 0–0.6)
EOSINOPHILS RELATIVE PERCENT: 2.2 %
GFR SERPL CREATININE-BSD FRML MDRD: >60 ML/MIN/{1.73_M2}
GLUCOSE BLD-MCNC: 98 MG/DL (ref 70–99)
GLUCOSE URINE: NEGATIVE MG/DL
HCT VFR BLD CALC: 42.9 % (ref 40.5–52.5)
HDLC SERPL-MCNC: 48 MG/DL (ref 40–60)
HEMOGLOBIN: 14.6 G/DL (ref 13.5–17.5)
INR BLD: 1 (ref 0.87–1.14)
IRON SATURATION: 30 % (ref 20–50)
IRON: 83 UG/DL (ref 59–158)
KETONES, URINE: NEGATIVE MG/DL
LDL CHOLESTEROL CALCULATED: 96 MG/DL
LEUKOCYTE ESTERASE, URINE: NEGATIVE
LYMPHOCYTES ABSOLUTE: 1.5 K/UL (ref 1–5.1)
LYMPHOCYTES RELATIVE PERCENT: 22.3 %
MCH RBC QN AUTO: 29.1 PG (ref 26–34)
MCHC RBC AUTO-ENTMCNC: 34 G/DL (ref 31–36)
MCV RBC AUTO: 85.6 FL (ref 80–100)
MICROSCOPIC EXAMINATION: NORMAL
MONOCYTES ABSOLUTE: 0.9 K/UL (ref 0–1.3)
MONOCYTES RELATIVE PERCENT: 14.1 %
NEUTROPHILS ABSOLUTE: 4 K/UL (ref 1.7–7.7)
NEUTROPHILS RELATIVE PERCENT: 60.8 %
NITRITE, URINE: NEGATIVE
PDW BLD-RTO: 13.3 % (ref 12.4–15.4)
PH UA: 6.5 (ref 5–8)
PLATELET # BLD: 233 K/UL (ref 135–450)
PMV BLD AUTO: 6.9 FL (ref 5–10.5)
POTASSIUM REFLEX MAGNESIUM: 3.8 MMOL/L (ref 3.5–5.1)
PROTEIN UA: NEGATIVE MG/DL
PROTHROMBIN TIME: 13.1 SEC (ref 11.7–14.5)
RBC # BLD: 5.01 M/UL (ref 4.2–5.9)
SODIUM BLD-SCNC: 135 MMOL/L (ref 136–145)
SPECIFIC GRAVITY UA: 1.01 (ref 1–1.03)
TOTAL IRON BINDING CAPACITY: 275 UG/DL (ref 260–445)
TRIGL SERPL-MCNC: 80 MG/DL (ref 0–150)
TSH REFLEX: 1.47 UIU/ML (ref 0.27–4.2)
URINE TYPE: NORMAL
UROBILINOGEN, URINE: 0.2 E.U./DL
VITAMIN B-12: 420 PG/ML (ref 211–911)
VLDLC SERPL CALC-MCNC: 16 MG/DL
WBC # BLD: 6.7 K/UL (ref 4–11)

## 2023-01-19 PROCEDURE — C1894 INTRO/SHEATH, NON-LASER: HCPCS

## 2023-01-19 PROCEDURE — 85025 COMPLETE CBC W/AUTO DIFF WBC: CPT

## 2023-01-19 PROCEDURE — 2500000003 HC RX 250 WO HCPCS

## 2023-01-19 PROCEDURE — 99223 1ST HOSP IP/OBS HIGH 75: CPT | Performed by: THORACIC SURGERY (CARDIOTHORACIC VASCULAR SURGERY)

## 2023-01-19 PROCEDURE — 6370000000 HC RX 637 (ALT 250 FOR IP): Performed by: INTERNAL MEDICINE

## 2023-01-19 PROCEDURE — 99152 MOD SED SAME PHYS/QHP 5/>YRS: CPT | Performed by: INTERNAL MEDICINE

## 2023-01-19 PROCEDURE — 6360000002 HC RX W HCPCS

## 2023-01-19 PROCEDURE — 93458 L HRT ARTERY/VENTRICLE ANGIO: CPT | Performed by: INTERNAL MEDICINE

## 2023-01-19 PROCEDURE — 2580000003 HC RX 258: Performed by: INTERNAL MEDICINE

## 2023-01-19 PROCEDURE — 82607 VITAMIN B-12: CPT

## 2023-01-19 PROCEDURE — 93458 L HRT ARTERY/VENTRICLE ANGIO: CPT

## 2023-01-19 PROCEDURE — 99152 MOD SED SAME PHYS/QHP 5/>YRS: CPT

## 2023-01-19 PROCEDURE — C1887 CATHETER, GUIDING: HCPCS

## 2023-01-19 PROCEDURE — 81003 URINALYSIS AUTO W/O SCOPE: CPT

## 2023-01-19 PROCEDURE — 83540 ASSAY OF IRON: CPT

## 2023-01-19 PROCEDURE — C1769 GUIDE WIRE: HCPCS

## 2023-01-19 PROCEDURE — B2111ZZ FLUOROSCOPY OF MULTIPLE CORONARY ARTERIES USING LOW OSMOLAR CONTRAST: ICD-10-PCS | Performed by: INTERNAL MEDICINE

## 2023-01-19 PROCEDURE — 83550 IRON BINDING TEST: CPT

## 2023-01-19 PROCEDURE — 80061 LIPID PANEL: CPT

## 2023-01-19 PROCEDURE — 87641 MR-STAPH DNA AMP PROBE: CPT

## 2023-01-19 PROCEDURE — 87086 URINE CULTURE/COLONY COUNT: CPT

## 2023-01-19 PROCEDURE — 80048 BASIC METABOLIC PNL TOTAL CA: CPT

## 2023-01-19 PROCEDURE — 36415 COLL VENOUS BLD VENIPUNCTURE: CPT

## 2023-01-19 PROCEDURE — 84443 ASSAY THYROID STIM HORMONE: CPT

## 2023-01-19 PROCEDURE — 99153 MOD SED SAME PHYS/QHP EA: CPT

## 2023-01-19 PROCEDURE — 4A023N7 MEASUREMENT OF CARDIAC SAMPLING AND PRESSURE, LEFT HEART, PERCUTANEOUS APPROACH: ICD-10-PCS | Performed by: INTERNAL MEDICINE

## 2023-01-19 PROCEDURE — 2060000000 HC ICU INTERMEDIATE R&B

## 2023-01-19 PROCEDURE — 85610 PROTHROMBIN TIME: CPT

## 2023-01-19 PROCEDURE — 2709999900 HC NON-CHARGEABLE SUPPLY

## 2023-01-19 RX ORDER — SODIUM CHLORIDE 0.9 % (FLUSH) 0.9 %
5-40 SYRINGE (ML) INJECTION PRN
Status: DISCONTINUED | OUTPATIENT
Start: 2023-01-19 | End: 2023-01-27

## 2023-01-19 RX ORDER — ACETAMINOPHEN 325 MG/1
650 TABLET ORAL EVERY 4 HOURS PRN
Status: DISCONTINUED | OUTPATIENT
Start: 2023-01-19 | End: 2023-01-19 | Stop reason: SDUPTHER

## 2023-01-19 RX ORDER — SODIUM CHLORIDE 0.9 % (FLUSH) 0.9 %
5-40 SYRINGE (ML) INJECTION EVERY 12 HOURS SCHEDULED
Status: DISCONTINUED | OUTPATIENT
Start: 2023-01-19 | End: 2023-01-27

## 2023-01-19 RX ORDER — SODIUM CHLORIDE 9 MG/ML
INJECTION, SOLUTION INTRAVENOUS CONTINUOUS
Status: ACTIVE | OUTPATIENT
Start: 2023-01-19 | End: 2023-01-20

## 2023-01-19 RX ADMIN — METOPROLOL TARTRATE 25 MG: 25 TABLET, FILM COATED ORAL at 20:19

## 2023-01-19 RX ADMIN — ROSUVASTATIN 10 MG: 10 TABLET, FILM COATED ORAL at 08:24

## 2023-01-19 RX ADMIN — METOPROLOL TARTRATE 25 MG: 25 TABLET, FILM COATED ORAL at 08:24

## 2023-01-19 RX ADMIN — FAMOTIDINE 20 MG: 20 TABLET, FILM COATED ORAL at 20:18

## 2023-01-19 RX ADMIN — SODIUM CHLORIDE: 9 INJECTION, SOLUTION INTRAVENOUS at 15:53

## 2023-01-19 RX ADMIN — CLOPIDOGREL BISULFATE 75 MG: 75 TABLET ORAL at 08:24

## 2023-01-19 RX ADMIN — ASPIRIN 81 MG: 81 TABLET, COATED ORAL at 08:24

## 2023-01-19 RX ADMIN — Medication 400 MG: at 20:21

## 2023-01-19 RX ADMIN — SODIUM CHLORIDE, PRESERVATIVE FREE 10 ML: 5 INJECTION INTRAVENOUS at 08:26

## 2023-01-19 RX ADMIN — SODIUM CHLORIDE, PRESERVATIVE FREE 10 ML: 5 INJECTION INTRAVENOUS at 20:21

## 2023-01-19 RX ADMIN — Medication 4000 UNITS: at 08:24

## 2023-01-19 RX ADMIN — FAMOTIDINE 20 MG: 20 TABLET, FILM COATED ORAL at 08:24

## 2023-01-19 RX ADMIN — SODIUM CHLORIDE, PRESERVATIVE FREE 10 ML: 5 INJECTION INTRAVENOUS at 20:20

## 2023-01-19 RX ADMIN — Medication 400 MG: at 00:39

## 2023-01-19 ASSESSMENT — PAIN SCALES - GENERAL
PAINLEVEL_OUTOF10: 0

## 2023-01-19 ASSESSMENT — PAIN - FUNCTIONAL ASSESSMENT: PAIN_FUNCTIONAL_ASSESSMENT: NONE - DENIES PAIN

## 2023-01-19 NOTE — PROGRESS NOTES
4 Eyes Admission Assessment     I agree as the admission nurse that 2 RN's have performed a thorough Head to Toe Skin Assessment on the patient. ALL assessment sites listed below have been assessed on admission. Areas assessed by both nurses: Nolia Pila   [x]   Head, Face, and Ears   [x]   Shoulders, Back, and Chest  [x]   Arms, Elbows, and Hands   [x]   Coccyx, Sacrum, and Ischium  [x]   Legs, Feet, and Heels        Does the Patient have Skin Breakdown?   No         Bill Prevention initiated:  No   Wound Care Orders initiated:  No      [facility] nurse consulted for Pressure Injury (Stage 3,4, Unstageable, DTI, NWPT, and Complex wounds) or Bill score 18 or lower:  No      Nurse 1 eSignature: Electronically signed by Liliam Sepulveda RN on 1/18/23 at 9:06 PM EST    **SHARE this note so that the co-signing nurse is able to place an eSignature**    Nurse 2 eSignature: Electronically signed by Delio Justin RN on 1/19/23 at 12:08 AM EST

## 2023-01-19 NOTE — PROGRESS NOTES
01/19/23 1442   Encounter Summary   Encounter Overview/Reason  Rituals, Rites and Sacraments   Service Provided For: Patient and family together   Referral/Consult From: [facility]   Support System Spouse   Last Encounter  01/19/23  (sos,  cm)   Complexity of Encounter Moderate   Begin Time 1430   End Time  1444   Total Time Calculated 14 min   Encounter    Type Initial Screen/Assessment   Rituals, Rites and Sacraments   Type Sacrament of Sick; Anointing;Blessings   Grief, Loss, and Adjustments   Type Adjustment to illness   Assessment/Intervention/Outcome   Assessment Calm;Coping; Hopeful   Intervention Active listening;Discussed belief system/Lutheran practices/hayde; Explored/Affirmed feelings, thoughts, concerns;Explored Coping Skills/Resources;Guided Imagery, Healing touch, etc.   Outcome Coping;Engaged in conversation;Expressed feelings, needs, and concerns;Expressed Gratitude   Electronically signed by Isabell Davis on 1/19/2023 at 2:46 PM

## 2023-01-19 NOTE — CARE COORDINATION
2:14 PM  Attempted to meet with patient this morning, but he was off the floor. Will follow-up at a later time.
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
## 2023-01-19 NOTE — PLAN OF CARE
Problem: Respiratory - Adult  Goal: Achieves optimal ventilation and oxygenation  Outcome: Progressing  Flowsheets (Taken 1/19/2023 0452)  Achieves optimal ventilation and oxygenation:   Assess for changes in respiratory status   Assess for changes in mentation and behavior   Position to facilitate oxygenation and minimize respiratory effort   Oxygen supplementation based on oxygen saturation or arterial blood gases   Initiate smoking cessation protocol as indicated   Encourage broncho-pulmonary hygiene including cough, deep breathe, incentive spirometry   Assess the need for suctioning and aspirate as needed   Assess and instruct to report shortness of breath or any respiratory difficulty   Respiratory therapy support as indicated  Note: Pt lungs sound clear. Pt is currently on RA. Will continue to monitor. Problem: Cardiovascular - Adult  Goal: Absence of cardiac dysrhythmias or at baseline  Outcome: Progressing  Note: Pt is currently on the tell monitor and NSR. Will continue to monitor. Problem: Skin/Tissue Integrity - Adult  Goal: Skin integrity remains intact  Outcome: Progressing  Note: Pt skin is clean, dry, and intact. Will continue to monitor. Problem: Musculoskeletal - Adult  Goal: Return mobility to safest level of function  Outcome: Progressing  Flowsheets (Taken 1/19/2023 0452)  Return Mobility to Safest Level of Function:   Assess patient stability and activity tolerance for standing, transferring and ambulating with or without assistive devices   Assist with transfers and ambulation using safe patient handling equipment as needed   Ensure adequate protection for wounds/incisions during mobilization   Obtain physical therapy/occupational therapy consults as needed   Apply continuous passive motion per provider or physical therapy orders to increase flexion toward goal   Instruct patient/family in ordered activity level  Note: Pt has a steady gait and is up ab paola.       Problem: Metabolic/Fluid and Electrolytes - Adult  Goal: Electrolytes maintained within normal limits  Outcome: Progressing  Flowsheets (Taken 1/19/2023 0452)  Electrolytes maintained within normal limits:   Monitor labs and assess patient for signs and symptoms of electrolyte imbalances   Administer electrolyte replacement as ordered   Monitor response to electrolyte replacements, including repeat lab results as appropriate   Fluid restriction as ordered   Instruct patient on fluid and nutrition restrictions as appropriate  Note: Pt electrolytes are WNL. Will continue to monitor. Problem: Pain  Goal: Verbalizes/displays adequate comfort level or baseline comfort level  Outcome: Progressing  Flowsheets (Taken 1/19/2023 0452)  Verbalizes/displays adequate comfort level or baseline comfort level:   Encourage patient to monitor pain and request assistance   Assess pain using appropriate pain scale   Administer analgesics based on type and severity of pain and evaluate response   Implement non-pharmacological measures as appropriate and evaluate response   Consider cultural and social influences on pain and pain management   Notify Licensed Independent Practitioner if interventions unsuccessful or patient reports new pain  Note: Pt denies any pain over night. Will continue to monitor.

## 2023-01-19 NOTE — PLAN OF CARE
Problem: ABCDS Injury Assessment  Goal: Absence of physical injury  Outcome: Progressing   Pt in bed with bed alarm on, instructed to call for assistance. Verbalized understanding. All fall precautions in place. Problem: Nutrition Deficit:  Goal: Optimize nutritional status  Outcome: Progressing   Pt educated on importance of staying hydrated and having as much intake as possible. Verbalized understanding. Will continue to monitor.

## 2023-01-19 NOTE — PROGRESS NOTES
Sent secure message to Dr. Jeanette Brown in regards to patients diet order stating that the NPO order starts at 0900 to verify. Along with verifying to give patient his plavix and aspirin this morning. Dr. Jeanette Brown states that it is okay to give both medications and that we will keep the NPO order as is.      Will continue to monitor
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
## 2023-01-19 NOTE — PROGRESS NOTES
Hospitalist Progress Note      PCP: Irina Harvey MD    Date of Admission: 1/18/2023    Chief Complaint: Chest Pain    Hospital Course: 69 yo M w/ PMH of HTN, HLD admitted on 01/18 for evaluation of new exertional chest pain. Cardiology evaluated patient and planned for [facility] on 01/19. Subjective: No acute events reported after admission overnight. Remained afebrile and hemodynamically stable. No active chest pain currently. No dyspnea, nausea or diaphoresis. Medications:  Reviewed    Infusion Medications    sodium chloride       Scheduled Medications    aspirin  81 mg Oral Daily    Vitamin D  4,000 Units Oral Daily    [Held by provider] clopidogrel  75 mg Oral Daily    famotidine  20 mg Oral BID    magnesium oxide  400 mg Oral Nightly    rosuvastatin  10 mg Oral Daily    sodium chloride flush  5-40 mL IntraVENous 2 times per day    metoprolol tartrate  25 mg Oral BID     PRN Meds: perflutren lipid microspheres, sodium chloride flush, sodium chloride, ondansetron **OR** ondansetron, polyethylene glycol, acetaminophen **OR** acetaminophen, nitroGLYCERIN      Intake/Output Summary (Last 24 hours) at 1/19/2023 1440  Last data filed at 1/19/2023 0744  Gross per 24 hour   Intake 250 ml   Output 775 ml   Net -525 ml       Physical Exam Performed:    /78   Pulse 66   Temp (!) 96.6 °F (35.9 °C) (Oral)   Resp 14   Ht 5' 6\" (1.676 m)   Wt 200 lb 9.9 oz (91 kg)   SpO2 99%   BMI 32.38 kg/m²     General appearance: No apparent distress, appears stated age and cooperative. HEENT: Pupils equal, round, and reactive to light. Conjunctivae/corneas clear. Neck: Supple, with full range of motion. No jugular venous distention. Trachea midline. Respiratory:  Normal respiratory effort. Clear to auscultation, bilaterally without Rales/Wheezes/Rhonchi. Cardiovascular: Regular rate and rhythm with normal S1/S2 without murmurs, rubs or gallops.   Abdomen: Soft, non-tender, non-distended with normal bowel sounds. Musculoskeletal: No clubbing, cyanosis or edema bilaterally. Full range of motion without deformity. Skin: Skin color, texture, turgor normal.  No rashes or lesions. Neurologic:  Neurovascularly intact without any focal sensory/motor deficits. Cranial nerves: II-XII intact, grossly non-focal.  Psychiatric: Alert and oriented, thought content appropriate, normal insight  Capillary Refill: Brisk, 3 seconds, normal   Peripheral Pulses: +2 palpable, equal bilaterally       Labs:   Recent Labs     01/18/23  1204 01/19/23  0354   WBC 8.3 6.7   HGB 15.3 14.6   HCT 43.8 42.9    233     Recent Labs     01/18/23  1204 01/19/23  0354   * 135*   K 4.1 3.8   CL 99 100   CO2 22 23   BUN 14 15   CREATININE 0.8 0.7*   CALCIUM 9.3 9.1     No results for input(s): AST, ALT, BILIDIR, BILITOT, ALKPHOS in the last 72 hours. Recent Labs     01/19/23  0910   INR 1.00     Recent Labs     01/18/23  1204 01/18/23  1439   TROPONINI <0.01 <0.01       Urinalysis:      Lab Results   Component Value Date/Time    NITRU Negative 11/12/2018 08:59 AM    BLOODU Negative 11/12/2018 08:59 AM    SPECGRAV 1.010 11/12/2018 08:59 AM    GLUCOSEU Negative 11/12/2018 08:59 AM       Radiology:  XR CHEST (2 VW)   Final Result   No acute cardiopulmonary abnormality.           IP CONSULT TO CARDIOLOGY  IP CONSULT TO HOSPITALIST  IP CONSULT TO CARDIOLOGY  IP CONSULT TO CARDIOTHORACIC SURGERY    Assessment/Plan:    Active Hospital Problems    Diagnosis     Unstable angina ([facility] [address] [I20.0]      Priority: Medium     #Chest pain, concerning for unstable angina:  Cardiology consulted, appreciate recs  Plan for cardiac cath today 1/19/23  Continue Asa, plavix, statin, add low dose metoprolol as BP tolerates     #HLD with prior myositis on simvastatin:  Cont current dose of crestor  F/u  lipid panel      #H/o TIA and CVA:  [facility]  On asa, plavix and statin- continue    DVT Prophylaxis: SCDs  Diet: Diet NPO  Code Status: Full Code  PT/OT Eval Status: as needed    Dispo - Home when medical work-up complete, likely today or tomorrow      Roxie Rojas MD

## 2023-01-20 ENCOUNTER — APPOINTMENT (OUTPATIENT)
Dept: VASCULAR LAB | Age: 78
DRG: 234 | End: 2023-01-20
Payer: MEDICARE

## 2023-01-20 PROBLEM — R94.39 ABNORMAL STRESS TEST: Status: ACTIVE | Noted: 2023-01-20

## 2023-01-20 LAB
ALBUMIN SERPL-MCNC: 3.5 G/DL (ref 3.4–5)
ALP BLD-CCNC: 67 U/L (ref 40–129)
ALT SERPL-CCNC: 19 U/L (ref 10–40)
APTT: 26.8 SEC (ref 23–34.3)
AST SERPL-CCNC: 29 U/L (ref 15–37)
BILIRUB SERPL-MCNC: 0.7 MG/DL (ref 0–1)
BILIRUBIN DIRECT: <0.2 MG/DL (ref 0–0.3)
BILIRUBIN, INDIRECT: ABNORMAL MG/DL (ref 0–1)
FIBRINOGEN: 405 MG/DL (ref 207–509)
MAGNESIUM: 1.9 MG/DL (ref 1.8–2.4)
MRSA SCREEN RT-PCR: NORMAL
TOTAL PROTEIN: 6 G/DL (ref 6.4–8.2)
URINE CULTURE, ROUTINE: NORMAL

## 2023-01-20 PROCEDURE — 85384 FIBRINOGEN ACTIVITY: CPT

## 2023-01-20 PROCEDURE — 83735 ASSAY OF MAGNESIUM: CPT

## 2023-01-20 PROCEDURE — 80076 HEPATIC FUNCTION PANEL: CPT

## 2023-01-20 PROCEDURE — 83036 HEMOGLOBIN GLYCOSYLATED A1C: CPT

## 2023-01-20 PROCEDURE — 2580000003 HC RX 258: Performed by: INTERNAL MEDICINE

## 2023-01-20 PROCEDURE — 93970 EXTREMITY STUDY: CPT

## 2023-01-20 PROCEDURE — 36415 COLL VENOUS BLD VENIPUNCTURE: CPT

## 2023-01-20 PROCEDURE — 85730 THROMBOPLASTIN TIME PARTIAL: CPT

## 2023-01-20 PROCEDURE — 6370000000 HC RX 637 (ALT 250 FOR IP): Performed by: INTERNAL MEDICINE

## 2023-01-20 PROCEDURE — 99232 SBSQ HOSP IP/OBS MODERATE 35: CPT | Performed by: INTERNAL MEDICINE

## 2023-01-20 PROCEDURE — 2060000000 HC ICU INTERMEDIATE R&B

## 2023-01-20 PROCEDURE — 93880 EXTRACRANIAL BILAT STUDY: CPT

## 2023-01-20 RX ADMIN — FAMOTIDINE 20 MG: 20 TABLET, FILM COATED ORAL at 21:43

## 2023-01-20 RX ADMIN — SODIUM CHLORIDE, PRESERVATIVE FREE 10 ML: 5 INJECTION INTRAVENOUS at 09:19

## 2023-01-20 RX ADMIN — FAMOTIDINE 20 MG: 20 TABLET, FILM COATED ORAL at 09:18

## 2023-01-20 RX ADMIN — Medication 400 MG: at 21:43

## 2023-01-20 RX ADMIN — METOPROLOL TARTRATE 25 MG: 25 TABLET, FILM COATED ORAL at 09:18

## 2023-01-20 RX ADMIN — METOPROLOL TARTRATE 25 MG: 25 TABLET, FILM COATED ORAL at 21:43

## 2023-01-20 RX ADMIN — ROSUVASTATIN 10 MG: 10 TABLET, FILM COATED ORAL at 09:18

## 2023-01-20 RX ADMIN — SODIUM CHLORIDE, PRESERVATIVE FREE 10 ML: 5 INJECTION INTRAVENOUS at 09:20

## 2023-01-20 RX ADMIN — SODIUM CHLORIDE, PRESERVATIVE FREE 10 ML: 5 INJECTION INTRAVENOUS at 21:43

## 2023-01-20 RX ADMIN — Medication 4000 UNITS: at 09:18

## 2023-01-20 RX ADMIN — ACETAMINOPHEN 650 MG: 325 TABLET ORAL at 15:56

## 2023-01-20 RX ADMIN — ASPIRIN 81 MG: 81 TABLET, COATED ORAL at 09:18

## 2023-01-20 ASSESSMENT — PAIN SCALES - GENERAL
PAINLEVEL_OUTOF10: 2
PAINLEVEL_OUTOF10: 5

## 2023-01-20 ASSESSMENT — PAIN DESCRIPTION - ORIENTATION: ORIENTATION: MID

## 2023-01-20 ASSESSMENT — PAIN DESCRIPTION - LOCATION: LOCATION: HEAD

## 2023-01-20 NOTE — PROGRESS NOTES
Cardiology Consult Service  Daily Progress Note        Admit Date:  1/18/2023    Subjective: Interval history:  Denies chest pain, shortness of breath  Objective:     Medications:   sodium chloride flush  5-40 mL IntraVENous 2 times per day    aspirin  81 mg Oral Daily    Vitamin D  4,000 Units Oral Daily    [Held by provider] clopidogrel  75 mg Oral Daily    famotidine  20 mg Oral BID    magnesium oxide  400 mg Oral Nightly    rosuvastatin  10 mg Oral Daily    sodium chloride flush  5-40 mL IntraVENous 2 times per day    metoprolol tartrate  25 mg Oral BID       IV drips:   sodium chloride         PRN:  sodium chloride, sodium chloride flush, perflutren lipid microspheres, sodium chloride flush, sodium chloride, ondansetron **OR** ondansetron, polyethylene glycol, acetaminophen **OR** acetaminophen, nitroGLYCERIN    Vitals:    01/20/23 0645 01/20/23 0909 01/20/23 1204 01/20/23 1555   BP:  126/65 132/74 (!) 147/72   Pulse:  82 69 61   Resp:  16 18 15   Temp:  97.1 °F (36.2 °C) 97.7 °F (36.5 °C) 97.2 °F (36.2 °C)   TempSrc:  Oral Oral Oral   SpO2:  97% 98% 100%   Weight: 191 lb 5.8 oz (86.8 kg)      Height:           Intake/Output Summary (Last 24 hours) at 1/20/2023 1631  Last data filed at 1/20/2023 0434  Gross per 24 hour   Intake --   Output 600 ml   Net -600 ml     I/O last 3 completed shifts: In: 250 [P.O.:240;  I.V.:10]  Out: 1375 [Urine:1375]  Wt Readings from Last 3 Encounters:   01/20/23 191 lb 5.8 oz (86.8 kg)   11/07/22 207 lb (93.9 kg)   10/04/22 193 lb 6.4 oz (87.7 kg)       Admit Wt: Weight: 200 lb 9.9 oz (91 kg)   Todays Wt: Weight: 191 lb 5.8 oz (86.8 kg)    TELEMETRY   Physical Exam:         General Appearance:  Alert, cooperative, no distress, appears stated age Appropriate weight   Head:  Normocephalic, without obvious abnormality, atraumatic   Eyes:  conjunctiva/corneas clear , anicteric  Ears normal   Throat no lesions   Nose: Nares normal appearance   Throat: Lips, mucosa, and tongue normal appearance   Neck: Supple, symmetrical, trachea midline, no carotid bruit. Lungs:   Normal respiratory rate, lungs clear to auscultation bilaterally. Chest Wall:  No tenderness or deformity   Heart:  Regular rate and rhythm. No murmurs rubs or gallops. Abdomen:   Soft, non-tender, bowel sounds normoactive. Labs:   Recent Labs     01/18/23  1204 01/19/23  0354 01/20/23  0431   * 135*  --    K 4.1 3.8  --    BUN 14 15  --    CREATININE 0.8 0.7*  --    CL 99 100  --    CO2 22 23  --    GLUCOSE 107* 98  --    CALCIUM 9.3 9.1  --    MG  --   --  1.90     Recent Labs     01/18/23  1204 01/19/23  0354   WBC 8.3 6.7   HGB 15.3 14.6   HCT 43.8 42.9    233   MCV 85.6 85.6     Recent Labs     01/19/23  0354   TRIG 80   HDL 48     Recent Labs     01/19/23  0910   INR 1.00     Recent Labs     01/18/23  1204 01/18/23  1439   TROPONINI <0.01 <0.01     No results for input(s): BNP in the last 72 hours. No results for input(s): NTPROBNP in the last 72 hours. No results for input(s): TSH in the last 72 hours. Imaging:       Assessment & Plan:     1. Multivessel CAD  2. Hypertension  3. Hyperlipidemia  4. History of TIA    No further chest pain, hemodynamically stable  Continue antiplatelet therapy with aspirin, now off Plavix  Plan for inpatient CABG next week  Continue beta-blocker, statin  Please consult as needed over the weekend    I have personally reviewed the reports and images of labs, radiological studies, cardiac studies including ECG's and telemetry, current and old medical records. The note was completed using EMR and Dragon dictation system. Every effort was made to ensure accuracy; however, inadvertent computerized transcription errors may be present. All questions and concerns were addressed to the patient. Thank you for allowing to us to participate in the care or Agueda Aguayo III. Please call our service with questions.     Mojgan Ang MD, [facility]  [facility] [address]  [facility] [address] [place] [address]  Ph: [phone]  Fax: [phone]

## 2023-01-20 NOTE — PROGRESS NOTES
Hospitalist Progress Note      PCP: Sravani Sanchez MD    Date of Admission: 1/18/2023    Chief Complaint: Chest Pain    Hospital Course: 67 yo M w/ PMH of HTN, HLD admitted on 01/18 for evaluation of new exertional chest pain. Cardiology evaluated patient and planned for LHC on 01/19. Subjective: No acute events reported overnight. Remained afebrile and hemodynamically stable. No active chest pain today. No dyspnea, nausea or diaphoresis. 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 Referred to CT surgery by Cardiology for eval of bypass. Medications:  Reviewed    Infusion Medications    sodium chloride       Scheduled Medications    sodium chloride flush  5-40 mL IntraVENous 2 times per day    aspirin  81 mg Oral Daily    Vitamin D  4,000 Units Oral Daily    [Held by provider] clopidogrel  75 mg Oral Daily    famotidine  20 mg Oral BID    magnesium oxide  400 mg Oral Nightly    rosuvastatin  10 mg Oral Daily    sodium chloride flush  5-40 mL IntraVENous 2 times per day    metoprolol tartrate  25 mg Oral BID     PRN Meds: sodium chloride flush, perflutren lipid microspheres, sodium chloride flush, sodium chloride, ondansetron **OR** ondansetron, polyethylene glycol, acetaminophen **OR** acetaminophen, nitroGLYCERIN      Intake/Output Summary (Last 24 hours) at 1/20/2023 1409  Last data filed at 1/20/2023 0434  Gross per 24 hour   Intake --   Output 600 ml   Net -600 ml       Physical Exam Performed:    /74   Pulse 69   Temp 97.7 °F (36.5 °C) (Oral)   Resp 18   Ht 5' 6\" (1.676 m)   Wt 191 lb 5.8 oz (86.8 kg)   SpO2 98%   BMI 30.89 kg/m²     General appearance: No acute distress, appears stated age and cooperative. HEENT: Pupils equal, round, and reactive to light. Conjunctivae/corneas clear. Neck: Supple, with full range of motion. No jugular venous distention. Trachea midline. Respiratory:  Normal respiratory effort.  Clear to auscultation, bilaterally without Rales/Wheezes/Rhonchi. Cardiovascular: Regular rate and rhythm with normal S1/S2 without murmurs, rubs or gallops. Abdomen: Soft, non-tender, non-distended with normal bowel sounds. Musculoskeletal: No clubbing, cyanosis or edema bilaterally. Full range of motion without deformity. Skin: Skin color, texture, turgor normal.  No rashes or lesions. Neurologic:  Neurovascularly intact without any focal sensory/motor deficits. Cranial nerves: II-XII intact, grossly non-focal.  Psychiatric: Alert and oriented, thought content appropriate, normal insight  Capillary Refill: Brisk, 3 seconds, normal   Peripheral Pulses: +2 palpable, equal bilaterally       Labs:   Recent Labs     01/18/23  1204 01/19/23  0354   WBC 8.3 6.7   HGB 15.3 14.6   HCT 43.8 42.9    233     Recent Labs     01/18/23  1204 01/19/23  0354   * 135*   K 4.1 3.8   CL 99 100   CO2 22 23   BUN 14 15   CREATININE 0.8 0.7*   CALCIUM 9.3 9.1     Recent Labs     01/20/23  0431   AST 29   ALT 19   BILIDIR <0.2   BILITOT 0.7   ALKPHOS 67     Recent Labs     01/19/23  0910   INR 1.00     Recent Labs     01/18/23  1204 01/18/23  1439   TROPONINI <0.01 <0.01       Urinalysis:      Lab Results   Component Value Date/Time    NITRU Negative 01/19/2023 08:51 PM    BLOODU Negative 01/19/2023 08:51 PM    SPECGRAV 1.010 01/19/2023 08:51 PM    GLUCOSEU Negative 01/19/2023 08:51 PM       Radiology:  VL PRE OP VEIN MAPPING         VL DUP CAROTID BILATERAL         XR CHEST (2 VW)   Final Result   No acute cardiopulmonary abnormality.           IP CONSULT TO CARDIOLOGY  IP CONSULT TO HOSPITALIST  IP CONSULT TO CARDIOLOGY  IP CONSULT TO CARDIOTHORACIC SURGERY    Assessment/Plan:    Active Hospital Problems    Diagnosis     Unstable angina ([address] [I20.0]      Priority: Medium     #Chest pain, concerning for unstable angina  #Multivessel CAD  Cardiology consulted, appreciate recs  CT surgery consulted, appreciate recs  --Plan for bypass surgery when Plavix wash-out complete; likely on 01/26  --Hold Plavix until bypass surgery  Continue Asa, statin, and low dose metoprolol as BP tolerates     #HLD with prior myositis on simvastatin:  Cont current dose of Crestor     #H/o TIA and CVA:  [facility]  On asa and statin- continue    DVT Prophylaxis: SCDs  Diet: ADULT DIET;  Regular; Low Fat/Low Chol/High Fiber/2 gm Na  Code Status: Full Code  PT/OT Eval Status: as needed    Dispo - Needs bypass surgery on 01/26, will discuss with Cardiology regarding when safe to DC if able prior to surgery      Kirsten Beckman MD

## 2023-01-20 NOTE — PLAN OF CARE
Problem: ABCDS Injury Assessment  Goal: Absence of physical injury  Outcome: Progressing  - Screening for Orthostasis AND not a [place] Risk per SUMMERS/ABCDS: Pt bed is in low position, side rails up, call light and belongings are in reach. Fall risk light is on outside pts room. Pt encouraged to call for assistance as needed. Will continue with hourly rounds for PO intake, pain needs, toileting and repositioning as needed.        Problem: Nutrition Deficit:  Goal: Optimize nutritional status  Outcome: Progressing   Ate all of dinner    Problem: Respiratory - Adult  Goal: Achieves optimal ventilation and oxygenation  Outcome: Progressing   RA Spo2 greater than 92    Problem: Cardiovascular - Adult  Goal: Maintains optimal cardiac output and hemodynamic stability  Outcome: Progressing  Goal: Absence of cardiac dysrhythmias or at baseline  Outcome: Progressing  No arrhythmias, NSR      Problem: Skin/Tissue Integrity - Adult  Goal: Skin integrity remains intact  Outcome: Progressing  Goal: Incisions, wounds, or drain sites healing without S/S of infection  Outcome: Progressing  Goal: Oral mucous membranes remain intact  Outcome: Progressing   Skin intact, radical puncture showing no signs of infection nor hemorrhaging    Problem: Musculoskeletal - Adult  Goal: Return mobility to safest level of function  Outcome: Progressing  Goal: Maintain proper alignment of affected body part  Outcome: Progressing  Goal: Return ADL status to a safe level of function  Outcome: Progressing  Independent

## 2023-01-20 NOTE — PLAN OF CARE
Problem: Nutrition Deficit:  Goal: Optimize nutritional status  1/20/2023 1738 by Ting Ruggiero RN  Outcome: Progressing   Pt with adequate oral intake. Problem: Respiratory - Adult  Goal: Achieves optimal ventilation and oxygenation  1/20/2023 1738 by Ting Ruggiero RN  Outcome: Progressing   Pt on room air, O2 sats greater than 95% this shift. Problem: Cardiovascular - Adult  Goal: Absence of cardiac dysrhythmias or at baseline  1/20/2023 1738 by Ting Ruggiero RN  Outcome: Progressing   Pt on tele, no new arrhythmias noted this shift. Will monitor. Problem: Pain  Goal: Verbalizes/displays adequate comfort level or baseline comfort level  Outcome: Progressing   Pt with complaint of mild headache pain, prn tylenol given. Patient satisfied with this. Will monitor.

## 2023-01-20 NOTE — CARE COORDINATION
Case Management Assessment           Initial Evaluation                Date / Time of Evaluation: 1/20/2023 12:59 PM                 Assessment Completed by: JOVON Jules    Patient Name: Rodo Alvarez III     YOB: 1945  Diagnosis: Unstable angina ([address] [I20.0]  Abnormal stress test [R94.39]  Exertional chest pain [R07.9]     Date / Time: 1/18/2023 11:42 AM    Patient Admission Status: Inpatient    If patient is discharged prior to next notation, then this note serves as note for discharge by case management. Current PCP: Robert Beltrán MD  Clinic Patient: No    Chart Reviewed: Yes  Patient/ Family Interviewed: yes    Initial assessment completed at bedside with: patient    Hospitalization in the last 30 days: No    Emergency Contacts:  Extended Emergency Contact Information  Primary Emergency Contact: Gemmala Jimmie  Address: [address] [place] [address] 83, [address] Phone: [phone]  Mobile Phone: [phone]  Relation: Spouse    Advance Directives:   Code Status: Full [address]: No    Financial  Payor: Dione Figueredo / Plan: Abena Davis / Product Type: *No Product type* /     Pre-cert required for SNF: Yes    Pharmacy    [address] [place] [address] [address] [facility], [address]  [address] [facility]  Phone: [phone] Fax: [phone]    [facility] [address], [address] [address] [facility]  Phone: [phone] Fax: [phone]      Potential assistance Purchasing Medications:    Does Patient want to participate in local refill/ meds to beds program?:      Meds To Beds General Rules:  1. Can ONLY be done Monday- Friday between 8:30am-5pm  2. Prescription(s) must be in pharmacy by 3pm to be filled same day  3. Copy of patient's insurance/ prescription drug card and patient face sheet must be sent along with the prescription(s)  4. Cost of Rx cannot be added to hospital bill. If financial assistance is needed, please contact unit  or ;  or  CANNOT provide pharmacy voucher for patients co-pays  5. Patients can then  the prescription on their way out of the hospital at discharge, or pharmacy can deliver to the bedside if staff is available. (payment due at time of pick-up or delivery - cash, check, or card accepted)     Able to afford home medications/ co-pay costs: Yes    ADLS  Support Systems:      PT AM-PAC:   /24  OT AM-PAC:   /24    [place]: home with wife  Steps: yes    Plans to RETURN to current housing: Yes  Barriers to RETURNING to current housing: medical clearance    Home Care Information  Currently ACTIVE with [address]: No  Home Care Agency: Not Applicable    Currently ACTIVE with [place] on Aging: No  Passport/ Waiver: No  Passport/ Waiver Services: Not Applicable      Durable Medical Equipment  DME Provider: none  Equipment: none    Home Oxygen and [address] prior to admission: No  [address]: Not Applicable  Other Respiratory Equipment: none    Informed of need to bring portable home O2 tank on day of DISCHARGE for nursing to connect prior to leaving: No  Verbalized agreement/Understanding: No  Person to bring portable tank at discharge: none    Dialysis  Active with HD/PD prior to admission: No  Nephrologist: none    HD Center:  Not Applicable    DISCHARGE PLAN:  Disposition: Home- No Services Needed    Transportation PLAN for discharge: family     Factors facilitating achievement of predicted outcomes: Family support, Motivated, and Cooperative    Barriers to discharge: medical clearance    Additional Case Management Notes: Patient is from home with his wife. He is independent at baseline. Anticipating no needs at discharge at this time.  Will continue to follow. The Plan for Transition of Care is related to the following treatment goals of Unstable angina (HCC) [I20.0]  Abnormal stress test [R94.39]  Exertional chest pain [R07.9]    The Patient and/or patient representative Jo-Ann Langford and his family were provided with a choice of provider and agrees with the discharge plan Yes    Freedom of choice list was provided with basic dialogue that supports the patient's individualized plan of care/goals and shares the quality data associated with the providers.  Yes    Care Transition patient: No    Geena Navarro   Case Management Department  Ph: [phone]   Fax: [phone]

## 2023-01-20 NOTE — PROCEDURES
CARDIAC CATHETERIZATION      Fede Cuba III (2 y.o., male)  1945  [id number]    1/19/2023        INDICATION(s):  Unstable angina, abnormal stress test      PROCEDURE:    Left heart cath  Coronary angiography      Risk, benefits alternatives to cardiac catheterization and possible intervention were discussed with the patient. Informed consent was obtained. The patient was brought to the cath lab and was prepped and draped in the normal sterile technique. 1%Lidocaine was used to anesthetize the site. The right radial  was cannulated and a 6 Fr sheath was inserted under ultrasonographic guidance. Continuous pulse-oximetry and ECG monitoring was performed, and frequent blood pressure assessment was performed. An independent trained observer pushed medications at my direction. We monitored the patient's level of consciousness and vital signs/physiologic status throughout the procedure (see start and stop times below). All catheter were advanced through the sheath over a guide wire and advanced under fluoroscopic guidance into the ascending aorta. We used 5/6 Fr catheters for right and left coronary angiography and to cross the aortic valve. Unable to engage left main with JL3.5, [place] and Tig catheters. LM engaged with EBU3.5 Guide. Left ventricular pressure and pullback across aortic valve was recorded. The sheath was removed and hemostasis was obtained with a TR band. The patient was moved to the floor in stable condition. There were no complications. Sedation: 2 mg Versed, 25 mcg Fentanyl  Sedation start: 1319  Sedation stop: 1355      Estimated blood loss: <10 mL      HEMODYNAMIC / ANGIOGRAPHIC DATA:    /57, 74 /7; Left ventricular end diastolic pressure was 20 mmHg. There was no gradient across the aortic valve upon pullback.   The left main coronary artery arises from the left coronary cusp giving rise to the left anterior descending artery and the left circumflex artery. The left main reveals mild luminal irregularites. The left anterior descending artery arises in normal fashion from left coronary giving rise to diagonals and septal branches. The LAD reveals >80% ostial stenosis, >70% mid stenosis. The ramus reveals >70% ostial, >60-70% proximal stenosis. The left circumflex reveals up 50-60% ostial stenosis; moderate disease in its midsegment. OM2 (last OM) reveals up to 70% stenosis in its proximal segment. The right coronary artery is a right dominant vessel. The right coronary artery ostium reveals up to 30% ostial stenosis. Distal RCA 60% stenosis. Mid PDA up to 50% stenosis.       CONCLUSIONS:  Obstructive Multivessel CAD    RECOMMENDATIONS:    Routine post cardiac catheterization care  Aggressive risk factor modification        Electronically signed by Frank Sears MD on 1/19/2023 at 8:07 PM

## 2023-01-21 DIAGNOSIS — I63.81 LACUNAR INFARCTION (HCC): ICD-10-CM

## 2023-01-21 LAB
ESTIMATED AVERAGE GLUCOSE: 99.7 MG/DL
HBA1C MFR BLD: 5.1 %

## 2023-01-21 PROCEDURE — 6370000000 HC RX 637 (ALT 250 FOR IP): Performed by: INTERNAL MEDICINE

## 2023-01-21 PROCEDURE — 2580000003 HC RX 258: Performed by: INTERNAL MEDICINE

## 2023-01-21 PROCEDURE — 2060000000 HC ICU INTERMEDIATE R&B

## 2023-01-21 RX ADMIN — ASPIRIN 81 MG: 81 TABLET, COATED ORAL at 09:36

## 2023-01-21 RX ADMIN — METOPROLOL TARTRATE 25 MG: 25 TABLET, FILM COATED ORAL at 09:36

## 2023-01-21 RX ADMIN — METOPROLOL TARTRATE 25 MG: 25 TABLET, FILM COATED ORAL at 21:59

## 2023-01-21 RX ADMIN — SODIUM CHLORIDE, PRESERVATIVE FREE 10 ML: 5 INJECTION INTRAVENOUS at 21:59

## 2023-01-21 RX ADMIN — Medication 400 MG: at 21:59

## 2023-01-21 RX ADMIN — SODIUM CHLORIDE, PRESERVATIVE FREE 10 ML: 5 INJECTION INTRAVENOUS at 10:20

## 2023-01-21 RX ADMIN — FAMOTIDINE 20 MG: 20 TABLET, FILM COATED ORAL at 09:36

## 2023-01-21 RX ADMIN — FAMOTIDINE 20 MG: 20 TABLET, FILM COATED ORAL at 21:59

## 2023-01-21 RX ADMIN — Medication 4000 UNITS: at 09:36

## 2023-01-21 RX ADMIN — SODIUM CHLORIDE, PRESERVATIVE FREE 10 ML: 5 INJECTION INTRAVENOUS at 10:19

## 2023-01-21 RX ADMIN — ROSUVASTATIN 10 MG: 10 TABLET, FILM COATED ORAL at 09:36

## 2023-01-21 NOTE — PLAN OF CARE
Problem: Nutrition Deficit:  Goal: Optimize nutritional status  1/21/2023 1037 by Vikki Banerjee RN  Outcome: Progressing   Pt with adequate oral intake, able to eat % of all meals this shift. Problem: Respiratory - Adult  Goal: Achieves optimal ventilation and oxygenation  1/21/2023 1037 by Vikki Banerjee RN  Outcome: Progressing   Pt on room air with O2 sats >93% this shift. Problem: Pain  Goal: Verbalizes/displays adequate comfort level or baseline comfort level  1/21/2023 1037 by Vikki Banerjee RN  Outcome: Progressing   Pt denies pain this shift. Problem: Musculoskeletal - Adult  Goal: Return mobility to safest level of function  1/21/2023 1037 by Vikki Banerjee RN  Outcome: Progressing  Pt UAL, ortho negative. Pt ambulated in the halls this shift, able to complete one lap. Encouraging pt to be up out of bed and continue regular ADLs as much as able.

## 2023-01-21 NOTE — PROGRESS NOTES
Hospitalist Progress Note      PCP: Romario Lyles MD    Date of Admission: 1/18/2023    Chief Complaint: Chest Pain    Hospital Course: 67 yo M w/ PMH of HTN, HLD admitted on 01/18 for evaluation of new exertional chest pain. Cardiology evaluated patient and completed LHC on 01/19. CT surg consulted and plan for CABG 01/26 currently    Subjective: No acute events reported overnight. Remained hemodynamically stable. No chest pain today. No dyspnea, nausea or diaphoresis. Medications:  Reviewed    Infusion Medications    sodium chloride       Scheduled Medications    sodium chloride flush  5-40 mL IntraVENous 2 times per day    aspirin  81 mg Oral Daily    Vitamin D  4,000 Units Oral Daily    [Held by provider] clopidogrel  75 mg Oral Daily    famotidine  20 mg Oral BID    magnesium oxide  400 mg Oral Nightly    rosuvastatin  10 mg Oral Daily    sodium chloride flush  5-40 mL IntraVENous 2 times per day    metoprolol tartrate  25 mg Oral BID     PRN Meds: sodium chloride, sodium chloride flush, perflutren lipid microspheres, sodium chloride flush, sodium chloride, ondansetron **OR** ondansetron, polyethylene glycol, acetaminophen **OR** acetaminophen, nitroGLYCERIN      Intake/Output Summary (Last 24 hours) at 1/21/2023 0820  Last data filed at 1/21/2023 0410  Gross per 24 hour   Intake --   Output 500 ml   Net -500 ml       Physical Exam Performed:    BP (!) 122/59   Pulse 67   Temp 96.9 °F (36.1 °C) (Oral)   Resp 16   Ht 5' 6\" (1.676 m)   Wt 191 lb 5.8 oz (86.8 kg)   SpO2 96%   BMI 30.89 kg/m²     General appearance: No active distress, appears stated age and cooperative. HEENT: Pupils equal, round, and reactive to light. Conjunctivae/corneas clear. Neck: Supple, with full range of motion. No jugular venous distention. Trachea midline. Respiratory:  Normal respiratory effort. Clear to auscultation, bilaterally without Rales/Wheezes/Rhonchi.   Cardiovascular: Regular rate and rhythm with normal S1/S2 without murmurs, rubs or gallops. Abdomen: Soft, non-tender, non-distended with normal bowel sounds. Musculoskeletal: No clubbing, cyanosis or edema bilaterally. Full range of motion without deformity. Skin: Skin color, texture, turgor normal.  No rashes or lesions. Neurologic:  Neurovascularly intact without any focal sensory/motor deficits. Cranial nerves: II-XII intact, grossly non-focal.  Psychiatric: Alert and oriented, thought content appropriate, normal insight  Capillary Refill: Brisk, 3 seconds, normal   Peripheral Pulses: +2 palpable, equal bilaterally       Labs:   Recent Labs     01/18/23  1204 01/19/23  0354   WBC 8.3 6.7   HGB 15.3 14.6   HCT 43.8 42.9    233     Recent Labs     01/18/23  1204 01/19/23  0354   * 135*   K 4.1 3.8   CL 99 100   CO2 22 23   BUN 14 15   CREATININE 0.8 0.7*   CALCIUM 9.3 9.1     Recent Labs     01/20/23  0431   AST 29   ALT 19   BILIDIR <0.2   BILITOT 0.7   ALKPHOS 67     Recent Labs     01/19/23  0910   INR 1.00     Recent Labs     01/18/23  1204 01/18/23  1439   TROPONINI <0.01 <0.01       Urinalysis:      Lab Results   Component Value Date/Time    NITRU Negative 01/19/2023 08:51 PM    BLOODU Negative 01/19/2023 08:51 PM    SPECGRAV 1.010 01/19/2023 08:51 PM    GLUCOSEU Negative 01/19/2023 08:51 PM       Radiology:  VL PRE OP VEIN MAPPING         VL DUP CAROTID BILATERAL         XR CHEST (2 VW)   Final Result   No acute cardiopulmonary abnormality.           IP CONSULT TO CARDIOLOGY  IP CONSULT TO HOSPITALIST  IP CONSULT TO CARDIOLOGY  IP CONSULT TO CARDIOTHORACIC SURGERY    Assessment/Plan:    Active Hospital Problems    Diagnosis     Abnormal stress test [R94.39]      Priority: Medium    Unstable angina ([address] [I20.0]      Priority: Medium     #Chest pain, concerning for unstable angina  #Multivessel CAD  Cardiology consulted, appreciate recs  CT surgery consulted, appreciate recs  --Plan for bypass surgery when Plavix wash-out complete; likely on 01/26  --Hold Plavix until bypass surgery  Continue Asa, statin, and low dose metoprolol as BP tolerates     #HLD with prior myositis on simvastatin:  Cont current dose of Crestor     #H/o TIA and CVA:  [facility]  On asa and statin- continue    DVT Prophylaxis: SCDs  Diet: ADULT DIET;  Regular; Low Fat/Low Chol/High Fiber/2 gm Na  Code Status: Full Code  PT/OT Eval Status: as needed    Dispo - Needs bypass surgery on 01/26, per Cardiology rec due to risk profile from location of coronary lesions will plan to remain inpatient for now until CABG      Harjeet Caro MD

## 2023-01-21 NOTE — PLAN OF CARE
Problem: ABCDS Injury Assessment  Goal: Absence of physical injury  1/21/2023 0646 by Daniel Bruno RN  Outcome: Progressing  Note: Orthostatic vital signs obtained at start of shift - see flowsheet for details. Pt meets criteria for orthostasis. Pt is a Low fall risk. See Pavel Calvin Fall Score and ABCDS Injury Risk assessments. - Screening for Orthostasis AND not a [place] Risk per SUMMERS/ABCDS: Pt bed is in low position, side rails up, call light and belongings are in reach. Fall risk light is on outside pts room. Pt encouraged to call for assistance as needed. Will continue with hourly rounds for PO intake, pain needs, toileting and repositioning as needed. Problem: Nutrition Deficit:  Goal: Optimize nutritional status  1/21/2023 0646 by Daniel Bruno RN  Outcome: Progressing  Note: Pt educated on importance of staying hydrated and having as much intake as possible. Verbalized understanding. Will continue to monitor. Problem: Respiratory - Adult  Goal: Achieves optimal ventilation and oxygenation  1/21/2023 0646 by Daniel Bruno RN  Outcome: Progressing  Note: Pt O2 sats remain stable on room air. No evidence of respiratory distress. Problem: Cardiovascular - Adult  Goal: Maintains optimal cardiac output and hemodynamic stability  1/21/2023 0646 by Daniel Bruno RN  Outcome: Progressing     Problem: Cardiovascular - Adult  Goal: Absence of cardiac dysrhythmias or at baseline  1/21/2023 0646 by Daniel Bruno RN  Outcome: Progressing     Problem: Skin/Tissue Integrity - Adult  Goal: Skin integrity remains intact  1/21/2023 0646 by Daniel Bruno RN  Outcome: Progressing  Note: Turn/reposition patient a minimum of every 2 hours . Assess skin q shift. Educate on benefits of turning/repositioning to maintain skin integrity and prevent pressure ulcers.        Problem: Skin/Tissue Integrity - Adult  Goal: Incisions, wounds, or drain sites healing without S/S of infection  1/21/2023 3671 by Yaneli Loja RN  Outcome: Progressing     Problem: Skin/Tissue Integrity - Adult  Goal: Oral mucous membranes remain intact  1/21/2023 0646 by Yaneli Loja RN  Outcome: Progressing     Problem: Musculoskeletal - Adult  Goal: Return mobility to safest level of function  1/21/2023 0646 by Yaneli Loja RN  Outcome: Progressing     Problem: Musculoskeletal - Adult  Goal: Maintain proper alignment of affected body part  1/21/2023 0646 by Yaneli Loja RN  Outcome: Progressing     Problem: Musculoskeletal - Adult  Goal: Return ADL status to a safe level of function  1/21/2023 0646 by Yaneli Loja RN  Outcome: Progressing     Problem: Gastrointestinal - Adult  Goal: Minimal or absence of nausea and vomiting  1/21/2023 0646 by Yaneli Loaj RN  Outcome: Progressing  Note: Pt educated on importance of calling for antiemetics when nauseous or vomiting. Pt verbalized understanding. Will continue to monitor. Problem: Gastrointestinal - Adult  Goal: Maintains or returns to baseline bowel function  1/21/2023 0646 by Yaneli Loja RN  Outcome: Progressing  Note: Pt remains free from diarrhea this shift. Problem: Gastrointestinal - Adult  Goal: Maintains adequate nutritional intake  1/21/2023 0646 by Yaneli Loja RN  Outcome: Progressing     Problem: Pain  Goal: Verbalizes/displays adequate comfort level or baseline comfort level  1/21/2023 0646 by Yaneli Loja RN  Outcome: Progressing  Note: Pt educated on importance of calling for pain meds when in pain. Pt verbalized understanding. Pain assessment completed at least once per shift. Will continue to monitor.

## 2023-01-22 PROCEDURE — 6370000000 HC RX 637 (ALT 250 FOR IP): Performed by: INTERNAL MEDICINE

## 2023-01-22 PROCEDURE — 2060000000 HC ICU INTERMEDIATE R&B

## 2023-01-22 PROCEDURE — 2580000003 HC RX 258: Performed by: INTERNAL MEDICINE

## 2023-01-22 RX ORDER — CLOPIDOGREL BISULFATE 75 MG/1
TABLET ORAL
Qty: 90 TABLET | Refills: 3 | Status: SHIPPED | OUTPATIENT
Start: 2023-01-22

## 2023-01-22 RX ORDER — ROSUVASTATIN CALCIUM 10 MG/1
TABLET, COATED ORAL
Qty: 90 TABLET | Refills: 3 | Status: SHIPPED | OUTPATIENT
Start: 2023-01-22

## 2023-01-22 RX ADMIN — Medication 4000 UNITS: at 07:56

## 2023-01-22 RX ADMIN — ROSUVASTATIN 10 MG: 10 TABLET, FILM COATED ORAL at 07:56

## 2023-01-22 RX ADMIN — Medication 400 MG: at 20:38

## 2023-01-22 RX ADMIN — METOPROLOL TARTRATE 25 MG: 25 TABLET, FILM COATED ORAL at 07:56

## 2023-01-22 RX ADMIN — SODIUM CHLORIDE, PRESERVATIVE FREE 10 ML: 5 INJECTION INTRAVENOUS at 07:56

## 2023-01-22 RX ADMIN — FAMOTIDINE 20 MG: 20 TABLET, FILM COATED ORAL at 20:36

## 2023-01-22 RX ADMIN — METOPROLOL TARTRATE 25 MG: 25 TABLET, FILM COATED ORAL at 20:36

## 2023-01-22 RX ADMIN — ASPIRIN 81 MG: 81 TABLET, COATED ORAL at 08:15

## 2023-01-22 RX ADMIN — FAMOTIDINE 20 MG: 20 TABLET, FILM COATED ORAL at 07:56

## 2023-01-22 RX ADMIN — SODIUM CHLORIDE, PRESERVATIVE FREE 10 ML: 5 INJECTION INTRAVENOUS at 20:38

## 2023-01-22 NOTE — PROGRESS NOTES
Hospitalist Progress Note      PCP: Tex Bazzi MD    Date of Admission: 1/18/2023    Chief Complaint: Chest Pain    Hospital Course: 67 yo M w/ PMH of HTN, HLD admitted on 01/18 for evaluation of new exertional chest pain. Cardiology evaluated patient and completed LHC on 01/19. CT surg consulted and plan for CABG 01/26 currently    Subjective: No acute clinical changes reported overnight. Remains afebrile and hemodynamically stable. Still no chest pain today. No dyspnea, nausea or diaphoresis. Tolerating PO      Medications:  Reviewed    Infusion Medications    sodium chloride       Scheduled Medications    sodium chloride flush  5-40 mL IntraVENous 2 times per day    aspirin  81 mg Oral Daily    Vitamin D  4,000 Units Oral Daily    [Held by provider] clopidogrel  75 mg Oral Daily    famotidine  20 mg Oral BID    magnesium oxide  400 mg Oral Nightly    rosuvastatin  10 mg Oral Daily    sodium chloride flush  5-40 mL IntraVENous 2 times per day    metoprolol tartrate  25 mg Oral BID     PRN Meds: sodium chloride, sodium chloride flush, perflutren lipid microspheres, sodium chloride flush, sodium chloride, ondansetron **OR** ondansetron, polyethylene glycol, acetaminophen **OR** acetaminophen, nitroGLYCERIN      Intake/Output Summary (Last 24 hours) at 1/22/2023 0805  Last data filed at 1/22/2023 0758  Gross per 24 hour   Intake 540 ml   Output 1680 ml   Net -1140 ml       Physical Exam Performed:    BP (!) 140/85   Pulse 77   Temp 98.7 °F (37.1 °C) (Oral)   Resp 16   Ht 5' 6\" (1.676 m)   Wt 193 lb 5.5 oz (87.7 kg)   SpO2 95%   BMI 31.21 kg/m²     General appearance: No apparent distress, appears stated age and cooperative. Up and walking in room  HEENT: Pupils equal, round, and reactive to light. Conjunctivae/corneas clear. Neck: Supple, with full range of motion. No jugular venous distention. Trachea midline. Respiratory:  Normal respiratory effort.  Clear to auscultation, bilaterally without Rales/Wheezes/Rhonchi. Cardiovascular: Regular rate and rhythm with normal S1/S2 without murmurs, rubs or gallops. Abdomen: Soft, non-tender, non-distended with normal bowel sounds. Musculoskeletal: No clubbing, cyanosis or edema bilaterally. Full range of motion without deformity. Skin: Skin color, texture, turgor normal.  No rashes or lesions. Neurologic:  Neurovascularly intact without any focal sensory/motor deficits. Cranial nerves: II-XII intact, grossly non-focal.  Psychiatric: Alert and oriented, thought content appropriate, normal insight  Capillary Refill: Brisk, 3 seconds, normal   Peripheral Pulses: +2 palpable, equal bilaterally       Labs:   No results for input(s): WBC, HGB, HCT, PLT in the last 72 hours. No results for input(s): NA, K, CL, CO2, BUN, CREATININE, CALCIUM, PHOS in the last 72 hours. Invalid input(s): MAGNES    Recent Labs     01/20/23  0431   AST 29   ALT 19   BILIDIR <0.2   BILITOT 0.7   ALKPHOS 67     Recent Labs     01/19/23  0910   INR 1.00     No results for input(s): CKTOTAL, TROPONINI in the last 72 hours. Urinalysis:      Lab Results   Component Value Date/Time    NITRU Negative 01/19/2023 08:51 PM    BLOODU Negative 01/19/2023 08:51 PM    SPECGRAV 1.010 01/19/2023 08:51 PM    GLUCOSEU Negative 01/19/2023 08:51 PM       Radiology:  VL PRE OP VEIN MAPPING         VL DUP CAROTID BILATERAL         XR CHEST (2 VW)   Final Result   No acute cardiopulmonary abnormality.           IP CONSULT TO CARDIOLOGY  IP CONSULT TO HOSPITALIST  IP CONSULT TO CARDIOLOGY  IP CONSULT TO CARDIOTHORACIC SURGERY    Assessment/Plan:    Active Hospital Problems    Diagnosis     Abnormal stress test [R94.39]      Priority: Medium    Unstable angina ([address] [I20.0]      Priority: Medium     #Chest pain, concerning for unstable angina  #Multivessel CAD  Cardiology consulted, appreciate recs  CT surgery consulted, appreciate recs  --Plan for bypass surgery when Plavix wash-out complete; likely on 01/26  --Hold Plavix until bypass surgery  Continue Asa, statin, and low dose metoprolol as BP tolerates     #HLD with prior myositis on simvastatin:  Cont current dose of statin     #H/o TIA and CVA:  [facility]  Continue ASA and statin    DVT Prophylaxis: SCDs  Diet: ADULT DIET;  Regular; Low Fat/Low Chol/High Fiber/2 gm Na  Code Status: Full Code  PT/OT Eval Status: as needed    Dispo - Needs bypass surgery on 01/26, per Cardiology rec due to risk profile from location of coronary lesions will plan to remain inpatient for now until CABG      Maria Teresa Denney MD

## 2023-01-22 NOTE — PLAN OF CARE
Problem: ABCDS Injury Assessment  Goal: Absence of physical injury  Outcome: Progressing  - Screening for Orthostasis AND not a [place] Risk per SUMMERS/ABCDS: Pt bed is in low position, side rails up, call light and belongings are in reach. Fall risk light is on outside pts room. Pt encouraged to call for assistance as needed. Will continue with hourly rounds for PO intake, pain needs, toileting and repositioning as needed.         Problem: Nutrition Deficit:  Goal: Optimize nutritional status  Outcome: Progressing   Ate all of dinner     Problem: Respiratory - Adult  Goal: Achieves optimal ventilation and oxygenation  Outcome: Progressing   RA Spo2 greater than 92     Problem: Cardiovascular - Adult  Goal: Maintains optimal cardiac output and hemodynamic stability  Outcome: Progressing  Goal: Absence of cardiac dysrhythmias or at baseline  Outcome: Progressing  No arrhythmias, NSR      Problem: Skin/Tissue Integrity - Adult  Goal: Skin integrity remains intact  Outcome: Progressing  Goal: Incisions, wounds, or drain sites healing without S/S of infection  Outcome: Progressing  Goal: Oral mucous membranes remain intact  Outcome: Progressing   Skin intact, radical puncture showing no signs of infection nor hemorrhaging    Problem: Musculoskeletal - Adult  Goal: Return mobility to safest level of function  Outcome: Progressing  Goal: Maintain proper alignment of affected body part  Outcome: Progressing  Goal: Return ADL status to a safe level of function  Outcome: Progressing  Independent

## 2023-01-23 LAB
ANION GAP SERPL CALCULATED.3IONS-SCNC: 8 MMOL/L (ref 3–16)
BASOPHILS ABSOLUTE: 0 K/UL (ref 0–0.2)
BASOPHILS RELATIVE PERCENT: 0.4 %
BUN BLDV-MCNC: 12 MG/DL (ref 7–20)
CALCIUM SERPL-MCNC: 8.9 MG/DL (ref 8.3–10.6)
CHLORIDE BLD-SCNC: 103 MMOL/L (ref 99–110)
CO2: 23 MMOL/L (ref 21–32)
CREAT SERPL-MCNC: 0.8 MG/DL (ref 0.8–1.3)
EOSINOPHILS ABSOLUTE: 0.2 K/UL (ref 0–0.6)
EOSINOPHILS RELATIVE PERCENT: 2.5 %
GFR SERPL CREATININE-BSD FRML MDRD: >60 ML/MIN/{1.73_M2}
GLUCOSE BLD-MCNC: 88 MG/DL (ref 70–99)
HCT VFR BLD CALC: 40.1 % (ref 40.5–52.5)
HEMOGLOBIN: 13.9 G/DL (ref 13.5–17.5)
LYMPHOCYTES ABSOLUTE: 1.7 K/UL (ref 1–5.1)
LYMPHOCYTES RELATIVE PERCENT: 26.2 %
MCH RBC QN AUTO: 30 PG (ref 26–34)
MCHC RBC AUTO-ENTMCNC: 34.7 G/DL (ref 31–36)
MCV RBC AUTO: 86.5 FL (ref 80–100)
MONOCYTES ABSOLUTE: 1.1 K/UL (ref 0–1.3)
MONOCYTES RELATIVE PERCENT: 16.8 %
NEUTROPHILS ABSOLUTE: 3.6 K/UL (ref 1.7–7.7)
NEUTROPHILS RELATIVE PERCENT: 54.1 %
PDW BLD-RTO: 13 % (ref 12.4–15.4)
PLATELET # BLD: 213 K/UL (ref 135–450)
PMV BLD AUTO: 7 FL (ref 5–10.5)
POTASSIUM REFLEX MAGNESIUM: 3.8 MMOL/L (ref 3.5–5.1)
RBC # BLD: 4.63 M/UL (ref 4.2–5.9)
SODIUM BLD-SCNC: 134 MMOL/L (ref 136–145)
WBC # BLD: 6.6 K/UL (ref 4–11)

## 2023-01-23 PROCEDURE — 80048 BASIC METABOLIC PNL TOTAL CA: CPT

## 2023-01-23 PROCEDURE — 85025 COMPLETE CBC W/AUTO DIFF WBC: CPT

## 2023-01-23 PROCEDURE — 2060000000 HC ICU INTERMEDIATE R&B

## 2023-01-23 PROCEDURE — 6370000000 HC RX 637 (ALT 250 FOR IP): Performed by: INTERNAL MEDICINE

## 2023-01-23 PROCEDURE — 36415 COLL VENOUS BLD VENIPUNCTURE: CPT

## 2023-01-23 PROCEDURE — 2580000003 HC RX 258: Performed by: INTERNAL MEDICINE

## 2023-01-23 RX ORDER — PANTOPRAZOLE SODIUM 40 MG/1
40 TABLET, DELAYED RELEASE ORAL
Status: DISCONTINUED | OUTPATIENT
Start: 2023-01-23 | End: 2023-01-26 | Stop reason: ALTCHOICE

## 2023-01-23 RX ORDER — BENZONATATE 100 MG/1
100 CAPSULE ORAL 3 TIMES DAILY PRN
Status: DISCONTINUED | OUTPATIENT
Start: 2023-01-23 | End: 2023-01-31 | Stop reason: HOSPADM

## 2023-01-23 RX ORDER — CALCIUM CARBONATE 200(500)MG
500 TABLET,CHEWABLE ORAL 3 TIMES DAILY PRN
Status: DISCONTINUED | OUTPATIENT
Start: 2023-01-23 | End: 2023-01-31 | Stop reason: HOSPADM

## 2023-01-23 RX ADMIN — Medication 400 MG: at 20:03

## 2023-01-23 RX ADMIN — PANTOPRAZOLE SODIUM 40 MG: 40 TABLET, DELAYED RELEASE ORAL at 15:48

## 2023-01-23 RX ADMIN — SODIUM CHLORIDE, PRESERVATIVE FREE 10 ML: 5 INJECTION INTRAVENOUS at 20:04

## 2023-01-23 RX ADMIN — FAMOTIDINE 20 MG: 20 TABLET, FILM COATED ORAL at 20:02

## 2023-01-23 RX ADMIN — FAMOTIDINE 20 MG: 20 TABLET, FILM COATED ORAL at 09:36

## 2023-01-23 RX ADMIN — ASPIRIN 81 MG: 81 TABLET, COATED ORAL at 09:36

## 2023-01-23 RX ADMIN — SODIUM CHLORIDE, PRESERVATIVE FREE 10 ML: 5 INJECTION INTRAVENOUS at 08:11

## 2023-01-23 RX ADMIN — METOPROLOL TARTRATE 25 MG: 25 TABLET, FILM COATED ORAL at 09:36

## 2023-01-23 RX ADMIN — ROSUVASTATIN 10 MG: 10 TABLET, FILM COATED ORAL at 09:36

## 2023-01-23 RX ADMIN — SODIUM CHLORIDE, PRESERVATIVE FREE 10 ML: 5 INJECTION INTRAVENOUS at 20:02

## 2023-01-23 RX ADMIN — Medication 4000 UNITS: at 09:35

## 2023-01-23 NOTE — PROGRESS NOTES
Progress Note  Hospital Day: 6    Chief Complaint: Preoperative follow up    Mr. Niurka Flynn has no complaints of chest pain     VITAL SIGNS   Temperature:  Current - Temp: 97.6 °F (36.4 °C); Max - Temp  Av.6 °F (36.4 °C)  Min: 96.4 °F (35.8 °C)  Max: 98.9 °F (37.2 °C)    Respiratory Rate : Resp  Av  Min: 13  Max: 29    Pulse Range: Pulse  Av.9  Min: 62  Max: 78    Blood Pressure Range:  Systolic (00PUD), MAX:419 , Min:122 , LYE:516   ; Diastolic (07HWS), DEI:27, Min:68, Max:85    Pulse ox Range: SpO2  Av.3 %  Min: 95 %  Max: 99 %        Admission Weight: Weight: 200 lb 9.9 oz (91 kg)    Current Weight: Patient Vitals for the past 96 hrs (Last 3 readings):   Weight   23 0834 191 lb 5.8 oz (86.8 kg)   23 0833 192 lb 14.4 oz (87.5 kg)   23 0927 193 lb 5.5 oz (87.7 kg)     I/O:   Intake/Output Summary (Last 24 hours) at 2023 1013  Last data filed at 2023 0334  Gross per 24 hour   Intake --   Output 1100 ml   Net -1100 ml     Urine: 400-300-800 ml/shift    LINES/ACCESS:   Peripheral Access: Lt hand Day #: 5    Diet: ADULT DIET;  Regular; Low Fat/Low Chol/High Fiber/2 gm Na    MEDICATIONS:      sodium chloride flush  5-40 mL IntraVENous 2 times per day    aspirin  81 mg Oral Daily    Vitamin D  4,000 Units Oral Daily    [Held by provider] clopidogrel  75 mg Oral Daily    famotidine  20 mg Oral BID    magnesium oxide  400 mg Oral Nightly    rosuvastatin  10 mg Oral Daily    sodium chloride flush  5-40 mL IntraVENous 2 times per day    metoprolol tartrate  25 mg Oral BID     DATA REVIEW:   CBC:   Recent Labs     23  0402   WBC 6.6   HGB 13.9   HCT 40.1*   MCV 86.5        BMP:   Recent Labs     23  0402   *   K 3.8      CO2 23   GLUCOSE 88   BUN 12   CREATININE 0.8   CALCIUM 8.9     Hemoglobin A1C   Date Value Ref Range Status   2023 5.1 See comment % Final     Comment:     Comment:  Diagnosis of Diabetes: > or = 6.5%  Increased risk of diabetes (Prediabetes): 5.7-6.4%  Glycemic Control: Nonpregnant Adults: <7.0%                    Pregnant: <6.0%         PT/INR:   Lab Results   Component Value Date    INR 1.00 01/19/2023    INR 0.96 11/12/2018    PROTIME 13.1 01/19/2023    PROTIME 10.9 11/12/2018     APTT:   Lab Results   Component Value Date    APTT 26.8 01/20/2023     LFT:   Lab Results   Component Value Date/Time    ALKPHOS 67 01/20/2023 04:31 AM    ALT 19 01/20/2023 04:31 AM    AST 29 01/20/2023 04:31 AM    PROT 6.0 01/20/2023 04:31 AM    PROT 6.9 02/05/2013 10:20 AM    BILITOT 0.7 01/20/2023 04:31 AM    BILIDIR <0.2 01/20/2023 04:31 AM    LABALBU 3.5 01/20/2023 04:31 AM     Troponin:   Lab Results   Component Value Date    CKTOTAL 173 03/10/2021    TROPONINI <0.01 01/18/2023     U/A:    Lab Results   Component Value Date/Time    COLORU Yellow 01/19/2023 08:51 PM    NITRU Negative 01/19/2023 08:51 PM    GLUCOSEU Negative 01/19/2023 08:51 PM    KETUA Negative 01/19/2023 08:51 PM    UROBILINOGEN 0.2 01/19/2023 08:51 PM    BILIRUBINUR Negative 01/19/2023 08:51 PM    BILIRUBINUR neg 01/04/2011 09:05 AM     Chest X-Ray 1/18/2023:   No acute abnormality     EKG 1/18/2023: NSR    Echo 1/18/2023:   Left ventricular cavity size is normal.Normal left ventricular wall thickness. Ejection fraction is visually estimated to be 55-60%. No regional wall motion abnormalities are noted. The right ventricle is normal in size and function. Trivial mitral regurgitation. No evidence of tricuspid regurgitation. IVC size is normal (<2.1cm) and collapses > 50% with respiration consistent with normal RA pressure (3mmHg). Unable to estimate pulmonary artery pressure secondary to incomplete TR jet  envelope. Stress Echo 8/3/2022:  ABNORMAL MODERATE RISK STRESS TEST. 1mm horizontal ST depression during recovery. The stress ECG showed no ischemia at target heart rate. Chun Score of 2 ( Moderate Risk ).  Following stress there was hypokinesis of the apical septal and mid anteroseptal wall     [facility] 1/19/2023:  /57, 74 /7; Left ventricular end diastolic pressure was 20 mmHg. There was no gradient across the aortic valve upon pullback. The left main coronary artery arises from the left coronary cusp giving rise to the left anterior descending artery and the left circumflex artery. The left main reveals mild luminal irregularites. The left anterior descending artery arises in normal fashion from left coronary giving rise to diagonals and septal branches. The LAD reveals >80% ostial stenosis, >70% mid stenosis. The ramus reveals >70% ostial, >60-70% proximal stenosis. The left circumflex reveals up 50-60% ostial stenosis; moderate disease in its midsegment. OM2 (last OM) reveals up to 70% stenosis in its proximal segment. The right coronary artery is a right dominant vessel. The right coronary artery ostium reveals up to 30% ostial stenosis. Distal RCA 60% stenosis. Mid PDA up to 50% stenosis. Carotid Duplex 1/20/2023:  There is <50% stenosis of the bilateral internal carotid arteries. There is bilateral antegrade vertebral flow. LE vein mapping 1/20/2023:  No evidence of superficial vein thrombosis bilat.   Superficial - Great Saphenous Vein   Right   Left   Location   Diameter Depth   Diameter Depth   Sapheno Femoral Junction   3.3     4.6     GSV High Thigh   4.9     3.7     GSV Mid Thigh   3.9     4.5     GSV Low Thigh   3     3.2     GSV Knee   3.1     2.1     GSV High Calf   2.2     2.4     GSV Mid Calf   3.3     1.5     GSV Low Calf   2.4     1.8     GSV Ankle   3.4     2       Superficial - Lesser Saphenous Vein   Right   Left   Location   Diameter Depth   Diameter Depth   SSV High Calf   1.2     1         ASSESSMENT:   Patient Active Problem List:     Anxiety     Hyperlipidemia     Diverticulosis of colon     Restless leg syndrome     Benign prostatic hyperplasia with urinary obstruction     Lumbar spinal stenosis     Hearing impairment Allergic rhinitis     GERD (gastroesophageal reflux disease)     Spondylolisthesis at L4-L5 level     Fatty liver     Atherosclerosis of aorta (HCC)     Primary osteoarthritis of both knees     Carotid stenosis, bilateral     Functional diarrhea     ED (erectile dysfunction)     Primary osteoarthritis of left knee     Migraine without aura and without status migrainosus, not intractable     Osteoarthritis of left knee     Status post total left knee replacement     Lacunar infarction (HCC)     Numbness and tingling in right hand     Lumbosacral radiculopathy     Age-related osteoporosis without current pathological fracture     Closed head injury     Concussion without loss of consciousness     Right hip pain     Vitamin D deficiency     Medication monitoring encounter     TIA (transient ischemic attack)     Unstable angina (HCC)     Abnormal stress test    Neuro: awake, alert and oriented x 3  CV:   Sinus  Pulm:  normal work of breathing  Abd:  soft, non-tender; bowel sounds normal  Ext: warm    PLAN:   Further discussed pt in detail with patient and daughter. All questions answered and is agreeable to proceed with surgery. Will have him start using his IS since he has a non-productive cough. CABG this week when Plavix has been metabolized.         Lisa Kearns, [address] [facility] pager  1/23/2023  10:13 AM

## 2023-01-23 NOTE — PLAN OF CARE
Problem: ABCDS Injury Assessment  Goal: Absence of physical injury  Outcome: Progressing  - Screening for Orthostasis AND not a [place] Risk per SUMMERS/ABCDS: Pt bed is in low position, side rails up, call light and belongings are in reach. Fall risk light is on outside pts room. Pt encouraged to call for assistance as needed. Will continue with hourly rounds for PO intake, pain needs, toileting and repositioning as needed.         Problem: Nutrition Deficit:  Goal: Optimize nutritional status  Outcome: Progressing   Ate all of dinner     Problem: Respiratory - Adult  Goal: Achieves optimal ventilation and oxygenation  Outcome: Progressing   RA Spo2 greater than 92     Problem: Cardiovascular - Adult  Goal: Maintains optimal cardiac output and hemodynamic stability  Outcome: Progressing  Goal: Absence of cardiac dysrhythmias or at baseline  Outcome: Progressing  No arrhythmias, NSR      Problem: Skin/Tissue Integrity - Adult  Goal: Skin integrity remains intact  Outcome: Progressing  Goal: Incisions, wounds, or drain sites healing without S/S of infection  Outcome: Progressing  Goal: Oral mucous membranes remain intact  Outcome: Progressing   Skin intact, radical puncture showing no signs of infection nor hemorrhaging    Problem: Musculoskeletal - Adult  Goal: Return mobility to safest level of function  Outcome: Progressing  Goal: Maintain proper alignment of affected body part  Outcome: Progressing  Goal: Return ADL status to a safe level of function  Outcome: Progressing  Independent

## 2023-01-23 NOTE — PLAN OF CARE
Problem: Nutrition Deficit:  Goal: Optimize nutritional status  Outcome: Progressing  Note: Pt reports adequate appetite. See intake flowsheet. Problem: Respiratory - Adult  Goal: Achieves optimal ventilation and oxygenation  Outcome: Progressing  Note: Pt remains on room air, sating appropriately.       Problem: Cardiovascular - Adult  Goal: Maintains optimal cardiac output and hemodynamic stability  Outcome: Progressing     Problem: Musculoskeletal - Adult  Goal: Return mobility to safest level of function  Outcome: Progressing     Problem: Gastrointestinal - Adult  Goal: Minimal or absence of nausea and vomiting  Outcome: Progressing     Problem: Pain  Goal: Verbalizes/displays adequate comfort level or baseline comfort level  Outcome: Progressing  Note: No reports of pain during this shift

## 2023-01-23 NOTE — PROGRESS NOTES
Hospitalist Progress Note      PCP: Debra Navarrete MD    Date of Admission: 1/18/2023    Chief Complaint: Chest Pain    Hospital Course: 69 yo M w/ PMH of HTN, HLD admitted on 01/18 for evaluation of new exertional chest pain. Cardiology evaluated patient and completed LHC on 01/19. CT surg consulted and plan for CABG 01/26 currently    Subjective:   Says he is having cough, some burning pain which feels like reflux  Cough seem to be worse at night  No N/V, she is takes a medication for reflux at home but doesn't know the name  Also had some chest pressure like he did last week      Medications:  Reviewed    Infusion Medications    sodium chloride       Scheduled Medications    sodium chloride flush  5-40 mL IntraVENous 2 times per day    aspirin  81 mg Oral Daily    Vitamin D  4,000 Units Oral Daily    [Held by provider] clopidogrel  75 mg Oral Daily    famotidine  20 mg Oral BID    magnesium oxide  400 mg Oral Nightly    rosuvastatin  10 mg Oral Daily    sodium chloride flush  5-40 mL IntraVENous 2 times per day    metoprolol tartrate  25 mg Oral BID     PRN Meds: sodium chloride, sodium chloride flush, perflutren lipid microspheres, sodium chloride flush, sodium chloride, ondansetron **OR** ondansetron, polyethylene glycol, acetaminophen **OR** acetaminophen, nitroGLYCERIN      Intake/Output Summary (Last 24 hours) at 1/23/2023 0851  Last data filed at 1/23/2023 0334  Gross per 24 hour   Intake --   Output 1100 ml   Net -1100 ml       Physical Exam Performed:    /72   Pulse 72   Temp 97.3 °F (36.3 °C) (Oral)   Resp 17   Ht 5' 6\" (1.676 m)   Wt 191 lb 5.8 oz (86.8 kg)   SpO2 96%   BMI 30.89 kg/m²     General appearance: No apparent distress, appears stated age and cooperative. Up and walking in room  HEENT: Pupils equal, round, and reactive to light. Conjunctivae/corneas clear. Neck: Supple, with full range of motion. No jugular venous distention. Trachea midline.   Respiratory:  Normal respiratory effort. Clear to auscultation, bilaterally without Rales/Wheezes/Rhonchi. Cardiovascular: Regular rate and rhythm with normal S1/S2 without murmurs, rubs or gallops. Abdomen: Soft, non-tender, non-distended with normal bowel sounds. Musculoskeletal: No clubbing, cyanosis or edema bilaterally. Full range of motion without deformity. Skin: Skin color, texture, turgor normal.  No rashes or lesions. Neurologic:  Neurovascularly intact without any focal sensory/motor deficits. Cranial nerves: II-XII intact, grossly non-focal.  Psychiatric: Alert and oriented, thought content appropriate, normal insight  Capillary Refill: Brisk, 3 seconds, normal   Peripheral Pulses: +2 palpable, equal bilaterally       Labs:   Recent Labs     01/23/23  0402   WBC 6.6   HGB 13.9   HCT 40.1*          Recent Labs     01/23/23  0402   *   K 3.8      CO2 23   BUN 12   CREATININE 0.8   CALCIUM 8.9       No results for input(s): AST, ALT, BILIDIR, BILITOT, ALKPHOS in the last 72 hours. No results for input(s): INR in the last 72 hours. No results for input(s): Adina Waverly in the last 72 hours. Urinalysis:      Lab Results   Component Value Date/Time    NITRU Negative 01/19/2023 08:51 PM    BLOODU Negative 01/19/2023 08:51 PM    SPECGRAV 1.010 01/19/2023 08:51 PM    GLUCOSEU Negative 01/19/2023 08:51 PM       Radiology:  VL PRE OP VEIN MAPPING   Final Result      VL DUP CAROTID BILATERAL   Final Result      XR CHEST (2 VW)   Final Result   No acute cardiopulmonary abnormality. IP CONSULT TO CARDIOLOGY  IP CONSULT TO HOSPITALIST  IP CONSULT TO CARDIOLOGY  IP CONSULT TO CARDIOTHORACIC SURGERY    [facility] by Dr. Riddle Sensing 01/19 --   HEMODYNAMIC / ANGIOGRAPHIC DATA:    /57, 74 /7; Left ventricular end diastolic pressure was 20 mmHg. There was no gradient across the aortic valve upon pullback.   The left main coronary artery arises from the left coronary cusp giving rise to the left anterior descending artery and the left circumflex artery. The left main reveals mild luminal irregularites. The left anterior descending artery arises in normal fashion from left coronary giving rise to diagonals and septal branches. The LAD reveals >80% ostial stenosis, >70% mid stenosis. The ramus reveals >70% ostial, >60-70% proximal stenosis. The left circumflex reveals up 50-60% ostial stenosis; moderate disease in its midsegment. OM2 (last OM) reveals up to 70% stenosis in its proximal segment. The right coronary artery is a right dominant vessel. The right coronary artery ostium reveals up to 30% ostial stenosis. Distal RCA 60% stenosis. Mid PDA up to 50% stenosis. CONCLUSIONS:  Obstructive Multivessel CAD     RECOMMENDATIONS:    Routine post cardiac catheterization care  Aggressive risk factor modification       Assessment/Plan:    Active Hospital Problems    Diagnosis     Abnormal stress test [R94.39]      Priority: Medium    Unstable angina (HCC) [I20.0]      Priority: Medium     #Chest pain, concerning for unstable angina, s/p C Multivessel CAD  Had episode of chest pressure this am similar to last week  If continue to have chest pressure consider low dose Nitro patch  CT surgery consulted, Plan for bypass surgery when Plavix wash-out complete; likely on 01/26  Hold Plavix until bypass surgery  Continue Asa, statin, and low dose metoprolol as BP tolerates    #Acid reflux, burning discomfort with cough worse at night  Already on pepcid, add Protonix  Tums as needed     #HLD with prior myositis on simvastatin:  Cont current dose of statin     #H/o TIA and CVA:  [facility]  Continue ASA and statin    DVT Prophylaxis: SCDs  Diet: ADULT DIET;  Regular; Low Fat/Low Chol/High Fiber/2 gm Na  Code Status: Full Code  PT/OT Eval Status: as needed    Dispo - Needs bypass surgery on 01/26, per Cardiology rec due to risk profile from location of coronary lesions will plan to remain inpatient for now until CABG      Colton Payne MD  Internal Medicine Hospitalist

## 2023-01-24 LAB
A/G RATIO: 1.5 (ref 1.1–2.2)
ALBUMIN SERPL-MCNC: 3.9 G/DL (ref 3.4–5)
ALP BLD-CCNC: 77 U/L (ref 40–129)
ALT SERPL-CCNC: 19 U/L (ref 10–40)
ANION GAP SERPL CALCULATED.3IONS-SCNC: 9 MMOL/L (ref 3–16)
AST SERPL-CCNC: 23 U/L (ref 15–37)
BASOPHILS ABSOLUTE: 0 K/UL (ref 0–0.2)
BASOPHILS RELATIVE PERCENT: 0.3 %
BILIRUB SERPL-MCNC: 1 MG/DL (ref 0–1)
BUN BLDV-MCNC: 10 MG/DL (ref 7–20)
CALCIUM SERPL-MCNC: 8.9 MG/DL (ref 8.3–10.6)
CHLORIDE BLD-SCNC: 103 MMOL/L (ref 99–110)
CO2: 24 MMOL/L (ref 21–32)
CREAT SERPL-MCNC: 0.8 MG/DL (ref 0.8–1.3)
EOSINOPHILS ABSOLUTE: 0.2 K/UL (ref 0–0.6)
EOSINOPHILS RELATIVE PERCENT: 2.9 %
GFR SERPL CREATININE-BSD FRML MDRD: >60 ML/MIN/{1.73_M2}
GLUCOSE BLD-MCNC: 114 MG/DL (ref 70–99)
HCT VFR BLD CALC: 39 % (ref 40.5–52.5)
HEMOGLOBIN: 14 G/DL (ref 13.5–17.5)
LYMPHOCYTES ABSOLUTE: 1.4 K/UL (ref 1–5.1)
LYMPHOCYTES RELATIVE PERCENT: 20.3 %
MCH RBC QN AUTO: 30.6 PG (ref 26–34)
MCHC RBC AUTO-ENTMCNC: 35.8 G/DL (ref 31–36)
MCV RBC AUTO: 85.6 FL (ref 80–100)
MONOCYTES ABSOLUTE: 1 K/UL (ref 0–1.3)
MONOCYTES RELATIVE PERCENT: 14.4 %
NEUTROPHILS ABSOLUTE: 4.2 K/UL (ref 1.7–7.7)
NEUTROPHILS RELATIVE PERCENT: 62.1 %
PDW BLD-RTO: 12.7 % (ref 12.4–15.4)
PLATELET # BLD: 201 K/UL (ref 135–450)
PMV BLD AUTO: 6.8 FL (ref 5–10.5)
POTASSIUM REFLEX MAGNESIUM: 4 MMOL/L (ref 3.5–5.1)
RBC # BLD: 4.56 M/UL (ref 4.2–5.9)
SODIUM BLD-SCNC: 136 MMOL/L (ref 136–145)
TOTAL PROTEIN: 6.5 G/DL (ref 6.4–8.2)
WBC # BLD: 6.8 K/UL (ref 4–11)

## 2023-01-24 PROCEDURE — 2580000003 HC RX 258: Performed by: INTERNAL MEDICINE

## 2023-01-24 PROCEDURE — 6370000000 HC RX 637 (ALT 250 FOR IP): Performed by: INTERNAL MEDICINE

## 2023-01-24 PROCEDURE — 2060000000 HC ICU INTERMEDIATE R&B

## 2023-01-24 PROCEDURE — 80053 COMPREHEN METABOLIC PANEL: CPT

## 2023-01-24 PROCEDURE — 85025 COMPLETE CBC W/AUTO DIFF WBC: CPT

## 2023-01-24 PROCEDURE — 36415 COLL VENOUS BLD VENIPUNCTURE: CPT

## 2023-01-24 RX ORDER — FLUTICASONE PROPIONATE 50 MCG
1 SPRAY, SUSPENSION (ML) NASAL DAILY PRN
Status: DISCONTINUED | OUTPATIENT
Start: 2023-01-24 | End: 2023-01-31 | Stop reason: HOSPADM

## 2023-01-24 RX ADMIN — PANTOPRAZOLE SODIUM 40 MG: 40 TABLET, DELAYED RELEASE ORAL at 08:28

## 2023-01-24 RX ADMIN — METOPROLOL TARTRATE 25 MG: 25 TABLET, FILM COATED ORAL at 19:34

## 2023-01-24 RX ADMIN — FLUTICASONE PROPIONATE 1 SPRAY: 50 SPRAY, METERED NASAL at 19:10

## 2023-01-24 RX ADMIN — METOPROLOL TARTRATE 25 MG: 25 TABLET, FILM COATED ORAL at 08:26

## 2023-01-24 RX ADMIN — SODIUM CHLORIDE, PRESERVATIVE FREE 10 ML: 5 INJECTION INTRAVENOUS at 08:28

## 2023-01-24 RX ADMIN — Medication 4000 UNITS: at 08:27

## 2023-01-24 RX ADMIN — ACETAMINOPHEN 650 MG: 325 TABLET ORAL at 20:53

## 2023-01-24 RX ADMIN — ACETAMINOPHEN 650 MG: 325 TABLET ORAL at 08:26

## 2023-01-24 RX ADMIN — SODIUM CHLORIDE, PRESERVATIVE FREE 10 ML: 5 INJECTION INTRAVENOUS at 19:33

## 2023-01-24 RX ADMIN — Medication 400 MG: at 19:34

## 2023-01-24 RX ADMIN — SODIUM CHLORIDE, PRESERVATIVE FREE 10 ML: 5 INJECTION INTRAVENOUS at 19:35

## 2023-01-24 RX ADMIN — ROSUVASTATIN 10 MG: 10 TABLET, FILM COATED ORAL at 08:26

## 2023-01-24 RX ADMIN — BENZONATATE 100 MG: 100 CAPSULE ORAL at 20:53

## 2023-01-24 RX ADMIN — ASPIRIN 81 MG: 81 TABLET, COATED ORAL at 08:26

## 2023-01-24 RX ADMIN — FAMOTIDINE 20 MG: 20 TABLET, FILM COATED ORAL at 08:26

## 2023-01-24 RX ADMIN — FAMOTIDINE 20 MG: 20 TABLET, FILM COATED ORAL at 19:33

## 2023-01-24 ASSESSMENT — PAIN DESCRIPTION - FREQUENCY: FREQUENCY: INTERMITTENT

## 2023-01-24 ASSESSMENT — PAIN - FUNCTIONAL ASSESSMENT: PAIN_FUNCTIONAL_ASSESSMENT: ACTIVITIES ARE NOT PREVENTED

## 2023-01-24 ASSESSMENT — PAIN DESCRIPTION - ORIENTATION: ORIENTATION: MID

## 2023-01-24 ASSESSMENT — PAIN SCALES - GENERAL
PAINLEVEL_OUTOF10: 3
PAINLEVEL_OUTOF10: 2

## 2023-01-24 ASSESSMENT — PAIN DESCRIPTION - PAIN TYPE: TYPE: ACUTE PAIN

## 2023-01-24 ASSESSMENT — PAIN DESCRIPTION - ONSET: ONSET: GRADUAL

## 2023-01-24 ASSESSMENT — PAIN DESCRIPTION - DESCRIPTORS: DESCRIPTORS: ACHING

## 2023-01-24 ASSESSMENT — PAIN DESCRIPTION - LOCATION
LOCATION: HEAD
LOCATION: HEAD

## 2023-01-24 NOTE — PROGRESS NOTES
Unsuccessful attempts to place new PIV made. Old IV still in place, unable to use. Will pass onto day shift to attempt as well. 0630: Successful PIV placement by Brody.  LDA inserted

## 2023-01-24 NOTE — PLAN OF CARE
Problem: ABCDS Injury Assessment  Goal: Absence of physical injury  Outcome: Progressing     Problem: Nutrition Deficit:  Goal: Optimize nutritional status  Outcome: Progressing     Problem: Respiratory - Adult  Goal: Achieves optimal ventilation and oxygenation  Outcome: Progressing

## 2023-01-24 NOTE — PROGRESS NOTES
Hospitalist Progress Note      PCP: Mary Vo MD    Date of Admission: 1/18/2023    Chief Complaint: Chest Pain    Hospital Course: 67 yo M w/ PMH of HTN, HLD admitted on 01/18 for evaluation of new exertional chest pain. Cardiology evaluated patient and completed LHC on 01/19. CT surg consulted and plan for CABG 01/26 currently    Subjective:   Complains of sinus congestion  Tells me that he uses Flonase inhaler as well as a Hamilton pot at home  Cough is improving since he was started on Protonix      Medications:  Reviewed    Infusion Medications    sodium chloride       Scheduled Medications    pantoprazole  40 mg Oral QAM AC    sodium chloride flush  5-40 mL IntraVENous 2 times per day    aspirin  81 mg Oral Daily    Vitamin D  4,000 Units Oral Daily    famotidine  20 mg Oral BID    magnesium oxide  400 mg Oral Nightly    rosuvastatin  10 mg Oral Daily    sodium chloride flush  5-40 mL IntraVENous 2 times per day    metoprolol tartrate  25 mg Oral BID     PRN Meds: fluticasone, benzonatate, calcium carbonate, sodium chloride, sodium chloride flush, perflutren lipid microspheres, sodium chloride flush, sodium chloride, ondansetron **OR** ondansetron, polyethylene glycol, acetaminophen **OR** acetaminophen, nitroGLYCERIN      Intake/Output Summary (Last 24 hours) at 1/24/2023 1238  Last data filed at 1/24/2023 0734  Gross per 24 hour   Intake 260 ml   Output 1850 ml   Net -1590 ml       Physical Exam Performed:    /73   Pulse 63   Temp 97.4 °F (36.3 °C) (Oral)   Resp 18   Ht 5' 6\" (1.676 m)   Wt 191 lb 5.8 oz (86.8 kg)   SpO2 98%   BMI 30.89 kg/m²     General appearance: No apparent distress, appears stated age and cooperative. Up and walking in room  HEENT: Pupils equal, round, and reactive to light. Conjunctivae/corneas clear. Neck: Supple, with full range of motion. No jugular venous distention. Trachea midline. Respiratory:  Normal respiratory effort.  Clear to auscultation, bilaterally without Rales/Wheezes/Rhonchi. Cardiovascular: Regular rate and rhythm with normal S1/S2 without murmurs, rubs or gallops. Abdomen: Soft, non-tender, non-distended with normal bowel sounds. Musculoskeletal: No clubbing, cyanosis or edema bilaterally. Full range of motion without deformity. Skin: Skin color, texture, turgor normal.  No rashes or lesions. Neurologic:  Neurovascularly intact without any focal sensory/motor deficits. Cranial nerves: II-XII intact, grossly non-focal.  Psychiatric: Alert and oriented, thought content appropriate, normal insight  Capillary Refill: Brisk, 3 seconds, normal   Peripheral Pulses: +2 palpable, equal bilaterally       Labs:   Recent Labs     01/23/23 0402 01/24/23 0403   WBC 6.6 6.8   HGB 13.9 14.0   HCT 40.1* 39.0*    201       Recent Labs     01/23/23 0402 01/24/23 0403   * 136   K 3.8 4.0    103   CO2 23 24   BUN 12 10   CREATININE 0.8 0.8   CALCIUM 8.9 8.9       Recent Labs     01/24/23 0403   AST 23   ALT 19   BILITOT 1.0   ALKPHOS 77       No results for input(s): INR in the last 72 hours. No results for input(s): Kim Pal in the last 72 hours. Urinalysis:      Lab Results   Component Value Date/Time    NITRU Negative 01/19/2023 08:51 PM    BLOODU Negative 01/19/2023 08:51 PM    SPECGRAV 1.010 01/19/2023 08:51 PM    GLUCOSEU Negative 01/19/2023 08:51 PM       Radiology:  VL PRE OP VEIN MAPPING   Final Result      VL DUP CAROTID BILATERAL   Final Result      XR CHEST (2 VW)   Final Result   No acute cardiopulmonary abnormality. IP CONSULT TO CARDIOLOGY  IP CONSULT TO HOSPITALIST  IP CONSULT TO CARDIOLOGY  IP CONSULT TO CARDIOTHORACIC SURGERY    [facility] by Dr. Benjamin Stone 01/19 --   HEMODYNAMIC / ANGIOGRAPHIC DATA:    /57, 74 /7; Left ventricular end diastolic pressure was 20 mmHg. There was no gradient across the aortic valve upon pullback.   The left main coronary artery arises from the left coronary cusp giving rise to the left anterior descending artery and the left circumflex artery. The left main reveals mild luminal irregularites. The left anterior descending artery arises in normal fashion from left coronary giving rise to diagonals and septal branches. The LAD reveals >80% ostial stenosis, >70% mid stenosis. The ramus reveals >70% ostial, >60-70% proximal stenosis. The left circumflex reveals up 50-60% ostial stenosis; moderate disease in its midsegment. OM2 (last OM) reveals up to 70% stenosis in its proximal segment. The right coronary artery is a right dominant vessel. The right coronary artery ostium reveals up to 30% ostial stenosis. Distal RCA 60% stenosis. Mid PDA up to 50% stenosis. CONCLUSIONS:  Obstructive Multivessel CAD     RECOMMENDATIONS:    Routine post cardiac catheterization care  Aggressive risk factor modification       Assessment/Plan:    Active Hospital Problems    Diagnosis     Abnormal stress test [R94.39]      Priority: Medium    Unstable angina ([address] [I20.0]      Priority: Medium     #Chest pain, concerning for unstable angina, s/p [facility] Multivessel CAD  Had episode of chest pressure this am similar to last week  If continue to have chest pressure consider low dose Nitro patch  CT surgery consulted, Plan for bypass surgery when Plavix wash-out complete; likely on 01/26  Hold Plavix until bypass surgery  Continue Asa, statin, and low dose metoprolol as BP tolerates    #Acid reflux, burning discomfort with cough worse at night. Already on pepcid, continue Protonix  Tums as needed     #Sinus congestion, continue Ocean Spray, add Flonase    #HLD with prior myositis on simvastatin:  Cont current dose of statin     #H/o TIA and CVA:  [facility]  Continue ASA and statin    DVT Prophylaxis: SCDs  Diet: ADULT DIET;  Regular; Low Fat/Low Chol/High Fiber/2 gm Na  Code Status: Full Code  PT/OT Eval Status: as needed    Dispo - Needs bypass surgery on 01/26, per Cardiology rec due to risk profile from location of coronary lesions will plan to remain inpatient for now until CABG      Mariah Christensen MD  Internal Medicine Hospitalist

## 2023-01-24 NOTE — PLAN OF CARE
Problem: Cardiovascular - Adult  Goal: Maintains optimal cardiac output and hemodynamic stability  Note: Pt maintained stable vitals throughout shift. Problem: Safety - Adult  Goal: Free from fall injury  Note: Pt remains free from falls during shift. Safety measures in place.

## 2023-01-24 NOTE — PROGRESS NOTES
Patient alert and oriented x4. Complains of headache in AM. Tylenol administered with relief. Ambulating independently within room. Tolerating well. Plans for CABG on Thursday. All needs met at this time. Will continue to monitor.

## 2023-01-24 NOTE — PROGRESS NOTES
Progress Note    Severe 3V CAD with Unstable Angina and Preserved EF of 55-60% by TTE. Denies CP/SOB. Vital Signs:                                                 BP (!) 142/79   Pulse 77   Temp 97.4 °F (36.3 °C) (Oral)   Resp 18   Ht 5' 6\" (1.676 m)   Wt 191 lb 5.8 oz (86.8 kg)   SpO2 97%   BMI 30.89 kg/m²           Admission Weight: 200 lb 9.9 oz (91 kg)       I/O:    Intake/Output Summary (Last 24 hours) at 1/24/2023 0923  Last data filed at 1/24/2023 0320  Gross per 24 hour   Intake 20 ml   Output 1850 ml   Net -1830 ml     LUNGS: CTA Bilat  HEART: RRR  ABD: Soft. Non-tender. BS + x 4 quads. EXT: No edema    Lab:  CBC:   Recent Labs     01/23/23  0402 01/24/23  0403   WBC 6.6 6.8   HGB 13.9 14.0   HCT 40.1* 39.0*   MCV 86.5 85.6    201     BMP:   Recent Labs     01/23/23  0402 01/24/23  0403   * 136   K 3.8 4.0    103   CO2 23 24   BUN 12 10   CREATININE 0.8 0.8   CALCIUM 8.9 8.9     ABG:   Ionized Calcium (mmol/L)    Cardiac Enzymes: No results for input(s): CKTOTAL, CKMB, CKMBINDEX, TROPONINI in the last 72 hours. PT/INR: No results for input(s): PROTIME, INR in the last 72 hours. APTT: No results for input(s): APTT in the last 72 hours. [facility]: 1/19/23  HEMODYNAMIC / ANGIOGRAPHIC DATA:    /57, 74 /7; Left ventricular end diastolic pressure was 20 mmHg. There was no gradient across the aortic valve upon pullback. The left main coronary artery arises from the left coronary cusp giving rise to the left anterior descending artery and the left circumflex artery. The left main reveals mild luminal irregularites. The left anterior descending artery arises in normal fashion from left coronary giving rise to diagonals and septal branches. The LAD reveals >80% ostial stenosis, >70% mid stenosis. The ramus reveals >70% ostial, >60-70% proximal stenosis. The left circumflex reveals up 50-60% ostial stenosis; moderate disease in its midsegment.  OM2 (last OM) reveals up to 70% stenosis in its proximal segment. The right coronary artery is a right dominant vessel. The right coronary artery ostium reveals up to 30% ostial stenosis. Distal RCA 60% stenosis. Mid PDA up to 50% stenosis. CONCLUSIONS:  Obstructive Multivessel CAD      TTE: 1/18/23   Summary   Left ventricular cavity size is normal.Normal left ventricular wall   thickness. Ejection fraction is visually estimated to be 55-60%. No regional wall motion   abnormalities are noted. The right ventricle is normal in size and function. Trivial mitral regurgitation. No evidence of tricuspid regurgitation. IVC size is normal (<2.1cm) and collapses > 50% with respiration consistent   with normal RA pressure (3mmHg). Unable to estimate pulmonary artery pressure secondary to incomplete TR jet   envelope. A/P:   Severe 3V CAD with preserved EF of 55-60% by TTE  Unstable Angina with abnormal stress test  HTN  HLD  Plan for CABG tentatively scheduled for Thursday with Dr. Debra Diallo.      PATI Templeton  1/24/2023  9:23 AM

## 2023-01-24 NOTE — PROGRESS NOTES
01/24/23 1024   Encounter Summary   Encounter Overview/Reason  Initial Encounter   Service Provided For: Patient   Referral/Consult From: Patient   Support System Spouse; Children   Last Encounter  01/24/23  (prayer, sos,  cm)   Complexity of Encounter High   Begin Time 1010   End Time  1026   Total Time Calculated 16 min   Encounter    Type Follow up   Rituals, Rites and Sacraments   Type Sacrament of Sick; Anointing   Assessment/Intervention/Outcome   Assessment Coping   Intervention Active listening;Explored Coping Skills/Resources;Guided Imagery, Healing touch, etc.   Outcome Coping;Encouraged;Expressed feelings, needs, and concerns;Expressed Gratitude   Electronically signed by Kendra Soto on 1/24/2023 at 10:27 AM

## 2023-01-25 ENCOUNTER — APPOINTMENT (OUTPATIENT)
Dept: CT IMAGING | Age: 78
DRG: 234 | End: 2023-01-25
Payer: MEDICARE

## 2023-01-25 ENCOUNTER — ANESTHESIA EVENT (OUTPATIENT)
Dept: OPERATING ROOM | Age: 78
End: 2023-01-25
Payer: MEDICARE

## 2023-01-25 LAB
A/G RATIO: 1.4 (ref 1.1–2.2)
ABO/RH: NORMAL
ALBUMIN SERPL-MCNC: 3.6 G/DL (ref 3.4–5)
ALP BLD-CCNC: 79 U/L (ref 40–129)
ALT SERPL-CCNC: 17 U/L (ref 10–40)
ANION GAP SERPL CALCULATED.3IONS-SCNC: 8 MMOL/L (ref 3–16)
ANTIBODY SCREEN: NORMAL
AST SERPL-CCNC: 20 U/L (ref 15–37)
BASOPHILS ABSOLUTE: 0 K/UL (ref 0–0.2)
BASOPHILS RELATIVE PERCENT: 0.6 %
BILIRUB SERPL-MCNC: 0.9 MG/DL (ref 0–1)
BUN BLDV-MCNC: 11 MG/DL (ref 7–20)
CALCIUM SERPL-MCNC: 8.9 MG/DL (ref 8.3–10.6)
CHLORIDE BLD-SCNC: 102 MMOL/L (ref 99–110)
CO2: 24 MMOL/L (ref 21–32)
CREAT SERPL-MCNC: 0.8 MG/DL (ref 0.8–1.3)
EOSINOPHILS ABSOLUTE: 0.2 K/UL (ref 0–0.6)
EOSINOPHILS RELATIVE PERCENT: 3.2 %
GFR SERPL CREATININE-BSD FRML MDRD: >60 ML/MIN/{1.73_M2}
GLUCOSE BLD-MCNC: 102 MG/DL (ref 70–99)
HCT VFR BLD CALC: 39.4 % (ref 40.5–52.5)
HEMOGLOBIN: 13.4 G/DL (ref 13.5–17.5)
LYMPHOCYTES ABSOLUTE: 1.6 K/UL (ref 1–5.1)
LYMPHOCYTES RELATIVE PERCENT: 28.9 %
MCH RBC QN AUTO: 29.4 PG (ref 26–34)
MCHC RBC AUTO-ENTMCNC: 34 G/DL (ref 31–36)
MCV RBC AUTO: 86.3 FL (ref 80–100)
MONOCYTES ABSOLUTE: 0.8 K/UL (ref 0–1.3)
MONOCYTES RELATIVE PERCENT: 14.4 %
NEUTROPHILS ABSOLUTE: 2.9 K/UL (ref 1.7–7.7)
NEUTROPHILS RELATIVE PERCENT: 52.9 %
PDW BLD-RTO: 12.8 % (ref 12.4–15.4)
PLATELET # BLD: 210 K/UL (ref 135–450)
PMV BLD AUTO: 7.1 FL (ref 5–10.5)
POTASSIUM REFLEX MAGNESIUM: 4 MMOL/L (ref 3.5–5.1)
RBC # BLD: 4.56 M/UL (ref 4.2–5.9)
SODIUM BLD-SCNC: 134 MMOL/L (ref 136–145)
TOTAL PROTEIN: 6.2 G/DL (ref 6.4–8.2)
WBC # BLD: 5.5 K/UL (ref 4–11)

## 2023-01-25 PROCEDURE — 6370000000 HC RX 637 (ALT 250 FOR IP): Performed by: INTERNAL MEDICINE

## 2023-01-25 PROCEDURE — 2060000000 HC ICU INTERMEDIATE R&B

## 2023-01-25 PROCEDURE — 94150 VITAL CAPACITY TEST: CPT

## 2023-01-25 PROCEDURE — 80053 COMPREHEN METABOLIC PANEL: CPT

## 2023-01-25 PROCEDURE — 85025 COMPLETE CBC W/AUTO DIFF WBC: CPT

## 2023-01-25 PROCEDURE — 86900 BLOOD TYPING SEROLOGIC ABO: CPT

## 2023-01-25 PROCEDURE — 36415 COLL VENOUS BLD VENIPUNCTURE: CPT

## 2023-01-25 PROCEDURE — 6370000000 HC RX 637 (ALT 250 FOR IP): Performed by: CLINICAL NURSE SPECIALIST

## 2023-01-25 PROCEDURE — 71250 CT THORAX DX C-: CPT

## 2023-01-25 PROCEDURE — 86850 RBC ANTIBODY SCREEN: CPT

## 2023-01-25 PROCEDURE — 86901 BLOOD TYPING SEROLOGIC RH(D): CPT

## 2023-01-25 PROCEDURE — 2580000003 HC RX 258: Performed by: INTERNAL MEDICINE

## 2023-01-25 RX ORDER — SODIUM CHLORIDE, SODIUM LACTATE, POTASSIUM CHLORIDE, CALCIUM CHLORIDE 600; 310; 30; 20 MG/100ML; MG/100ML; MG/100ML; MG/100ML
INJECTION, SOLUTION INTRAVENOUS CONTINUOUS
Status: DISCONTINUED | OUTPATIENT
Start: 2023-01-26 | End: 2023-01-26 | Stop reason: DRUGHIGH

## 2023-01-25 RX ORDER — CHLORHEXIDINE GLUCONATE 4 G/100ML
SOLUTION TOPICAL SEE ADMIN INSTRUCTIONS
Status: DISCONTINUED | OUTPATIENT
Start: 2023-01-25 | End: 2023-01-27

## 2023-01-25 RX ORDER — CHLORHEXIDINE GLUCONATE 0.12 MG/ML
15 RINSE ORAL ONCE
Status: COMPLETED | OUTPATIENT
Start: 2023-01-26 | End: 2023-01-26

## 2023-01-25 RX ADMIN — ASPIRIN 81 MG: 81 TABLET, COATED ORAL at 08:35

## 2023-01-25 RX ADMIN — MUPIROCIN: 20 OINTMENT TOPICAL at 19:52

## 2023-01-25 RX ADMIN — NITROGLYCERIN 0.4 MG: 0.4 TABLET, ORALLY DISINTEGRATING SUBLINGUAL at 23:12

## 2023-01-25 RX ADMIN — FAMOTIDINE 20 MG: 20 TABLET, FILM COATED ORAL at 08:35

## 2023-01-25 RX ADMIN — METOPROLOL TARTRATE 25 MG: 25 TABLET, FILM COATED ORAL at 19:51

## 2023-01-25 RX ADMIN — METOPROLOL TARTRATE 25 MG: 25 TABLET, FILM COATED ORAL at 08:34

## 2023-01-25 RX ADMIN — CHLORHEXIDINE GLUCONATE: 213 SOLUTION TOPICAL at 19:51

## 2023-01-25 RX ADMIN — PANTOPRAZOLE SODIUM 40 MG: 40 TABLET, DELAYED RELEASE ORAL at 08:38

## 2023-01-25 RX ADMIN — SODIUM CHLORIDE, PRESERVATIVE FREE 10 ML: 5 INJECTION INTRAVENOUS at 08:35

## 2023-01-25 RX ADMIN — Medication 400 MG: at 19:51

## 2023-01-25 RX ADMIN — NITROGLYCERIN 0.4 MG: 0.4 TABLET, ORALLY DISINTEGRATING SUBLINGUAL at 00:28

## 2023-01-25 RX ADMIN — Medication 4000 UNITS: at 08:34

## 2023-01-25 RX ADMIN — FAMOTIDINE 20 MG: 20 TABLET, FILM COATED ORAL at 19:51

## 2023-01-25 RX ADMIN — ROSUVASTATIN 10 MG: 10 TABLET, FILM COATED ORAL at 08:34

## 2023-01-25 ASSESSMENT — PAIN SCALES - GENERAL
PAINLEVEL_OUTOF10: 0
PAINLEVEL_OUTOF10: 0
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
## 2023-01-25 NOTE — PROGRESS NOTES
Group Topic: BH Process Group     Date: 1/6/2023  Start Time:  1:15 PM  End Time:  2:00 PM  Facilitators: ADDI Felix    Focus: Problem Solving Skills - Problem Solving Individual Concerns  Number in attendance: 6    Patients were encouraged to identify individual stressors and healthy ways to manage them.      Patient shared he is \"scared that I will feel like this forever.\"  A couple group members and writer shared they have seen some improvements in patient since his first day, including patient talking in groups.  Patient mentioned hearing he made improvements felt like \"more pressure\" on him.  Patient noted he is unsure \"how I am going to make it through this weekend.\"  Writer asked patient if there are things he might be able to do, and patient stated there is \"an antiwar meeting\" he is interested in and his friends will be getting together to play games this weekend.  He agreed to try engage in those activities this weekend.    Method: Group  Attendance: Present  Participation: Moderate  Patient Response: Appropriate feedback, Attentive, Awareness of social/physical boundaries and Interactive  Mood: Anxious and Depressed  Affect: Type: Anxious and Depressed   Range: Blunted/flat   Congruency: Congruent   Stability: Stable  Behavior/Socialization: Appropriate to group, Cooperative and Engaged  Thought Process: Focused  Task Performance: Follows directions  Patient Evaluation: Independent - full participation         Progress Note  Hospital Day: 8    Chief Complaint: Preoperative follow up    Mr. Inga Zhu has some right-sided chest pain around midnight. SL NTG and pain relieved. VITAL SIGNS   Temperature:  Current - Temp: 97.4 °F (36.3 °C); Max - Temp  Av.7 °F (36.5 °C)  Min: 97.4 °F (36.3 °C)  Max: 98.1 °F (36.7 °C)    Respiratory Rate : Resp  Av.6  Min: 15  Max: 23    Pulse Range: Pulse  Av  Min: 60  Max: 76    Blood Pressure Range:  Systolic (48IGA), ZKY:937 , Min:112 , DIV:573   ; Diastolic (50DGT), LOC:72, Min:67, Max:83    Pulse ox Range: SpO2  Av.2 %  Min: 97 %  Max: 99 %        Admission Weight: Weight: 200 lb 9.9 oz (91 kg)    Current Weight: Patient Vitals for the past 96 hrs (Last 3 readings):   Weight   23 0832 190 lb 7.6 oz (86.4 kg)   23 0834 191 lb 5.8 oz (86.8 kg)   23 0833 192 lb 14.4 oz (87.5 kg)       I/O:   Intake/Output Summary (Last 24 hours) at 2023 1536  Last data filed at 2023 0086  Gross per 24 hour   Intake 820 ml   Output 2100 ml   Net -1280 ml       Urine: [phone] ml/shift    LINES/ACCESS:   Peripheral Access: Rt forearm Day #: 1    Diet: ADULT DIET;  Regular; Low Fat/Low Chol/High Fiber/2 gm Na  Diet NPO Exceptions are: Sips of Water with Meds    MEDICATIONS:      [START ON 2023] ceFAZolin (ANCEF) IVPB  2,000 mg IntraVENous On Call to OR    [START ON 2023] vancomycin (VANCOCIN) IV  1,500 mg IntraVENous On Call to OR    [START ON 2023] chlorhexidine  15 mL Mouth/Throat Once    chlorhexidine   Topical See Admin Instructions    mupirocin   Nasal Once    [START ON 2023] mupirocin   Nasal Once    pantoprazole  40 mg Oral QAM AC    sodium chloride flush  5-40 mL IntraVENous 2 times per day    aspirin  81 mg Oral Daily    Vitamin D  4,000 Units Oral Daily    famotidine  20 mg Oral BID    magnesium oxide  400 mg Oral Nightly    rosuvastatin  10 mg Oral Daily    sodium chloride flush  5-40 mL IntraVENous 2 times per day metoprolol tartrate  25 mg Oral BID     DATA REVIEW:   CBC:   Recent Labs     01/23/23  0402 01/24/23  0403 01/25/23  0356   WBC 6.6 6.8 5.5   HGB 13.9 14.0 13.4*   HCT 40.1* 39.0* 39.4*   MCV 86.5 85.6 86.3    201 210       BMP:   Recent Labs     01/23/23  0402 01/24/23  0403 01/25/23  0356   * 136 134*   K 3.8 4.0 4.0    103 102   CO2 23 24 24   GLUCOSE 88 114* 102*   BUN 12 10 11   CREATININE 0.8 0.8 0.8   CALCIUM 8.9 8.9 8.9       Hemoglobin A1C   Date Value Ref Range Status   01/20/2023 5.1 See comment % Final     Comment:     Comment:  Diagnosis of Diabetes: > or = 6.5%  Increased risk of diabetes (Prediabetes): 5.7-6.4%  Glycemic Control: Nonpregnant Adults: <7.0%                    Pregnant: <6.0%         PT/INR:   Lab Results   Component Value Date    INR 1.00 01/19/2023    INR 0.96 11/12/2018    PROTIME 13.1 01/19/2023    PROTIME 10.9 11/12/2018     APTT:   Lab Results   Component Value Date    APTT 26.8 01/20/2023     LFT:   Lab Results   Component Value Date/Time    ALKPHOS 79 01/25/2023 03:56 AM    ALT 17 01/25/2023 03:56 AM    AST 20 01/25/2023 03:56 AM    PROT 6.2 01/25/2023 03:56 AM    PROT 6.9 02/05/2013 10:20 AM    BILITOT 0.9 01/25/2023 03:56 AM    BILIDIR <0.2 01/20/2023 04:31 AM    LABALBU 3.6 01/25/2023 03:56 AM     Troponin:   Lab Results   Component Value Date    CKTOTAL 173 03/10/2021    TROPONINI <0.01 01/18/2023     U/A:    Lab Results   Component Value Date/Time    COLORU Yellow 01/19/2023 08:51 PM    NITRU Negative 01/19/2023 08:51 PM    GLUCOSEU Negative 01/19/2023 08:51 PM    KETUA Negative 01/19/2023 08:51 PM    UROBILINOGEN 0.2 01/19/2023 08:51 PM    BILIRUBINUR Negative 01/19/2023 08:51 PM    BILIRUBINUR neg 01/04/2011 09:05 AM     Chest X-Ray 1/18/2023:   No acute abnormality     EKG 1/18/2023: NSR    Echo 1/18/2023:   Left ventricular cavity size is normal.Normal left ventricular wall thickness. Ejection fraction is visually estimated to be 55-60%. No regional wall motion abnormalities are noted. The right ventricle is normal in size and function. Trivial mitral regurgitation. No evidence of tricuspid regurgitation. IVC size is normal (<2.1cm) and collapses > 50% with respiration consistent with normal RA pressure (3mmHg). Unable to estimate pulmonary artery pressure secondary to incomplete TR jet  envelope. Stress Echo 8/3/2022:  ABNORMAL MODERATE RISK STRESS TEST. 1mm horizontal ST depression during recovery. The stress ECG showed no ischemia at target heart rate. Chnu Score of 2 ( Moderate Risk ). Following stress there was hypokinesis of the apical septal and mid anteroseptal wall     LHC 1/19/2023:  /57, 74 /7; Left ventricular end diastolic pressure was 20 mmHg. There was no gradient across the aortic valve upon pullback. The left main coronary artery arises from the left coronary cusp giving rise to the left anterior descending artery and the left circumflex artery. The left main reveals mild luminal irregularites. The left anterior descending artery arises in normal fashion from left coronary giving rise to diagonals and septal branches. The LAD reveals >80% ostial stenosis, >70% mid stenosis. The ramus reveals >70% ostial, >60-70% proximal stenosis. The left circumflex reveals up 50-60% ostial stenosis; moderate disease in its midsegment. OM2 (last OM) reveals up to 70% stenosis in its proximal segment. The right coronary artery is a right dominant vessel. The right coronary artery ostium reveals up to 30% ostial stenosis. Distal RCA 60% stenosis. Mid PDA up to 50% stenosis. Carotid Duplex 1/20/2023:  There is <50% stenosis of the bilateral internal carotid arteries. There is bilateral antegrade vertebral flow. LE vein mapping 1/20/2023:  No evidence of superficial vein thrombosis bilat.   Superficial - Great Saphenous Vein   Right   Left   Location   Diameter Depth   Diameter Depth   Sapheno Femoral Junction   3.3     4.6 GSV High Thigh   4.9     3.7     GSV Mid Thigh   3.9     4.5     GSV Low Thigh   3     3.2     GSV Knee   3.1     2.1     GSV High Calf   2.2     2.4     GSV Mid Calf   3.3     1.5     GSV Low Calf   2.4     1.8     GSV Ankle   3.4     2       Superficial - Lesser Saphenous Vein   Right   Left   Location   Diameter Depth   Diameter Depth   SSV High Calf   1.2     1       Chest CT non-contrast 1/25/2023:  The lungs appear clear of acute infiltrate or effusion. Partially calcified pleural-based nodule right lower lobe suggests benign lesion. Stable subpleural nodular density in the right middle lobe seen laterally best seen on image #50 of series 605 is stable from prior study. No suspicious noncalcified parenchymal nodules are seen. No mediastinal adenopathy is seen. Moderately extensive coronary cascade is seen predominantly involving the left anterior descending and circumflex arteries. Heart size is within normal limits. Osseous structures are unremarkable. Limited views the upper abdomen show no definite acute pathology.      ASSESSMENT:   Patient Active Problem List:     Anxiety     Hyperlipidemia     Diverticulosis of colon     Restless leg syndrome     Benign prostatic hyperplasia with urinary obstruction     Lumbar spinal stenosis     Hearing impairment     Allergic rhinitis     GERD (gastroesophageal reflux disease)     Spondylolisthesis at L4-L5 level     Fatty liver     Atherosclerosis of aorta (HCC)     Primary osteoarthritis of both knees     Carotid stenosis, bilateral     Functional diarrhea     ED (erectile dysfunction)     Primary osteoarthritis of left knee     Migraine without aura and without status migrainosus, not intractable     Osteoarthritis of left knee     Status post total left knee replacement     Lacunar infarction (HCC)     Numbness and tingling in right hand     Lumbosacral radiculopathy     Age-related osteoporosis without current pathological fracture     Closed head injury     Concussion without loss of consciousness     Right hip pain     Vitamin D deficiency     Medication monitoring encounter     TIA (transient ischemic attack)     Unstable angina (HCC)     Abnormal stress test    Neuro: awake, alert and oriented x 3  CV:   Sinus  Pulm:  normal work of breathing  Abd:  soft, non-tender; bowel sounds normal  Ext: warm    PLAN:   Further discussed pt in detail with patient. All questions answered and is agreeable to proceed with surgery. Informed consent signed. CABG tomorrow AM with Dr Marina Canales.         Clay Garcia, [address] [facility][note 3] pager  1/25/2023  3:36 PM

## 2023-01-25 NOTE — PROGRESS NOTES
PT c/o right CP. First Nitro tab given. Will evaluate in 5 min.    [id number]: pt's CP relieved.  Will continue to monitor

## 2023-01-25 NOTE — PROGRESS NOTES
Patient admitted to ICU room 4518. Alert and oriented x 4, follows commands in all extremities. No neuro deficits to note. NSR on monitor  134/63 (82)  96% RA  19 RR  Afebrile    Patient sitting comfortably in chair. All needs met at this time. Plan to prep patient over night for OR in the am.  NPO starting at midnight. Will pass on to nightshift RN.

## 2023-01-25 NOTE — PROGRESS NOTES
Hospitalist Progress Note      PCP: John Paul Maradiaga MD    Date of Admission: 1/18/2023    Chief Complaint: Chest Pain    Hospital Course: 67 yo M w/ PMH of HTN, HLD admitted on 01/18 for evaluation of new exertional chest pain. Cardiology evaluated patient and completed LHC on 01/19. CT surg consulted and plan for CABG 01/26 currently    Subjective: No acute clinical changes reported overnight. Remains afebrile and hemodynamically stable. One episode of CP reported overnight that resolved after 1  Nitro tab. No chest pain today. No other acute symptomatic complaints currently. Medications:  Reviewed    Infusion Medications    sodium chloride       Scheduled Medications    pantoprazole  40 mg Oral QAM AC    sodium chloride flush  5-40 mL IntraVENous 2 times per day    aspirin  81 mg Oral Daily    Vitamin D  4,000 Units Oral Daily    famotidine  20 mg Oral BID    magnesium oxide  400 mg Oral Nightly    rosuvastatin  10 mg Oral Daily    sodium chloride flush  5-40 mL IntraVENous 2 times per day    metoprolol tartrate  25 mg Oral BID     PRN Meds: fluticasone, benzonatate, calcium carbonate, sodium chloride, sodium chloride flush, perflutren lipid microspheres, sodium chloride flush, sodium chloride, ondansetron **OR** ondansetron, polyethylene glycol, acetaminophen **OR** acetaminophen, nitroGLYCERIN      Intake/Output Summary (Last 24 hours) at 1/25/2023 0800  Last data filed at 1/25/2023 0357  Gross per 24 hour   Intake 980 ml   Output 2350 ml   Net -1370 ml       Physical Exam Performed:    /67   Pulse 60   Temp 97.5 °F (36.4 °C) (Oral)   Resp 15   Ht 5' 6\" (1.676 m)   Wt 191 lb 5.8 oz (86.8 kg)   SpO2 98%   BMI 30.89 kg/m²     General appearance: No acute distress, appears stated age and cooperative. Sitting up in bed  HEENT: Pupils equal, round, and reactive to light. Conjunctivae/corneas clear. Neck: Supple, with full range of motion. No jugular venous distention.  Trachea midline. Respiratory:  Normal respiratory effort. Clear to auscultation, bilaterally without Rales/Wheezes/Rhonchi. Cardiovascular: Regular rate and rhythm with normal S1/S2 without murmurs, rubs or gallops. Abdomen: Soft, non-tender, non-distended with normal bowel sounds. Musculoskeletal: No clubbing, cyanosis or edema bilaterally. Full range of motion without deformity. Skin: Skin color, texture, turgor normal.  No rashes or lesions. Neurologic:  Neurovascularly intact without any focal sensory/motor deficits. Cranial nerves: II-XII intact, grossly non-focal.  Psychiatric: Alert and oriented, thought content appropriate, normal insight  Capillary Refill: Brisk, 3 seconds, normal   Peripheral Pulses: +2 palpable, equal bilaterally       Labs:   Recent Labs     01/23/23  0402 01/24/23  0403 01/25/23  0356   WBC 6.6 6.8 5.5   HGB 13.9 14.0 13.4*   HCT 40.1* 39.0* 39.4*    201 210       Recent Labs     01/23/23  0402 01/24/23  0403 01/25/23  0356   * 136 134*   K 3.8 4.0 4.0    103 102   CO2 23 24 24   BUN 12 10 11   CREATININE 0.8 0.8 0.8   CALCIUM 8.9 8.9 8.9       Recent Labs     01/24/23  0403 01/25/23  0356   AST 23 20   ALT 19 17   BILITOT 1.0 0.9   ALKPHOS 77 79     No results for input(s): INR in the last 72 hours. No results for input(s): Robyn Mould in the last 72 hours. Urinalysis:      Lab Results   Component Value Date/Time    NITRU Negative 01/19/2023 08:51 PM    BLOODU Negative 01/19/2023 08:51 PM    SPECGRAV 1.010 01/19/2023 08:51 PM    GLUCOSEU Negative 01/19/2023 08:51 PM       Radiology:  VL PRE OP VEIN MAPPING   Final Result      VL DUP CAROTID BILATERAL   Final Result      XR CHEST (2 VW)   Final Result   No acute cardiopulmonary abnormality.       CT CHEST WO CONTRAST    (Results Pending)       IP CONSULT TO CARDIOLOGY  IP CONSULT TO HOSPITALIST  IP CONSULT TO CARDIOLOGY  IP CONSULT TO CARDIOTHORACIC SURGERY    Assessment/Plan:    Active Hospital Problems Diagnosis     Abnormal stress test [R94.39]      Priority: Medium    Unstable angina (HCC) [I20.0]      Priority: Medium     #Chest pain, concerning for unstable angina  #Multivessel CAD  Cardiology consulted, appreciate recs  CT surgery consulted, appreciate recs  --Plan for bypass surgery when Plavix wash-out complete; current plan for tomorrow  --Hold Plavix until bypass surgery  Continue Asa, statin, and low dose metoprolol as BP tolerates     #HLD with prior myositis on simvastatin:  Cont current dose of statin     #H/o TIA and CVA:  [facility]  Continue ASA and statin    DVT Prophylaxis: SCDs  Diet: ADULT DIET;  Regular; Low Fat/Low Chol/High Fiber/2 gm Na  Code Status: Full Code  PT/OT Eval Status: as needed    Dispo - Remain inpatient with planned CABG tomorrow      Lesley Catherine MD

## 2023-01-25 NOTE — PROGRESS NOTES
4 Eyes Admission Assessment     I agree as the admission nurse that 2 RN's have performed a thorough Head to Toe Skin Assessment on the patient. ALL assessment sites listed below have been assessed on admission. Areas assessed by both nurses: Rosa Waddell and Brigette Carroll RN  [x]   Head, Face, and Ears   [x]   Shoulders, Back, and Chest  [x]   Arms, Elbows, and Hands   [x]   Coccyx, Sacrum, and Ischium  [x]   Legs, Feet, and Heels        Does the Patient have Skin Breakdown?   No         Bill Prevention initiated:  No   Wound Care Orders initiated:  No      [facility] nurse consulted for Pressure Injury (Stage 3,4, Unstageable, DTI, NWPT, and Complex wounds) or Bill score 18 or lower:  No      Nurse 1 eSignature: Electronically signed by Amy Ruelas RN on 1/25/23 at 6:16 PM EST    **SHARE this note so that the co-signing nurse is able to place an eSignature**    Nurse 2 eSignature: Electronically signed by Wilmar Benoit RN on 1/25/23 at 6:16 PM EST

## 2023-01-26 ENCOUNTER — ANESTHESIA (OUTPATIENT)
Dept: OPERATING ROOM | Age: 78
End: 2023-01-26
Payer: MEDICARE

## 2023-01-26 ENCOUNTER — APPOINTMENT (OUTPATIENT)
Dept: GENERAL RADIOLOGY | Age: 78
DRG: 234 | End: 2023-01-26
Payer: MEDICARE

## 2023-01-26 PROBLEM — R07.9 EXERTIONAL CHEST PAIN: Status: ACTIVE | Noted: 2023-01-26

## 2023-01-26 LAB
ANION GAP SERPL CALCULATED.3IONS-SCNC: 10 MMOL/L (ref 3–16)
ANION GAP SERPL CALCULATED.3IONS-SCNC: 10 MMOL/L (ref 3–16)
APTT: 31.9 SEC (ref 23–34.3)
BASE EXCESS ARTERIAL: -1 (ref -3–3)
BASE EXCESS ARTERIAL: -2 (ref -3–3)
BASE EXCESS ARTERIAL: -3 (ref -3–3)
BASE EXCESS ARTERIAL: -4 (ref -3–3)
BASE EXCESS ARTERIAL: -4 (ref -3–3)
BASE EXCESS ARTERIAL: -6 (ref -3–3)
BASE EXCESS ARTERIAL: -7 (ref -3–3)
BASE EXCESS ARTERIAL: 1 (ref -3–3)
BASOPHILS ABSOLUTE: 0 K/UL (ref 0–0.2)
BASOPHILS RELATIVE PERCENT: 0.7 %
BUN BLDV-MCNC: 11 MG/DL (ref 7–20)
BUN BLDV-MCNC: 12 MG/DL (ref 7–20)
CALCIUM IONIZED: 1.04 MMOL/L (ref 1.12–1.32)
CALCIUM IONIZED: 1.05 MMOL/L (ref 1.12–1.32)
CALCIUM IONIZED: 1.07 MMOL/L (ref 1.12–1.32)
CALCIUM IONIZED: 1.15 MMOL/L (ref 1.12–1.32)
CALCIUM IONIZED: 1.16 MMOL/L (ref 1.12–1.32)
CALCIUM IONIZED: 1.19 MMOL/L (ref 1.12–1.32)
CALCIUM IONIZED: 1.26 MMOL/L (ref 1.12–1.32)
CALCIUM SERPL-MCNC: 8.2 MG/DL (ref 8.3–10.6)
CALCIUM SERPL-MCNC: 8.7 MG/DL (ref 8.3–10.6)
CHLORIDE BLD-SCNC: 102 MMOL/L (ref 99–110)
CHLORIDE BLD-SCNC: 106 MMOL/L (ref 99–110)
CO2: 21 MMOL/L (ref 21–32)
CO2: 24 MMOL/L (ref 21–32)
CREAT SERPL-MCNC: 0.8 MG/DL (ref 0.8–1.3)
CREAT SERPL-MCNC: 1 MG/DL (ref 0.8–1.3)
EOSINOPHILS ABSOLUTE: 0.2 K/UL (ref 0–0.6)
EOSINOPHILS RELATIVE PERCENT: 2.7 %
GFR SERPL CREATININE-BSD FRML MDRD: >60 ML/MIN/{1.73_M2}
GFR SERPL CREATININE-BSD FRML MDRD: >60 ML/MIN/{1.73_M2}
GLUCOSE BLD-MCNC: 105 MG/DL (ref 70–99)
GLUCOSE BLD-MCNC: 113 MG/DL (ref 70–99)
GLUCOSE BLD-MCNC: 113 MG/DL (ref 70–99)
GLUCOSE BLD-MCNC: 123 MG/DL (ref 70–99)
GLUCOSE BLD-MCNC: 125 MG/DL (ref 70–99)
GLUCOSE BLD-MCNC: 134 MG/DL (ref 70–99)
GLUCOSE BLD-MCNC: 135 MG/DL (ref 70–99)
GLUCOSE BLD-MCNC: 136 MG/DL (ref 70–99)
GLUCOSE BLD-MCNC: 137 MG/DL (ref 70–99)
GLUCOSE BLD-MCNC: 139 MG/DL (ref 70–99)
GLUCOSE BLD-MCNC: 140 MG/DL (ref 70–99)
GLUCOSE BLD-MCNC: 144 MG/DL (ref 70–99)
GLUCOSE BLD-MCNC: 145 MG/DL (ref 70–99)
GLUCOSE BLD-MCNC: 145 MG/DL (ref 70–99)
GLUCOSE BLD-MCNC: 147 MG/DL (ref 70–99)
GLUCOSE BLD-MCNC: 148 MG/DL (ref 70–99)
GLUCOSE BLD-MCNC: 153 MG/DL (ref 70–99)
GLUCOSE BLD-MCNC: 157 MG/DL (ref 70–99)
GLUCOSE BLD-MCNC: 97 MG/DL (ref 70–99)
HCO3 ARTERIAL: 18.8 MMOL/L (ref 21–29)
HCO3 ARTERIAL: 20.8 MMOL/L (ref 21–29)
HCO3 ARTERIAL: 21.6 MMOL/L (ref 21–29)
HCO3 ARTERIAL: 21.7 MMOL/L (ref 21–29)
HCO3 ARTERIAL: 22 MMOL/L (ref 21–29)
HCO3 ARTERIAL: 22.1 MMOL/L (ref 21–29)
HCO3 ARTERIAL: 22.3 MMOL/L (ref 21–29)
HCO3 ARTERIAL: 22.4 MMOL/L (ref 21–29)
HCO3 ARTERIAL: 22.8 MMOL/L (ref 21–29)
HCO3 ARTERIAL: 23.3 MMOL/L (ref 21–29)
HCO3 ARTERIAL: 24.9 MMOL/L (ref 21–29)
HCT VFR BLD CALC: 37.4 % (ref 40.5–52.5)
HCT VFR BLD CALC: 38.8 % (ref 40.5–52.5)
HEMOGLOBIN: 12.5 G/DL (ref 13.5–17.5)
HEMOGLOBIN: 12.5 G/DL (ref 13.5–17.5)
HEMOGLOBIN: 13.4 G/DL (ref 13.5–17.5)
HEMOGLOBIN: 8.6 G/DL (ref 13.5–17.5)
HEMOGLOBIN: 8.9 G/DL (ref 13.5–17.5)
HEMOGLOBIN: 9 G/DL (ref 13.5–17.5)
HEMOGLOBIN: 9.1 G/DL (ref 13.5–17.5)
HEMOGLOBIN: 9.7 G/DL (ref 13.5–17.5)
INR BLD: 1.49 (ref 0.87–1.14)
LYMPHOCYTES ABSOLUTE: 1.5 K/UL (ref 1–5.1)
LYMPHOCYTES RELATIVE PERCENT: 26.3 %
MAGNESIUM: 2.9 MG/DL (ref 1.8–2.4)
MCH RBC QN AUTO: 29.1 PG (ref 26–34)
MCH RBC QN AUTO: 29.4 PG (ref 26–34)
MCHC RBC AUTO-ENTMCNC: 33.5 G/DL (ref 31–36)
MCHC RBC AUTO-ENTMCNC: 34.4 G/DL (ref 31–36)
MCV RBC AUTO: 85.4 FL (ref 80–100)
MCV RBC AUTO: 86.9 FL (ref 80–100)
MONOCYTES ABSOLUTE: 0.9 K/UL (ref 0–1.3)
MONOCYTES RELATIVE PERCENT: 15.2 %
NEUTROPHILS ABSOLUTE: 3.2 K/UL (ref 1.7–7.7)
NEUTROPHILS RELATIVE PERCENT: 55.1 %
O2 SAT, ARTERIAL: 100 % (ref 93–100)
O2 SAT, ARTERIAL: 97 % (ref 93–100)
O2 SAT, ARTERIAL: 98 % (ref 93–100)
O2 SAT, ARTERIAL: 99 % (ref 93–100)
PCO2 ARTERIAL: 34.6 MM HG (ref 35–45)
PCO2 ARTERIAL: 34.7 MM HG (ref 35–45)
PCO2 ARTERIAL: 34.8 MM HG (ref 35–45)
PCO2 ARTERIAL: 34.9 MM HG (ref 35–45)
PCO2 ARTERIAL: 35.5 MM HG (ref 35–45)
PCO2 ARTERIAL: 36 MM HG (ref 35–45)
PCO2 ARTERIAL: 37.1 MM HG (ref 35–45)
PCO2 ARTERIAL: 37.8 MM HG (ref 35–45)
PCO2 ARTERIAL: 41.7 MM HG (ref 35–45)
PCO2 ARTERIAL: 42 MM HG (ref 35–45)
PCO2 ARTERIAL: 42.5 MM HG (ref 35–45)
PDW BLD-RTO: 13 % (ref 12.4–15.4)
PDW BLD-RTO: 13 % (ref 12.4–15.4)
PERFORMED ON: ABNORMAL
PH ARTERIAL: 7.3 (ref 7.35–7.45)
PH ARTERIAL: 7.33 (ref 7.35–7.45)
PH ARTERIAL: 7.33 (ref 7.35–7.45)
PH ARTERIAL: 7.34 (ref 7.35–7.45)
PH ARTERIAL: 7.38 (ref 7.35–7.45)
PH ARTERIAL: 7.4 (ref 7.35–7.45)
PH ARTERIAL: 7.41 (ref 7.35–7.45)
PH ARTERIAL: 7.42 (ref 7.35–7.45)
PH ARTERIAL: 7.43 (ref 7.35–7.45)
PLATELET # BLD: 207 K/UL (ref 135–450)
PLATELET # BLD: 218 K/UL (ref 135–450)
PMV BLD AUTO: 7.1 FL (ref 5–10.5)
PMV BLD AUTO: 7.1 FL (ref 5–10.5)
PO2 ARTERIAL: 116.7 MM HG (ref 75–108)
PO2 ARTERIAL: 121.8 MM HG (ref 75–108)
PO2 ARTERIAL: 141.2 MM HG (ref 75–108)
PO2 ARTERIAL: 147 MM HG (ref 75–108)
PO2 ARTERIAL: 256.3 MM HG (ref 75–108)
PO2 ARTERIAL: 298.5 MM HG (ref 75–108)
PO2 ARTERIAL: 305.5 MM HG (ref 75–108)
PO2 ARTERIAL: 349.4 MM HG (ref 75–108)
PO2 ARTERIAL: 436.6 MM HG (ref 75–108)
PO2 ARTERIAL: 465.6 MM HG (ref 75–108)
PO2 ARTERIAL: 96.3 MM HG (ref 75–108)
POC POTASSIUM: 4.3 MMOL/L (ref 3.5–5.1)
POC POTASSIUM: 4.5 MMOL/L (ref 3.5–5.1)
POC POTASSIUM: 4.6 MMOL/L (ref 3.5–5.1)
POC POTASSIUM: 4.8 MMOL/L (ref 3.5–5.1)
POC POTASSIUM: 5 MMOL/L (ref 3.5–5.1)
POC POTASSIUM: 5.1 MMOL/L (ref 3.5–5.1)
POC POTASSIUM: 5.2 MMOL/L (ref 3.5–5.1)
POC POTASSIUM: 5.4 MMOL/L (ref 3.5–5.1)
POC SAMPLE TYPE: ABNORMAL
POC SODIUM: 137 MMOL/L (ref 136–145)
POC SODIUM: 138 MMOL/L (ref 136–145)
POC SODIUM: 138 MMOL/L (ref 136–145)
POC SODIUM: 140 MMOL/L (ref 136–145)
POC SODIUM: 143 MMOL/L (ref 136–145)
POTASSIUM REFLEX MAGNESIUM: 4.3 MMOL/L (ref 3.5–5.1)
POTASSIUM SERPL-SCNC: 4.3 MMOL/L (ref 3.5–5.1)
POTASSIUM SERPL-SCNC: 4.7 MMOL/L (ref 3.5–5.1)
PROTHROMBIN TIME: 18 SEC (ref 11.7–14.5)
RBC # BLD: 4.31 M/UL (ref 4.2–5.9)
RBC # BLD: 4.54 M/UL (ref 4.2–5.9)
SODIUM BLD-SCNC: 136 MMOL/L (ref 136–145)
SODIUM BLD-SCNC: 137 MMOL/L (ref 136–145)
TCO2 ARTERIAL: 20 MMOL/L
TCO2 ARTERIAL: 22 MMOL/L
TCO2 ARTERIAL: 23 MMOL/L
TCO2 ARTERIAL: 24 MMOL/L
TCO2 ARTERIAL: 26 MMOL/L
WBC # BLD: 15.6 K/UL (ref 4–11)
WBC # BLD: 5.8 K/UL (ref 4–11)

## 2023-01-26 PROCEDURE — 2780000010 HC IMPLANT OTHER: Performed by: THORACIC SURGERY (CARDIOTHORACIC VASCULAR SURGERY)

## 2023-01-26 PROCEDURE — 83605 ASSAY OF LACTIC ACID: CPT

## 2023-01-26 PROCEDURE — 6370000000 HC RX 637 (ALT 250 FOR IP): Performed by: INTERNAL MEDICINE

## 2023-01-26 PROCEDURE — 3700000001 HC ADD 15 MINUTES (ANESTHESIA): Performed by: THORACIC SURGERY (CARDIOTHORACIC VASCULAR SURGERY)

## 2023-01-26 PROCEDURE — 6360000002 HC RX W HCPCS: Performed by: THORACIC SURGERY (CARDIOTHORACIC VASCULAR SURGERY)

## 2023-01-26 PROCEDURE — 6370000000 HC RX 637 (ALT 250 FOR IP): Performed by: THORACIC SURGERY (CARDIOTHORACIC VASCULAR SURGERY)

## 2023-01-26 PROCEDURE — 06BP4ZZ EXCISION OF RIGHT SAPHENOUS VEIN, PERCUTANEOUS ENDOSCOPIC APPROACH: ICD-10-PCS | Performed by: THORACIC SURGERY (CARDIOTHORACIC VASCULAR SURGERY)

## 2023-01-26 PROCEDURE — 5A1221Z PERFORMANCE OF CARDIAC OUTPUT, CONTINUOUS: ICD-10-PCS | Performed by: THORACIC SURGERY (CARDIOTHORACIC VASCULAR SURGERY)

## 2023-01-26 PROCEDURE — C1751 CATH, INF, PER/CENT/MIDLINE: HCPCS | Performed by: THORACIC SURGERY (CARDIOTHORACIC VASCULAR SURGERY)

## 2023-01-26 PROCEDURE — A4216 STERILE WATER/SALINE, 10 ML: HCPCS | Performed by: THORACIC SURGERY (CARDIOTHORACIC VASCULAR SURGERY)

## 2023-01-26 PROCEDURE — 85730 THROMBOPLASTIN TIME PARTIAL: CPT

## 2023-01-26 PROCEDURE — 82947 ASSAY GLUCOSE BLOOD QUANT: CPT

## 2023-01-26 PROCEDURE — 94150 VITAL CAPACITY TEST: CPT

## 2023-01-26 PROCEDURE — 6360000002 HC RX W HCPCS: Performed by: ANESTHESIOLOGY

## 2023-01-26 PROCEDURE — 2580000003 HC RX 258: Performed by: THORACIC SURGERY (CARDIOTHORACIC VASCULAR SURGERY)

## 2023-01-26 PROCEDURE — 02L70CK OCCLUSION OF LEFT ATRIAL APPENDAGE WITH EXTRALUMINAL DEVICE, OPEN APPROACH: ICD-10-PCS | Performed by: THORACIC SURGERY (CARDIOTHORACIC VASCULAR SURGERY)

## 2023-01-26 PROCEDURE — 85027 COMPLETE CBC AUTOMATED: CPT

## 2023-01-26 PROCEDURE — 2720000010 HC SURG SUPPLY STERILE: Performed by: THORACIC SURGERY (CARDIOTHORACIC VASCULAR SURGERY)

## 2023-01-26 PROCEDURE — 2060000000 HC ICU INTERMEDIATE R&B

## 2023-01-26 PROCEDURE — 84132 ASSAY OF SERUM POTASSIUM: CPT

## 2023-01-26 PROCEDURE — 83735 ASSAY OF MAGNESIUM: CPT

## 2023-01-26 PROCEDURE — 2580000003 HC RX 258: Performed by: CLINICAL NURSE SPECIALIST

## 2023-01-26 PROCEDURE — 85610 PROTHROMBIN TIME: CPT

## 2023-01-26 PROCEDURE — 82803 BLOOD GASES ANY COMBINATION: CPT

## 2023-01-26 PROCEDURE — 85018 HEMOGLOBIN: CPT

## 2023-01-26 PROCEDURE — C9113 INJ PANTOPRAZOLE SODIUM, VIA: HCPCS | Performed by: THORACIC SURGERY (CARDIOTHORACIC VASCULAR SURGERY)

## 2023-01-26 PROCEDURE — 94002 VENT MGMT INPAT INIT DAY: CPT

## 2023-01-26 PROCEDURE — 71045 X-RAY EXAM CHEST 1 VIEW: CPT

## 2023-01-26 PROCEDURE — 2580000003 HC RX 258: Performed by: ANESTHESIOLOGY

## 2023-01-26 PROCEDURE — 80048 BASIC METABOLIC PNL TOTAL CA: CPT

## 2023-01-26 PROCEDURE — B245ZZ4 ULTRASONOGRAPHY OF LEFT HEART, TRANSESOPHAGEAL: ICD-10-PCS | Performed by: THORACIC SURGERY (CARDIOTHORACIC VASCULAR SURGERY)

## 2023-01-26 PROCEDURE — 02100Z9 BYPASS CORONARY ARTERY, ONE ARTERY FROM LEFT INTERNAL MAMMARY, OPEN APPROACH: ICD-10-PCS | Performed by: THORACIC SURGERY (CARDIOTHORACIC VASCULAR SURGERY)

## 2023-01-26 PROCEDURE — C9290 INJ, BUPIVACAINE LIPOSOME: HCPCS | Performed by: ANESTHESIOLOGY

## 2023-01-26 PROCEDURE — A4217 STERILE WATER/SALINE, 500 ML: HCPCS | Performed by: THORACIC SURGERY (CARDIOTHORACIC VASCULAR SURGERY)

## 2023-01-26 PROCEDURE — P9045 ALBUMIN (HUMAN), 5%, 250 ML: HCPCS | Performed by: THORACIC SURGERY (CARDIOTHORACIC VASCULAR SURGERY)

## 2023-01-26 PROCEDURE — 3700000000 HC ANESTHESIA ATTENDED CARE: Performed by: THORACIC SURGERY (CARDIOTHORACIC VASCULAR SURGERY)

## 2023-01-26 PROCEDURE — 94761 N-INVAS EAR/PLS OXIMETRY MLT: CPT

## 2023-01-26 PROCEDURE — 33508 ENDOSCOPIC VEIN HARVEST: CPT | Performed by: THORACIC SURGERY (CARDIOTHORACIC VASCULAR SURGERY)

## 2023-01-26 PROCEDURE — 2500000003 HC RX 250 WO HCPCS: Performed by: THORACIC SURGERY (CARDIOTHORACIC VASCULAR SURGERY)

## 2023-01-26 PROCEDURE — 2700000000 HC OXYGEN THERAPY PER DAY

## 2023-01-26 PROCEDURE — 2709999900 HC NON-CHARGEABLE SUPPLY: Performed by: THORACIC SURGERY (CARDIOTHORACIC VASCULAR SURGERY)

## 2023-01-26 PROCEDURE — 3600000008 HC SURGERY OHS BASE: Performed by: THORACIC SURGERY (CARDIOTHORACIC VASCULAR SURGERY)

## 2023-01-26 PROCEDURE — 36415 COLL VENOUS BLD VENIPUNCTURE: CPT

## 2023-01-26 PROCEDURE — 64450 NJX AA&/STRD OTHER PN/BRANCH: CPT | Performed by: ANESTHESIOLOGY

## 2023-01-26 PROCEDURE — 2500000003 HC RX 250 WO HCPCS: Performed by: ANESTHESIOLOGY

## 2023-01-26 PROCEDURE — 85025 COMPLETE CBC W/AUTO DIFF WBC: CPT

## 2023-01-26 PROCEDURE — 33533 CABG ARTERIAL SINGLE: CPT | Performed by: THORACIC SURGERY (CARDIOTHORACIC VASCULAR SURGERY)

## 2023-01-26 PROCEDURE — 021109W BYPASS CORONARY ARTERY, TWO ARTERIES FROM AORTA WITH AUTOLOGOUS VENOUS TISSUE, OPEN APPROACH: ICD-10-PCS | Performed by: THORACIC SURGERY (CARDIOTHORACIC VASCULAR SURGERY)

## 2023-01-26 PROCEDURE — 3600000018 HC SURGERY OHS ADDTL 15MIN: Performed by: THORACIC SURGERY (CARDIOTHORACIC VASCULAR SURGERY)

## 2023-01-26 PROCEDURE — 82330 ASSAY OF CALCIUM: CPT

## 2023-01-26 PROCEDURE — 84295 ASSAY OF SERUM SODIUM: CPT

## 2023-01-26 PROCEDURE — P9047 ALBUMIN (HUMAN), 25%, 50ML: HCPCS | Performed by: THORACIC SURGERY (CARDIOTHORACIC VASCULAR SURGERY)

## 2023-01-26 PROCEDURE — 85347 COAGULATION TIME ACTIVATED: CPT

## 2023-01-26 PROCEDURE — 6360000002 HC RX W HCPCS: Performed by: CLINICAL NURSE SPECIALIST

## 2023-01-26 PROCEDURE — 33518 CABG ARTERY-VEIN TWO: CPT | Performed by: THORACIC SURGERY (CARDIOTHORACIC VASCULAR SURGERY)

## 2023-01-26 PROCEDURE — 6370000000 HC RX 637 (ALT 250 FOR IP): Performed by: CLINICAL NURSE SPECIALIST

## 2023-01-26 RX ORDER — NITROGLYCERIN 20 MG/100ML
10 INJECTION INTRAVENOUS CONTINUOUS PRN
Status: DISCONTINUED | OUTPATIENT
Start: 2023-01-26 | End: 2023-01-27

## 2023-01-26 RX ORDER — HYDRALAZINE HYDROCHLORIDE 20 MG/ML
5 INJECTION INTRAMUSCULAR; INTRAVENOUS EVERY 5 MIN PRN
Status: DISCONTINUED | OUTPATIENT
Start: 2023-01-26 | End: 2023-01-30

## 2023-01-26 RX ORDER — POLYETHYLENE GLYCOL 3350 17 G/17G
17 POWDER, FOR SOLUTION ORAL DAILY
Status: DISCONTINUED | OUTPATIENT
Start: 2023-01-27 | End: 2023-01-31 | Stop reason: HOSPADM

## 2023-01-26 RX ORDER — FENTANYL CITRATE 0.05 MG/ML
INJECTION, SOLUTION INTRAMUSCULAR; INTRAVENOUS PRN
Status: DISCONTINUED | OUTPATIENT
Start: 2023-01-26 | End: 2023-01-26 | Stop reason: SDUPTHER

## 2023-01-26 RX ORDER — PROPOFOL 10 MG/ML
INJECTION, EMULSION INTRAVENOUS PRN
Status: DISCONTINUED | OUTPATIENT
Start: 2023-01-26 | End: 2023-01-26 | Stop reason: SDUPTHER

## 2023-01-26 RX ORDER — MEPERIDINE HYDROCHLORIDE 25 MG/ML
25 INJECTION INTRAMUSCULAR; INTRAVENOUS; SUBCUTANEOUS
Status: DISCONTINUED | OUTPATIENT
Start: 2023-01-26 | End: 2023-01-27

## 2023-01-26 RX ORDER — SENNA PLUS 8.6 MG/1
2 TABLET ORAL DAILY PRN
Status: DISCONTINUED | OUTPATIENT
Start: 2023-01-26 | End: 2023-01-31 | Stop reason: HOSPADM

## 2023-01-26 RX ORDER — PANTOPRAZOLE SODIUM 40 MG/1
40 TABLET, DELAYED RELEASE ORAL
Status: DISCONTINUED | OUTPATIENT
Start: 2023-01-28 | End: 2023-01-30

## 2023-01-26 RX ORDER — MAGNESIUM SULFATE IN WATER 40 MG/ML
2000 INJECTION, SOLUTION INTRAVENOUS PRN
Status: DISCONTINUED | OUTPATIENT
Start: 2023-01-26 | End: 2023-01-30

## 2023-01-26 RX ORDER — AMINOCAPROIC ACID 250 MG/ML
INJECTION, SOLUTION INTRAVENOUS PRN
Status: DISCONTINUED | OUTPATIENT
Start: 2023-01-26 | End: 2023-01-26 | Stop reason: SDUPTHER

## 2023-01-26 RX ORDER — HEPARIN SODIUM 1000 [USP'U]/ML
INJECTION, SOLUTION INTRAVENOUS; SUBCUTANEOUS PRN
Status: DISCONTINUED | OUTPATIENT
Start: 2023-01-26 | End: 2023-01-26 | Stop reason: SDUPTHER

## 2023-01-26 RX ORDER — FENTANYL CITRATE 50 UG/ML
25 INJECTION, SOLUTION INTRAMUSCULAR; INTRAVENOUS
Status: ACTIVE | OUTPATIENT
Start: 2023-01-26 | End: 2023-01-27

## 2023-01-26 RX ORDER — 0.9 % SODIUM CHLORIDE 0.9 %
500 INTRAVENOUS SOLUTION INTRAVENOUS ONCE
Status: COMPLETED | OUTPATIENT
Start: 2023-01-26 | End: 2023-01-26

## 2023-01-26 RX ORDER — PROTAMINE SULFATE 10 MG/ML
INJECTION, SOLUTION INTRAVENOUS PRN
Status: DISCONTINUED | OUTPATIENT
Start: 2023-01-26 | End: 2023-01-26 | Stop reason: SDUPTHER

## 2023-01-26 RX ORDER — FUROSEMIDE 10 MG/ML
40 INJECTION INTRAMUSCULAR; INTRAVENOUS
Status: ACTIVE | OUTPATIENT
Start: 2023-01-26 | End: 2023-01-27

## 2023-01-26 RX ORDER — 0.9 % SODIUM CHLORIDE 0.9 %
1000 INTRAVENOUS SOLUTION INTRAVENOUS CONTINUOUS PRN
Status: DISCONTINUED | OUTPATIENT
Start: 2023-01-26 | End: 2023-01-26 | Stop reason: SDUPTHER

## 2023-01-26 RX ORDER — ACETAMINOPHEN 500 MG
1000 TABLET ORAL EVERY 6 HOURS
Status: DISCONTINUED | OUTPATIENT
Start: 2023-01-26 | End: 2023-01-31 | Stop reason: HOSPADM

## 2023-01-26 RX ORDER — SODIUM CHLORIDE 0.9 % (FLUSH) 0.9 %
5-40 SYRINGE (ML) INJECTION PRN
Status: DISCONTINUED | OUTPATIENT
Start: 2023-01-26 | End: 2023-01-31 | Stop reason: HOSPADM

## 2023-01-26 RX ORDER — FUROSEMIDE 10 MG/ML
40 INJECTION INTRAMUSCULAR; INTRAVENOUS 2 TIMES DAILY
Status: DISCONTINUED | OUTPATIENT
Start: 2023-01-27 | End: 2023-01-31 | Stop reason: HOSPADM

## 2023-01-26 RX ORDER — SODIUM CHLORIDE, SODIUM LACTATE, POTASSIUM CHLORIDE, CALCIUM CHLORIDE 600; 310; 30; 20 MG/100ML; MG/100ML; MG/100ML; MG/100ML
INJECTION, SOLUTION INTRAVENOUS CONTINUOUS
Status: DISCONTINUED | OUTPATIENT
Start: 2023-01-26 | End: 2023-01-27

## 2023-01-26 RX ORDER — BUPIVACAINE HYDROCHLORIDE 5 MG/ML
INJECTION, SOLUTION EPIDURAL; INTRACAUDAL
Status: COMPLETED | OUTPATIENT
Start: 2023-01-26 | End: 2023-01-26

## 2023-01-26 RX ORDER — GLUCAGON 1 MG/ML
1 KIT INJECTION PRN
Status: DISCONTINUED | OUTPATIENT
Start: 2023-01-26 | End: 2023-01-30

## 2023-01-26 RX ORDER — GABAPENTIN 300 MG/1
300 CAPSULE ORAL 3 TIMES DAILY
Status: DISCONTINUED | OUTPATIENT
Start: 2023-01-26 | End: 2023-01-31 | Stop reason: HOSPADM

## 2023-01-26 RX ORDER — INSULIN LISPRO 100 [IU]/ML
0-12 INJECTION, SOLUTION INTRAVENOUS; SUBCUTANEOUS
Status: DISCONTINUED | OUTPATIENT
Start: 2023-01-27 | End: 2023-01-31 | Stop reason: HOSPADM

## 2023-01-26 RX ORDER — LISINOPRIL 2.5 MG/1
2.5 TABLET ORAL
Status: DISCONTINUED | OUTPATIENT
Start: 2023-01-28 | End: 2023-01-31 | Stop reason: HOSPADM

## 2023-01-26 RX ORDER — MIDAZOLAM HYDROCHLORIDE 1 MG/ML
1 INJECTION INTRAMUSCULAR; INTRAVENOUS
Status: DISCONTINUED | OUTPATIENT
Start: 2023-01-26 | End: 2023-01-27

## 2023-01-26 RX ORDER — ALBUMIN, HUMAN INJ 5% 5 %
25 SOLUTION INTRAVENOUS PRN
Status: DISCONTINUED | OUTPATIENT
Start: 2023-01-26 | End: 2023-01-27

## 2023-01-26 RX ORDER — ONDANSETRON 2 MG/ML
4 INJECTION INTRAMUSCULAR; INTRAVENOUS EVERY 6 HOURS PRN
Status: DISCONTINUED | OUTPATIENT
Start: 2023-01-26 | End: 2023-01-31 | Stop reason: HOSPADM

## 2023-01-26 RX ORDER — ONDANSETRON 4 MG/1
4 TABLET, ORALLY DISINTEGRATING ORAL EVERY 8 HOURS PRN
Status: DISCONTINUED | OUTPATIENT
Start: 2023-01-26 | End: 2023-01-26 | Stop reason: SDUPTHER

## 2023-01-26 RX ORDER — ALBUMIN (HUMAN) 12.5 G/50ML
25 SOLUTION INTRAVENOUS PRN
Status: DISCONTINUED | OUTPATIENT
Start: 2023-01-26 | End: 2023-01-27

## 2023-01-26 RX ORDER — SODIUM CHLORIDE 0.9 % (FLUSH) 0.9 %
5-40 SYRINGE (ML) INJECTION EVERY 12 HOURS SCHEDULED
Status: DISCONTINUED | OUTPATIENT
Start: 2023-01-26 | End: 2023-01-31 | Stop reason: HOSPADM

## 2023-01-26 RX ORDER — SUCCINYLCHOLINE CHLORIDE 20 MG/ML
INJECTION INTRAMUSCULAR; INTRAVENOUS PRN
Status: DISCONTINUED | OUTPATIENT
Start: 2023-01-26 | End: 2023-01-26 | Stop reason: SDUPTHER

## 2023-01-26 RX ORDER — POTASSIUM CHLORIDE 29.8 MG/ML
20 INJECTION INTRAVENOUS PRN
Status: DISCONTINUED | OUTPATIENT
Start: 2023-01-26 | End: 2023-01-30

## 2023-01-26 RX ORDER — SODIUM CHLORIDE 9 MG/ML
INJECTION, SOLUTION INTRAVENOUS PRN
Status: DISCONTINUED | OUTPATIENT
Start: 2023-01-26 | End: 2023-01-27

## 2023-01-26 RX ORDER — POTASSIUM CHLORIDE 750 MG/1
10 TABLET, EXTENDED RELEASE ORAL
Status: DISCONTINUED | OUTPATIENT
Start: 2023-01-27 | End: 2023-01-28

## 2023-01-26 RX ORDER — CHLORHEXIDINE GLUCONATE 0.12 MG/ML
15 RINSE ORAL 2 TIMES DAILY
Status: DISCONTINUED | OUTPATIENT
Start: 2023-01-26 | End: 2023-01-31 | Stop reason: HOSPADM

## 2023-01-26 RX ORDER — CLOPIDOGREL BISULFATE 75 MG/1
75 TABLET ORAL DAILY
Status: DISCONTINUED | OUTPATIENT
Start: 2023-01-27 | End: 2023-01-31 | Stop reason: HOSPADM

## 2023-01-26 RX ORDER — ALBUTEROL SULFATE 90 UG/1
2 AEROSOL, METERED RESPIRATORY (INHALATION) EVERY 6 HOURS PRN
Status: DISCONTINUED | OUTPATIENT
Start: 2023-01-26 | End: 2023-01-26 | Stop reason: SDUPTHER

## 2023-01-26 RX ORDER — SODIUM CHLORIDE 9 MG/ML
INJECTION, SOLUTION INTRAVENOUS CONTINUOUS PRN
Status: DISCONTINUED | OUTPATIENT
Start: 2023-01-26 | End: 2023-01-26 | Stop reason: SDUPTHER

## 2023-01-26 RX ORDER — KETAMINE HCL IN NACL, ISO-OSM 20 MG/2 ML
SYRINGE (ML) INJECTION PRN
Status: DISCONTINUED | OUTPATIENT
Start: 2023-01-26 | End: 2023-01-26 | Stop reason: SDUPTHER

## 2023-01-26 RX ORDER — DEXTROSE MONOHYDRATE 100 MG/ML
INJECTION, SOLUTION INTRAVENOUS CONTINUOUS PRN
Status: DISCONTINUED | OUTPATIENT
Start: 2023-01-26 | End: 2023-01-31 | Stop reason: HOSPADM

## 2023-01-26 RX ORDER — ROCURONIUM BROMIDE 10 MG/ML
INJECTION, SOLUTION INTRAVENOUS PRN
Status: DISCONTINUED | OUTPATIENT
Start: 2023-01-26 | End: 2023-01-26 | Stop reason: SDUPTHER

## 2023-01-26 RX ORDER — AMIODARONE HYDROCHLORIDE 50 MG/ML
INJECTION, SOLUTION INTRAVENOUS PRN
Status: DISCONTINUED | OUTPATIENT
Start: 2023-01-26 | End: 2023-01-26 | Stop reason: SDUPTHER

## 2023-01-26 RX ORDER — ATORVASTATIN CALCIUM 40 MG/1
40 TABLET, FILM COATED ORAL NIGHTLY
Status: DISCONTINUED | OUTPATIENT
Start: 2023-01-27 | End: 2023-01-27

## 2023-01-26 RX ORDER — LANOLIN ALCOHOL/MO/W.PET/CERES
400 CREAM (GRAM) TOPICAL 2 TIMES DAILY
Status: DISCONTINUED | OUTPATIENT
Start: 2023-01-27 | End: 2023-01-31 | Stop reason: HOSPADM

## 2023-01-26 RX ORDER — ALBUTEROL SULFATE 2.5 MG/3ML
2.5 SOLUTION RESPIRATORY (INHALATION) EVERY 6 HOURS PRN
Status: DISCONTINUED | OUTPATIENT
Start: 2023-01-26 | End: 2023-01-31 | Stop reason: HOSPADM

## 2023-01-26 RX ORDER — METOPROLOL TARTRATE 5 MG/5ML
2.5 INJECTION INTRAVENOUS EVERY 10 MIN PRN
Status: DISCONTINUED | OUTPATIENT
Start: 2023-01-26 | End: 2023-01-30

## 2023-01-26 RX ORDER — MAGNESIUM HYDROXIDE 1200 MG/15ML
LIQUID ORAL CONTINUOUS PRN
Status: COMPLETED | OUTPATIENT
Start: 2023-01-26 | End: 2023-01-26

## 2023-01-26 RX ORDER — PROTAMINE SULFATE 10 MG/ML
50 INJECTION, SOLUTION INTRAVENOUS
Status: DISCONTINUED | OUTPATIENT
Start: 2023-01-26 | End: 2023-01-27

## 2023-01-26 RX ADMIN — AMINOCAPROIC ACID 1 G/HR: 250 INJECTION, SOLUTION INTRAVENOUS at 07:44

## 2023-01-26 RX ADMIN — FENTANYL CITRATE 250 MCG: 0.05 INJECTION, SOLUTION INTRAMUSCULAR; INTRAVENOUS at 07:01

## 2023-01-26 RX ADMIN — PANTOPRAZOLE SODIUM 40 MG: 40 INJECTION, POWDER, LYOPHILIZED, FOR SOLUTION INTRAVENOUS at 15:09

## 2023-01-26 RX ADMIN — VANCOMYCIN HYDROCHLORIDE 1500 MG: 10 INJECTION, POWDER, LYOPHILIZED, FOR SOLUTION INTRAVENOUS at 20:43

## 2023-01-26 RX ADMIN — MUPIROCIN: 20 OINTMENT TOPICAL at 05:54

## 2023-01-26 RX ADMIN — SUCCINYLCHOLINE CHLORIDE 100 MG: 20 INJECTION, SOLUTION INTRAMUSCULAR; INTRAVENOUS; PARENTERAL at 07:08

## 2023-01-26 RX ADMIN — AMIODARONE HYDROCHLORIDE 1 MG/MIN: 50 INJECTION, SOLUTION INTRAVENOUS at 12:35

## 2023-01-26 RX ADMIN — FENTANYL CITRATE 250 MCG: 0.05 INJECTION, SOLUTION INTRAMUSCULAR; INTRAVENOUS at 09:41

## 2023-01-26 RX ADMIN — BUPIVACAINE 20 ML: 13.3 INJECTION, SUSPENSION, LIPOSOMAL INFILTRATION at 07:25

## 2023-01-26 RX ADMIN — ROCURONIUM BROMIDE 50 MG: 10 INJECTION INTRAVENOUS at 07:13

## 2023-01-26 RX ADMIN — ALBUMIN (HUMAN) 25 G: 0.25 INJECTION, SOLUTION INTRAVENOUS at 15:58

## 2023-01-26 RX ADMIN — Medication 15 ML: at 14:45

## 2023-01-26 RX ADMIN — SODIUM BICARBONATE 50 MEQ: 84 INJECTION INTRAVENOUS at 14:06

## 2023-01-26 RX ADMIN — MUPIROCIN: 20 OINTMENT TOPICAL at 15:10

## 2023-01-26 RX ADMIN — MUPIROCIN: 20 OINTMENT TOPICAL at 20:43

## 2023-01-26 RX ADMIN — CEFAZOLIN 2000 MG: 2 INJECTION, POWDER, FOR SOLUTION INTRAMUSCULAR; INTRAVENOUS at 20:09

## 2023-01-26 RX ADMIN — AMIODARONE HYDROCHLORIDE 0.5 MG/MIN: 50 INJECTION, SOLUTION INTRAVENOUS at 22:21

## 2023-01-26 RX ADMIN — NOREPINEPHRINE BITARTRATE 0.1 MCG/KG/MIN: 1 INJECTION, SOLUTION, CONCENTRATE INTRAVENOUS at 11:52

## 2023-01-26 RX ADMIN — ROCURONIUM BROMIDE 50 MG: 10 INJECTION INTRAVENOUS at 09:41

## 2023-01-26 RX ADMIN — CEFAZOLIN 2000 MG: 2 INJECTION, POWDER, FOR SOLUTION INTRAMUSCULAR; INTRAVENOUS at 07:40

## 2023-01-26 RX ADMIN — SODIUM BICARBONATE 50 MEQ: 84 INJECTION INTRAVENOUS at 15:02

## 2023-01-26 RX ADMIN — PROTAMINE SULFATE 400 MG: 10 INJECTION, SOLUTION INTRAVENOUS at 11:53

## 2023-01-26 RX ADMIN — HEPARIN SODIUM 35000 UNITS: 1000 INJECTION INTRAVENOUS; SUBCUTANEOUS at 08:57

## 2023-01-26 RX ADMIN — GABAPENTIN 300 MG: 300 CAPSULE ORAL at 20:43

## 2023-01-26 RX ADMIN — SODIUM CHLORIDE, POTASSIUM CHLORIDE, SODIUM LACTATE AND CALCIUM CHLORIDE: 600; 310; 30; 20 INJECTION, SOLUTION INTRAVENOUS at 13:26

## 2023-01-26 RX ADMIN — Medication 15 ML: at 20:43

## 2023-01-26 RX ADMIN — SODIUM CHLORIDE: 9 INJECTION, SOLUTION INTRAVENOUS at 06:55

## 2023-01-26 RX ADMIN — POTASSIUM CHLORIDE 20 MEQ: 29.8 INJECTION, SOLUTION INTRAVENOUS at 18:34

## 2023-01-26 RX ADMIN — AMIODARONE HYDROCHLORIDE 150 MG: 50 INJECTION, SOLUTION INTRAVENOUS at 12:16

## 2023-01-26 RX ADMIN — ALBUMIN (HUMAN) 25 G: 12.5 INJECTION, SOLUTION INTRAVENOUS at 13:15

## 2023-01-26 RX ADMIN — SODIUM CHLORIDE, POTASSIUM CHLORIDE, SODIUM LACTATE AND CALCIUM CHLORIDE: 600; 310; 30; 20 INJECTION, SOLUTION INTRAVENOUS at 05:53

## 2023-01-26 RX ADMIN — CEFAZOLIN 1000 MG: 2 INJECTION, POWDER, FOR SOLUTION INTRAMUSCULAR; INTRAVENOUS at 11:40

## 2023-01-26 RX ADMIN — FAMOTIDINE 20 MG: 20 TABLET, FILM COATED ORAL at 20:43

## 2023-01-26 RX ADMIN — FENTANYL CITRATE 250 MCG: 0.05 INJECTION, SOLUTION INTRAMUSCULAR; INTRAVENOUS at 11:05

## 2023-01-26 RX ADMIN — SODIUM CHLORIDE 500 ML: 9 INJECTION, SOLUTION INTRAVENOUS at 14:08

## 2023-01-26 RX ADMIN — CHLORHEXIDINE GLUCONATE 15 ML: 1.2 RINSE ORAL at 05:54

## 2023-01-26 RX ADMIN — DEXMEDETOMIDINE 0.1 MCG/KG/HR: 100 INJECTION, SOLUTION INTRAVENOUS at 23:02

## 2023-01-26 RX ADMIN — ROCURONIUM BROMIDE 50 MG: 10 INJECTION INTRAVENOUS at 11:05

## 2023-01-26 RX ADMIN — Medication 20 MG: at 09:38

## 2023-01-26 RX ADMIN — ACETAMINOPHEN 1000 MG: 500 TABLET ORAL at 20:43

## 2023-01-26 RX ADMIN — NOREPINEPHRINE BITARTRATE 0.04 MCG/KG/MIN: 1 INJECTION, SOLUTION, CONCENTRATE INTRAVENOUS at 14:52

## 2023-01-26 RX ADMIN — PROPOFOL 120 MG: 10 INJECTION, EMULSION INTRAVENOUS at 07:08

## 2023-01-26 RX ADMIN — AMIODARONE HYDROCHLORIDE 150 MG: 50 INJECTION, SOLUTION INTRAVENOUS at 13:17

## 2023-01-26 RX ADMIN — SODIUM CHLORIDE 1500 MG: 900 INJECTION, SOLUTION INTRAVENOUS at 07:30

## 2023-01-26 RX ADMIN — BUPIVACAINE HYDROCHLORIDE 20 ML: 5 INJECTION, SOLUTION EPIDURAL; INTRACAUDAL; PERINEURAL at 07:25

## 2023-01-26 RX ADMIN — AMINOCAPROIC ACID 5000 MG: 250 INJECTION, SOLUTION INTRAVENOUS at 07:30

## 2023-01-26 RX ADMIN — FENTANYL CITRATE 250 MCG: 0.05 INJECTION, SOLUTION INTRAMUSCULAR; INTRAVENOUS at 07:41

## 2023-01-26 ASSESSMENT — PULMONARY FUNCTION TESTS
PIF_VALUE: 18
PIF_VALUE: 18
PIF_VALUE: 15
PIF_VALUE: 15
PIF_VALUE: 18
PIF_VALUE: 16
PIF_VALUE: 21
PIF_VALUE: 18
PIF_VALUE: 20
PIF_VALUE: 15
PIF_VALUE: 19
PIF_VALUE: 22
PIF_VALUE: 21
PIF_VALUE: 23
PIF_VALUE: 16
PIF_VALUE: 17
PIF_VALUE: 18
PIF_VALUE: 20
PIF_VALUE: 19
PIF_VALUE: 21
PIF_VALUE: 15
PIF_VALUE: 15
PIF_VALUE: 20
PIF_VALUE: 18
PIF_VALUE: 17
PIF_VALUE: 15

## 2023-01-26 ASSESSMENT — PAIN SCALES - GENERAL
PAINLEVEL_OUTOF10: 0
PAINLEVEL_OUTOF10: 5
PAINLEVEL_OUTOF10: 0
PAINLEVEL_OUTOF10: 0

## 2023-01-26 ASSESSMENT — PAIN DESCRIPTION - ORIENTATION: ORIENTATION: MID

## 2023-01-26 ASSESSMENT — PAIN DESCRIPTION - FREQUENCY: FREQUENCY: INTERMITTENT

## 2023-01-26 ASSESSMENT — PAIN DESCRIPTION - PAIN TYPE: TYPE: SURGICAL PAIN

## 2023-01-26 ASSESSMENT — PAIN DESCRIPTION - LOCATION: LOCATION: CHEST

## 2023-01-26 ASSESSMENT — PAIN - FUNCTIONAL ASSESSMENT: PAIN_FUNCTIONAL_ASSESSMENT: PREVENTS OR INTERFERES SOME ACTIVE ACTIVITIES AND ADLS

## 2023-01-26 ASSESSMENT — PAIN DESCRIPTION - ONSET: ONSET: GRADUAL

## 2023-01-26 ASSESSMENT — PAIN DESCRIPTION - DESCRIPTORS: DESCRIPTORS: SORE
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
## 2023-01-26 NOTE — PROGRESS NOTES
Pt's wife called via phone and was provided with an update. All questions answered. Will continue to monitor.

## 2023-01-26 NOTE — ANESTHESIA PROCEDURE NOTES
Peripheral Block    Patient location during procedure: OR  Reason for block: post-op pain management and at surgeon's request  Start time: 1/26/2023 7:20 AM  End time: 1/26/2023 7:25 AM  Staffing  Performed: anesthesiologist   Anesthesiologist: Eddie Chang MD  Preanesthetic Checklist  Completed: patient identified, IV checked, site marked, risks and benefits discussed, surgical/procedural consents, equipment checked, pre-op evaluation, timeout performed, anesthesia consent given, oxygen available and monitors applied/VS acknowledged  Peripheral Block   Patient position: supine  Prep: ChloraPrep  Provider prep: mask and sterile gloves  Patient monitoring: cardiac monitor, continuous pulse ox, frequent blood pressure checks and IV access  Block type: PECS II  Laterality: bilateral  Injection technique: single-shot  Guidance: ultrasound guided  Local infiltration: lidocaine  Infiltration strength: 1 %  Local infiltration: lidocaine  Dose: 3 mL    Needle   Needle gauge: 21 G  Needle localization: ultrasound guidance  Needle length: 10 cm  Assessment   Injection assessment: negative aspiration for heme, no paresthesia on injection and local visualized surrounding nerve on ultrasound  Paresthesia pain: none  Slow fractionated injection: yes  Hemodynamics: stable  Real-time US image taken/store: yes  Outcomes: uncomplicated and patient tolerated procedure well    Additional Notes  Immediately prior to procedure a \"time out\" was called to verify the correct patient, allergies, laterality, procedure and equipment. Time out performed with  RN    Local Anesthetic:10 cc 0.5 % Bupivacaine + 10 cc Exparel   Amount: 20 ml to each side in 5 ml increments after negative aspiration each time. Pectoralis major, pectoralis minor and serratus muscle are identified; the tip of the needle and the spread of the local anesthetic between the pectoralis minor and serratus muscles are visualized.  The muscles appeared to be anatomically normal and there were no abnormal pathologically findings seen.      Medications Administered  bupivacaine (PF) 0.5 % - Perineural   20 mL - 1/26/2023 7:25:00 AM  bupivacaine liposome 1.3 % - Perineural   20 mL - 1/26/2023 7:25:00 AM

## 2023-01-26 NOTE — PROGRESS NOTES
Pt admitted from OR to room 4518 via bed with anesthesia, OR staff, Tiff Neal and his fellow. Pt connected to cardiac monitor and ventilator. Bedside report given per anesthesia. Pt has amicar, levophed and amio infusing via pump. Standard lines, villagomez to gravity and chest tube connected to suction. Pt has pink tinged urine, villagomez was taped to sheet and underneath pt. Will continue to assess and monitor.

## 2023-01-26 NOTE — ANESTHESIA PROCEDURE NOTES
Arterial Line:    An arterial line was placed using surface landmarks, in the OR for the following indication(s): continuous blood pressure monitoring and blood sampling needed.    A 20 gauge (size), 5 cm (length), Arrow (type) catheter was placed, Seldinger technique not used, into the right brachial artery, secured by suture and Tegaderm.  Anesthesia type: General    Events:  patient tolerated procedure well with no complications and EBL 0mL.  Anesthesiologist: Raj Villasenor MD  Performed: Anesthesiologist   Preanesthetic Checklist  Completed: patient identified, IV checked, site marked, risks and benefits discussed, surgical/procedural consents, equipment checked, pre-op evaluation, timeout performed, anesthesia consent given, oxygen available and monitors applied/VS acknowledged

## 2023-01-26 NOTE — PROGRESS NOTES
Patient showering prior to bedtime. Education provided regarding AM surgery, NPO status, IS, and what to expect post-operatively. Checklist in progress. NSR 70's, /63 (87). No chest pain or SOB.

## 2023-01-26 NOTE — PROGRESS NOTES
4 Eyes Admission Assessment     I agree as the admission nurse that 2 RN's have performed a thorough Head to Toe Skin Assessment on the patient. ALL assessment sites listed below have been assessed on admission. Areas assessed by both nurses:   [x]   Head, Face, and Ears   [x]   Shoulders, Back, and Chest  [x]   Arms, Elbows, and Hands   [x]   Coccyx, Sacrum, and Ischium  [x]   Legs, Feet, and Heels        Does the Patient have Skin Breakdown?   No         Bill Prevention initiated:  Yes   Wound Care Orders initiated:  NA      [facility] nurse consulted for Pressure Injury (Stage 3,4, Unstageable, DTI, NWPT, and Complex wounds) or Bill score 18 or lower:  NA      Nurse 1 eSignature: Electronically signed by Tammy Gruber RN on 1/26/23 at 5:37 PM EST    **SHARE this note so that the co-signing nurse is able to place an eSignature**    Nurse 2 eSignature: Electronically signed by Samina Richards RN on 1/26/23 at 6:30 PM EST

## 2023-01-26 NOTE — ANESTHESIA PROCEDURE NOTES
Central Venous Line:    A central venous line was placed using surface landmarks, in the OR for the following indication(s): central venous access and CVP monitoring. Sterility preparation included the following: hand hygiene performed prior to procedure, maximum sterile barriers used and sterile technique used to drape from head to toe. The patient was placed in Trendelenburg position. The right internal jugular vein was prepped. The site was prepped with Chloraprep. A 9 Fr (size), 10 (length), introducer double lumen was placed. During the procedure, the following specific steps were taken: target vein identified, needle advanced into vein and blood aspirated and guidewire advanced into vein. Intravenous verification was obtained by ultrasound and venous blood return. Post insertion care included: all ports aspirated, all ports flushed easily, guidewire removed intact, Biopatch applied, line sutured in place and dressing applied. During the procedure the patient experienced: patient tolerated procedure well with no complications. Outcomes: uncomplicated and patient tolerated procedure wellno  Anesthesia type: generalA(n) oximetric, 8 (size) Pulmonary Artery Catheter (PAC) was placed through the Introducer CVL in the right internal jugular vein. The PAC placement was confirmed by pressure tracing changes and secured at 45 cm (depth). The patient experienced the following events during the procedure: patient tolerated procedure well with no complications. PA Cath placed?: Yes  Staffing  Performed: Anesthesiologist   Anesthesiologist: Jeannette Fontenot MD  Preanesthetic Checklist  Completed: patient identified, timeout performed and monitors applied/VS acknowledged

## 2023-01-26 NOTE — PROGRESS NOTES
Pt's wife and daughter's to bedside and was updated on pt condition and plan of care. All questions answered. Will continue to monitor.

## 2023-01-26 NOTE — PLAN OF CARE
Problem: ABCDS Injury Assessment  Goal: Absence of physical injury  Outcome: Progressing     Problem: Nutrition Deficit:  Goal: Optimize nutritional status  Outcome: Progressing     Problem: Respiratory - Adult  Goal: Achieves optimal ventilation and oxygenation  Outcome: Progressing     Problem: Cardiovascular - Adult  Goal: Maintains optimal cardiac output and hemodynamic stability  Outcome: Progressing  Goal: Absence of cardiac dysrhythmias or at baseline  Outcome: Progressing     Problem: Skin/Tissue Integrity - Adult  Goal: Skin integrity remains intact  Outcome: Progressing  Goal: Incisions, wounds, or drain sites healing without S/S of infection  Outcome: Progressing  Goal: Oral mucous membranes remain intact  Outcome: Progressing     Problem: Musculoskeletal - Adult  Goal: Return mobility to safest level of function  Outcome: Progressing  Goal: Maintain proper alignment of affected body part  Outcome: Progressing  Goal: Return ADL status to a safe level of function  Outcome: Progressing     Problem: Gastrointestinal - Adult  Goal: Minimal or absence of nausea and vomiting  Outcome: Progressing  Goal: Maintains or returns to baseline bowel function  Outcome: Progressing  Goal: Maintains adequate nutritional intake  Outcome: Progressing  Goal: Establish and maintain optimal ostomy function  Outcome: Progressing     Problem: Metabolic/Fluid and Electrolytes - Adult  Goal: Electrolytes maintained within normal limits  Outcome: Progressing  Goal: Hemodynamic stability and optimal renal function maintained  Outcome: Progressing  Goal: Glucose maintained within prescribed range  Outcome: Progressing     Problem: Pain  Goal: Verbalizes/displays adequate comfort level or baseline comfort level  Outcome: Progressing     Problem: Safety - Adult  Goal: Free from fall injury  Outcome: Progressing     Problem: Discharge Planning  Goal: Discharge to home or other facility with appropriate resources  Outcome: Progressing

## 2023-01-26 NOTE — PLAN OF CARE
Problem: ABCDS Injury Assessment  Goal: Absence of physical injury  Outcome: Progressing  Flowsheets (Taken 1/26/2023 1612)  Absence of Physical Injury: Implement safety measures based on patient assessment     Problem: Nutrition Deficit:  Goal: Optimize nutritional status  Outcome: Progressing     Problem: Respiratory - Adult  Goal: Achieves optimal ventilation and oxygenation  Outcome: Progressing     Problem: Cardiovascular - Adult  Goal: Maintains optimal cardiac output and hemodynamic stability  Outcome: Progressing     Problem: Cardiovascular - Adult  Goal: Absence of cardiac dysrhythmias or at baseline  Outcome: Progressing     Problem: Skin/Tissue Integrity - Adult  Goal: Skin integrity remains intact  Outcome: Progressing  Flowsheets (Taken 1/26/2023 1612)  Skin Integrity Remains Intact:   Monitor for areas of redness and/or skin breakdown   Assess vascular access sites hourly   Every 4-6 hours: If on nasal continuous positive airway pressure, respiratory therapy assesses nares and determine need for appliance change or resting period     Problem: Musculoskeletal - Adult  Goal: Return mobility to safest level of function  Outcome: Progressing  Goal: Maintain proper alignment of affected body part  Outcome: Progressing  Goal: Return ADL status to a safe level of function  Outcome: Progressing     Problem: Gastrointestinal - Adult  Goal: Minimal or absence of nausea and vomiting  Outcome: Progressing  Goal: Maintains or returns to baseline bowel function  Outcome: Progressing  Goal: Maintains adequate nutritional intake  Outcome: Progressing  Goal: Establish and maintain optimal ostomy function  Outcome: Progressing     Problem: Metabolic/Fluid and Electrolytes - Adult  Goal: Electrolytes maintained within normal limits  Outcome: Progressing     Problem: Metabolic/Fluid and Electrolytes - Adult  Goal: Hemodynamic stability and optimal renal function maintained  Outcome: Progressing     Problem: Metabolic/Fluid and Electrolytes - Adult  Goal: Glucose maintained within prescribed range  Outcome: Progressing     Problem: Safety - Adult  Goal: Free from fall injury  Outcome: Progressing  Flowsheets (Taken 1/26/2023 1612)  Free From Fall Injury:   Instruct family/caregiver on patient safety   Based on caregiver fall risk screen, instruct family/caregiver to ask for assistance with transferring infant if caregiver noted to have fall risk factors     Problem: Safety - Medical Restraint  Goal: Remains free of injury from restraints (Restraint for Interference with Medical Device)  Description: INTERVENTIONS:  1. Determine that other, less restrictive measures have been tried or would not be effective before applying the restraint  2. Evaluate the patient's condition at the time of restraint application  3. Inform patient/family regarding the reason for restraint  4. Q2H: Monitor safety, psychosocial status, comfort, nutrition and hydration  Outcome: Progressing     Problem: Skin/Tissue Integrity  Goal: Absence of new skin breakdown  Description: 1. Monitor for areas of redness and/or skin breakdown  2. Assess vascular access sites hourly  3. Every 4-6 hours minimum:  Change oxygen saturation probe site  4. Every 4-6 hours:  If on nasal continuous positive airway pressure, respiratory therapy assess nares and determine need for appliance change or resting period.   Outcome: Progressing     Problem: Discharge Planning  Goal: Discharge to home or other facility with appropriate resources  Outcome: Progressing

## 2023-01-26 NOTE — OP NOTE
[address]  [address] [place], [address]    PATIENT NAME: Rosy Comas III   : 1945   MED REC NO: [id number]   ACCOUNT NO: [de-identified]     OPERATIVE REPORT    DATE OF PROCEDURE:  23      PREOPERATIVE DIAGNOSIS:  3-vessel coronary artery disease. POSTOPERATIVE DIAGNOSES:  Three-vessel coronary artery disease with the  addition of acute blood loss anemia. PROCEDURE:  Coronary artery bypass grafting x3 - LIMA to LAD, reverse aortic coronary saphenous vein graft sequentially to the posterior descending and the second obtuse marginal; endoscopic vein harvesting, right thigh; total cardiopulmonary bypass. SURGEON:  Lucinda Mayen MD     ASSISTANTS:  Alexus Jaimes MD and PATI Lacy (Mr. Nava Early  presence was essential as no qualified resident was available to perform  the endoscopic saphenous vein harvest). ANESTHESIA: General endotracheal anesthesia. ESTIMATED BLOOD LOSS:  Autotransfusion. INDICATIONS:  The patient is a 70-year-old man with a history of hypertension, hyperlipidemia, prior stroke and TIAs on Plavix, who was having anginal symptoms and presented to the hospital. Left heart catheterization revealed advanced 3-vessel coronary artery disease. Transthoracic echocardiography revealed a normal left ventricular ejection fraction and no severe valvular abnormalities. Consent was obtained for elective surgical revascularization. FINDINGS AT SURGERY: The preoperative transesophageal echocardiogram revealed a normal ejection fraction and trace mitral regurgitation. The saphenous vein was good quality for grafting. Postoperative RENETTA revealed the ejection fraction remained normal.    OPERATIVE PROCEDURE: General endotracheal anesthesia was obtained, antibiotics given and the patient was prepped and draped in a sterile fashion. A time out was held confirming the patient and appropriate surgery.      The chest was entered via skin incision and median sternotomy. The left internal mammary artery and vein were mobilized off the chest wall and fascia. The mammary artery was divided distally and remained in situ proximally. Simultaneously a segment of saphenous vein was endoscopically harvested from the patient's right leg. Once the mammary and saphenous vein conduits were mobilized, the patient was systemically heparinized. The pericardial well was developed by dividing the thymic remnant and incising the pericardium and suspending the pericardial tissue laterally. The ascending aorta and right atrial appendage were cannulated and cardiopulmonary bypass initiated. Antegrade and retrograde cardioplegia cannula were placed and the distal coronary targets were clearly identified. The aorta was then cross-clamped. Cardioplegia was delivered antegrade to the aortic root followed by retrograde cardioplegia induction via the coronary sinus. A total of 600 cc cardioplegia was given in each direction. The heart arrested readily and the heart was cool to palpation. Additional doses of cardioplegia were given in a retrograde fashion approximately every 20 minutes thereafter. The heart was then positioned appropriately and the posterior descending artery was identified and dissected out. The distal end of the saphenous vein was then prepped and grafted in an end-to-side fashion to this. The same vein was sequentially grafted in a side-to-side fashion to the second obtuse marginal.  Each anastomosis was tested with heparinized saline to assess flow. Each graft flowed readily and without resistance. The ramus was identified fairly proximally near the atrioventricular groove and dissected out, but it was too small to graft where it appeared on the epicardium. At this time, the left atrial appendage was then clipped with a 35 mm clip device. Next the left mammary artery was brought through a lateral tunnel in the pericardium.  It had excellent flow. This was grafted to the left anterior descending coronary artery in an end-to-side fashion. Flow was briefly tested down the graft which resulted in brisk flow and readily filling of the coronary. A bulldog was placed to temporarily occlude blood flow down the mammary artery. The patient was rewarmed during this time. The heart was returned to its anatomic position. The antegrade cardioplegia cannula was removed and the residual aortotomy was used for grafting the proximal saphenous vein graft. The aortic cross-clamp was removed and the aorta and heart were de-aired via the proximal saphenous vein just prior to tying of the vein anastomotic suture. There were no twists or kinks in the vein graft, and one tacking stitch was used so that it would rest without tension on the heart. The mammary artery bulldog was removed and blood allowed to perfuse the LAD. With the cross-clamp released, the heart reanimated with a supraventricular tachycardia rhythm with a normal appearing atrial contraction. On transesophgeal echocardiography, a significant amount of air returning from the pulmonary circulation to the left atrium and left ventricle were noted. De-airing of the vein graft and of the aorta was done with a good result. During this time, the heart required defibrillation, but eventually spontaneously returned to normal sinus rhythm. All anastomoses and grafts were inspected for bleeding and hemostasis was achieved. The patient was weaned from cardiopulmonary bypass, resumed ventilation, had return to normal sinus rhythm, return to normothermia and was hemodynamically stable. The venous cannula was clamped and removed from the right atrial appendage. The retrograde cannula was removed. Protamine was administered to reverse heparinization. The aortic cannula was clamped and removed, the purse string stitches tied, and the site was over-sewn with 5-0 prolene.      All surgical sites within the field were inspected for bleeding and hemostasis was achieved. A single bipolar pacing wire was placed along the anterior surface of the right ventricle. The pericardium was re-approximated with interrupted Vicryl suture. Two 39 Mexican chest tubes were placed using an angled chest tube in the left pleural space and a straight chest tube in the mediastinum just anterior to the re-approximated pericardium. The left KWABENA harvest site was closely inspected for bleeding. Bone wax was applied to the sternal periosteum. The field was inspected one last time to ensure adequate hemostasis. The sternum was re-approximated with four #6 sternal wires in a figure-of-eight fashion. The fascia was closed with 0-PDS running and the subcutaneous tissue closed with 2-0 Vicryl. The skin was closed with 3-0 vicryl running suture. A Prineo dressing was placed over the wound and Dermabond applied to secure the dressing in place. The patient tolerated the surgery well and was transferred to the ICU intubated and hemodynamically stable. Dr. Emelia Ramsey was present and scrubbed for the entirety of the operative case. Charissa Goodson M.D.   Cardiothoracic Surgery PGY-5

## 2023-01-26 NOTE — BRIEF OP NOTE
Pre-op Dx:unstable angina; 3V CAD; hyperlipidemia      Post-op Dx:same; acute blood loss anemia      Procedure:RENETTA; TCPB; CABG x 3, LIMA to LAD, SVG to OM2 and PDA; EVH R thigh; 35 mm Atriclip 2 to LA appendage base      Anesthesia:General endotracheal anesthesia      Surgeon/Assistants:Charley/Rudolph/Marcelino      Specimens:none      EBL:autotransfusion      Complications: none      Findings:normal EF; trace MR; Ramus too small to graft where it appears on epicardium

## 2023-01-26 NOTE — PROGRESS NOTES
PRN nitroglycerin given for chest pain 7/10 \"pressure-like\" to right mid chest. Patient states this pain is similar to the pain he felt on admission. VSS otherwise. After 5 minutes, patient states that his CP has resolved. . Patient instructed to call with any changes.

## 2023-01-26 NOTE — ANESTHESIA PROCEDURE NOTES
Procedure Performed: RENETTA       Start Time:        End Time:      Preanesthesia Checklist:  Patient identified, IV assessed, risks and benefits discussed, monitors and equipment assessed, procedure being performed at surgeon's request and anesthesia consent obtained. General Procedure Information  Diagnostic Indications for Echo:  hemodynamic monitoring  Physician Requesting Echo: Fay Cogan, MD  Location performed:  OR  Intubated  Bite block not placed  Heart visualized  Probe Insertion:  Easy  Probe Type:  3D  Modalities:  2D only, 3D, color flow mapping, continuous wave Doppler, contrast, pulse wave Doppler and M-mode        Anesthesia Information  Performed Personally  Anesthesiologist:  Mary Oconnor MD    Transesophageal Echocardiogram (RENETTA) ProbePlacement Procedure Note    NAME: Butch Allison     MR#: [id number]    Surgical Procedure: CABG        Surgeon: Jose Alberto Sierra             Date: 26 Jan 2023    Indication: donna-op monitoring and access    Procedure: After induction of General Endotracheal Anesthesia, a standard RENETTA probe was placed through the mouth and advanced into the esophagus and stomach. Multiple planes at multiple depths were viewed and the findings related to the surgeon. Ultrasound Findings:  Prepump: EF preserved 50% no RWMA noted. RV and LV is normal size and function. MV with trace central MR, MAC present. There is mild TR and no AI. The ELISABETH measurements are WNL.  Unremarkable preop exam.  Post: EF 50+% hyperdynamic with hypovolemic LV, no RWMA, RV function remains normal. Trace central MR persists    Complications: none    Parisa Cruz MD  7:50 AM No

## 2023-01-26 NOTE — PROGRESS NOTES
Pt turned and removed all extra linens, pt tolerated fairly well. Pt has sacral heart to sacrum no redness underneath, no other skin issues. Will continue to monitor.

## 2023-01-26 NOTE — ANESTHESIA POSTPROCEDURE EVALUATION
Department of Anesthesiology  Postprocedure Note    Patient: Elvis Sow  MRN: [id number]  [place]: 1945  Date of evaluation: 1/26/2023      Procedure Summary     Date: 01/26/23 Room / Location: [address] [place] [address] / [facility]    Anesthesia Start: 0796 Anesthesia Stop: 7135    Procedure: RENETTA; TCPB; CABG x 3, LIMA to LAD, SVG to OM2 and PDA; EVH R thigh; 35 mm Atriclip 2 to LA appendage base (Chest) Diagnosis:       Coronary artery disease, unspecified vessel or lesion type, unspecified whether angina present, unspecified whether native or transplanted heart      (Coronary artery disease, unspecified vessel or lesion type, unspecified whether angina present, unspecified whether native or transplanted heart [I25.10])    Surgeons: Jere Farris MD Responsible Provider: Lina Magana MD    Anesthesia Type: general, regional ASA Status: 4          Anesthesia Type: No value filed.     Naomi Phase I: Naomi Score: 10    Naomi Phase II:        Anesthesia Post Evaluation    Patient location during evaluation: ICU  Level of consciousness: sedated and ventilated  Airway patency: patent  Nausea & Vomiting: no vomiting  Complications: no  Cardiovascular status: blood pressure returned to baseline  Respiratory status: acceptable  Hydration status: euvolemic  Multimodal analgesia pain management approach

## 2023-01-26 NOTE — PLAN OF CARE
Problem: ABCDS Injury Assessment  Goal: Absence of physical injury  1/26/2023 0141 by Dinesh Cheng RN  Outcome: Progressing  1/25/2023 2018 by Scott Martin RN  Outcome: Progressing     Problem: Nutrition Deficit:  Goal: Optimize nutritional status  1/26/2023 0141 by Dinesh Cheng RN  Outcome: Progressing  1/25/2023 2018 by Scott Martin RN  Outcome: Progressing     Problem: Respiratory - Adult  Goal: Achieves optimal ventilation and oxygenation  1/26/2023 0141 by Dinesh Cheng RN  Outcome: Progressing  1/25/2023 2018 by Scott Martin RN  Outcome: Progressing  Flowsheets (Taken 1/25/2023 2000 by Dinesh Cheng RN)  Achieves optimal ventilation and oxygenation:   Assess for changes in respiratory status   Assess for changes in mentation and behavior     Problem: Cardiovascular - Adult  Goal: Maintains optimal cardiac output and hemodynamic stability  1/26/2023 0141 by Dinesh Cheng RN  Outcome: Progressing  1/25/2023 2018 by Scott Martin RN  Outcome: Progressing  Flowsheets (Taken 1/25/2023 2000 by Dinesh Cheng RN)  Maintains optimal cardiac output and hemodynamic stability:   Monitor blood pressure and heart rate   Monitor urine output and notify Licensed Independent Practitioner for values outside of normal range  Goal: Absence of cardiac dysrhythmias or at baseline  1/26/2023 0141 by Dinesh Cheng RN  Outcome: Progressing  1/25/2023 2018 by Scott Martin RN  Outcome: Progressing  Flowsheets (Taken 1/25/2023 2000 by Dinesh Cheng RN)  Absence of cardiac dysrhythmias or at baseline:   Monitor cardiac rate and rhythm   Assess for signs of decreased cardiac output     Problem: Skin/Tissue Integrity - Adult  Goal: Skin integrity remains intact  1/26/2023 0141 by Dinesh Cheng RN  Outcome: Progressing  1/25/2023 2018 by Scott Martin RN  Outcome: Progressing  Flowsheets (Taken 1/25/2023 2000 by Dinesh Cheng RN)  Skin Integrity Remains Intact: Monitor for areas of redness and/or skin breakdown  Goal: Incisions, wounds, or drain sites healing without S/S of infection  1/26/2023 0141 by Johnathon Bland RN  Outcome: Progressing  1/25/2023 2018 by Graciela Almanza RN  Outcome: Progressing  Goal: Oral mucous membranes remain intact  1/26/2023 0141 by Johnathon Bland RN  Outcome: Progressing  1/25/2023 2018 by Graciela Almanza RN  Outcome: Progressing     Problem: Musculoskeletal - Adult  Goal: Return mobility to safest level of function  1/26/2023 0141 by Johnathon Bland RN  Outcome: Progressing  1/25/2023 2018 by Graciela Almanza RN  Outcome: Progressing  Goal: Maintain proper alignment of affected body part  1/26/2023 0141 by Johnathon Bland RN  Outcome: Progressing  1/25/2023 2018 by Graciela Almanza RN  Outcome: Progressing  Goal: Return ADL status to a safe level of function  1/26/2023 0141 by Johnathon Bland RN  Outcome: Progressing  1/25/2023 2018 by Graciela Almanza RN  Outcome: Progressing     Problem: Metabolic/Fluid and Electrolytes - Adult  Goal: Electrolytes maintained within normal limits  1/26/2023 0141 by Johnathon Bland RN  Outcome: Progressing  1/25/2023 2018 by Graciela Almanza RN  Outcome: Progressing  Flowsheets (Taken 1/25/2023 2000 by Johnathon Bland RN)  Electrolytes maintained within normal limits: Monitor labs and assess patient for signs and symptoms of electrolyte imbalances  Goal: Hemodynamic stability and optimal renal function maintained  1/26/2023 0141 by Johnathon Bland RN  Outcome: Progressing  1/25/2023 2018 by Graciela Almanza RN  Outcome: Progressing  Goal: Glucose maintained within prescribed range  1/26/2023 0141 by Johnathon Bland RN  Outcome: Progressing  1/25/2023 2018 by Graciela Almanza RN  Outcome: Progressing     Problem: Pain  Goal: Verbalizes/displays adequate comfort level or baseline comfort level  1/26/2023 0141 by Johnathon Bland RN  Outcome: Progressing  Flowsheets (Taken 1/26/2023 0000)  Verbalizes/displays adequate comfort level or baseline comfort level:   Encourage patient to monitor pain and request assistance   Assess pain using appropriate pain scale   Administer analgesics based on type and severity of pain and evaluate response   Implement non-pharmacological measures as appropriate and evaluate response   Consider cultural and social influences on pain and pain management  1/25/2023 2018 by Elvin Weiss RN  Outcome: Progressing     Problem: Safety - Adult  Goal: Free from fall injury  1/26/2023 0141 by Amna Fleming RN  Outcome: Progressing  1/25/2023 2018 by Elvin Weiss RN  Outcome: Progressing Satisfactory

## 2023-01-26 NOTE — RT PROTOCOL NOTE
RT Nebulizer Bronchodilator Protocol Note    There is a bronchodilator order in the chart from a provider indicating to follow the RT Bronchodilator Protocol and there is an Initiate RT Bronchodilator Protocol order as well (see protocol at bottom of note). CXR Findings:  XR CHEST PORTABLE    Result Date: 1/26/2023  1. Pulmonary edema. The findings from the last RT Protocol Assessment were as follows:  Smoking: None or smoker <15 pack years  Respiratory Pattern: Regular pattern and RR 12-20 bpm  Breath Sounds: Clear breath sounds  Cough: Strong, spontaneous, non-productive  Indication for Bronchodilator Therapy:    Bronchodilator Assessment Score: 0    Aerosolized bronchodilator medication orders have been revised according to the RT Nebulizer Bronchodilator Protocol below. Respiratory Therapist to perform RT Therapy Protocol Assessment initially then follow the protocol. Repeat RT Therapy Protocol Assessment PRN for score 0-3 or on second treatment, BID, and PRN for scores above 3. No Indications - adjust the frequency to every 6 hours PRN wheezing or bronchospasm, if no treatments needed after 48 hours then discontinue using Per Protocol order mode. If indication present, adjust the RT bronchodilator orders based on the Bronchodilator Assessment Score as indicated below. If a patient is on this medication at home then do not decrease Frequency below that used at home. 0-3 - enter or revise RT bronchodilator order(s) to equivalent RT Bronchodilator order with Frequency of every 4 hours PRN for wheezing or increased work of breathing using Per Protocol order mode. 4-6 - enter or revise RT Bronchodilator order(s) to two equivalent RT bronchodilator orders with one order with BID Frequency and one order with Frequency of every 4 hours PRN wheezing or increased work of breathing using Per Protocol order mode.          7-10 - enter or revise RT Bronchodilator order(s) to two equivalent RT bronchodilator orders with one order with TID Frequency and one order with Frequency of every 4 hours PRN wheezing or increased work of breathing using Per Protocol order mode. 11-13 - enter or revise RT Bronchodilator order(s) to one equivalent RT bronchodilator order with QID Frequency and an Albuterol order with Frequency of every 4 hours PRN wheezing or increased work of breathing using Per Protocol order mode. Greater than 13 - enter or revise RT Bronchodilator order(s) to one equivalent RT bronchodilator order with every 4 hours Frequency and an Albuterol order with Frequency of every 2 hours PRN wheezing or increased work of breathing using Per Protocol order mode. RT to enter RT Home Evaluation for COPD & MDI Assessment order using Per Protocol order mode.     Electronically signed by Nestor Caicedo RCP on 1/26/2023 at 5:24 PM

## 2023-01-26 NOTE — PROGRESS NOTES
Patient has been successfully weaned from Mechanical Ventilation. RSBI before extubation was 36 with EtCO2 of 33 and SpO2 of 98 on 30% FiO2. Patient extubated and placed on 3 liters/min via nasal cannula. Post extubation SpO2 is 97% with HR  73 bpm and RR 25 breaths/min. Patient had strong cough that was productive of white sputum. Extubation Well tolerated by patient. Debby Montilla

## 2023-01-26 NOTE — DISCHARGE INSTRUCTIONS
[facility] [place]  Discharge Instructions Following Cardiac Surgery    We thank you for choosing [facility]! We hope that you always had a positive experience and that we always provided the very best care during your stay. We hope that you will always choose [facility]. ACTIVITY/EXERCISE   ? Slowly increase your activity after you go home. Pace yourself, listen to your body. If it hurts, stop. ? The limit on how much you can lift, push or pull is 5 pounds. For the first 4 weeks after surgery, you must not lift anything over 5 pounds. (You cannot lift anything heavier than a gallon of milk). Beginning the 5th week after surgery, you may lift, push or pull up to 10 pounds. ? Begin your walking program on the first day you are home and record how many times per day and number of minutes on your log. You may climb stairs; there are no limits to stair climbing. ? Continue to do your cough and deep breathing exercises and the incentive spirometer 6 times a day as you did in the hospital and record in your daily log.   ? You can have sex when it is comfortable for you. The amount of stress on the heart during sex is about the same as climbing 2 flights of stairs. ? It will take 2 to 3 months to feel like you did before surgery in terms of energy and strength. ?  Do not use arms to get up from a seated position. Instead rock in chair to give       yourself momentum to sit up. .     APPETITE   ? Your appetite might be smaller. It should slowly improve. ? Small, frequent meals as well as cold foods may help. ? The dietitian will assist you with a low fat, low cholesterol, low salt diet. TEMPERATURE   ? Take your temperature at the same time each day. Take your temperature at 8 a.m.      (morning) and 8 p.m. (evening). Record your results in your daily log. WEIGHT   ?  Weigh yourself when you awaken in the morning and record in your log.    PAIN   ? You may have pain at the incision. Shoulder, neck, back and upper chest discomfort are common. Take your pain medications as ordered. BOWEL HABITS   ? Constipation is common due to medications and inactivity. ? Try drinking prune juice, eating a well balanced diet including fruits, vegetables and whole grains. ? If constipation persists, you may use any over-the-counter laxative, suppository or enema. DRIVING   ? No driving for the first 2 weeks after discharge from the hospital.  You may ride in a car.   ? Your surgeon will tell you when you can resume driving at your postoperative office visit. INCISIONS   ? Warning signs of infection: redness, warmth to touch, green colored pus, tender to touch. ? Clean your incisions with a soap that kills germs, such as Dial or Safeguard. ? Be sure to rinse the incision with water and pat dry. SMOKING   If you smoke, STOP. SLEEPING   ? If you have trouble sleeping during the night, take your pain medication at bedtime. SUPPORT STOCKINGS   ? Wear at home for 3 weeks. Take them off at night when you go to bed.   ? Hand or machine wash. Line dry. SWELLING OF LEGS   ? It is common to have leg swelling, especially if you have a leg incision. ? It is helpful to keep your legs elevated when you are sitting or lie down and elevate your legs on pillows. AWARENESS OF HEART BEATING   ? You may feel your heart is beating stronger or that your heart is beating in your neck and ears. DON'T WORRY - this is common especially at bedtime. CALL YOUR SURGEON IF YOU HAVE:   ? Rapid heart rate or fluttering in your chest   ? Fever over 101° F.   ? Weight gain or loss of 3 pounds overnight or 5      pounds in one week   ? Persistent nausea or vomiting   ? Excessive drainage or very red incision   ? Sudden, severe shortness of breath   ? Pain unrelieved by prescribed medication    OFFICE VISIT   ?  You should see your surgeon about 1 week after discharge from the hospital.   ? Call you surgeon's office to make an appointment ([phone]). ? Bring any questions you have so they may be addressed. ? BRING YOUR MEDICATION LIST.    ??????????????????????????????????????????????????????????????? Home Health Agency: ____________________      Phone: _______________    Follow up Appointments:  Call the following physicians as soon as possible for follow-up appointments:    Surgeon: Dr Whitehead________   Phone: [phone]   on  Wednesday, February 8, 2023 at 2:30 PM  Cardiologist: Dr Slaughter____    Phone: [phone]) ________  in  2-3 Weeks  Doctor: Dr Garcia________  Phone: ____________    in 3-4 Weeks  Patient, family, or significant other verbalizes understanding of instructions including medications on attached page    If you have questions regarding these instructions, please call:  [phone]  (ICU Phone Number)    Smoking Cessation Education:   [x] Not applicable             [] Written information given to patient/family    Discharged to : [x] Home [] Other: __________________________                               [address] 8:00 AM 8:00 PM 8:00 AM MINUTES/TIMES 6 TIMES/DAY                                                                                                                                                                                                  PLEASE BRING THIS WITH YOU WHEN YOU  [facility] your your wound    After [address] has been applied    Your healthcare professional has chosen to use DERMABOND PRINEO system to close your wound. Marsveien 48 is the combination of a mesh and a liquid adhesive that allows the incision or wound to be held together during the healing process.     M[place] 48 should remain in place until your healthcare professional has determined that adequate healing has occurred, which is usually anywhere between 7-14 days. In most cases, Marsveien 48 is easily removed with little or no discomfort. In the event that you notice that Marsveien 48 is beginning to loosen or may be coming off, contact your healthcare professional.    The following information is provided to help you understand how to care for your incision. You should always follow the instructions of your healthcare provider. Things to know    Bathing or showering  If directed by your healthcare professional, you may occasionally and briefly wet your incision or wound that was treated with Marsveien 48 in the shower or bath. Do not soak or scrub your incision or wound. Do not swim or soak your incision or wound in water. After showering or bathing, gently blot your incision or wound dry with a soft towel. If a dry protective dressing is being used over Marsveien 48, it should be replaced with a fresh, dry protective dressing after showering or bathing as directed by your healthcare practitioner. Care should also be taken so that any tape that may be part of the dry protective dressing does not come into contact with Marsveien 48 because when the tape is removed, it may also remove Marsveien 48. Wound healing  If you experience any redness, swelling, discomfort, warmth or pus, contact your healthcare professional and he or she will determine how your incision  or wound is healing and taken the necessary steps to address any issues. Exercise  Do not engage in strenuous exercise that may cause additional stress on your incision or wound. Follow your healthcare professional's guidance about when you can return to your normal activities.     Removing Marsveien 48  Your healthcare professional will determine when the healing process of your incision or wound has been completed and Marsveien 48 is ready to be removed, which is usually between 7-14 days. When healing is complete, your healthcare professional will carefully peel off the Marsveien 48. Prior to removal, do not scratch, rub or pick at the mesh. This may loosen the adhesive and mesh before the skin is completed. In the event that you notice the Marsveien 48 is beginning to loosen and may be coming off or comes off the skin/wound, contact your healthcare professional.    Ointments of liquids  Topical ointments, liquids or any other product (other than dry bandages) should not be applied to the incision while Marsveien 48 is in place. These may loosen Marsveien 48 from the skin before it has completely healed. From [address]:   Exit [address] to [address] [place] [address]. Turn right onto [address] [place] [address]. Go past the [facility] (which will be  on your right) to [address] [place] [address] Road. Turn right onto [address] [place] [address] Road. Continue past the [facility] (still on your right) to [address]. Turn left onto [address] Turn right at the first drive off of [address] into the parking lot for The [address] [place] [address] medical offices. From [address]:   Exit [address] to [address]. Turn left onto [facility]. Continue past the [facility] (which will be on your right when you cross [facility].). Turn left onto [address] Turn right at the first drive off of [address] into the parking lot for  The [address] [place] [address] medical offices. Please feel free to call    [phone] for further assistance.

## 2023-01-27 LAB
ACTIVATED CLOTTING TIME: 106 SEC (ref 99–130)
ACTIVATED CLOTTING TIME: 129 SEC (ref 99–130)
ACTIVATED CLOTTING TIME: 133 SEC (ref 99–130)
ACTIVATED CLOTTING TIME: 402 SEC (ref 99–130)
ACTIVATED CLOTTING TIME: 411 SEC (ref 99–130)
ACTIVATED CLOTTING TIME: 439 SEC (ref 99–130)
ACTIVATED CLOTTING TIME: 443 SEC (ref 99–130)
ACTIVATED CLOTTING TIME: 447 SEC (ref 99–130)
ANION GAP SERPL CALCULATED.3IONS-SCNC: 8 MMOL/L (ref 3–16)
BASE EXCESS ARTERIAL: -4 (ref -3–3)
BASE EXCESS ARTERIAL: -4 (ref -3–3)
BUN BLDV-MCNC: 14 MG/DL (ref 7–20)
CALCIUM IONIZED: 1.2 MMOL/L (ref 1.12–1.32)
CALCIUM SERPL-MCNC: 7.5 MG/DL (ref 8.3–10.6)
CHLORIDE BLD-SCNC: 112 MMOL/L (ref 99–110)
CO2: 22 MMOL/L (ref 21–32)
CREAT SERPL-MCNC: 0.9 MG/DL (ref 0.8–1.3)
GFR SERPL CREATININE-BSD FRML MDRD: >60 ML/MIN/{1.73_M2}
GLUCOSE BLD-MCNC: 106 MG/DL (ref 70–99)
GLUCOSE BLD-MCNC: 118 MG/DL (ref 70–99)
GLUCOSE BLD-MCNC: 120 MG/DL (ref 70–99)
GLUCOSE BLD-MCNC: 123 MG/DL (ref 70–99)
GLUCOSE BLD-MCNC: 124 MG/DL (ref 70–99)
GLUCOSE BLD-MCNC: 125 MG/DL (ref 70–99)
GLUCOSE BLD-MCNC: 128 MG/DL (ref 70–99)
GLUCOSE BLD-MCNC: 130 MG/DL (ref 70–99)
GLUCOSE BLD-MCNC: 133 MG/DL (ref 70–99)
GLUCOSE BLD-MCNC: 134 MG/DL (ref 70–99)
GLUCOSE BLD-MCNC: 151 MG/DL (ref 70–99)
HCO3 ARTERIAL: 21.3 MMOL/L (ref 21–29)
HCO3 ARTERIAL: 21.4 MMOL/L (ref 21–29)
HCT VFR BLD CALC: 27.8 % (ref 40.5–52.5)
HEMOGLOBIN: 9.6 G/DL (ref 13.5–17.5)
INR BLD: 1.28 (ref 0.87–1.14)
LACTATE: 2.02 MMOL/L (ref 0.4–2)
MAGNESIUM: 2.4 MG/DL (ref 1.8–2.4)
MCH RBC QN AUTO: 29.7 PG (ref 26–34)
MCHC RBC AUTO-ENTMCNC: 34.4 G/DL (ref 31–36)
MCV RBC AUTO: 86.4 FL (ref 80–100)
O2 SAT, ARTERIAL: 100 % (ref 93–100)
O2 SAT, ARTERIAL: 94 % (ref 93–100)
PCO2 ARTERIAL: 35.4 MM HG (ref 35–45)
PCO2 ARTERIAL: 38.7 MM HG (ref 35–45)
PDW BLD-RTO: 13.3 % (ref 12.4–15.4)
PERFORMED ON: ABNORMAL
PH ARTERIAL: 7.35 (ref 7.35–7.45)
PH ARTERIAL: 7.39 (ref 7.35–7.45)
PLATELET # BLD: 134 K/UL (ref 135–450)
PMV BLD AUTO: 7.2 FL (ref 5–10.5)
PO2 ARTERIAL: 401.4 MM HG (ref 75–108)
PO2 ARTERIAL: 71.7 MM HG (ref 75–108)
POC POTASSIUM: 4.7 MMOL/L (ref 3.5–5.1)
POC SAMPLE TYPE: ABNORMAL
POC SAMPLE TYPE: ABNORMAL
POC SODIUM: 142 MMOL/L (ref 136–145)
POTASSIUM SERPL-SCNC: 4.6 MMOL/L (ref 3.5–5.1)
POTASSIUM SERPL-SCNC: 4.6 MMOL/L (ref 3.5–5.1)
PROTHROMBIN TIME: 16 SEC (ref 11.7–14.5)
RBC # BLD: 3.22 M/UL (ref 4.2–5.9)
SODIUM BLD-SCNC: 142 MMOL/L (ref 136–145)
TCO2 ARTERIAL: 22 MMOL/L
TCO2 ARTERIAL: 23 MMOL/L
WBC # BLD: 9.7 K/UL (ref 4–11)

## 2023-01-27 PROCEDURE — 85027 COMPLETE CBC AUTOMATED: CPT

## 2023-01-27 PROCEDURE — 2060000000 HC ICU INTERMEDIATE R&B

## 2023-01-27 PROCEDURE — 97162 PT EVAL MOD COMPLEX 30 MIN: CPT

## 2023-01-27 PROCEDURE — 2580000003 HC RX 258: Performed by: THORACIC SURGERY (CARDIOTHORACIC VASCULAR SURGERY)

## 2023-01-27 PROCEDURE — 82803 BLOOD GASES ANY COMBINATION: CPT

## 2023-01-27 PROCEDURE — 6370000000 HC RX 637 (ALT 250 FOR IP): Performed by: THORACIC SURGERY (CARDIOTHORACIC VASCULAR SURGERY)

## 2023-01-27 PROCEDURE — 97166 OT EVAL MOD COMPLEX 45 MIN: CPT

## 2023-01-27 PROCEDURE — 97116 GAIT TRAINING THERAPY: CPT

## 2023-01-27 PROCEDURE — 2700000000 HC OXYGEN THERAPY PER DAY

## 2023-01-27 PROCEDURE — 6370000000 HC RX 637 (ALT 250 FOR IP): Performed by: INTERNAL MEDICINE

## 2023-01-27 PROCEDURE — 83735 ASSAY OF MAGNESIUM: CPT

## 2023-01-27 PROCEDURE — 82947 ASSAY GLUCOSE BLOOD QUANT: CPT

## 2023-01-27 PROCEDURE — 84132 ASSAY OF SERUM POTASSIUM: CPT

## 2023-01-27 PROCEDURE — 85610 PROTHROMBIN TIME: CPT

## 2023-01-27 PROCEDURE — 6370000000 HC RX 637 (ALT 250 FOR IP): Performed by: CLINICAL NURSE SPECIALIST

## 2023-01-27 PROCEDURE — 97530 THERAPEUTIC ACTIVITIES: CPT

## 2023-01-27 PROCEDURE — 94761 N-INVAS EAR/PLS OXIMETRY MLT: CPT

## 2023-01-27 PROCEDURE — 80048 BASIC METABOLIC PNL TOTAL CA: CPT

## 2023-01-27 PROCEDURE — 6360000002 HC RX W HCPCS: Performed by: THORACIC SURGERY (CARDIOTHORACIC VASCULAR SURGERY)

## 2023-01-27 RX ORDER — AMIODARONE HYDROCHLORIDE 200 MG/1
200 TABLET ORAL DAILY
Status: DISCONTINUED | OUTPATIENT
Start: 2023-01-28 | End: 2023-01-28

## 2023-01-27 RX ORDER — ROSUVASTATIN CALCIUM 20 MG/1
20 TABLET, COATED ORAL NIGHTLY
Status: DISCONTINUED | OUTPATIENT
Start: 2023-01-27 | End: 2023-01-31 | Stop reason: HOSPADM

## 2023-01-27 RX ADMIN — METOPROLOL TARTRATE 12.5 MG: 25 TABLET, FILM COATED ORAL at 19:47

## 2023-01-27 RX ADMIN — MUPIROCIN: 20 OINTMENT TOPICAL at 09:47

## 2023-01-27 RX ADMIN — FUROSEMIDE 40 MG: 10 INJECTION, SOLUTION INTRAMUSCULAR; INTRAVENOUS at 09:51

## 2023-01-27 RX ADMIN — ASPIRIN 325 MG: 325 TABLET, COATED ORAL at 09:43

## 2023-01-27 RX ADMIN — Medication 4000 UNITS: at 09:46

## 2023-01-27 RX ADMIN — SODIUM CHLORIDE, PRESERVATIVE FREE 10 ML: 5 INJECTION INTRAVENOUS at 19:52

## 2023-01-27 RX ADMIN — CLOPIDOGREL BISULFATE 75 MG: 75 TABLET ORAL at 09:45

## 2023-01-27 RX ADMIN — ROSUVASTATIN CALCIUM 20 MG: 20 TABLET, COATED ORAL at 19:47

## 2023-01-27 RX ADMIN — GABAPENTIN 300 MG: 300 CAPSULE ORAL at 19:47

## 2023-01-27 RX ADMIN — VANCOMYCIN HYDROCHLORIDE 1500 MG: 10 INJECTION, POWDER, LYOPHILIZED, FOR SOLUTION INTRAVENOUS at 06:32

## 2023-01-27 RX ADMIN — CEFAZOLIN 2000 MG: 2 INJECTION, POWDER, FOR SOLUTION INTRAMUSCULAR; INTRAVENOUS at 18:27

## 2023-01-27 RX ADMIN — METOPROLOL TARTRATE 12.5 MG: 25 TABLET, FILM COATED ORAL at 09:44

## 2023-01-27 RX ADMIN — SODIUM CHLORIDE, PRESERVATIVE FREE 10 ML: 5 INJECTION INTRAVENOUS at 10:43

## 2023-01-27 RX ADMIN — VANCOMYCIN HYDROCHLORIDE 1500 MG: 10 INJECTION, POWDER, LYOPHILIZED, FOR SOLUTION INTRAVENOUS at 19:39

## 2023-01-27 RX ADMIN — CEFAZOLIN 2000 MG: 2 INJECTION, POWDER, FOR SOLUTION INTRAMUSCULAR; INTRAVENOUS at 12:11

## 2023-01-27 RX ADMIN — Medication 400 MG: at 19:47

## 2023-01-27 RX ADMIN — POTASSIUM CHLORIDE 10 MEQ: 750 TABLET, EXTENDED RELEASE ORAL at 09:45

## 2023-01-27 RX ADMIN — Medication 15 ML: at 09:48

## 2023-01-27 RX ADMIN — GABAPENTIN 300 MG: 300 CAPSULE ORAL at 14:41

## 2023-01-27 RX ADMIN — POTASSIUM CHLORIDE 10 MEQ: 750 TABLET, EXTENDED RELEASE ORAL at 12:15

## 2023-01-27 RX ADMIN — Medication 15 ML: at 19:48

## 2023-01-27 RX ADMIN — FUROSEMIDE 40 MG: 10 INJECTION, SOLUTION INTRAMUSCULAR; INTRAVENOUS at 18:30

## 2023-01-27 RX ADMIN — FAMOTIDINE 20 MG: 20 TABLET, FILM COATED ORAL at 09:45

## 2023-01-27 RX ADMIN — POTASSIUM CHLORIDE 10 MEQ: 750 TABLET, EXTENDED RELEASE ORAL at 18:27

## 2023-01-27 RX ADMIN — INSULIN GLARGINE 13 UNITS: 100 INJECTION, SOLUTION SUBCUTANEOUS at 20:33

## 2023-01-27 RX ADMIN — CEFAZOLIN 2000 MG: 2 INJECTION, POWDER, FOR SOLUTION INTRAMUSCULAR; INTRAVENOUS at 05:02

## 2023-01-27 RX ADMIN — GABAPENTIN 300 MG: 300 CAPSULE ORAL at 09:45

## 2023-01-27 RX ADMIN — ACETAMINOPHEN 1000 MG: 500 TABLET ORAL at 14:41

## 2023-01-27 RX ADMIN — ACETAMINOPHEN 1000 MG: 500 TABLET ORAL at 07:03

## 2023-01-27 RX ADMIN — Medication 400 MG: at 09:46

## 2023-01-27 RX ADMIN — MUPIROCIN: 20 OINTMENT TOPICAL at 19:56

## 2023-01-27 RX ADMIN — ACETAMINOPHEN 1000 MG: 500 TABLET ORAL at 19:47

## 2023-01-27 ASSESSMENT — PAIN SCALES - GENERAL
PAINLEVEL_OUTOF10: 5
PAINLEVEL_OUTOF10: 4
PAINLEVEL_OUTOF10: 4
PAINLEVEL_OUTOF10: 0
PAINLEVEL_OUTOF10: 0
PAINLEVEL_OUTOF10: 4
PAINLEVEL_OUTOF10: 0
PAINLEVEL_OUTOF10: 4

## 2023-01-27 ASSESSMENT — PAIN DESCRIPTION - LOCATION
LOCATION: CHEST

## 2023-01-27 ASSESSMENT — PAIN DESCRIPTION - PAIN TYPE
TYPE: SURGICAL PAIN

## 2023-01-27 ASSESSMENT — PAIN DESCRIPTION - FREQUENCY: FREQUENCY: INTERMITTENT

## 2023-01-27 ASSESSMENT — PAIN DESCRIPTION - DESCRIPTORS
DESCRIPTORS: ACHING

## 2023-01-27 ASSESSMENT — PAIN DESCRIPTION - ORIENTATION
ORIENTATION: MID
ORIENTATION: MID
ORIENTATION: LEFT

## 2023-01-27 ASSESSMENT — PAIN - FUNCTIONAL ASSESSMENT
PAIN_FUNCTIONAL_ASSESSMENT: ACTIVITIES ARE NOT PREVENTED
PAIN_FUNCTIONAL_ASSESSMENT: ACTIVITIES ARE NOT PREVENTED
PAIN_FUNCTIONAL_ASSESSMENT: PREVENTS OR INTERFERES SOME ACTIVE ACTIVITIES AND ADLS

## 2023-01-27 NOTE — PROGRESS NOTES
Physical Therapy  Facility/Department: [address] [place] [address] ICU  Physical Therapy Initial Assessment and Treatment    Name: Marga Marin  : 1945  MRN: [id number]  Date of Service: 2023    Discharge Recommendations:    Minus Comas III scored a 14/24 on the AM-PAC short mobility form. Current research shows that an AM-PAC score of 18 or greater is typically associated with a discharge to the patient's home setting. Based on the patient's AM-PAC score and their current functional mobility deficits, it is recommended that the patient have 2-3 sessions per week of Physical Therapy at d/c to increase the patient's independence. At this time, this patient demonstrates the endurance and safety to discharge home with home vs OP services and a follow up treatment frequency of 2-3x/wk. Please see assessment section for further patient specific details. If patient discharges prior to next session this note will serve as a discharge summary. Please see below for the latest assessment towards goals. PT Equipment Recommendations  Equipment Needed:  (will cont to assess)      Patient Diagnosis(es): The primary encounter diagnosis was Exertional chest pain. A diagnosis of Abnormal stress test was also pertinent to this visit. Past Medical History:  has a past medical history of Allergic rhinitis, Anxiety, BPH (benign prostatic hypertrophy) with urinary obstruction, CAD (coronary artery disease), Chronic back pain, Colon polyp, Diverticulosis of colon, Environmental allergies, FH: CAD (coronary artery disease), Fractures, GERD (gastroesophageal reflux disease), Headache(784.0), Hearing impairment, Hyperlipidemia, Hypertension, Osteoarthritis, and Restless leg syndrome. Past Surgical History:  has a past surgical history that includes Tonsillectomy and adenoidectomy; nasal/sinus endoscopy; Cholecystectomy (94); Rotator cuff repair (Bilateral);  Femur fracture surgery; hernia repair (06); Colonoscopy (08,11/12,11/17/17); Cardiac surgery; Cardiac catheterization; Total knee arthroplasty (Left, 1/22/2019); joint replacement; knee surgery; incision and drainage (Left, 4/24/2019); and Coronary artery bypass graft (N/A, 1/26/2023). Assessment   Body Structures, Functions, Activity Limitations Requiring Skilled Therapeutic Intervention: Decreased functional mobility ; Decreased endurance;Decreased strength  Assessment: Pt is a 68 y.o. male s/pCABG x 3 on 1/26. Tolerating OOB activity fairly well POD #1. Pt normally independent with mobility without AD. Currently needing min assist x 2 for transfers and CGA for gt. Fatigues quickly and has VAZQUEZ with mild desat with ambulation. However recovered quickly. Anticipate pt will make good progress with gradual increase in activity. Encouraged pt to be OOB and ambulating as tolerated with staff. Pt verbalized understanding. Pt plans to return home with wife at d/c. Rec cont skilled PT to maximize mobility and independence  Treatment Diagnosis: impaired functional mobility s/p CABG on 1/26  Decision Making: Medium Complexity  Requires PT Follow-Up: Yes     Plan   Physcial Therapy Plan  General Plan:  (2-5)  Current Treatment Recommendations: Strengthening, Functional mobility training, Gait training, Stair training, Endurance training, Equipment evaluation, education, & procurement, Safety education & training, Patient/Caregiver education & training  Safety Devices  Type of Devices: Chair alarm in place, Call light within reach, Left in chair, Nurse notified     Restrictions  Position Activity Restriction  Other position/activity restrictions: up with assist, sternal precautions     Subjective   General  Additional Pertinent Hx: Pt is a 68 y.o. male adm 1/18 with unstable angina. Pt presented to ED with chest pain. Cath lab 1/19:[facility] demonstrated multivessel CAD. Pt s/p CABG x 3 1/26.    PMH:  anxiety, CAD, diverticulosis, CAD, GERD, hyperlipidemia, HTN, OA, bilat rotator cuff repair, L TKR  Referring Practitioner: Tessy Hercules MD  Referral Date : 01/26/23  Subjective  Subjective: Pt found sitting in chair. Agreeable to PT. Reports incisional pain 5/10. Social/Functional History  Social/Functional History  Lives With: Spouse  Type of Home: House  Home Layout: Able to Live on Main level with bedroom/bathroom  Home Access: Stairs to enter without rails (1+1 YONNY)  Bathroom Shower/Tub: Tub/Shower unit  Bathroom Toilet: Standard  Bathroom Equipment:  (none)  Home Equipment:  (none)  Has the patient had two or more falls in the past year or any fall with injury in the past year?: No  ADL Assistance: Independent  Homemaking Assistance:  (wife primarily performs)  Ambulation Assistance: Independent  Transfer Assistance: Independent  Active : Yes  Occupation: Retired  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 Wife is in good health and can assist at d/c. Vision/Hearing  Vision  Vision: Impaired  Vision Exceptions: Wears glasses for reading  Hearing  Hearing: Exceptions to [facility]  Hearing Exceptions: Bilateral hearing aid    Cognition         Objective                 AROM RLE (degrees)  RLE AROM: WFL  AROM LLE (degrees)  LLE AROM : WFL  Strength RLE  Strength RLE: WFL  Strength LLE  Strength LLE: WFL          Bed mobility  Bed Mobility Comments: Pt found sitting in chair and stayed up in chair at end of session.   Reviewed logroll technique and following sternal precautions  Transfers  Sit to Stand: Dependent/Total (min assist x 2 from chair, cues for sternal precautions)  Stand to Sit: Dependent/Total (min assist x 2 to chair, cues for sternal precautions)  Ambulation  Device: Rollator  Other Apparatus: O2 (2L, chest tube, IVs, villagomez)  Assistance: Contact guard assistance  Quality of Gait: decreased bilat step length/height, decreased marianne, VAZQUEZ  Distance: [de-identified]'  Comments: Pulse ox 86% after ambulation.   Increased to low 90s within 1-2 minutes with rest and cues for pursed lip breathing             Treatment included: transfers, gt, pt education    OutComes Score                                                  AM-PAC Score  AM-PAC Inpatient Mobility Raw Score : 14 (01/27/23 1018)  AM-PAC Inpatient T-Scale Score : 38.1 (01/27/23 1018)  Mobility Inpatient CMS 0-100% Score: 61.29 (01/27/23 1018)  Mobility Inpatient CMS G-Code Modifier : CL (01/27/23 1018)          Tinneti Score       Goals  Short Term Goals  Time Frame for Short Term Goals: By discharge  Short Term Goal 1: Sit to stand SBA  Short Term Goal 2: Pt will amb >150' with LRAD SBA  Short Term Goal 3: Pt will up/down 1 step with LRAD SBA       Education  Patient Education  Education Given To: Patient  Education Provided: Role of Therapy;Precautions;Plan of Care  Education Provided Comments: Educated on sternal precautions, logrolling for in/out of bed  Education Method: Verbal  Education Outcome: Verbalized understanding;Continued education needed      Therapy Time   Individual Concurrent Group Co-treatment   Time In 0800         Time Out 0853         Minutes 53             Timed Code Treatment Minutes: 38      Total Treatment Minutes:  [address], PT

## 2023-01-27 NOTE — PROGRESS NOTES
Progress Note  Hospital Day: 10  POD#  1  S/P Coronary artery bypass x3, BRENT clip    Chief Complaint: Postop follow up    Mr. Elisabet Salazar is doing well this morning, was able to ambulate in the hallway and his pain is well managed     24 hour Interval History:    - extubated [id number]; placed on amiodarone gtt in OR due to ventricular tachyarrhythmia while weaning from bypass, rare PVCs since and no other arrhythmia    Today's plan:  Complete amiodarone 24 hour gtt, start 200 mg PO daily tomorrow  Keep chest tubes, will place to water seal, keep villagomez  Transition    VITAL SIGNS   Temperature:  Current - Temp: 98.8 °F (37.1 °C);  Max - Temp  Av.1 °F (37.3 °C)  Min: 98.4 °F (36.9 °C)  Max: 99.7 °F (37.6 °C)    Respiratory Rate : Resp  Av.4  Min: 12  Max: 31    Pulse Range: Pulse  Av.9  Min: 69  Max: 86    Blood Pressure Range:  Systolic (93DJI), GBA:411 , Min:98 , VEE:189   ; Diastolic (80XMP), UUN:50, Min:53, Max:77    Pulse ox Range: SpO2  Av.2 %  Min: 92 %  Max: 100 %  O2 Flow Rate (L/min): 1 L/min    CVP (Mean): 16 mmHg  PAP (Systolic/Diastolic): 27/00  PAP (Mean): 22 mmHg  SVR (Using ABP Mean): 1083.33 dyne*sec/cm5  CCI: 3.4 L/min  SVO2 (%): 63 %    Admission Weight: Weight: 200 lb 9.9 oz (91 kg)    Current Weight: Patient Vitals for the past 96 hrs (Last 3 readings):   Weight   23 0600 197 lb 8.5 oz (89.6 kg)   23 0600 190 lb 11.2 oz (86.5 kg)   23 0832 190 lb 7.6 oz (86.4 kg)       VENT SETTINGS   Vent Mode: CPAP/PS Resp Rate (Set): 16 bmp/Vt (Set, mL): 550 mL/ /FiO2 : 28 %    I/O:   Intake/Output Summary (Last 24 hours) at 2023 0955  Last data filed at 2023 1160  Gross per 24 hour   Intake 5794.51 ml   Output 1975 ml   Net 3819.51 ml     Urine (villagomez): 9145-587-094 ml/shift  Chest tube:  -360 ml/shift  serosanguinous, on suction, no leak    LINES/ACCESS:   Central Access: CVC, introducer Day #: 1  Peripheral Access: right hand Day #: 1  Arterial Line Access: removed POD#1                              PA catheter removed POD#1  Walters Day #: 1            ETT Day #: extubated POD#0 at 17:17  Pacing Wires Day #:  1    Diet: ADULT DIET; Regular; 3 carb choices (45 gm/meal);  No Added Salt (3-4 gm); 1500 ml    MEDICATIONS:      [START ON 1/28/2023] amiodarone  200 mg Oral Daily    sodium chloride flush  5-40 mL IntraVENous 2 times per day    aspirin  325 mg Oral Daily    clopidogrel  75 mg Oral Daily    chlorhexidine  15 mL Mouth/Throat BID    furosemide  40 mg IntraVENous BID    magnesium oxide  400 mg Oral BID    mupirocin   Nasal BID    potassium chloride  10 mEq Oral TID WC    polyethylene glycol  17 g Oral Daily    metoprolol tartrate  12.5 mg Oral BID    [START ON 1/28/2023] lisinopril  2.5 mg Oral Lunch    atorvastatin  40 mg Oral Nightly    [START ON 1/28/2023] pantoprazole  40 mg Oral BID AC    pantoprazole (PROTONIX) 40 mg injection  40 mg IntraVENous Daily    ceFAZolin (ANCEF) IVPB  2,000 mg IntraVENous Q8H    vancomycin (VANCOCIN) IV  1,500 mg IntraVENous Q12H    insulin glargine  0.15 Units/kg SubCUTAneous Nightly    insulin lispro  0-12 Units SubCUTAneous 4x Daily AC & HS    acetaminophen  1,000 mg Oral Q6H    gabapentin  300 mg Oral TID    chlorhexidine   Topical See Admin Instructions    sodium chloride flush  5-40 mL IntraVENous 2 times per day    Vitamin D  4,000 Units Oral Daily    famotidine  20 mg Oral BID    sodium chloride flush  5-40 mL IntraVENous 2 times per day       Infusions:   amiodarone 0.5 mg/min (01/26/23 2221)    lactated ringers IV soln 50 mL/hr at 01/26/23 1326    sodium chloride      norepinephrine Stopped (01/27/23 0201)    nitroGLYCERIN Stopped (01/26/23 1449)    nitroprusside (NIPRIDE) 50 mg in NS infusion      insulin 1.74 Units/hr (01/27/23 0720)    dextrose      dexmedetomidine (PRECEDEX) IV infusion 0.05 mcg/kg/hr (01/27/23 0658)    sodium chloride         DATA REVIEW:   CBC:   Recent Labs     01/25/23  0356 01/26/23  0203 01/26/23  0742 01/26/23  1102 01/26/23  1132 01/26/23  1209 01/26/23  1320 01/27/23  0307   WBC 5.5 5.8  --   --   --   --  15.6* 9.7   HGB 13.4* 13.4*   < > 8.9* 9.0* 9.7* 12.5* 9.6*   HCT 39.4* 38.8*  --   --   --   --  37.4* 27.8*   MCV 86.3 85.4  --   --   --   --  86.9 86.4    218  --   --   --   --  207 134*    < > = values in this interval not displayed. BMP:   Recent Labs     01/25/23  0356 01/26/23  0549 01/26/23  1320 01/26/23  1752 01/27/23  0032 01/27/23 0307   * 136 137  --   --  142   K 4.0 4.3 4.7 4.3 4.6 4.6    102 106  --   --  112*   CO2 24 24 21  --   --  22   GLUCOSE 102* 105* 140*  --   --  125*   BUN 11 11 12  --   --  14   CREATININE 0.8 0.8 1.0  --   --  0.9   CALCIUM 8.9 8.7 8.2*  --   --  7.5*   MG  --   --  2.90*  --   --  2.40     Accucheck Glucoses:   Recent Labs     01/27/23  0158 01/27/23  0308 01/27/23  0504 01/27/23  0720 01/27/23  0754   POCGLU 120* 123* 106* 118* 124*     Cardiac Enzymes: No results for input(s): CKTOTAL, CKMB, CKMBINDEX in the last 72 hours. Invalid input(s): TROPONIN  PT/INR:   Recent Labs     01/26/23 1752 01/27/23 0307   PROTIME 18.0* 16.0*   INR 1.49* 1.28*     APTT:   Recent Labs     01/26/23  1320   APTT 31.9     LFT:   Recent Labs     01/25/23 0356   AST 20   ALT 17   BILITOT 0.9   ALKPHOS 79     Troponin: Invalid input(s): TROPONIN  BNP: No results for input(s): BNP in the last 72 hours.   ABG:    Lab Results   Component Value Date/Time    PHART 7.389 01/27/2023 03:08 AM    PO2ART 71.7 01/27/2023 03:08 AM    ZEL4SOK 35.4 01/27/2023 03:08 AM    H9UCYUNG 94 01/27/2023 03:08 AM    PFD0DHJ 22 01/27/2023 03:08 AM    ARE9NTQ 21.3 01/27/2023 03:08 AM    BEART -4 01/27/2023 03:08 AM    BEART -4 01/26/2023 05:58 PM    BEART -4 01/26/2023 05:06 PM    BEART -3 01/26/2023 03:57 PM    BEART -7 01/26/2023 02:58 PM     U/A:  No results for input(s): NITRITE, COLORU, PHUR, LABCAST, WBCUA, RBCUA, MUCUS, TRICHOMONAS, YEAST, BACTERIA, CLARITYU, González Delay, UROBILINOGEN, BILIRUBINUR, BLOODU, GLUCOSEU, AMORPHOUS in the last 72 hours. Invalid input(s): Amisha Hawk    Urine Culture:  n/a  Blood Culture: n/a         Chest X-Ray:     1/26/2023:  Narrative   EXAM: XR CHEST PORTABLE       INDICATION: Post op open heart surgery       COMPARISON: CT dated 1/25/2023, radiograph dated 1/18/2023       FINDINGS:   Medical Devices: Endotracheal tube terminates approximately 3 vertebral bodies above the sonia. Post surgical changes of the mediastinum including median sternotomy wires and left atrial appendage clip. Mediastinal drain in place. Right internal jugular    venous access with [place] catheter terminus projecting over the main pulmonary artery. Left chest tube. Lungs: Central opacities. Pleura: No pneumothorax or pleural effusion. Heart and Mediastinum: The cardiomediastinal silhouette is within normal limits. Bones: No acute suspicious abnormality. Impression   1. Pulmonary edema.            ASSESSMENT:   Patient Active Problem List:     Anxiety     Hyperlipidemia     Diverticulosis of colon     Restless leg syndrome     Benign prostatic hyperplasia with urinary obstruction     Lumbar spinal stenosis     Hearing impairment     Allergic rhinitis     GERD (gastroesophageal reflux disease)     Spondylolisthesis at L4-L5 level     Fatty liver     Atherosclerosis of aorta (HCC)     Primary osteoarthritis of both knees     Carotid stenosis, bilateral     Functional diarrhea     ED (erectile dysfunction)     Primary osteoarthritis of left knee     Migraine without aura and without status migrainosus, not intractable     Osteoarthritis of left knee     Status post total left knee replacement     Lacunar infarction (HCC)     Numbness and tingling in right hand     Lumbosacral radiculopathy     Age-related osteoporosis without current pathological fracture     Closed head injury     Concussion without loss of consciousness     Right hip pain     Vitamin D deficiency     Medication monitoring encounter     TIA (transient ischemic attack)     Unstable angina (HCC)     Abnormal stress test     Exertional chest pain      Neuro: awake, alert and oriented x 3  CV:   Sinus, pacing wires intact  Pulm:  normal work of breathing  Abd:  soft, non-tender; bowel sounds normal; passing flatus, no BM  Walters: clear yellow  Wounds: clean, dry and intact  Ext: warm, + edema    PLAN:   Neuro - pain tolerated    - intraop pec blocks, Precedex while chest tube are in place   - continued scheduled APAP and gabapentin     CV - keep pacing wires and CVC   -  mg/ Plavix 75 mg   - metoprolol 12.5 bid   - Lisinopril 2.5 mg daily to start tomorrow   - Lipitor 40 mg  Pulm - acute postoperative pulmonary insuffiency as expected- wean O2 as tolerated, continue incentive spirometry   - chest tube 560 ml, keep today  Renal - Acute kidney failure/creatinine stable - Cr 0.9, preop Cr 0.8  - wt 197 lbs, preop wt 190 lbs  - urine output  1820 mL/24 hrs   - start diuresis for postop fluid retention        - keep urinary catheter for accurate I & O  Heme - Acute blood loss anemia as expected- hgb 9.6, continue to monitor            - Acute postoperative thrombocytopenia - plt ct 134k, continue to monitor        ID - continue surgical prophylaxis antibiotics  FEN - cardiac diet with 1500 ml fluid restriction     - dc insulin infusion later today     - bowel med  GI prophylaxis - Protonix 40 mg bid  VTE prophylaxis - SCD and PABLO hose  Disposition -   Transition to progressive care, PT/OT  Discussed patient with Dr Darlene Monroy who is agreeable to assessment and plan.         Yumiko Benton MD   Cardiothoracic Surgery PGY-5  1/27/2023  9:55 AM

## 2023-01-27 NOTE — PLAN OF CARE
Problem: ABCDS Injury Assessment  Goal: Absence of physical injury  1/27/2023 0054 by Leah Barreto RN  Outcome: Progressing  1/26/2023 1733 by Khadra Briggs RN  Outcome: Progressing  Flowsheets (Taken 1/26/2023 1612)  Absence of Physical Injury: Implement safety measures based on patient assessment     Problem: Nutrition Deficit:  Goal: Optimize nutritional status  1/27/2023 0054 by Leah Barreto RN  Outcome: Progressing  1/26/2023 1733 by Khadra Briggs RN  Outcome: Progressing     Problem: Respiratory - Adult  Goal: Achieves optimal ventilation and oxygenation  1/27/2023 0054 by Leah Barreto RN  Outcome: Progressing  1/26/2023 1733 by Khadra Briggs RN  Outcome: Progressing     Problem: Cardiovascular - Adult  Goal: Maintains optimal cardiac output and hemodynamic stability  1/27/2023 0054 by Leah Barreto RN  Outcome: Progressing  Flowsheets (Taken 1/26/2023 2000)  Maintains optimal cardiac output and hemodynamic stability:   Monitor blood pressure and heart rate   Monitor urine output and notify Licensed Independent Practitioner for values outside of normal range   Assess for signs of decreased cardiac output  1/26/2023 1733 by Khadra Briggs RN  Outcome: Progressing  Goal: Absence of cardiac dysrhythmias or at baseline  1/27/2023 0054 by Leah Barreto RN  Outcome: Progressing  Flowsheets (Taken 1/26/2023 2000)  Absence of cardiac dysrhythmias or at baseline: Monitor cardiac rate and rhythm  1/26/2023 1733 by Khadra Briggs RN  Outcome: Progressing     Problem: Skin/Tissue Integrity - Adult  Goal: Skin integrity remains intact  1/27/2023 0054 by Leah Barreto RN  Outcome: Progressing  Flowsheets  Taken 1/26/2023 2006  Skin Integrity Remains Intact:   Monitor for areas of redness and/or skin breakdown   Assess vascular access sites hourly   Every 4-6 hours minimum: Change oxygen saturation probe site  Taken 1/26/2023 2000  Skin Integrity Remains Intact: Monitor for areas of redness and/or skin breakdown   Assess vascular access sites hourly   Every 4-6 hours minimum: Change oxygen saturation probe site  1/26/2023 1733 by Rubén Clark RN  Outcome: Progressing  Flowsheets (Taken 1/26/2023 1612)  Skin Integrity Remains Intact:   Monitor for areas of redness and/or skin breakdown   Assess vascular access sites hourly   Every 4-6 hours: If on nasal continuous positive airway pressure, respiratory therapy assesses nares and determine need for appliance change or resting period  Goal: Incisions, wounds, or drain sites healing without S/S of infection  1/27/2023 0054 by Jeet Calhoun RN  Outcome: Progressing  Flowsheets  Taken 1/26/2023 2006  Incisions, Wounds, or Drain Sites Healing Without Sign and Symptoms of Infection: TWICE DAILY: Assess and document skin integrity  Taken 1/26/2023 2000  Incisions, Wounds, or Drain Sites Healing Without Sign and Symptoms of Infection: ADMISSION and DAILY: Assess and document risk factors for pressure ulcer development  1/26/2023 1733 by Rubén Clark RN  Outcome: Progressing  Flowsheets (Taken 1/26/2023 1612)  Incisions, Wounds, or Drain Sites Healing Without Sign and Symptoms of Infection:   TWICE DAILY: Assess and document skin integrity   TWICE DAILY: Assess and document dressing/incision, wound bed, drain sites and surrounding tissue   Implement wound care per orders  Goal: Oral mucous membranes remain intact  1/27/2023 0054 by Jeet Calhoun RN  Outcome: Progressing  1/26/2023 1733 by Rubén Clark RN  Outcome: Progressing     Problem: Musculoskeletal - Adult  Goal: Return mobility to safest level of function  1/27/2023 0054 by Jeet Calhoun RN  Outcome: Progressing  1/26/2023 1733 by Rubén Clark RN  Outcome: Progressing  Goal: Maintain proper alignment of affected body part  1/27/2023 0054 by Jeet Calhoun RN  Outcome: Progressing  1/26/2023 1733 by Rubén Clark RN  Outcome: Progressing  Goal: Return ADL status to a safe level of function  1/27/2023 0054 by Stephany Brandon RN  Outcome: Progressing  1/26/2023 1733 by Sherryle Phy, RN  Outcome: Progressing     Problem: Gastrointestinal - Adult  Goal: Minimal or absence of nausea and vomiting  1/27/2023 0054 by Stephany Brandon RN  Outcome: Progressing  1/26/2023 1733 by Sherryle Phy, RN  Outcome: Progressing  Goal: Maintains or returns to baseline bowel function  1/27/2023 0054 by Stephany Brandon RN  Outcome: Progressing  1/26/2023 1733 by Sherryle Phy, RN  Outcome: Progressing  Goal: Maintains adequate nutritional intake  1/27/2023 0054 by Stephany Brandon RN  Outcome: Progressing  1/26/2023 1733 by Sherryle Phy, RN  Outcome: Progressing  Goal: Establish and maintain optimal ostomy function  1/27/2023 0054 by Stephany Brandon RN  Outcome: Progressing  1/26/2023 1733 by Sherryle Phy, RN  Outcome: Progressing     Problem: Metabolic/Fluid and Electrolytes - Adult  Goal: Electrolytes maintained within normal limits  1/27/2023 0054 by Stephany Brandon RN  Outcome: Progressing  Flowsheets (Taken 1/26/2023 2000)  Electrolytes maintained within normal limits:   Monitor labs and assess patient for signs and symptoms of electrolyte imbalances   Administer electrolyte replacement as ordered  1/26/2023 1733 by Sherryle Phy, RN  Outcome: Progressing  Goal: Hemodynamic stability and optimal renal function maintained  1/27/2023 0054 by Stephany Brandon RN  Outcome: Progressing  Flowsheets (Taken 1/26/2023 2000)  Hemodynamic stability and optimal renal function maintained:   Monitor labs and assess for signs and symptoms of volume excess or deficit   Monitor intake, output and patient weight   Monitor urine specific gravity, serum osmolarity and serum sodium as indicated or ordered  1/26/2023 1733 by Sherryle Phy, RN  Outcome: Progressing  Goal: Glucose maintained within prescribed range  1/27/2023 0054 by Dago Cartwright RN  Outcome: Progressing  1/26/2023 1733 by Radha Cornelius RN  Outcome: Progressing

## 2023-01-27 NOTE — PROGRESS NOTES
Occupational Therapy  Facility/Department: [facility] ICU  Occupational Therapy Initial Assessment/tx    Name: Vani Calero  : 1945  MRN: [id number]  Date of Service: 2023    Discharge Recommendations:  Early Citizen III scored a 17/24 on the AM-PAC ADL Inpatient form. Current research shows that an AM-PAC score of 18 or greater is typically associated with a discharge to the patient's home setting. Please see assessment section for further patient specific details. If patient discharges prior to next session this note will serve as a discharge summary. Please see below for the latest assessment towards goals. OT Equipment Recommendations  Equipment Needed: No       Patient Diagnosis(es): The primary encounter diagnosis was Exertional chest pain. A diagnosis of Abnormal stress test was also pertinent to this visit. Past Medical History:  has a past medical history of Allergic rhinitis, Anxiety, BPH (benign prostatic hypertrophy) with urinary obstruction, CAD (coronary artery disease), Chronic back pain, Colon polyp, Diverticulosis of colon, Environmental allergies, FH: CAD (coronary artery disease), Fractures, GERD (gastroesophageal reflux disease), Headache(784.0), Hearing impairment, Hyperlipidemia, Hypertension, Osteoarthritis, and Restless leg syndrome. Past Surgical History:  has a past surgical history that includes Tonsillectomy and adenoidectomy; nasal/sinus endoscopy; Cholecystectomy (94); Rotator cuff repair (Bilateral); Femur fracture surgery; hernia repair (06); Colonoscopy (08,,17); Cardiac surgery; Cardiac catheterization; Total knee arthroplasty (Left, 2019); joint replacement; knee surgery; incision and drainage (Left, 2019); and Coronary artery bypass graft (N/A, 2023). Treatment Diagnosis: impaired mobility and ADLs      Assessment   Performance deficits / Impairments: Decreased functional mobility ; Decreased ADL status  Assessment: Pt admitted with 3 vessel CAD, unstable angina-to OR 1/26/23 for CABG x 3. He is requiring assist with functional transfers with use of rolling walker, assist with LE ADLs. Pt is motivated to increase functional indep with all tasks. He resides with wife and she can A with all needs. Recommend discharge to home with initial 24 hr A. Treatment Diagnosis: impaired mobility and ADLs  Prognosis: Good  Decision Making: Medium Complexity  REQUIRES OT FOLLOW-UP: No  Activity Tolerance  Activity Tolerance: Patient Tolerated treatment well  Activity Tolerance Comments: on 2 liters O2, O@ sat=97%, ML=230/87, HR=93        Plan   Occupational Therapy Plan  Times Per Week: 2-5  Current Treatment Recommendations: Balance training, Functional mobility training, Endurance training, Safety education & training, Self-Care / ADL     Restrictions  Position Activity Restriction  Other position/activity restrictions: up with assist, sternal precautions    Subjective   General  Chart Reviewed: Yes  Additional Pertinent Hx: PMH:  CAD, TIA, anxiety, chronic back pain, diverticulosis, hyperlipidemia, RLS, salma B RCR, L TKR  Family / Caregiver Present: No  Referring Practitioner: Rachana Fajardo MD  Diagnosis: Pt admitted with 3 vessel CAD, unstable angina-to OR 1/26/23 for CABG x 3  Subjective  Subjective: Pt in chair, agreeable to work with OT.   General Comment  Comments: pt has chest tube, IV, catheter, O2     Social/Functional History  Social/Functional History  Lives With: Spouse  Type of Home: House  Home Layout: Able to Live on Main level with bedroom/bathroom  Home Access: Stairs to enter without rails (1+1 YONNY)  Bathroom Shower/Tub: Tub/Shower unit  Bathroom Toilet: Standard  Bathroom Equipment:  (none)  Home Equipment:  (none)  Has the patient had two or more falls in the past year or any fall with injury in the past year?: No  ADL Assistance: Independent  Homemaking Assistance:  (wife primarily performs)  Ambulation Assistance: Independent  Transfer Assistance: Independent  Active : Yes  Occupation: Retired  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 Wife is in good health and can assist at d/c.       Objective                Safety Devices  Type of Devices: Chair alarm in place;Call light within reach; Left in chair;Nurse notified  Balance  Sitting: Intact  Standing: Impaired (CGA)  Gait  Overall Level of Assistance:  (Functional mobility with rolling walker and CGA, cues to pace self)  Toilet Transfers  Toilet - Technique: Ambulating (with rolling walker and CGA)  Equipment Used:  (simulated 3 in1 commode)  Toilet Transfer: 2 Person assistance;Minimal assistance (min A of 2 stand to sit, cues for sternal prec, safe positioning and hand placement)  AROM:  (~90 shoulder flex, elbows to hands=WFL)              Transfers  Sit to stand: 2 Person assistance (cues for sternal prec, [place] of 2)  Stand to sit: 2 Person assistance (cues for safe positioning and sternal prec, [place] of 2)  Vision  Vision: Impaired  Vision Exceptions: Wears glasses for reading  Hearing  Hearing: Exceptions to [facility]  Hearing Exceptions: Bilateral hearing aid  Cognition  Overall Cognitive Status: WFL  Orientation  Overall Orientation Status: Within Normal Limits                  Education Given To: Patient  Education Provided: Role of Therapy;Precautions; ADL Adaptive Strategies;Transfer Training  Education Method: Demonstration;Verbal  Barriers to Learning: None  Education Outcome: Verbalized understanding;Continued education needed           Hand Dominance  Hand Dominance: Left            G-Code     OutComes Score                                                  AM-PAC Score        AM-[facility] Inpatient Daily Activity Raw Score: 17 (01/27/23 0952)  AM-PAC Inpatient ADL T-Scale Score : 37.26 (01/27/23 0952)  ADL Inpatient CMS 0-100% Score: 50.11 (01/27/23 6639)  ADL Inpatient CMS G-Code Modifier : CK (01/27/23 4019)           Goals  Short Term Goals  Time Frame for Short Term Goals: discharge  Short Term Goal 1: pt to transfer to commode with SBA  Short Term Goal 2: pt to manage toileting with SBA  Short Term Goal 3: pt to verbalize and adhere to all sternal prec with occ cues  Short Term Goal 4: pt to participate in LE dressing assessment  Patient Goals   Patient goals : to get more indep       Therapy Time   Individual Concurrent Group Co-treatment   Time In 0800         Time Out 0853         Minutes 53          Timed Code Treatment Minutes:   38    Total Treatment Minutes:   25 [facility], OTR/L Alexavng 19

## 2023-01-27 NOTE — CONSULTS
Comprehensive Nutrition Assessment    RECOMMENDATIONS:  PO Diet: Continue Regular; 3 carb choice; DONTE; 1500 mL FR diet  ONS: No indication  Nutrition Education: Education completed   Monitor nutrition adequacy, pertinent labs, bowel habits, wt changes, and clinical progress    NUTRITION ASSESSMENT:   Nutritional summary & status: Consult for diet education: Pt on Regulra; 3 carb choice; DONTE; 1500 mL FR diet. Intakes between %. Pt states that his appetite has been slightly decreased since surgery, but is slowly improving. He is struggling w/ the fluid restriction, provided him w/ tips to combat along w/ diet education. Left pt w/ heart healthy diet education handout, pt asked if he could call me back when wife comes back to visit. No N/V or abdominal pain. No current indication for ONS at this time. Will continue to monitor. Admission/PMH: Unstable angina s/p CABG x3    MALNUTRITION ASSESSMENT      Malnutrition Status: No malnutrition      NUTRITION DIAGNOSIS   Food & Nutrition-related knowledge deficit related to cardiac dysfunction as evidenced by other (comment) (s/p CABG x3)    Nutrition Monitoring and Evaluation:   Food/Nutrient Intake Outcomes:  Food and Nutrient Intake  Physical Signs/Symptoms Outcomes:  Biochemical Data, Nutrition Focused Physical Findings, Weight     OBJECTIVE DATA: Significant to nutrition assessment  Nutrition Related Findings: . -2.3L. Active bowel sounds. +BM 1/25. Wounds: Surgical Incision  Nutrition Goals: Meet at least 75% of estimated needs, prior to discharge     [facility] DIET; Regular; 3 carb choices (45 gm/meal);  No Added Salt (3-4 gm); 1500 ml  PO Intake: %   PO Supplement Intake:None Ordered  Additional Sources of Calories/IVF:N/A     ANTHROPOMETRICS  Current Height: 5' 6\" (167.6 cm)  Current Weight: 197 lb 8.5 oz (89.6 kg)    Admission weight: 200 lb 9.9 oz (91 kg)  Ideal Body Weight (IBW): 142 lbs  (65 kg)    BMI: 31.9    COMPARATIVE STANDARDS  Energy (kcal):  2698-6207 (18-20 kcal/kg  CBW)     Protein (g):   (1.0-1.2 g/kg CBW)       Fluid (mL/day):  1500 mL FR    The patient will be monitored per nutrition standards of care. Consult dietitian if additional nutrition interventions are needed prior to RD reassessment.      Marciano Davila, [address], [address] [place] [address] Drive:  [phone]  Office:  [phone]

## 2023-01-27 NOTE — PROGRESS NOTES
Pt has remained hemodynamically stable. UO has been more than adequate. CT output has slowed down. More significant output with ambulation but has remained dark red. Anticipate chest tube removal tomorrow. Pt tolerated ambulation very well and weaned to room air.      Electronically signed by Violet Connell RN on 1/27/2023 at 6:13 PM

## 2023-01-27 NOTE — PLAN OF CARE
Problem: ABCDS Injury Assessment  Goal: Absence of physical injury  1/27/2023 1701 by Armen Wood RN  Outcome: Progressing  1/27/2023 1700 by Armen Wood RN  Outcome: Progressing     Problem: Nutrition Deficit:  Goal: Optimize nutritional status  1/27/2023 1701 by Armen Wood RN  Outcome: Progressing  1/27/2023 1700 by Armen Wood RN  Outcome: Progressing  Flowsheets (Taken 1/27/2023 1410 by Toya Segundo RD)  Nutrient intake appropriate for improving, restoring, or maintaining nutritional needs:   Monitor oral intake, labs, and treatment plans   Assess nutritional status and recommend course of action     Problem: Respiratory - Adult  Goal: Achieves optimal ventilation and oxygenation  1/27/2023 1701 by Armen Wood RN  Outcome: Progressing  1/27/2023 1700 by Armen Wood RN  Outcome: Progressing     Problem: Cardiovascular - Adult  Goal: Maintains optimal cardiac output and hemodynamic stability  1/27/2023 1701 by Armen Wood RN  Outcome: Progressing  1/27/2023 1700 by Armen Wood RN  Outcome: Progressing  Goal: Absence of cardiac dysrhythmias or at baseline  1/27/2023 1701 by Armen Wodo RN  Outcome: Progressing  1/27/2023 1700 by Armen Wood RN  Outcome: Progressing     Problem: Skin/Tissue Integrity - Adult  Goal: Skin integrity remains intact  1/27/2023 1701 by Armen Wood RN  Outcome: Progressing  1/27/2023 1700 by Armen Wood RN  Outcome: Progressing  Goal: Incisions, wounds, or drain sites healing without S/S of infection  1/27/2023 1701 by Armen Wood RN  Outcome: Progressing  1/27/2023 1700 by Armen Wood RN  Outcome: Progressing  Goal: Oral mucous membranes remain intact  1/27/2023 1701 by Armen Wood RN  Outcome: Progressing  1/27/2023 1700 by Armen Wood RN  Outcome: Progressing     Problem: Musculoskeletal - Adult  Goal: Return mobility to safest level of function  1/27/2023 1701 by Armen Wood RN  Outcome: Progressing  1/27/2023 1700 by Armen Wood RN  Outcome: Progressing  Goal: Maintain proper alignment of affected body part  1/27/2023 1701 by Slava Evans RN  Outcome: Progressing  1/27/2023 1700 by Slava Evans RN  Outcome: Progressing  Goal: Return ADL status to a safe level of function  1/27/2023 1701 by Slava Evans RN  Outcome: Progressing  1/27/2023 1700 by Slava Evans RN  Outcome: Progressing     Problem: Gastrointestinal - Adult  Goal: Minimal or absence of nausea and vomiting  1/27/2023 1701 by Slava Evans RN  Outcome: Progressing  1/27/2023 1700 by Slava Evans RN  Outcome: Progressing  Goal: Maintains or returns to baseline bowel function  1/27/2023 1701 by Slava Evans RN  Outcome: Progressing  1/27/2023 1700 by Slava Evans RN  Outcome: Progressing  Goal: Maintains adequate nutritional intake  1/27/2023 1701 by Slava Evans RN  Outcome: Progressing  1/27/2023 1700 by Slava Evans RN  Outcome: Progressing  Goal: Establish and maintain optimal ostomy function  1/27/2023 1701 by Slava Evans RN  Outcome: Progressing  1/27/2023 1700 by Slava Evans RN  Outcome: Progressing

## 2023-01-27 NOTE — PROGRESS NOTES
Patient assisted to standing scale and chair - reported some dizziness and weakness upon standing, but vital signs were unchanged. NSR 70 with very rare PVCs. Systolic 'R. 460 mL out of chest tubes overnight  417 out of villagomez overnight.

## 2023-01-28 LAB
ANION GAP SERPL CALCULATED.3IONS-SCNC: 6 MMOL/L (ref 3–16)
BUN BLDV-MCNC: 17 MG/DL (ref 7–20)
CALCIUM SERPL-MCNC: 7.9 MG/DL (ref 8.3–10.6)
CHLORIDE BLD-SCNC: 106 MMOL/L (ref 99–110)
CO2: 26 MMOL/L (ref 21–32)
CREAT SERPL-MCNC: 0.8 MG/DL (ref 0.8–1.3)
GFR SERPL CREATININE-BSD FRML MDRD: >60 ML/MIN/{1.73_M2}
GLUCOSE BLD-MCNC: 121 MG/DL (ref 70–99)
GLUCOSE BLD-MCNC: 126 MG/DL (ref 70–99)
GLUCOSE BLD-MCNC: 137 MG/DL (ref 70–99)
GLUCOSE BLD-MCNC: 138 MG/DL (ref 70–99)
GLUCOSE BLD-MCNC: 153 MG/DL (ref 70–99)
GLUCOSE BLD-MCNC: 190 MG/DL (ref 70–99)
HCT VFR BLD CALC: 26.1 % (ref 40.5–52.5)
HEMOGLOBIN: 8.8 G/DL (ref 13.5–17.5)
MAGNESIUM: 2.2 MG/DL (ref 1.8–2.4)
MCH RBC QN AUTO: 29.8 PG (ref 26–34)
MCHC RBC AUTO-ENTMCNC: 33.8 G/DL (ref 31–36)
MCV RBC AUTO: 88.4 FL (ref 80–100)
PDW BLD-RTO: 13.2 % (ref 12.4–15.4)
PERFORMED ON: ABNORMAL
PLATELET # BLD: 137 K/UL (ref 135–450)
PMV BLD AUTO: 7.4 FL (ref 5–10.5)
POTASSIUM SERPL-SCNC: 4.3 MMOL/L (ref 3.5–5.1)
RBC # BLD: 2.95 M/UL (ref 4.2–5.9)
SODIUM BLD-SCNC: 138 MMOL/L (ref 136–145)
WBC # BLD: 11.3 K/UL (ref 4–11)

## 2023-01-28 PROCEDURE — 83735 ASSAY OF MAGNESIUM: CPT

## 2023-01-28 PROCEDURE — 2580000003 HC RX 258: Performed by: THORACIC SURGERY (CARDIOTHORACIC VASCULAR SURGERY)

## 2023-01-28 PROCEDURE — 80048 BASIC METABOLIC PNL TOTAL CA: CPT

## 2023-01-28 PROCEDURE — 6370000000 HC RX 637 (ALT 250 FOR IP): Performed by: CLINICAL NURSE SPECIALIST

## 2023-01-28 PROCEDURE — 6360000002 HC RX W HCPCS: Performed by: THORACIC SURGERY (CARDIOTHORACIC VASCULAR SURGERY)

## 2023-01-28 PROCEDURE — C9113 INJ PANTOPRAZOLE SODIUM, VIA: HCPCS | Performed by: THORACIC SURGERY (CARDIOTHORACIC VASCULAR SURGERY)

## 2023-01-28 PROCEDURE — 85027 COMPLETE CBC AUTOMATED: CPT

## 2023-01-28 PROCEDURE — A4216 STERILE WATER/SALINE, 10 ML: HCPCS | Performed by: THORACIC SURGERY (CARDIOTHORACIC VASCULAR SURGERY)

## 2023-01-28 PROCEDURE — 94761 N-INVAS EAR/PLS OXIMETRY MLT: CPT

## 2023-01-28 PROCEDURE — 2060000000 HC ICU INTERMEDIATE R&B

## 2023-01-28 PROCEDURE — 6370000000 HC RX 637 (ALT 250 FOR IP): Performed by: INTERNAL MEDICINE

## 2023-01-28 PROCEDURE — 6370000000 HC RX 637 (ALT 250 FOR IP): Performed by: STUDENT IN AN ORGANIZED HEALTH CARE EDUCATION/TRAINING PROGRAM

## 2023-01-28 PROCEDURE — 36592 COLLECT BLOOD FROM PICC: CPT

## 2023-01-28 PROCEDURE — 6370000000 HC RX 637 (ALT 250 FOR IP): Performed by: THORACIC SURGERY (CARDIOTHORACIC VASCULAR SURGERY)

## 2023-01-28 RX ORDER — POTASSIUM CHLORIDE 20 MEQ/1
20 TABLET, EXTENDED RELEASE ORAL
Status: DISCONTINUED | OUTPATIENT
Start: 2023-01-28 | End: 2023-01-31 | Stop reason: HOSPADM

## 2023-01-28 RX ORDER — AMIODARONE HYDROCHLORIDE 200 MG/1
400 TABLET ORAL 2 TIMES DAILY
Status: DISCONTINUED | OUTPATIENT
Start: 2023-01-28 | End: 2023-01-31 | Stop reason: HOSPADM

## 2023-01-28 RX ORDER — AMIODARONE HYDROCHLORIDE 200 MG/1
200 TABLET ORAL DAILY
Status: DISCONTINUED | OUTPATIENT
Start: 2023-02-02 | End: 2023-01-31 | Stop reason: HOSPADM

## 2023-01-28 RX ADMIN — AMIODARONE HYDROCHLORIDE 200 MG: 200 TABLET ORAL at 08:25

## 2023-01-28 RX ADMIN — AMIODARONE HYDROCHLORIDE 0.5 MG/MIN: 50 INJECTION, SOLUTION INTRAVENOUS at 21:58

## 2023-01-28 RX ADMIN — POTASSIUM CHLORIDE 20 MEQ: 1500 TABLET, EXTENDED RELEASE ORAL at 17:20

## 2023-01-28 RX ADMIN — FUROSEMIDE 40 MG: 10 INJECTION, SOLUTION INTRAMUSCULAR; INTRAVENOUS at 17:20

## 2023-01-28 RX ADMIN — GABAPENTIN 300 MG: 300 CAPSULE ORAL at 08:26

## 2023-01-28 RX ADMIN — LISINOPRIL 2.5 MG: 2.5 TABLET ORAL at 13:55

## 2023-01-28 RX ADMIN — METOPROLOL TARTRATE 12.5 MG: 25 TABLET, FILM COATED ORAL at 19:59

## 2023-01-28 RX ADMIN — POLYETHYLENE GLYCOL 3350 17 G: 17 POWDER, FOR SOLUTION ORAL at 08:36

## 2023-01-28 RX ADMIN — MUPIROCIN: 20 OINTMENT TOPICAL at 08:35

## 2023-01-28 RX ADMIN — CEFAZOLIN 2000 MG: 2 INJECTION, POWDER, FOR SOLUTION INTRAMUSCULAR; INTRAVENOUS at 04:04

## 2023-01-28 RX ADMIN — GABAPENTIN 300 MG: 300 CAPSULE ORAL at 13:56

## 2023-01-28 RX ADMIN — PANTOPRAZOLE SODIUM 40 MG: 40 TABLET, DELAYED RELEASE ORAL at 17:19

## 2023-01-28 RX ADMIN — ACETAMINOPHEN 1000 MG: 500 TABLET ORAL at 04:06

## 2023-01-28 RX ADMIN — AMIODARONE HYDROCHLORIDE 150 MG: 50 INJECTION, SOLUTION INTRAVENOUS at 12:21

## 2023-01-28 RX ADMIN — AMIODARONE HYDROCHLORIDE 1 MG/MIN: 50 INJECTION, SOLUTION INTRAVENOUS at 12:38

## 2023-01-28 RX ADMIN — METOPROLOL TARTRATE 12.5 MG: 25 TABLET, FILM COATED ORAL at 08:32

## 2023-01-28 RX ADMIN — FUROSEMIDE 40 MG: 10 INJECTION, SOLUTION INTRAMUSCULAR; INTRAVENOUS at 08:34

## 2023-01-28 RX ADMIN — ROSUVASTATIN CALCIUM 20 MG: 20 TABLET, COATED ORAL at 19:58

## 2023-01-28 RX ADMIN — GABAPENTIN 300 MG: 300 CAPSULE ORAL at 19:58

## 2023-01-28 RX ADMIN — Medication 15 ML: at 08:34

## 2023-01-28 RX ADMIN — POTASSIUM CHLORIDE 10 MEQ: 750 TABLET, EXTENDED RELEASE ORAL at 08:36

## 2023-01-28 RX ADMIN — ACETAMINOPHEN 1000 MG: 500 TABLET ORAL at 20:04

## 2023-01-28 RX ADMIN — PANTOPRAZOLE SODIUM 40 MG: 40 INJECTION, POWDER, LYOPHILIZED, FOR SOLUTION INTRAVENOUS at 08:34

## 2023-01-28 RX ADMIN — CALCIUM GLUCONATE 1000 MG: 98 INJECTION, SOLUTION INTRAVENOUS at 17:02

## 2023-01-28 RX ADMIN — CLOPIDOGREL BISULFATE 75 MG: 75 TABLET ORAL at 08:31

## 2023-01-28 RX ADMIN — AMIODARONE HYDROCHLORIDE 400 MG: 200 TABLET ORAL at 19:58

## 2023-01-28 RX ADMIN — Medication 400 MG: at 08:26

## 2023-01-28 RX ADMIN — MUPIROCIN: 20 OINTMENT TOPICAL at 19:59

## 2023-01-28 RX ADMIN — PANTOPRAZOLE SODIUM 40 MG: 40 TABLET, DELAYED RELEASE ORAL at 06:36

## 2023-01-28 RX ADMIN — Medication 400 MG: at 19:58

## 2023-01-28 RX ADMIN — ASPIRIN 325 MG: 325 TABLET, COATED ORAL at 08:31

## 2023-01-28 RX ADMIN — Medication 15 ML: at 19:59

## 2023-01-28 RX ADMIN — SODIUM CHLORIDE, PRESERVATIVE FREE 10 ML: 5 INJECTION INTRAVENOUS at 20:03

## 2023-01-28 RX ADMIN — SODIUM CHLORIDE, PRESERVATIVE FREE 10 ML: 5 INJECTION INTRAVENOUS at 08:38

## 2023-01-28 RX ADMIN — AMIODARONE HYDROCHLORIDE 400 MG: 200 TABLET ORAL at 12:40

## 2023-01-28 RX ADMIN — Medication 4000 UNITS: at 08:27

## 2023-01-28 RX ADMIN — POTASSIUM CHLORIDE 10 MEQ: 750 TABLET, EXTENDED RELEASE ORAL at 12:41

## 2023-01-28 ASSESSMENT — PAIN DESCRIPTION - FREQUENCY: FREQUENCY: INTERMITTENT

## 2023-01-28 ASSESSMENT — PAIN - FUNCTIONAL ASSESSMENT: PAIN_FUNCTIONAL_ASSESSMENT: ACTIVITIES ARE NOT PREVENTED

## 2023-01-28 ASSESSMENT — PAIN DESCRIPTION - LOCATION
LOCATION: CHEST

## 2023-01-28 ASSESSMENT — PAIN SCALES - GENERAL
PAINLEVEL_OUTOF10: 5
PAINLEVEL_OUTOF10: 4
PAINLEVEL_OUTOF10: 3
PAINLEVEL_OUTOF10: 5
PAINLEVEL_OUTOF10: 4
PAINLEVEL_OUTOF10: 3

## 2023-01-28 ASSESSMENT — PAIN DESCRIPTION - ORIENTATION
ORIENTATION: MID
ORIENTATION: MID

## 2023-01-28 ASSESSMENT — PAIN DESCRIPTION - PAIN TYPE: TYPE: SURGICAL PAIN

## 2023-01-28 ASSESSMENT — PAIN DESCRIPTION - ONSET: ONSET: GRADUAL

## 2023-01-28 ASSESSMENT — PAIN DESCRIPTION - DESCRIPTORS: DESCRIPTORS: ACHING

## 2023-01-28 NOTE — PROGRESS NOTES
Progress Note  Hospital Day: 11  POD#  1  S/P Coronary artery bypass x3, BRENT clip    Chief Complaint: Postop follow up    Mr. Hank Talbert no complaints. Had dump of 75 ml when got up this AM    24 hour Interval History:    - extubated ; placed on amiodarone gtt in OR due to ventricular tachyarrhythmia while weaning from bypass, rare PVCs since and no other arrhythmia   -  amiodarone gtt off, change to 200 mg daily, swan out, a-line out    Today's plan:  Keep chest tubes this AM, possible removal this afternoon if low output  Lasix, metoprolol    VITAL SIGNS   Temperature:  Current - Temp: 97.6 °F (36.4 °C);  Max - Temp  Av.3 °F (36.8 °C)  Min: 97.6 °F (36.4 °C)  Max: 99.2 °F (37.3 °C)    Respiratory Rate : Resp  Av.7  Min: 16  Max: 28    Pulse Range: Pulse  Av.6  Min: 69  Max: 84    Blood Pressure Range:  Systolic (54MNB), JTJ:084 , Min:104 , KHC:243   ; Diastolic (75VYP), AVV:54, Min:48, Max:73    Pulse ox Range: SpO2  Av.2 %  Min: 90 %  Max: 99 %  O2 Flow Rate (L/min): 0 L/min    CVP (Mean): 16 mmHg  PAP (Systolic/Diastolic): 65/53  PAP (Mean): 22 mmHg  SVR (Using ABP Mean): 1083.33 dyne*sec/cm5  CCI: 3.4 L/min  SVO2 (%): 63 %    Admission Weight: Weight: 200 lb 9.9 oz (91 kg)    Current Weight: Patient Vitals for the past 96 hrs (Last 3 readings):   Weight   23 0600 206 lb 12.7 oz (93.8 kg)   23 0600 197 lb 8.5 oz (89.6 kg)   23 0600 190 lb 11.2 oz (86.5 kg)         VENT SETTINGS   Vent Mode: CPAP/PS Resp Rate (Set): 16 bmp/Vt (Set, mL): 550 mL/ /FiO2 : 28 %    I/O:   Intake/Output Summary (Last 24 hours) at 2023 0827  Last data filed at 2023 0630  Gross per 24 hour   Intake 1636.42 ml   Output 2720 ml   Net -1083.58 ml       Urine (villagomez): [phone] ml/shift  Chest tube:  160- ml/shift  serosanguinous, on water seal, no leak    LINES/ACCESS:   Central Access: CVC, introducer Day #: 2  Peripheral Access: right hand Day #: 2  Arterial Line Access: removed POD#1                              PA catheter removed POD#1  Walters Day #: 2  ETT Day #: extubated POD#0 at 17:17  Pacing Wires Day #:  2    Diet: ADULT DIET; Regular; 3 carb choices (45 gm/meal); No Added Salt (3-4 gm); 1500 ml    MEDICATIONS:      amiodarone  200 mg Oral Daily    rosuvastatin  20 mg Oral Nightly    sodium chloride flush  5-40 mL IntraVENous 2 times per day    aspirin  325 mg Oral Daily    clopidogrel  75 mg Oral Daily    chlorhexidine  15 mL Mouth/Throat BID    furosemide  40 mg IntraVENous BID    magnesium oxide  400 mg Oral BID    mupirocin   Nasal BID    potassium chloride  10 mEq Oral TID WC    polyethylene glycol  17 g Oral Daily    metoprolol tartrate  12.5 mg Oral BID    lisinopril  2.5 mg Oral Lunch    pantoprazole  40 mg Oral BID AC    pantoprazole (PROTONIX) 40 mg injection  40 mg IntraVENous Daily    insulin lispro  0-12 Units SubCUTAneous 4x Daily AC & HS    acetaminophen  1,000 mg Oral Q6H    gabapentin  300 mg Oral TID    Vitamin D  4,000 Units Oral Daily       Infusions:   norepinephrine Stopped (01/27/23 0201)    dextrose      dexmedetomidine (PRECEDEX) IV infusion 0.05 mcg/kg/hr (01/28/23 0603)       DATA REVIEW:   CBC:   Recent Labs     01/26/23  0549 01/26/23  0742 01/26/23  1132 01/26/23  1209 01/26/23  1320 01/27/23  0307 01/28/23  0415   WBC 5.8  --   --   --  15.6* 9.7 11.3*   HGB 13.4*   < > 9.0* 9.7* 12.5* 9.6* 8.8*   HCT 38.8*  --   --   --  37.4* 27.8* 26.1*   MCV 85.4  --   --   --  86.9 86.4 88.4     --   --   --  207 134* 137    < > = values in this interval not displayed.        BMP:   Recent Labs     01/26/23  0549 01/26/23  1320 01/26/23  1752 01/27/23  0032 01/27/23  0307 01/28/23  0415    137  --   --  142 138   K 4.3 4.7 4.3 4.6 4.6 4.3    106  --   --  112* 106   CO2 24 21  --   --  22 26   GLUCOSE 105* 140*  --   --  125* 121*   BUN 11 12  --   --  14 17   CREATININE 0.8 1.0  --   --  0.9 0.8   CALCIUM 8.7 8.2*  --   --  7.5* 7.9*   MG  --  2.90*  --   --  2.40 2.20       Accucheck Glucoses:   Recent Labs     01/27/23  1209 01/27/23  1618 01/27/23  1954 01/28/23  0416 01/28/23  0802   POCGLU 128* 130* 151* 138* 126*       Cardiac Enzymes: No results for input(s): CKTOTAL, CKMB, CKMBINDEX in the last 72 hours. Invalid input(s): TROPONIN  PT/INR:   Recent Labs     01/26/23  1752 01/27/23  0307   PROTIME 18.0* 16.0*   INR 1.49* 1.28*       APTT:   Recent Labs     01/26/23  1320   APTT 31.9       LFT:   No results for input(s): AST, ALT, ALB, BILIDIR, BILITOT, ALKPHOS in the last 72 hours. Troponin: Invalid input(s): TROPONIN  BNP: No results for input(s): BNP in the last 72 hours. ABG:    Lab Results   Component Value Date/Time    PHART 7.389 01/27/2023 03:08 AM    PO2ART 71.7 01/27/2023 03:08 AM    OBY9WSE 35.4 01/27/2023 03:08 AM    F9HQQUMO 94 01/27/2023 03:08 AM    CGR9WWY 22 01/27/2023 03:08 AM    BNY2QFG 21.3 01/27/2023 03:08 AM    BEART -4 01/27/2023 03:08 AM    BEART -4 01/26/2023 05:58 PM    BEART -4 01/26/2023 05:06 PM    BEART -3 01/26/2023 03:57 PM    BEART -7 01/26/2023 02:58 PM     U/A:  No results for input(s): NITRITE, COLORU, PHUR, LABCAST, WBCUA, RBCUA, MUCUS, TRICHOMONAS, YEAST, BACTERIA, CLARITYU, SPECGRAV, LEUKOCYTESUR, UROBILINOGEN, BILIRUBINUR, BLOODU, GLUCOSEU, AMORPHOUS in the last 72 hours. Invalid input(s): Mar [place]    Urine Culture:  n/a  Blood Culture: n/a         Chest X-Ray:     1/26/2023:  Narrative   EXAM: XR CHEST PORTABLE       INDICATION: Post op open heart surgery       COMPARISON: CT dated 1/25/2023, radiograph dated 1/18/2023       FINDINGS:   Medical Devices: Endotracheal tube terminates approximately 3 vertebral bodies above the sonia. Post surgical changes of the mediastinum including median sternotomy wires and left atrial appendage clip. Mediastinal drain in place. Right internal jugular    venous access with [place] catheter terminus projecting over the main pulmonary artery.  Left chest tube. Lungs: Central opacities. Pleura: No pneumothorax or pleural effusion. Heart and Mediastinum: The cardiomediastinal silhouette is within normal limits. Bones: No acute suspicious abnormality. Impression   1. Pulmonary edema.            ASSESSMENT:   Patient Active Problem List:     Anxiety     Hyperlipidemia     Diverticulosis of colon     Restless leg syndrome     Benign prostatic hyperplasia with urinary obstruction     Lumbar spinal stenosis     Hearing impairment     Allergic rhinitis     GERD (gastroesophageal reflux disease)     Spondylolisthesis at L4-L5 level     Fatty liver     Atherosclerosis of aorta (HCC)     Primary osteoarthritis of both knees     Carotid stenosis, bilateral     Functional diarrhea     ED (erectile dysfunction)     Primary osteoarthritis of left knee     Migraine without aura and without status migrainosus, not intractable     Osteoarthritis of left knee     Status post total left knee replacement     Lacunar infarction (HCC)     Numbness and tingling in right hand     Lumbosacral radiculopathy     Age-related osteoporosis without current pathological fracture     Closed head injury     Concussion without loss of consciousness     Right hip pain     Vitamin D deficiency     Medication monitoring encounter     TIA (transient ischemic attack)     Unstable angina (HCC)     Abnormal stress test     Exertional chest pain      Neuro: awake, alert and oriented x 3  CV:   Sinus, pacing wires intact  Pulm:  normal work of breathing  Abd:  soft, non-tender; bowel sounds normal; passing flatus, no BM  Walters: clear yellow  Wounds: clean, dry and intact  Ext: warm, + edema    PLAN:   Neuro - pain tolerated    - intraop pec blocks, Precedex while chest tube are in place   - continued scheduled APAP and gabapentin    - PT  14/24 on 1/27  on the AM-PAC short mobility form              - OT  17/24 on 1/27 on the AM-PAC ADL Inpatient form     CV - keep pacing wires and CVC   -  mg/ Plavix 75 mg   - metoprolol 12.5 bid   - Lisinopril 2.5 mg daily to start today   - Lipitor 40 mg  Pulm - acute postoperative pulmonary insuffiency as expected- wean O2 as tolerated, continue incentive spirometry   - chest tube 335 ml, keep for now, possible removal later  Renal - Acute kidney failure/creatinine stable - Cr 0.8, preop Cr 0.8  - wt 206 lbs, preop wt 190 lbs  - urine output  2450 mL/24 hrs   - start diuresis for postop fluid retention        - keep urinary catheter for accurate I & O  Heme - Acute blood loss anemia as expected- hgb 8.8, continue to monitor            - Acute postoperative thrombocytopenia - plt ct 137k, continue to monitor        ID - continue surgical prophylaxis antibiotics  FEN - cardiac diet with 1500 ml fluid restriction     - bowel med   - ISS  GI prophylaxis - Protonix 40 mg bid  VTE prophylaxis - SCD and PABLO hose  Disposition -   Transition to progressive care, PT/OT    Nancy Frye MD   Cardiothoracic Surgery PGY-5  1/28/2023  8:27 AM      AF. Amio protocol. Replete Ca.   Diuresing well  Tubes still with a bit too much to remove yet    Halie Thornton MD  1/28/2023  3:55 PM

## 2023-01-28 NOTE — PLAN OF CARE
Problem: Respiratory - Adult  Goal: Achieves optimal ventilation and oxygenation  Outcome: Progressing     Problem: Cardiovascular - Adult  Goal: Absence of cardiac dysrhythmias or at baseline  Recent Flowsheet Documentation  Taken 1/28/2023 0800 by Nito Tao RN  Absence of cardiac dysrhythmias or at baseline: Monitor cardiac rate and rhythm     Problem: Skin/Tissue Integrity - Adult  Goal: Skin integrity remains intact  Outcome: Progressing     Problem: Musculoskeletal - Adult  Goal: Return mobility to safest level of function  Recent Flowsheet Documentation  Taken 1/28/2023 0800 by Nito Tao RN  Return Mobility to Safest Level of Function: Assess patient stability and activity tolerance for standing, transferring and ambulating with or without assistive devices

## 2023-01-28 NOTE — PLAN OF CARE
Problem: ABCDS Injury Assessment  Goal: Absence of physical injury  1/27/2023 2354 by Dona Wren RN  Outcome: Progressing  Flowsheets (Taken 1/27/2023 2254)  Absence of Physical Injury: Implement safety measures based on patient assessment     Problem: Nutrition Deficit:  Goal: Optimize nutritional status  1/27/2023 2354 by Dona Wren RN  Outcome: Progressing     Problem: Respiratory - Adult  Goal: Achieves optimal ventilation and oxygenation  1/27/2023 2354 by Dona Wren RN  Outcome: Progressing  Flowsheets (Taken 1/27/2023 2000)  Achieves optimal ventilation and oxygenation:   Assess for changes in respiratory status   Assess for changes in mentation and behavior   Encourage broncho-pulmonary hygiene including cough, deep breathe, incentive spirometry     Problem: Cardiovascular - Adult  Goal: Maintains optimal cardiac output and hemodynamic stability  1/27/2023 2354 by Dona Wren RN  Outcome: Progressing  Flowsheets (Taken 1/27/2023 2000)  Maintains optimal cardiac output and hemodynamic stability:   Monitor blood pressure and heart rate   Monitor urine output and notify Licensed Independent Practitioner for values outside of normal range   Assess for signs of decreased cardiac output     Problem: Cardiovascular - Adult  Goal: Absence of cardiac dysrhythmias or at baseline  1/27/2023 2354 by Dona Wren RN  Outcome: Progressing  Flowsheets (Taken 1/27/2023 2000)  Absence of cardiac dysrhythmias or at baseline:   Monitor cardiac rate and rhythm   Assess for signs of decreased cardiac output     Problem: Skin/Tissue Integrity - Adult  Goal: Skin integrity remains intact  1/27/2023 2354 by Dona Wren RN  Outcome: Progressing  Flowsheets  Taken 1/27/2023 2254  Skin Integrity Remains Intact:   Monitor for areas of redness and/or skin breakdown   Assess vascular access sites hourly     Problem: Musculoskeletal - Adult  Goal: Return mobility to safest level of function  1/27/2023 2354 by Emi Cosme RN  Outcome: Progressing     Problem: Gastrointestinal - Adult  Goal: Minimal or absence of nausea and vomiting  1/27/2023 2354 by Emi Cosme RN  Outcome: Progressing     Problem: Metabolic/Fluid and Electrolytes - Adult  Goal: Electrolytes maintained within normal limits  1/27/2023 2354 by Emi Cosme RN  Outcome: Progressing  Flowsheets (Taken 1/27/2023 2000)  Electrolytes maintained within normal limits: Monitor labs and assess patient for signs and symptoms of electrolyte imbalances     Problem: Safety - Adult  Goal: Free from fall injury  1/27/2023 2354 by Emi Cosme RN  Outcome: Progressing  Flowsheets (Taken 1/27/2023 2254)  Free From Fall Injury:   Instruct family/caregiver on patient safety   Based on caregiver fall risk screen, instruct family/caregiver to ask for assistance with transferring infant if caregiver noted to have fall risk factors     Problem: Discharge Planning  Goal: Discharge to home or other facility with appropriate resources  1/27/2023 2354 by Emi Cosme RN  Outcome: Progressing     Problem: Skin/Tissue Integrity  Goal: Absence of new skin breakdown  Description: 1. Monitor for areas of redness and/or skin breakdown  2. Assess vascular access sites hourly  3. Every 4-6 hours minimum:  Change oxygen saturation probe site  4. Every 4-6 hours:  If on nasal continuous positive airway pressure, respiratory therapy assess nares and determine need for appliance change or resting period.   1/27/2023 2354 by Emi Cosme RN  Outcome: Progressing

## 2023-01-29 ENCOUNTER — APPOINTMENT (OUTPATIENT)
Dept: GENERAL RADIOLOGY | Age: 78
DRG: 234 | End: 2023-01-29
Payer: MEDICARE

## 2023-01-29 LAB
ANION GAP SERPL CALCULATED.3IONS-SCNC: 9 MMOL/L (ref 3–16)
BUN BLDV-MCNC: 14 MG/DL (ref 7–20)
CALCIUM SERPL-MCNC: 7.8 MG/DL (ref 8.3–10.6)
CHLORIDE BLD-SCNC: 97 MMOL/L (ref 99–110)
CO2: 24 MMOL/L (ref 21–32)
CREAT SERPL-MCNC: 0.7 MG/DL (ref 0.8–1.3)
EKG ATRIAL RATE: 79 BPM
EKG DIAGNOSIS: NORMAL
EKG P AXIS: 35 DEGREES
EKG P-R INTERVAL: 162 MS
EKG Q-T INTERVAL: 414 MS
EKG QRS DURATION: 102 MS
EKG QTC CALCULATION (BAZETT): 474 MS
EKG R AXIS: -7 DEGREES
EKG T AXIS: 85 DEGREES
EKG VENTRICULAR RATE: 79 BPM
GFR SERPL CREATININE-BSD FRML MDRD: >60 ML/MIN/{1.73_M2}
GLUCOSE BLD-MCNC: 122 MG/DL (ref 70–99)
GLUCOSE BLD-MCNC: 129 MG/DL (ref 70–99)
GLUCOSE BLD-MCNC: 138 MG/DL (ref 70–99)
GLUCOSE BLD-MCNC: 317 MG/DL (ref 70–99)
GLUCOSE BLD-MCNC: 342 MG/DL (ref 70–99)
HCT VFR BLD CALC: 25.3 % (ref 40.5–52.5)
HEMOGLOBIN: 8.6 G/DL (ref 13.5–17.5)
MAGNESIUM: 2.1 MG/DL (ref 1.8–2.4)
MCH RBC QN AUTO: 29.8 PG (ref 26–34)
MCHC RBC AUTO-ENTMCNC: 34.2 G/DL (ref 31–36)
MCV RBC AUTO: 87.2 FL (ref 80–100)
PDW BLD-RTO: 13.2 % (ref 12.4–15.4)
PERFORMED ON: ABNORMAL
PLATELET # BLD: 154 K/UL (ref 135–450)
PMV BLD AUTO: 7.4 FL (ref 5–10.5)
POTASSIUM SERPL-SCNC: 3.7 MMOL/L (ref 3.5–5.1)
RBC # BLD: 2.9 M/UL (ref 4.2–5.9)
SODIUM BLD-SCNC: 130 MMOL/L (ref 136–145)
WBC # BLD: 10.5 K/UL (ref 4–11)

## 2023-01-29 PROCEDURE — 6370000000 HC RX 637 (ALT 250 FOR IP): Performed by: CLINICAL NURSE SPECIALIST

## 2023-01-29 PROCEDURE — 71045 X-RAY EXAM CHEST 1 VIEW: CPT

## 2023-01-29 PROCEDURE — 93005 ELECTROCARDIOGRAM TRACING: CPT | Performed by: THORACIC SURGERY (CARDIOTHORACIC VASCULAR SURGERY)

## 2023-01-29 PROCEDURE — 83735 ASSAY OF MAGNESIUM: CPT

## 2023-01-29 PROCEDURE — 36592 COLLECT BLOOD FROM PICC: CPT

## 2023-01-29 PROCEDURE — 2060000000 HC ICU INTERMEDIATE R&B

## 2023-01-29 PROCEDURE — 85027 COMPLETE CBC AUTOMATED: CPT

## 2023-01-29 PROCEDURE — 6370000000 HC RX 637 (ALT 250 FOR IP): Performed by: THORACIC SURGERY (CARDIOTHORACIC VASCULAR SURGERY)

## 2023-01-29 PROCEDURE — 6370000000 HC RX 637 (ALT 250 FOR IP): Performed by: INTERNAL MEDICINE

## 2023-01-29 PROCEDURE — 6360000002 HC RX W HCPCS: Performed by: STUDENT IN AN ORGANIZED HEALTH CARE EDUCATION/TRAINING PROGRAM

## 2023-01-29 PROCEDURE — A4216 STERILE WATER/SALINE, 10 ML: HCPCS | Performed by: THORACIC SURGERY (CARDIOTHORACIC VASCULAR SURGERY)

## 2023-01-29 PROCEDURE — 6360000002 HC RX W HCPCS: Performed by: THORACIC SURGERY (CARDIOTHORACIC VASCULAR SURGERY)

## 2023-01-29 PROCEDURE — 6370000000 HC RX 637 (ALT 250 FOR IP): Performed by: STUDENT IN AN ORGANIZED HEALTH CARE EDUCATION/TRAINING PROGRAM

## 2023-01-29 PROCEDURE — 80048 BASIC METABOLIC PNL TOTAL CA: CPT

## 2023-01-29 PROCEDURE — C9113 INJ PANTOPRAZOLE SODIUM, VIA: HCPCS | Performed by: THORACIC SURGERY (CARDIOTHORACIC VASCULAR SURGERY)

## 2023-01-29 PROCEDURE — 2580000003 HC RX 258: Performed by: THORACIC SURGERY (CARDIOTHORACIC VASCULAR SURGERY)

## 2023-01-29 PROCEDURE — 93010 ELECTROCARDIOGRAM REPORT: CPT | Performed by: INTERNAL MEDICINE

## 2023-01-29 RX ORDER — POTASSIUM CHLORIDE 29.8 MG/ML
20 INJECTION INTRAVENOUS ONCE
Status: COMPLETED | OUTPATIENT
Start: 2023-01-29 | End: 2023-01-29

## 2023-01-29 RX ORDER — INDOMETHACIN 25 MG/1
25 CAPSULE ORAL
Status: DISCONTINUED | OUTPATIENT
Start: 2023-01-29 | End: 2023-01-31 | Stop reason: HOSPADM

## 2023-01-29 RX ADMIN — PANTOPRAZOLE SODIUM 40 MG: 40 TABLET, DELAYED RELEASE ORAL at 16:16

## 2023-01-29 RX ADMIN — FUROSEMIDE 40 MG: 10 INJECTION, SOLUTION INTRAMUSCULAR; INTRAVENOUS at 18:10

## 2023-01-29 RX ADMIN — ACETAMINOPHEN 1000 MG: 500 TABLET ORAL at 07:19

## 2023-01-29 RX ADMIN — ACETAMINOPHEN 1000 MG: 500 TABLET ORAL at 22:19

## 2023-01-29 RX ADMIN — Medication 4000 UNITS: at 09:31

## 2023-01-29 RX ADMIN — GABAPENTIN 300 MG: 300 CAPSULE ORAL at 21:10

## 2023-01-29 RX ADMIN — ROSUVASTATIN CALCIUM 20 MG: 20 TABLET, COATED ORAL at 21:10

## 2023-01-29 RX ADMIN — INDOMETHACIN 25 MG: 25 CAPSULE ORAL at 17:37

## 2023-01-29 RX ADMIN — INDOMETHACIN 25 MG: 25 CAPSULE ORAL at 12:54

## 2023-01-29 RX ADMIN — Medication 400 MG: at 09:35

## 2023-01-29 RX ADMIN — ANTACID TABLETS 500 MG: 500 TABLET, CHEWABLE ORAL at 12:53

## 2023-01-29 RX ADMIN — CLOPIDOGREL BISULFATE 75 MG: 75 TABLET ORAL at 09:34

## 2023-01-29 RX ADMIN — ACETAMINOPHEN 1000 MG: 500 TABLET ORAL at 09:37

## 2023-01-29 RX ADMIN — MUPIROCIN: 20 OINTMENT TOPICAL at 21:10

## 2023-01-29 RX ADMIN — POTASSIUM CHLORIDE 20 MEQ: 1500 TABLET, EXTENDED RELEASE ORAL at 09:29

## 2023-01-29 RX ADMIN — GABAPENTIN 300 MG: 300 CAPSULE ORAL at 09:35

## 2023-01-29 RX ADMIN — Medication 400 MG: at 21:13

## 2023-01-29 RX ADMIN — SODIUM CHLORIDE, PRESERVATIVE FREE 10 ML: 5 INJECTION INTRAVENOUS at 09:40

## 2023-01-29 RX ADMIN — PANTOPRAZOLE SODIUM 40 MG: 40 INJECTION, POWDER, LYOPHILIZED, FOR SOLUTION INTRAVENOUS at 08:54

## 2023-01-29 RX ADMIN — POTASSIUM CHLORIDE 20 MEQ: 1500 TABLET, EXTENDED RELEASE ORAL at 12:53

## 2023-01-29 RX ADMIN — PANTOPRAZOLE SODIUM 40 MG: 40 TABLET, DELAYED RELEASE ORAL at 07:19

## 2023-01-29 RX ADMIN — MUPIROCIN: 20 OINTMENT TOPICAL at 09:39

## 2023-01-29 RX ADMIN — POTASSIUM CHLORIDE 20 MEQ: 29.8 INJECTION, SOLUTION INTRAVENOUS at 09:28

## 2023-01-29 RX ADMIN — METOPROLOL TARTRATE 12.5 MG: 25 TABLET, FILM COATED ORAL at 21:10

## 2023-01-29 RX ADMIN — ACETAMINOPHEN 1000 MG: 500 TABLET ORAL at 17:35

## 2023-01-29 RX ADMIN — Medication 15 ML: at 09:36

## 2023-01-29 RX ADMIN — Medication 15 ML: at 21:11

## 2023-01-29 RX ADMIN — METOPROLOL TARTRATE 12.5 MG: 25 TABLET, FILM COATED ORAL at 09:33

## 2023-01-29 RX ADMIN — INSULIN GLARGINE 5 UNITS: 100 INJECTION, SOLUTION SUBCUTANEOUS at 21:24

## 2023-01-29 RX ADMIN — AMIODARONE HYDROCHLORIDE 400 MG: 200 TABLET ORAL at 21:10

## 2023-01-29 RX ADMIN — ASPIRIN 325 MG: 325 TABLET, COATED ORAL at 09:32

## 2023-01-29 RX ADMIN — FUROSEMIDE 40 MG: 10 INJECTION, SOLUTION INTRAMUSCULAR; INTRAVENOUS at 08:53

## 2023-01-29 RX ADMIN — SODIUM CHLORIDE, PRESERVATIVE FREE 10 ML: 5 INJECTION INTRAVENOUS at 21:12

## 2023-01-29 RX ADMIN — AMIODARONE HYDROCHLORIDE 400 MG: 200 TABLET ORAL at 09:30

## 2023-01-29 RX ADMIN — INSULIN LISPRO 9 UNITS: 100 INJECTION, SOLUTION INTRAVENOUS; SUBCUTANEOUS at 07:41

## 2023-01-29 RX ADMIN — GABAPENTIN 300 MG: 300 CAPSULE ORAL at 14:03

## 2023-01-29 RX ADMIN — POLYETHYLENE GLYCOL 3350 17 G: 17 POWDER, FOR SOLUTION ORAL at 09:37

## 2023-01-29 ASSESSMENT — PAIN SCALES - GENERAL
PAINLEVEL_OUTOF10: 2
PAINLEVEL_OUTOF10: 1
PAINLEVEL_OUTOF10: 2
PAINLEVEL_OUTOF10: 1

## 2023-01-29 ASSESSMENT — PAIN DESCRIPTION - LOCATION
LOCATION: CHEST

## 2023-01-29 NOTE — PROGRESS NOTES
Progress Note  Hospital Day: 12  POD#  3  S/P Coronary artery bypass x3, BRENT clip    Chief Complaint: Postop follow up    Mr. Meneses Findazael a-fib RVR yesterday, started amio protocol and now in NSR and feels improved; chest tube dump 205 this AM, thin appearing, has friction rub    24 hour Interval History:    - extubated ; placed on amiodarone gtt in OR due to ventricular tachyarrhythmia while weaning from bypass, rare PVCs since and no other arrhythmia   -  amiodarone gtt off, change to 200 mg daily, swan out, a-line out   - a-fib RVR, amio protocol restarted    Today's plan:  Keep chest tubes, start indomethacin  Lasix, metoprolol    VITAL SIGNS   Temperature:  Current - Temp: 98.9 °F (37.2 °C);  Max - Temp  Av.5 °F (36.9 °C)  Min: 98 °F (36.7 °C)  Max: 99.1 °F (37.3 °C)    Respiratory Rate : Resp  Av.6  Min: 15  Max: 28    Pulse Range: Pulse  Av.9  Min: 61  Max: 144    Blood Pressure Range:  Systolic (47GTI), EUZ:791 , Min:101 , FEL:649   ; Diastolic (80ZSE), TOO:73, Min:57, Max:78    Pulse ox Range: SpO2  Av.3 %  Min: 89 %  Max: 98 %  O2 Flow Rate (L/min): 0 L/min    CVP (Mean): 16 mmHg  PAP (Systolic/Diastolic): 34/75  PAP (Mean): 22 mmHg  SVR (Using ABP Mean): 1083.33 dyne*sec/cm5  CCI: 3.4 L/min  SVO2 (%): 63 %    Admission Weight: Weight: 200 lb 9.9 oz (91 kg)    Current Weight: Patient Vitals for the past 96 hrs (Last 3 readings):   Weight   23 0600 203 lb 7.8 oz (92.3 kg)   23 0600 206 lb 12.7 oz (93.8 kg)   23 0600 197 lb 8.5 oz (89.6 kg)         VENT SETTINGS   Vent Mode: CPAP/PS Resp Rate (Set): 16 bmp/Vt (Set, mL): 550 mL/ /FiO2 : 28 %    I/O:   Intake/Output Summary (Last 24 hours) at 2023 0838  Last data filed at 2023 0700  Gross per 24 hour   Intake 1307 ml   Output 2745 ml   Net -1438 ml       Urine (villagomez): 9439-541-904 ml/shift  Chest tube:  70- ml/shift  serosanguinous, on water seal, no leak    LINES/ACCESS:   Central Access: CVC, introducer Day #: 3  Arterial Line Access: removed POD#1                              PA catheter removed POD#1  Walters Day #: 3  ETT Day #: extubated POD#0 at 17:17  Pacing Wires Day #: 3    Diet: ADULT DIET; Regular; 3 carb choices (45 gm/meal);  No Added Salt (3-4 gm); 1500 ml    MEDICATIONS:      insulin glargine  5 Units SubCUTAneous Nightly    potassium chloride  20 mEq IntraVENous Once    indomethacin  25 mg Oral TID WC    amiodarone  400 mg Oral BID    Followed by    Judi Rick ON 2/2/2023] amiodarone  200 mg Oral Daily    potassium chloride  20 mEq Oral TID WC    rosuvastatin  20 mg Oral Nightly    sodium chloride flush  5-40 mL IntraVENous 2 times per day    aspirin  325 mg Oral Daily    clopidogrel  75 mg Oral Daily    chlorhexidine  15 mL Mouth/Throat BID    furosemide  40 mg IntraVENous BID    magnesium oxide  400 mg Oral BID    mupirocin   Nasal BID    polyethylene glycol  17 g Oral Daily    metoprolol tartrate  12.5 mg Oral BID    lisinopril  2.5 mg Oral Lunch    pantoprazole  40 mg Oral BID AC    pantoprazole (PROTONIX) 40 mg injection  40 mg IntraVENous Daily    insulin lispro  0-12 Units SubCUTAneous 4x Daily AC & HS    acetaminophen  1,000 mg Oral Q6H    gabapentin  300 mg Oral TID    Vitamin D  4,000 Units Oral Daily       Infusions:   amiodarone 0.5 mg/min (01/28/23 2158)    norepinephrine Stopped (01/27/23 0201)    dextrose         DATA REVIEW:   CBC:   Recent Labs     01/26/23  1209 01/26/23  1320 01/27/23  0307 01/28/23  0415 01/29/23  0428   WBC  --  15.6* 9.7 11.3* 10.5   HGB 9.7* 12.5* 9.6* 8.8* 8.6*   HCT  --  37.4* 27.8* 26.1* 25.3*   MCV  --  86.9 86.4 88.4 87.2   PLT  --  207 134* 137 154       BMP:   Recent Labs     01/26/23  1320 01/26/23  1752 01/27/23  0032 01/27/23  0307 01/28/23  0415 01/29/23  0429     --   --  142 138 130*   K 4.7 4.3 4.6 4.6 4.3 3.7     --   --  112* 106 97*   CO2 21  --   --  22 26 24   GLUCOSE 140*  --   --  125* 121* 317*   BUN 12  -- --  14 17 14   CREATININE 1.0  --   --  0.9 0.8 0.7*   CALCIUM 8.2*  --   --  7.5* 7.9* 7.8*   MG 2.90*  --   --  2.40 2.20 2.10       Accucheck Glucoses:   Recent Labs     01/28/23  0802 01/28/23  1220 01/28/23  1714 01/28/23  1956 01/29/23  0429   POCGLU 126* 137* 153* 190* 342*       Cardiac Enzymes: No results for input(s): CKTOTAL, CKMB, CKMBINDEX in the last 72 hours. Invalid input(s): TROPONIN  PT/INR:   Recent Labs     01/26/23  1752 01/27/23  0307   PROTIME 18.0* 16.0*   INR 1.49* 1.28*       APTT:   Recent Labs     01/26/23  1320   APTT 31.9       LFT:   No results for input(s): AST, ALT, ALB, BILIDIR, BILITOT, ALKPHOS in the last 72 hours. Troponin: Invalid input(s): TROPONIN  BNP: No results for input(s): BNP in the last 72 hours. ABG:    Lab Results   Component Value Date/Time    PHART 7.389 01/27/2023 03:08 AM    PO2ART 71.7 01/27/2023 03:08 AM    LBI8BMW 35.4 01/27/2023 03:08 AM    C9EGJGMN 94 01/27/2023 03:08 AM    YZD9CHF 22 01/27/2023 03:08 AM    ZSS7ALU 21.3 01/27/2023 03:08 AM    BEART -4 01/27/2023 03:08 AM    BEART -4 01/26/2023 05:58 PM    BEART -4 01/26/2023 05:06 PM    BEART -3 01/26/2023 03:57 PM    BEART -7 01/26/2023 02:58 PM     U/A:  No results for input(s): NITRITE, COLORU, PHUR, LABCAST, WBCUA, RBCUA, MUCUS, TRICHOMONAS, YEAST, BACTERIA, CLARITYU, SPECGRAV, LEUKOCYTESUR, UROBILINOGEN, BILIRUBINUR, BLOODU, GLUCOSEU, AMORPHOUS in the last 72 hours. Invalid input(s): Mis Barahona    Urine Culture:  n/a  Blood Culture: n/a         Chest X-Ray:     1/26/2023:  Narrative   EXAM: XR CHEST PORTABLE       INDICATION: Post op open heart surgery       COMPARISON: CT dated 1/25/2023, radiograph dated 1/18/2023       FINDINGS:   Medical Devices: Endotracheal tube terminates approximately 3 vertebral bodies above the sonia. Post surgical changes of the mediastinum including median sternotomy wires and left atrial appendage clip. Mediastinal drain in place.  Right internal jugular    venous access with Kimberly-Melo catheter terminus projecting over the main pulmonary artery. Left chest tube. Lungs: Central opacities. Pleura: No pneumothorax or pleural effusion. Heart and Mediastinum: The cardiomediastinal silhouette is within normal limits. Bones: No acute suspicious abnormality. Impression   1. Pulmonary edema.            ASSESSMENT:   Patient Active Problem List:     Anxiety     Hyperlipidemia     Diverticulosis of colon     Restless leg syndrome     Benign prostatic hyperplasia with urinary obstruction     Lumbar spinal stenosis     Hearing impairment     Allergic rhinitis     GERD (gastroesophageal reflux disease)     Spondylolisthesis at L4-L5 level     Fatty liver     Atherosclerosis of aorta (HCC)     Primary osteoarthritis of both knees     Carotid stenosis, bilateral     Functional diarrhea     ED (erectile dysfunction)     Primary osteoarthritis of left knee     Migraine without aura and without status migrainosus, not intractable     Osteoarthritis of left knee     Status post total left knee replacement     Lacunar infarction (HCC)     Numbness and tingling in right hand     Lumbosacral radiculopathy     Age-related osteoporosis without current pathological fracture     Closed head injury     Concussion without loss of consciousness     Right hip pain     Vitamin D deficiency     Medication monitoring encounter     TIA (transient ischemic attack)     Unstable angina (HCC)     Abnormal stress test     Exertional chest pain      Neuro: awake, alert and oriented x 3  CV:   Sinus, pacing wires intact  Pulm:  normal work of breathing  Abd:  soft, non-tender; bowel sounds normal; passing flatus, no BM  Walters: clear yellow  Wounds: clean, dry and intact  Ext: warm, + edema, improving    PLAN:   Neuro - pain tolerated    - intraop pec blocks, Precedex while chest tube are in place   - continued scheduled APAP and gabapentin    - PT  14/24 on 1/27  on the AM-PAC short mobility form              - OT  17/24 on 1/27 on the AM-PAC ADL Inpatient form     CV - keep pacing wires and CVC   -  mg/ Plavix 75 mg   - metoprolol 12.5 bid   - Lisinopril 2.5 mg daily   - Lipitor 40 mg   - indomethacin for pericardial inflammation  Pulm - acute postoperative pulmonary insuffiency as expected- wean O2 as tolerated, continue incentive spirometry   - chest tube 345 ml, keep today  Renal - Acute kidney failure/creatinine stable - Cr 0.7, preop Cr 0.8  - wt 203 lbs, preop wt 190 lbs  - urine output  2400 mL/24 hrs   - continue diuresis for postop fluid retention        - keep urinary catheter for accurate I & O  Heme - Acute blood loss anemia as expected- hgb 8.6, continue to monitor            - Acute postoperative thrombocytopenia - plt ct 154k, continue to monitor        ID - continue surgical prophylaxis antibiotics  FEN - cardiac diet with 1500 ml fluid restriction     - bowel med   - ISS  GI prophylaxis - Protonix 40 mg bid  VTE prophylaxis - SCD and PABLO vazqueze  Disposition -   Transition to progressive care, PT/OT    Katlyn Wade MD   Cardiothoracic Surgery PGY-5  1/29/2023  8:38 AM     SR after AF.  Amio  Diuresing  Tubes with a bit too much drainage to remove yet  Off Yuan Richards MD  1/29/2023  3:58 PM

## 2023-01-29 NOTE — PROGRESS NOTES
Patient 2000 medications given without difficulty. Pt is A&O X4, A-Fib noted with a friction rub, Respiratory is clear and bowel sounds are active. Urine output is adequate. Patient moved from chair to bed with no complains and VSS. Bed is in lowest position with wheels locked and bed alarm active. Call light is within reach.

## 2023-01-29 NOTE — PLAN OF CARE
Problem: ABCDS Injury Assessment  Goal: Absence of physical injury  Outcome: Progressing  Flowsheets (Taken 1/28/2023 1932)  Absence of Physical Injury: Implement safety measures based on patient assessment     Problem: Nutrition Deficit:  Goal: Optimize nutritional status  Outcome: Progressing     Problem: Respiratory - Adult  Goal: Achieves optimal ventilation and oxygenation  1/28/2023 1938 by Kandy Singh RN  Outcome: Progressing     Problem: Cardiovascular - Adult  Goal: Maintains optimal cardiac output and hemodynamic stability  Outcome: Progressing     Problem: Cardiovascular - Adult  Goal: Absence of cardiac dysrhythmias or at baseline  Outcome: Progressing     Problem: Skin/Tissue Integrity - Adult  Goal: Skin integrity remains intact  1/28/2023 1938 by Kandy Singh RN  Outcome: Progressing  Flowsheets (Taken 1/28/2023 1932)  Skin Integrity Remains Intact:   Monitor for areas of redness and/or skin breakdown   Assess vascular access sites hourly   Every 4-6 hours minimum: Change oxygen saturation probe site   Every 4-6 hours: If on nasal continuous positive airway pressure, respiratory therapy assesses nares and determine need for appliance change or resting period     Problem: Skin/Tissue Integrity - Adult  Goal: Incisions, wounds, or drain sites healing without S/S of infection  1/28/2023 1938 by Kandy Singh RN  Outcome: Progressing  Flowsheets (Taken 1/28/2023 1932)  Incisions, Wounds, or Drain Sites Healing Without Sign and Symptoms of Infection:   TWICE DAILY: Assess and document skin integrity   TWICE DAILY: Assess and document dressing/incision, wound bed, drain sites and surrounding tissue     Problem: Skin/Tissue Integrity - Adult  Goal: Oral mucous membranes remain intact  Outcome: Progressing     Problem: Musculoskeletal - Adult  Goal: Return mobility to safest level of function  Outcome: Progressing     Problem: Gastrointestinal - Adult  Goal: Minimal or absence of nausea and vomiting  Outcome: Progressing     Problem: Metabolic/Fluid and Electrolytes - Adult  Goal: Electrolytes maintained within normal limits  Outcome: Progressing     Problem: Pain  Goal: Verbalizes/displays adequate comfort level or baseline comfort level  Outcome: Progressing     Problem: Safety - Adult  Goal: Free from fall injury  Outcome: Progressing  Flowsheets (Taken 1/28/2023 1932)  Free From Fall Injury:   Instruct family/caregiver on patient safety   Based on caregiver fall risk screen, instruct family/caregiver to ask for assistance with transferring infant if caregiver noted to have fall risk factors     Problem: Discharge Planning  Goal: Discharge to home or other facility with appropriate resources  Outcome: Progressing     Problem: Skin/Tissue Integrity  Goal: Absence of new skin breakdown  Description: 1. Monitor for areas of redness and/or skin breakdown  2. Assess vascular access sites hourly  3. Every 4-6 hours minimum:  Change oxygen saturation probe site  4. Every 4-6 hours:  If on nasal continuous positive airway pressure, respiratory therapy assess nares and determine need for appliance change or resting period.   Outcome: Progressing

## 2023-01-30 LAB
ANION GAP SERPL CALCULATED.3IONS-SCNC: 8 MMOL/L (ref 3–16)
BUN BLDV-MCNC: 16 MG/DL (ref 7–20)
CALCIUM SERPL-MCNC: 8 MG/DL (ref 8.3–10.6)
CHLORIDE BLD-SCNC: 103 MMOL/L (ref 99–110)
CO2: 25 MMOL/L (ref 21–32)
CREAT SERPL-MCNC: 0.8 MG/DL (ref 0.8–1.3)
GFR SERPL CREATININE-BSD FRML MDRD: >60 ML/MIN/{1.73_M2}
GLUCOSE BLD-MCNC: 106 MG/DL (ref 70–99)
GLUCOSE BLD-MCNC: 116 MG/DL (ref 70–99)
GLUCOSE BLD-MCNC: 120 MG/DL (ref 70–99)
GLUCOSE BLD-MCNC: 123 MG/DL (ref 70–99)
HCT VFR BLD CALC: 26.9 % (ref 40.5–52.5)
HEMOGLOBIN: 9.3 G/DL (ref 13.5–17.5)
MAGNESIUM: 1.9 MG/DL (ref 1.8–2.4)
MCH RBC QN AUTO: 29.9 PG (ref 26–34)
MCHC RBC AUTO-ENTMCNC: 34.7 G/DL (ref 31–36)
MCV RBC AUTO: 86.1 FL (ref 80–100)
PDW BLD-RTO: 12.9 % (ref 12.4–15.4)
PERFORMED ON: ABNORMAL
PLATELET # BLD: 200 K/UL (ref 135–450)
PMV BLD AUTO: 7.1 FL (ref 5–10.5)
POTASSIUM SERPL-SCNC: 4.1 MMOL/L (ref 3.5–5.1)
RBC # BLD: 3.12 M/UL (ref 4.2–5.9)
SODIUM BLD-SCNC: 136 MMOL/L (ref 136–145)
WBC # BLD: 8.6 K/UL (ref 4–11)

## 2023-01-30 PROCEDURE — 2060000000 HC ICU INTERMEDIATE R&B

## 2023-01-30 PROCEDURE — 83735 ASSAY OF MAGNESIUM: CPT

## 2023-01-30 PROCEDURE — 6370000000 HC RX 637 (ALT 250 FOR IP): Performed by: STUDENT IN AN ORGANIZED HEALTH CARE EDUCATION/TRAINING PROGRAM

## 2023-01-30 PROCEDURE — 94761 N-INVAS EAR/PLS OXIMETRY MLT: CPT

## 2023-01-30 PROCEDURE — 6370000000 HC RX 637 (ALT 250 FOR IP): Performed by: THORACIC SURGERY (CARDIOTHORACIC VASCULAR SURGERY)

## 2023-01-30 PROCEDURE — 6370000000 HC RX 637 (ALT 250 FOR IP): Performed by: CLINICAL NURSE SPECIALIST

## 2023-01-30 PROCEDURE — 6360000002 HC RX W HCPCS: Performed by: THORACIC SURGERY (CARDIOTHORACIC VASCULAR SURGERY)

## 2023-01-30 PROCEDURE — 80048 BASIC METABOLIC PNL TOTAL CA: CPT

## 2023-01-30 PROCEDURE — 6370000000 HC RX 637 (ALT 250 FOR IP): Performed by: INTERNAL MEDICINE

## 2023-01-30 PROCEDURE — 85027 COMPLETE CBC AUTOMATED: CPT

## 2023-01-30 PROCEDURE — 2580000003 HC RX 258: Performed by: THORACIC SURGERY (CARDIOTHORACIC VASCULAR SURGERY)

## 2023-01-30 RX ORDER — ROSUVASTATIN CALCIUM 20 MG/1
20 TABLET, COATED ORAL NIGHTLY
Qty: 90 TABLET | Refills: 0 | Status: CANCELLED | OUTPATIENT
Start: 2023-01-30 | End: 2023-04-30

## 2023-01-30 RX ORDER — PANTOPRAZOLE SODIUM 20 MG/1
20 TABLET, DELAYED RELEASE ORAL
Status: DISCONTINUED | OUTPATIENT
Start: 2023-01-30 | End: 2023-01-31 | Stop reason: HOSPADM

## 2023-01-30 RX ORDER — INDOMETHACIN 25 MG/1
25 CAPSULE ORAL
Qty: 60 CAPSULE | Refills: 3 | Status: CANCELLED | OUTPATIENT
Start: 2023-01-30

## 2023-01-30 RX ORDER — LACTULOSE 10 G/15ML
20 SOLUTION ORAL ONCE
Status: COMPLETED | OUTPATIENT
Start: 2023-01-30 | End: 2023-01-30

## 2023-01-30 RX ADMIN — LACTULOSE 20 G: 20 SOLUTION ORAL at 09:42

## 2023-01-30 RX ADMIN — INDOMETHACIN 25 MG: 25 CAPSULE ORAL at 18:08

## 2023-01-30 RX ADMIN — POTASSIUM CHLORIDE 20 MEQ: 1500 TABLET, EXTENDED RELEASE ORAL at 09:43

## 2023-01-30 RX ADMIN — POLYETHYLENE GLYCOL 3350 17 G: 17 POWDER, FOR SOLUTION ORAL at 09:42

## 2023-01-30 RX ADMIN — SODIUM CHLORIDE, PRESERVATIVE FREE 10 ML: 5 INJECTION INTRAVENOUS at 09:43

## 2023-01-30 RX ADMIN — MUPIROCIN: 20 OINTMENT TOPICAL at 09:45

## 2023-01-30 RX ADMIN — METOPROLOL TARTRATE 12.5 MG: 25 TABLET, FILM COATED ORAL at 21:16

## 2023-01-30 RX ADMIN — GABAPENTIN 300 MG: 300 CAPSULE ORAL at 13:37

## 2023-01-30 RX ADMIN — POTASSIUM CHLORIDE 20 MEQ: 1500 TABLET, EXTENDED RELEASE ORAL at 13:36

## 2023-01-30 RX ADMIN — FUROSEMIDE 40 MG: 10 INJECTION, SOLUTION INTRAMUSCULAR; INTRAVENOUS at 18:04

## 2023-01-30 RX ADMIN — FUROSEMIDE 40 MG: 10 INJECTION, SOLUTION INTRAMUSCULAR; INTRAVENOUS at 09:42

## 2023-01-30 RX ADMIN — PANTOPRAZOLE SODIUM 20 MG: 20 TABLET, DELAYED RELEASE ORAL at 18:03

## 2023-01-30 RX ADMIN — Medication 15 ML: at 21:16

## 2023-01-30 RX ADMIN — ASPIRIN 325 MG: 325 TABLET, COATED ORAL at 09:43

## 2023-01-30 RX ADMIN — INDOMETHACIN 25 MG: 25 CAPSULE ORAL at 09:44

## 2023-01-30 RX ADMIN — INDOMETHACIN 25 MG: 25 CAPSULE ORAL at 13:37

## 2023-01-30 RX ADMIN — AMIODARONE HYDROCHLORIDE 400 MG: 200 TABLET ORAL at 09:42

## 2023-01-30 RX ADMIN — GABAPENTIN 300 MG: 300 CAPSULE ORAL at 09:43

## 2023-01-30 RX ADMIN — ACETAMINOPHEN 1000 MG: 500 TABLET ORAL at 09:43

## 2023-01-30 RX ADMIN — POTASSIUM CHLORIDE 20 MEQ: 1500 TABLET, EXTENDED RELEASE ORAL at 18:03

## 2023-01-30 RX ADMIN — Medication 400 MG: at 21:17

## 2023-01-30 RX ADMIN — Medication 15 ML: at 09:42

## 2023-01-30 RX ADMIN — Medication 4000 UNITS: at 09:45

## 2023-01-30 RX ADMIN — FLUTICASONE PROPIONATE 1 SPRAY: 50 SPRAY, METERED NASAL at 09:59

## 2023-01-30 RX ADMIN — Medication 400 MG: at 09:45

## 2023-01-30 RX ADMIN — CLOPIDOGREL BISULFATE 75 MG: 75 TABLET ORAL at 09:43

## 2023-01-30 RX ADMIN — ROSUVASTATIN CALCIUM 20 MG: 20 TABLET, COATED ORAL at 21:15

## 2023-01-30 RX ADMIN — AMIODARONE HYDROCHLORIDE 400 MG: 200 TABLET ORAL at 21:15

## 2023-01-30 RX ADMIN — MUPIROCIN: 20 OINTMENT TOPICAL at 21:16

## 2023-01-30 RX ADMIN — ACETAMINOPHEN 1000 MG: 500 TABLET ORAL at 18:03

## 2023-01-30 RX ADMIN — LISINOPRIL 2.5 MG: 2.5 TABLET ORAL at 13:37

## 2023-01-30 RX ADMIN — GABAPENTIN 300 MG: 300 CAPSULE ORAL at 21:16

## 2023-01-30 RX ADMIN — METOPROLOL TARTRATE 12.5 MG: 25 TABLET, FILM COATED ORAL at 09:42

## 2023-01-30 RX ADMIN — PANTOPRAZOLE SODIUM 40 MG: 40 TABLET, DELAYED RELEASE ORAL at 08:02

## 2023-01-30 RX ADMIN — SODIUM CHLORIDE, PRESERVATIVE FREE 10 ML: 5 INJECTION INTRAVENOUS at 21:17

## 2023-01-30 ASSESSMENT — PAIN DESCRIPTION - ORIENTATION
ORIENTATION: LEFT

## 2023-01-30 ASSESSMENT — PAIN DESCRIPTION - DESCRIPTORS
DESCRIPTORS: ACHING;SORE

## 2023-01-30 ASSESSMENT — PAIN SCALES - GENERAL
PAINLEVEL_OUTOF10: 1
PAINLEVEL_OUTOF10: 0
PAINLEVEL_OUTOF10: 1
PAINLEVEL_OUTOF10: 1
PAINLEVEL_OUTOF10: 0
PAINLEVEL_OUTOF10: 1

## 2023-01-30 ASSESSMENT — PAIN DESCRIPTION - LOCATION
LOCATION: CHEST

## 2023-01-30 ASSESSMENT — PAIN DESCRIPTION - ONSET
ONSET: ON-GOING

## 2023-01-30 ASSESSMENT — PAIN DESCRIPTION - FREQUENCY
FREQUENCY: INTERMITTENT

## 2023-01-30 ASSESSMENT — PAIN DESCRIPTION - PAIN TYPE
TYPE: SURGICAL PAIN

## 2023-01-30 ASSESSMENT — PAIN - FUNCTIONAL ASSESSMENT
PAIN_FUNCTIONAL_ASSESSMENT: ACTIVITIES ARE NOT PREVENTED

## 2023-01-30 NOTE — CARE COORDINATION
Case management is following for discharge dispostion. The chart was reviewed. Mr. Alyson Rocha is from home with his spouse. He is independent with self care and functional mobility at baseline. It is anticipated he will returen home with [facility] following CABG x 4. PT/OT evaluations are pending.     Darby Sun RN  Case Management  [phone]

## 2023-01-30 NOTE — PLAN OF CARE
Problem: ABCDS Injury Assessment  Goal: Absence of physical injury  Recent Flowsheet Documentation  Taken 1/30/2023 0800 by Marybeth Jaimes RN  Absence of Physical Injury: Implement safety measures based on patient assessment     Problem: Nutrition Deficit:  Goal: Optimize nutritional status  Outcome: Progressing     Problem: Respiratory - Adult  Goal: Achieves optimal ventilation and oxygenation  Outcome: Adequate for Discharge  Flowsheets (Taken 1/30/2023 0800)  Achieves optimal ventilation and oxygenation:   Assess for changes in respiratory status   Assess for changes in mentation and behavior   Position to facilitate oxygenation and minimize respiratory effort   Encourage broncho-pulmonary hygiene including cough, deep breathe, incentive spirometry   Assess and instruct to report shortness of breath or any respiratory difficulty     Problem: Cardiovascular - Adult  Goal: Maintains optimal cardiac output and hemodynamic stability  Outcome: Adequate for Discharge  Flowsheets (Taken 1/30/2023 0800)  Maintains optimal cardiac output and hemodynamic stability:   Monitor blood pressure and heart rate   Assess for signs of decreased cardiac output     Problem: Cardiovascular - Adult  Goal: Absence of cardiac dysrhythmias or at baseline  Outcome: Adequate for Discharge  Flowsheets (Taken 1/30/2023 0800)  Absence of cardiac dysrhythmias or at baseline:   Monitor cardiac rate and rhythm   Assess for signs of decreased cardiac output   Administer antiarrhythmia medication and electrolyte replacement as ordered     Problem: Skin/Tissue Integrity - Adult  Goal: Skin integrity remains intact  Recent Flowsheet Documentation  Taken 1/30/2023 0800 by Marybeth Jaimes RN  Skin Integrity Remains Intact:   Monitor for areas of redness and/or skin breakdown   Assess vascular access sites hourly   Every 4-6 hours minimum: Change oxygen saturation probe site     Problem: Musculoskeletal - Adult  Goal: Return mobility to safest level of function  Recent Flowsheet Documentation  Taken 1/30/2023 0800 by Lacey Hernandez RN  Return Mobility to Safest Level of Function:   Assess patient stability and activity tolerance for standing, transferring and ambulating with or without assistive devices   Assist with transfers and ambulation using safe patient handling equipment as needed   Ensure adequate protection for wounds/incisions during mobilization     Problem: Gastrointestinal - Adult  Goal: Maintains or returns to baseline bowel function  Recent Flowsheet Documentation  Taken 1/30/2023 0800 by Lacey Hernandez RN  Maintains or returns to baseline bowel function:   Assess bowel function   Administer ordered medications as needed   Encourage mobilization and activity     Problem: Gastrointestinal - Adult  Goal: Maintains adequate nutritional intake  Recent Flowsheet Documentation  Taken 1/30/2023 0800 by Lacey Hernandez RN  Maintains adequate nutritional intake:   Monitor percentage of each meal consumed   Monitor intake and output, weight and lab values     Problem: Metabolic/Fluid and Electrolytes - Adult  Goal: Electrolytes maintained within normal limits  Recent Flowsheet Documentation  Taken 1/30/2023 0800 by Lacey Hernandez RN  Electrolytes maintained within normal limits:   Monitor labs and assess patient for signs and symptoms of electrolyte imbalances   Fluid restriction as ordered     Problem: Safety - Adult  Goal: Free from fall injury  Recent Flowsheet Documentation  Taken 1/30/2023 0800 by Lacey Hernandez RN  Free From Fall Injury: Instruct family/caregiver on patient safety

## 2023-01-30 NOTE — PROGRESS NOTES
Progress Note  Hospital Day: 13  POD#  4  S/P Coronary artery bypass x3, BRENT clip (2023)    Chief Complaint: Postop follow up    Mr. Elizabeth Olsen is sitting up in the chair. States he didn't slepp well last night    24 hour Interval History:    - extubated ; placed on amiodarone gtt in OR due to ventricular tachyarrhythmia while weaning from bypass, rare PVCs since and no other arrhythmia   -  amiodarone gtt off, change to 200 mg daily, swan, art line, pacing wires dc   - a-fib RVR, amio protocol restarted   - Keep chest tubes, start indomethacin    Today's plan: remove urinary catheter. Hopefully dc chest tubes later today    VITAL SIGNS   Temperature:  Current - Temp: 98.9 °F (37.2 °C); Max - Temp  Av.1 °F (36.7 °C)  Min: 97.5 °F (36.4 °C)  Max: 98.9 °F (37.2 °C)    Respiratory Rate : Resp  Av.5  Min: 15  Max: 25    Pulse Range: Pulse  Av.1  Min: 62  Max: 80    Blood Pressure Range:  Systolic (97RCG), UTY:946 , Min:93 , SYY:832   ; Diastolic (93KEV), JRO:43, Min:52, Max:84    Pulse ox Range: SpO2  Av.8 %  Min: 95 %  Max: 97 %  O2 Flow Rate (L/min): 0 L/min    Admission Weight: Weight: 200 lb 9.9 oz (91 kg)  current wt up 13 lbs from preop  Current Weight: Patient Vitals for the past 96 hrs (Last 3 readings):   Weight   23 0600 203 lb 1.6 oz (92.1 kg)   23 0600 203 lb 7.8 oz (92.3 kg)   23 0600 206 lb 12.7 oz (93.8 kg)     I/O:   Intake/Output Summary (Last 24 hours) at 2023 3736  Last data filed at 2023 0600  Gross per 24 hour   Intake 1624 ml   Output 4570 ml   Net -2946 ml       Urine (villagomez): 8123-2916-918 ml/shift  Chest tube:  150-250-20 ml/shift  serosanguinous, on water seal, no leak    LINES/ACCESS:   Central Access: CVC, introducer Day #: 4  Villagomez Day #: 3    Diet: ADULT DIET; Regular; 3 carb choices (45 gm/meal);  No Added Salt (3-4 gm); 1500 ml    MEDICATIONS:      lactulose  20 g Oral Once    insulin glargine  5 Units SubCUTAneous Nightly    indomethacin  25 mg Oral TID WC    amiodarone  400 mg Oral BID    Followed by    Judi Rick ON 2/2/2023] amiodarone  200 mg Oral Daily    potassium chloride  20 mEq Oral TID WC    rosuvastatin  20 mg Oral Nightly    sodium chloride flush  5-40 mL IntraVENous 2 times per day    aspirin  325 mg Oral Daily    clopidogrel  75 mg Oral Daily    chlorhexidine  15 mL Mouth/Throat BID    furosemide  40 mg IntraVENous BID    magnesium oxide  400 mg Oral BID    mupirocin   Nasal BID    polyethylene glycol  17 g Oral Daily    metoprolol tartrate  12.5 mg Oral BID    lisinopril  2.5 mg Oral Lunch    pantoprazole  40 mg Oral BID AC    insulin lispro  0-12 Units SubCUTAneous 4x Daily AC & HS    acetaminophen  1,000 mg Oral Q6H    gabapentin  300 mg Oral TID    Vitamin D  4,000 Units Oral Daily     DATA REVIEW:   CBC:   Recent Labs     01/28/23  0415 01/29/23  0428 01/30/23  0607   WBC 11.3* 10.5 8.6   HGB 8.8* 8.6* 9.3*   HCT 26.1* 25.3* 26.9*   MCV 88.4 87.2 86.1    154 200       BMP:   Recent Labs     01/28/23  0415 01/29/23  0429 01/30/23  0607    130* 136   K 4.3 3.7 4.1    97* 103   CO2 26 24 25   GLUCOSE 121* 317* 106*   BUN 17 14 16   CREATININE 0.8 0.7* 0.8   CALCIUM 7.9* 7.8* 8.0*   MG 2.20 2.10 1.90     Accucheck Glucoses:   Recent Labs     01/29/23  0429 01/29/23  1251 01/29/23  1703 01/29/23  2108 01/30/23  0802   POCGLU 342* 129* 122* 138* 123*     ABG:    Lab Results   Component Value Date/Time    PHART 7.389 01/27/2023 03:08 AM    PO2ART 71.7 01/27/2023 03:08 AM    EXJ9IUP 35.4 01/27/2023 03:08 AM    G0LTGPNZ 94 01/27/2023 03:08 AM    IVJ9BLN 22 01/27/2023 03:08 AM    NJJ1CFI 21.3 01/27/2023 03:08 AM    BEART -4 01/27/2023 03:08 AM    BEART -4 01/26/2023 05:58 PM    BEART -4 01/26/2023 05:06 PM    BEART -3 01/26/2023 03:57 PM    BEART -7 01/26/2023 02:58 PM     Chest X-Ray 1/26/2023 (postop):   Pulm Ed    ASSESSMENT:   Patient Active Problem List:     Anxiety Hyperlipidemia     Diverticulosis of colon     Restless leg syndrome     Benign prostatic hyperplasia with urinary obstruction     Lumbar spinal stenosis     Hearing impairment     Allergic rhinitis     GERD (gastroesophageal reflux disease)     Spondylolisthesis at L4-L5 level     Fatty liver     Atherosclerosis of aorta (HCC)     Primary osteoarthritis of both knees     Carotid stenosis, bilateral     Functional diarrhea     ED (erectile dysfunction)     Primary osteoarthritis of left knee     Migraine without aura and without status migrainosus, not intractable     Osteoarthritis of left knee     Status post total left knee replacement     Lacunar infarction (HCC)     Numbness and tingling in right hand     Lumbosacral radiculopathy     Age-related osteoporosis without current pathological fracture     Closed head injury     Concussion without loss of consciousness     Right hip pain     Vitamin D deficiency     Medication monitoring encounter     TIA (transient ischemic attack)     Unstable angina (HCC)     Abnormal stress test     Exertional chest pain      Neuro: awake, alert and oriented x 3  CV:   Sinus rate 82  Pulm:  normal work of breathing, clear, using IS [id number]  Abd:  soft, non-tender; bowel sounds normal; passing flatus, no BM  Walters: clear yellow  Wounds: clean, dry and intact  Ext: warm, + edema, improving    PLAN:   Neuro - pain tolerated    - intraop pec blocks, Precedex dc 1/28   - continued scheduled APAP and gabapentin    - PT  14/24 on 1/27  on the AM-PAC short mobility form              - OT  17/24 on 1/27 on the AM-PAC ADL Inpatient form  CV - keep CVC   -  mg/ Plavix 75 mg   - Amiodarone for periop AF   - metoprolol 12.5 bid   - Lisinopril 2.5 mg daily   - Crestor 20 mg   - indomethacin for pericardial inflammation  Pulm - off O2, continue incentive spirometry   - chest tube 420 ml, remove chest tubes when criteria met  Renal - creatinine stable - Cr 0.8, preop Cr 0.8  - wt 203 lbs, preop wt 190 lbs  - urine output  4.2 L/24 hrs   - continue diuresis for postop fluid retention        - remove urinary catheter  Heme - hgb 9.3, continue to monitor  ID - surgical prophylaxis antibiotics completed  FEN - cardiac diet with 1500 ml fluid restriction     - bowel med - lactulose    - ISS  GI prophylaxis - Protonix 40 mg bid  VTE prophylaxis - SCD and PABLO hose  Disposition -  continue PT/OT, home health at discharge      Franchesca Jasmine, [address] [facility] pager  1/30/2023  8:31 AM

## 2023-01-30 NOTE — PLAN OF CARE
Problem: ABCDS Injury Assessment  Goal: Absence of physical injury  Outcome: Progressing  Flowsheets (Taken 1/30/2023 0009)  Absence of Physical Injury: Implement safety measures based on patient assessment     Problem: Nutrition Deficit:  Goal: Optimize nutritional status  Outcome: Progressing     Problem: Respiratory - Adult  Goal: Achieves optimal ventilation and oxygenation  Outcome: Progressing  Flowsheets (Taken 1/29/2023 2000)  Achieves optimal ventilation and oxygenation: Assess for changes in respiratory status     Problem: Cardiovascular - Adult  Goal: Maintains optimal cardiac output and hemodynamic stability  Outcome: Progressing  Flowsheets (Taken 1/29/2023 2000)  Maintains optimal cardiac output and hemodynamic stability: Monitor blood pressure and heart rate     Problem: Cardiovascular - Adult  Goal: Absence of cardiac dysrhythmias or at baseline  Outcome: Progressing  Flowsheets (Taken 1/29/2023 2000)  Absence of cardiac dysrhythmias or at baseline: Monitor cardiac rate and rhythm     Problem: Skin/Tissue Integrity - Adult  Goal: Skin integrity remains intact  Outcome: Progressing  Flowsheets  Taken 1/30/2023 0009  Skin Integrity Remains Intact: Monitor for areas of redness and/or skin breakdown  Taken 1/29/2023 2000  Skin Integrity Remains Intact: Monitor for areas of redness and/or skin breakdown     Problem: Skin/Tissue Integrity - Adult  Goal: Incisions, wounds, or drain sites healing without S/S of infection  Outcome: Progressing     Problem: Skin/Tissue Integrity - Adult  Goal: Oral mucous membranes remain intact  Outcome: Progressing  Flowsheets  Taken 1/30/2023 0009  Oral Mucous Membranes Remain Intact: Assess oral mucosa and hygiene practices  Taken 1/29/2023 2000  Oral Mucous Membranes Remain Intact: Assess oral mucosa and hygiene practices     Problem: Musculoskeletal - Adult  Goal: Return mobility to safest level of function  Outcome: Progressing  Flowsheets (Taken 1/29/2023 2000)  Return Mobility to Safest Level of Function: Assess patient stability and activity tolerance for standing, transferring and ambulating with or without assistive devices     Problem: Musculoskeletal - Adult  Goal: Maintain proper alignment of affected body part  Outcome: Progressing  Flowsheets (Taken 1/29/2023 2000)  Maintain proper alignment of affected body part:  Instruct and reinforce with patient and family use of appropriate assistive device and precautions (e.g. spinal or hip dislocation precautions)     Problem: Musculoskeletal - Adult  Goal: Return ADL status to a safe level of function  Outcome: Progressing     Problem: Gastrointestinal - Adult  Goal: Minimal or absence of nausea and vomiting  Outcome: Progressing  Flowsheets (Taken 1/29/2023 2000)  Minimal or absence of nausea and vomiting: Provide nonpharmacologic comfort measures as appropriate     Problem: Gastrointestinal - Adult  Goal: Maintains or returns to baseline bowel function  Outcome: Progressing  Flowsheets (Taken 1/29/2023 2000)  Maintains or returns to baseline bowel function: Assess bowel function     Problem: Gastrointestinal - Adult  Goal: Maintains adequate nutritional intake  Outcome: Progressing  Flowsheets (Taken 1/29/2023 2000)  Maintains adequate nutritional intake: Monitor percentage of each meal consumed     Problem: Metabolic/Fluid and Electrolytes - Adult  Goal: Electrolytes maintained within normal limits  Outcome: Progressing  Flowsheets (Taken 1/29/2023 2000)  Electrolytes maintained within normal limits: Monitor labs and assess patient for signs and symptoms of electrolyte imbalances     Problem: Metabolic/Fluid and Electrolytes - Adult  Goal: Hemodynamic stability and optimal renal function maintained  Outcome: Progressing  Flowsheets (Taken 1/29/2023 2000)  Hemodynamic stability and optimal renal function maintained: Monitor labs and assess for signs and symptoms of volume excess or deficit     Problem: Metabolic/Fluid and Electrolytes - Adult  Goal: Glucose maintained within prescribed range  Outcome: Progressing  Flowsheets (Taken 1/29/2023 2000)  Glucose maintained within prescribed range: Monitor blood glucose as ordered     Problem: Pain  Goal: Verbalizes/displays adequate comfort level or baseline comfort level  Outcome: Progressing     Problem: Safety - Adult  Goal: Free from fall injury  Outcome: Progressing  Flowsheets (Taken 1/30/2023 0009)  Free From Fall Injury: Instruct family/caregiver on patient safety     Problem: Discharge Planning  Goal: Discharge to home or other facility with appropriate resources  Outcome: Progressing     Problem: Skin/Tissue Integrity  Goal: Absence of new skin breakdown  Description: 1. Monitor for areas of redness and/or skin breakdown  2. Assess vascular access sites hourly  3. Every 4-6 hours minimum:  Change oxygen saturation probe site  4. Every 4-6 hours:  If on nasal continuous positive airway pressure, respiratory therapy assess nares and determine need for appliance change or resting period.   Outcome: Progressing

## 2023-01-30 NOTE — PROGRESS NOTES
Patient up in chair. Did not sleep well last night per patient. Barely any pain only from left chest tube. Output does not meet criteria to remove yet, hopefully later today.  Lactulose to be given this AM.

## 2023-01-31 VITALS
HEART RATE: 73 BPM | OXYGEN SATURATION: 97 % | SYSTOLIC BLOOD PRESSURE: 117 MMHG | WEIGHT: 196.7 LBS | BODY MASS INDEX: 31.61 KG/M2 | RESPIRATION RATE: 22 BRPM | TEMPERATURE: 98.1 F | DIASTOLIC BLOOD PRESSURE: 67 MMHG | HEIGHT: 66 IN

## 2023-01-31 LAB
ANION GAP SERPL CALCULATED.3IONS-SCNC: 7 MMOL/L (ref 3–16)
BUN BLDV-MCNC: 15 MG/DL (ref 7–20)
CALCIUM SERPL-MCNC: 7.7 MG/DL (ref 8.3–10.6)
CHLORIDE BLD-SCNC: 106 MMOL/L (ref 99–110)
CO2: 23 MMOL/L (ref 21–32)
CREAT SERPL-MCNC: 0.8 MG/DL (ref 0.8–1.3)
GFR SERPL CREATININE-BSD FRML MDRD: >60 ML/MIN/{1.73_M2}
GLUCOSE BLD-MCNC: 101 MG/DL (ref 70–99)
HCT VFR BLD CALC: 26.9 % (ref 40.5–52.5)
HEMOGLOBIN: 9.2 G/DL (ref 13.5–17.5)
MAGNESIUM: 1.9 MG/DL (ref 1.8–2.4)
MCH RBC QN AUTO: 29.8 PG (ref 26–34)
MCHC RBC AUTO-ENTMCNC: 34.3 G/DL (ref 31–36)
MCV RBC AUTO: 86.9 FL (ref 80–100)
PDW BLD-RTO: 13.3 % (ref 12.4–15.4)
PLATELET # BLD: 229 K/UL (ref 135–450)
PMV BLD AUTO: 7.1 FL (ref 5–10.5)
POTASSIUM SERPL-SCNC: 3.9 MMOL/L (ref 3.5–5.1)
RBC # BLD: 3.09 M/UL (ref 4.2–5.9)
SODIUM BLD-SCNC: 136 MMOL/L (ref 136–145)
WBC # BLD: 7.4 K/UL (ref 4–11)

## 2023-01-31 PROCEDURE — 97530 THERAPEUTIC ACTIVITIES: CPT

## 2023-01-31 PROCEDURE — 2580000003 HC RX 258: Performed by: THORACIC SURGERY (CARDIOTHORACIC VASCULAR SURGERY)

## 2023-01-31 PROCEDURE — 97116 GAIT TRAINING THERAPY: CPT

## 2023-01-31 PROCEDURE — 6370000000 HC RX 637 (ALT 250 FOR IP): Performed by: STUDENT IN AN ORGANIZED HEALTH CARE EDUCATION/TRAINING PROGRAM

## 2023-01-31 PROCEDURE — 6360000002 HC RX W HCPCS: Performed by: THORACIC SURGERY (CARDIOTHORACIC VASCULAR SURGERY)

## 2023-01-31 PROCEDURE — 83735 ASSAY OF MAGNESIUM: CPT

## 2023-01-31 PROCEDURE — 80048 BASIC METABOLIC PNL TOTAL CA: CPT

## 2023-01-31 PROCEDURE — 97535 SELF CARE MNGMENT TRAINING: CPT

## 2023-01-31 PROCEDURE — 6370000000 HC RX 637 (ALT 250 FOR IP): Performed by: THORACIC SURGERY (CARDIOTHORACIC VASCULAR SURGERY)

## 2023-01-31 PROCEDURE — 6370000000 HC RX 637 (ALT 250 FOR IP): Performed by: INTERNAL MEDICINE

## 2023-01-31 PROCEDURE — 85027 COMPLETE CBC AUTOMATED: CPT

## 2023-01-31 PROCEDURE — 36592 COLLECT BLOOD FROM PICC: CPT

## 2023-01-31 PROCEDURE — 94761 N-INVAS EAR/PLS OXIMETRY MLT: CPT

## 2023-01-31 PROCEDURE — 6370000000 HC RX 637 (ALT 250 FOR IP): Performed by: CLINICAL NURSE SPECIALIST

## 2023-01-31 RX ORDER — AMIODARONE HYDROCHLORIDE 200 MG/1
200 TABLET ORAL DAILY
Qty: 45 TABLET | Refills: 0 | Status: SHIPPED | OUTPATIENT
Start: 2023-02-02 | End: 2023-02-08 | Stop reason: ALTCHOICE

## 2023-01-31 RX ORDER — LISINOPRIL 2.5 MG/1
2.5 TABLET ORAL
Qty: 30 TABLET | Refills: 3 | Status: SHIPPED | OUTPATIENT
Start: 2023-01-31 | End: 2023-02-08 | Stop reason: ALTCHOICE

## 2023-01-31 RX ORDER — GABAPENTIN 300 MG/1
300 CAPSULE ORAL 3 TIMES DAILY
Qty: 42 CAPSULE | Refills: 0 | Status: SHIPPED | OUTPATIENT
Start: 2023-01-31 | End: 2023-02-08 | Stop reason: SINTOL

## 2023-01-31 RX ADMIN — INDOMETHACIN 25 MG: 25 CAPSULE ORAL at 13:23

## 2023-01-31 RX ADMIN — POTASSIUM CHLORIDE 20 MEQ: 1500 TABLET, EXTENDED RELEASE ORAL at 13:23

## 2023-01-31 RX ADMIN — Medication 4000 UNITS: at 10:27

## 2023-01-31 RX ADMIN — POTASSIUM CHLORIDE 20 MEQ: 1500 TABLET, EXTENDED RELEASE ORAL at 10:23

## 2023-01-31 RX ADMIN — Medication 400 MG: at 10:27

## 2023-01-31 RX ADMIN — CLOPIDOGREL BISULFATE 75 MG: 75 TABLET ORAL at 10:23

## 2023-01-31 RX ADMIN — SODIUM CHLORIDE, PRESERVATIVE FREE 10 ML: 5 INJECTION INTRAVENOUS at 10:26

## 2023-01-31 RX ADMIN — GABAPENTIN 300 MG: 300 CAPSULE ORAL at 10:22

## 2023-01-31 RX ADMIN — FUROSEMIDE 40 MG: 10 INJECTION, SOLUTION INTRAMUSCULAR; INTRAVENOUS at 10:26

## 2023-01-31 RX ADMIN — GABAPENTIN 300 MG: 300 CAPSULE ORAL at 13:23

## 2023-01-31 RX ADMIN — AMIODARONE HYDROCHLORIDE 400 MG: 200 TABLET ORAL at 10:23

## 2023-01-31 RX ADMIN — LISINOPRIL 2.5 MG: 2.5 TABLET ORAL at 13:23

## 2023-01-31 RX ADMIN — ACETAMINOPHEN 1000 MG: 500 TABLET ORAL at 10:22

## 2023-01-31 RX ADMIN — PANTOPRAZOLE SODIUM 20 MG: 20 TABLET, DELAYED RELEASE ORAL at 06:56

## 2023-01-31 RX ADMIN — ASPIRIN 325 MG: 325 TABLET, COATED ORAL at 10:23

## 2023-01-31 RX ADMIN — METOPROLOL TARTRATE 12.5 MG: 25 TABLET, FILM COATED ORAL at 10:23

## 2023-01-31 RX ADMIN — INDOMETHACIN 25 MG: 25 CAPSULE ORAL at 10:27

## 2023-01-31 ASSESSMENT — PAIN SCALES - GENERAL
PAINLEVEL_OUTOF10: 0
PAINLEVEL_OUTOF10: 0

## 2023-01-31 NOTE — CARE COORDINATION
Case Management Assessment            Discharge Note                    Date / Time of Note: 1/31/2023 1:48 PM                  Discharge Note Completed by: Zofia Martinez RN    Patient Name: Alta Zaragoza III   YOB: 1945  Diagnosis: Unstable angina ([address] [I20.0]  Abnormal stress test [R94.39]  Exertional chest pain [R07.9]   Date / Time: 1/18/2023 11:42 AM    Current PCP: Kayleigh Mathias MD    Advance Directives:  Code Status: Full Code    Financial:  Payor: Swathi Garcia / Plan: Johnny Whitfield / Product Type: *No Product type* /      Pharmacy:    [id number] - [address], [address]  Phone: [phone] Fax: [phone]    [facility] [address] [address] [address] [facility]  Phone: [phone] Fax: [phone]    [facility] Mail - [address], [address] [phone] Illa Delay [phone]  [address] [place] [address]. [phone]  Phone: [phone] Fax: [phone]      ADLS:  Current PT AM-PAC Score: 15 /24  Current OT AM-PAC Score: 18 /24    DISCHARGE Disposition: Home with [address] [place] [address] Way: [facility] referral     STACY Completed: Yes      IMM Completed:   Yes, Case management has presented and reviewed IMM letter #2 to the patient and/or family/ POA. Patient and/or family/POA verbalized understanding of their medicare rights and appeal process if needed. Patient and/or family/POA has signed, initialed and placed today's date (1/31/2023) and time (1400) on IMM letter #2 on the the appropriate lines. Patient and/or family/POA, copy of letter offered and they are aware that this original copy of IMM letter #2 is available prior to discharge from the paper chart on the unit.   Electronic documentation has been entered into epic for IMM letter #2 and original paper copy has been added to the paper chart at the nurses station. Transportation:  Transportation PLAN for discharge: family   Mode of Transport: [address]:  [address] ordered at discharge: Yes  [address]: [place]  Phone: [phone]  Fax: [phone]      The Patient and/or patient representative Xavier Garcia and his family were provided with a choice of provider and agrees with the discharge plan Yes    Freedom of choice list was provided with basic dialogue that supports the patient's individualized plan of care/goals and shares the quality data associated with the providers.  Yes      Kee Miller RN  [facility]  Case Management Department  [phone]

## 2023-01-31 NOTE — PROGRESS NOTES
Progress Note    POD # 5 S/P CABGX3/LIMA/LA Clip (#35mm Atriclip)    Sitting up in bed. 50cc CT dump this AM. 80cc overnight. No air leak on water seal. Denies incisional CP or SOB. Vital Signs:                                                 /64   Pulse 78   Temp 98 °F (36.7 °C) (Oral)   Resp 20   Ht 5' 6\" (1.676 m)   Wt 196 lb 11.2 oz (89.2 kg)   SpO2 96%   BMI 31.75 kg/m²  O2 Flow Rate (L/min): 0 L/min     I/O:    Intake/Output Summary (Last 24 hours) at 1/31/2023 0801  Last data filed at 1/31/2023 0600  Gross per 24 hour   Intake 1410 ml   Output 2630 ml   Net -1220 ml       INCISIONS: Warm/Dry/Healing  STERNUM: Stable to palpation  LUNGS: CTA Bilat  HEART: SR HR 82  ABD: Soft. Non-tender. BS + x 4 quads. EXT: No edema    Lab:  CBC:   Recent Labs     01/29/23  0428 01/30/23  0607 01/31/23  0409   WBC 10.5 8.6 7.4   HGB 8.6* 9.3* 9.2*   HCT 25.3* 26.9* 26.9*   MCV 87.2 86.1 86.9    200 229     BMP:   Recent Labs     01/29/23  0429 01/30/23  0607 01/31/23  0409   * 136 136   K 3.7 4.1 3.9   CL 97* 103 106   CO2 24 25 23   BUN 14 16 15   CREATININE 0.7* 0.8 0.8   CALCIUM 7.8* 8.0* 7.7*   MG 2.10 1.90 1.90     ABG:   Ionized Calcium (mmol/L)    Cardiac Enzymes: No results for input(s): CKTOTAL, CKMB, CKMBINDEX, TROPONINI in the last 72 hours. PT/INR: No results for input(s): PROTIME, INR in the last 72 hours. APTT: No results for input(s): APTT in the last 72 hours. CXR: 1/29/23 Reviewed  Impression       1. Right IJ sheath with removal of the pulmonary artery catheter. The endotracheal tube has been removed. Otherwise, stable support apparatus. 2. No visible pneumothorax. 3. Mild left basilar airspace disease, likely atelectasis. A/P:   POD # 5 S/P CABGX3/LIMA/LA Clip (#35mm Atriclip)  TM: 98.9, 119/64, 84,21 SpO2 97% on RA  CT Output: 400cc/24h. No air leak on water seal.  WBC's: 7.4 (8.6)  H&H: 9.2/26.9 (9.3/26. 9)  BUN/CR: 15/0.8 (16/0.8)  WT: 89.2 Kg (92.1 Kg) - Admit wt 91 KG  UOP: 2230cc/24h.    D/C CT per Dr. Karrie Olmstead for D/C later today - MEDREC/F/U complete  Continue Cordarone 400mg BID  Continue Plavix  Continue Lasix 40mg BID  Continue Lopressor 12.5 MG BID    [place], [place]  1/31/2023  8:01 AM

## 2023-01-31 NOTE — CARE COORDINATION
[facility]    Patient aware and agreeable to services.  Faxed orders to [facility] for [facility] by 2/2    Dov Ivey LPN  Care Transition Nurse  [address] [place]  [phone]

## 2023-01-31 NOTE — PROGRESS NOTES
Occupational Therapy  Occupational Therapy  Daily Treatment Note  Patient Name: Kt Wilkerson  MRN: [id number]            Other position/activity restrictions: up with assist, sternal precautions     Additional Pertinent Hx: Pt is a 68 y.o. male adm 1/18 with unstable angina. Pt presented to ED with chest pain. Cath lab 1/19:[facility] demonstrated multivessel CAD. Pt s/p CABG x 3 1/26. PMH:  anxiety, CAD, diverticulosis, CAD, GERD, hyperlipidemia, HTN, OA, bilat rotator cuff repair, L TKR      Diagnosis: Pt admitted with 3 vessel CAD, unstable angina-to OR 1/26/23 for CABG x 3  Treatment Diagnosis: impaired mobility and ADLs    Subjective: Pt seated EOB with RN present, had just returned from walking. Pt pleasant and agreeable to therapy session. General Comment: Pt sit to stand CGA, pt ambulated no device ~60ft + ~20ft + ~20ft + ~60ft and completed up/down 2 steps at Min A with HHA (x 2 trials). Pt with seated rest break. Pt back in room stand to sit CGA EOB. Pt sit to stand CGA and ambulated ~12ft CGA no device x 2 trials (to/from bathroom). Pt in stance at sink SBA with wash face/wet and comb hair. Pt seated EOB with sit to supine Mod A (assist with BLEs) + Min A. Call light in reach and bed alarm on.     Pain: Denies    Social/Functional History  Lives With: Spouse  Type of Home: House  Home Layout: Able to Live on Main level with bedroom/bathroom  Home Access: Stairs to enter without rails (1+1 YONNY)  Bathroom Shower/Tub: Tub/Shower unit  Bathroom Toilet: Standard  Bathroom Equipment:  (none)  Home Equipment:  (none)  Has the patient had two or more falls in the past year or any fall with injury in the past year?: No  ADL Assistance: Independent  Homemaking Assistance:  (wife primarily performs)  Ambulation Assistance: Independent  Transfer Assistance: Independent  Active : Yes  Occupation: Retired  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 Wife is in good health and can assist at d/c. Prior Function  ADL Assistance: Independent  Homemaking Assistance:  (wife primarily performs)  Ambulation Assistance: Independent  Transfer Assistance: Independent  Additional Comments: Reports has been feeling tired for the last year. Wife is in good health and can assist at d/c.    Objective:    Cognition/Orientation:WNL, vcs to implement precautions     Bed mobility   Sit to Supine: Min A + Mod A (assist with BLEs)  Scooting:SBA    Functional Mobility   Sit to Stand:CGA  Stand to Sit:CGA  Bed to Chair Transfer: CGA  Commode Transfer:declined  Stairs: up/down 2 steps x 2 trials with HHA and Min A    ADLs   Grooming:SBA to wet and comb hair/oral care/wash face  Bathing:N/A  UB dressing:N/A  LB dressing:N/A  Toileting:N/A      UE Exercises N/A    Activity Tolerance: Good with rest breaks      Patient Education: stair training, role of OT, d/c rec, sternal precautions (will need reinforcement)    Safety Devices in Place: bed alarm on and call light in reach    Assessment: Pt CGA for all functional mobility (no device) and transfers. Pt did require Min A with HHA  to complete 2 steps up/down. Pt SBA for balance during grooming. Recommend d/c home with initial 24 hr assist. Continue with POC. Discharge Recommendations: Rawleigh Cushing III scored a 18/24 on the AM-PAC ADL Inpatient form. Current research shows that an AM-PAC score of 18 or greater is typically associated with a discharge to the patient's home setting. Based on the patient's AM-PAC score, and their current ADL deficits, it is recommended that the patient have 2-3 sessions per week of Occupational Therapy at d/c to increase the patient's independence. At this time, this patient demonstrates the endurance and safety to discharge home with 24 hr assist (home vs OP services) and a follow up treatment frequency of 2-3x/wk.    Please see assessment section for further patient specific details. If patient discharges prior to next session this note will serve as a discharge summary. Please see below for the latest assessment towards goals. Equipment Needs: none    Therapy Time:   Individual Concurrent Group Co-treatment   Time In 955         Time Out 1020         Minutes 25         Timed Code Treatment Minutes: 25 min   Total Treatment Minutes: 25 min      Goals:  Short Term Goals  Time Frame for Short Term Goals: discharge  Short Term Goal 1: pt to transfer to commode with SBA  Short Term Goal 2: pt to manage toileting with SBA  Short Term Goal 3: pt to verbalize and adhere to all sternal prec with occ cues  Short Term Goal 4: pt to participate in LE dressing assessment     No goals met,continue all goals 1/31     Plan:      Times Per Week: 2-5        If patient is discharged prior to next treatment, this note will serve as the discharge summary.     Marina Ferrell, [address]

## 2023-01-31 NOTE — PLAN OF CARE
Problem: ABCDS Injury Assessment  Goal: Absence of physical injury  Outcome: Progressing  Flowsheets (Taken 1/30/2023 2336)  Absence of Physical Injury: Implement safety measures based on patient assessment     Problem: Nutrition Deficit:  Goal: Optimize nutritional status  1/31/2023 0153 by Brianna De La Rosa RN  Outcome: Progressing     Problem: Respiratory - Adult  Goal: Achieves optimal ventilation and oxygenation  1/31/2023 0153 by Brianna De La Rosa RN  Outcome: Progressing     Problem: Cardiovascular - Adult  Goal: Maintains optimal cardiac output and hemodynamic stability  1/31/2023 0153 by Brianna De La Rosa RN  Outcome: Progressing  Flowsheets (Taken 1/30/2023 2000)  Maintains optimal cardiac output and hemodynamic stability: Monitor blood pressure and heart rate     Problem: Cardiovascular - Adult  Goal: Absence of cardiac dysrhythmias or at baseline  1/31/2023 0153 by Brianna De La Rosa RN  Outcome: Progressing  Flowsheets (Taken 1/30/2023 2000)  Absence of cardiac dysrhythmias or at baseline: Monitor cardiac rate and rhythm     Problem: Skin/Tissue Integrity - Adult  Goal: Skin integrity remains intact  Outcome: Progressing  Flowsheets  Taken 1/30/2023 2336  Skin Integrity Remains Intact: Monitor for areas of redness and/or skin breakdown  Taken 1/30/2023 2000  Skin Integrity Remains Intact: Monitor for areas of redness and/or skin breakdown     Problem: Skin/Tissue Integrity - Adult  Goal: Incisions, wounds, or drain sites healing without S/S of infection  Outcome: Progressing     Problem: Skin/Tissue Integrity - Adult  Goal: Oral mucous membranes remain intact  Outcome: Progressing  Flowsheets (Taken 1/30/2023 2000)  Oral Mucous Membranes Remain Intact: Assess oral mucosa and hygiene practices     Problem: Musculoskeletal - Adult  Goal: Return mobility to safest level of function  Outcome: Progressing  Flowsheets (Taken 1/30/2023 2000)  Return Mobility to Safest Level of Function: Assess patient stability and activity tolerance for standing, transferring and ambulating with or without assistive devices     Problem: Musculoskeletal - Adult  Goal: Maintain proper alignment of affected body part  Outcome: Progressing     Problem: Musculoskeletal - Adult  Goal: Return ADL status to a safe level of function  Outcome: Progressing     Problem: Gastrointestinal - Adult  Goal: Maintains or returns to baseline bowel function  Outcome: Progressing     Problem: Gastrointestinal - Adult  Goal: Maintains adequate nutritional intake  Outcome: Progressing  Flowsheets (Taken 1/30/2023 2000)  Maintains adequate nutritional intake: Monitor intake and output, weight and lab values     Problem: Metabolic/Fluid and Electrolytes - Adult  Goal: Electrolytes maintained within normal limits  Outcome: Progressing  Flowsheets (Taken 1/30/2023 2000)  Electrolytes maintained within normal limits: Monitor labs and assess patient for signs and symptoms of electrolyte imbalances     Problem: Metabolic/Fluid and Electrolytes - Adult  Goal: Hemodynamic stability and optimal renal function maintained  Outcome: Progressing  Flowsheets (Taken 1/30/2023 2000)  Hemodynamic stability and optimal renal function maintained: Monitor labs and assess for signs and symptoms of volume excess or deficit     Problem: Metabolic/Fluid and Electrolytes - Adult  Goal: Glucose maintained within prescribed range  Outcome: Progressing     Problem: Pain  Goal: Verbalizes/displays adequate comfort level or baseline comfort level  Outcome: Progressing     Problem: Safety - Adult  Goal: Free from fall injury  Outcome: Progressing  Flowsheets (Taken 1/30/2023 2336)  Free From Fall Injury: Instruct family/caregiver on patient safety     Problem: Discharge Planning  Goal: Discharge to home or other facility with appropriate resources  Outcome: Progressing     Problem: Skin/Tissue Integrity  Goal: Absence of new skin breakdown  Description: 1.   Monitor for areas of redness and/or skin breakdown  2. Assess vascular access sites hourly  3. Every 4-6 hours minimum:  Change oxygen saturation probe site  4. Every 4-6 hours:  If on nasal continuous positive airway pressure, respiratory therapy assess nares and determine need for appliance change or resting period.   Outcome: Progressing

## 2023-01-31 NOTE — PROGRESS NOTES
Physical Therapy  Facility/Department: [facility]  Physical Therapy Daily Treatment Note    Name: Morelia Duckworth  : 1945  MRN: [id number]  Date of Service: 2023    Discharge Recommendations:  Lila Ambriz III scored a 15/24 on the AM-PAC short mobility form. Current research shows that an AM-PAC score of 18 or greater is typically associated with a discharge to the patient's home setting. Based on the patient's AM-PAC score and their current functional mobility deficits, it is recommended that the patient have 2-3 sessions per week of Physical Therapy at d/c to increase the patient's independence. At this time, this patient demonstrates the endurance and safety to discharge home with home vs OP services and a follow up treatment frequency of 2-3x/wk. Please see assessment section for further patient specific details. If patient discharges prior to next session this note will serve as a discharge summary. Please see below for the latest assessment towards goals. PT Equipment Recommendations  Equipment Needed:  (none anticipated, will continue to asses)      Patient Diagnosis(es): The primary encounter diagnosis was Exertional chest pain. A diagnosis of Abnormal stress test was also pertinent to this visit. Past Medical History:  has a past medical history of Allergic rhinitis, Anxiety, BPH (benign prostatic hypertrophy) with urinary obstruction, CAD (coronary artery disease), Chronic back pain, Colon polyp, Diverticulosis of colon, Environmental allergies, FH: CAD (coronary artery disease), Fractures, GERD (gastroesophageal reflux disease), Headache(784.0), Hearing impairment, Hyperlipidemia, Hypertension, Osteoarthritis, and Restless leg syndrome. Past Surgical History:  has a past surgical history that includes Tonsillectomy and adenoidectomy; nasal/sinus endoscopy; Cholecystectomy (94); Rotator cuff repair (Bilateral);  Femur fracture surgery; hernia repair (06); Colonoscopy (08,11/12,11/17/17); Cardiac surgery; Cardiac catheterization; Total knee arthroplasty (Left, 1/22/2019); joint replacement; knee surgery; incision and drainage (Left, 4/24/2019); and Coronary artery bypass graft (N/A, 1/26/2023). Assessment   Body Structures, Functions, Activity Limitations Requiring Skilled Therapeutic Intervention: Decreased functional mobility ; Decreased endurance;Decreased strength  Assessment: Pt demonstrates improved functional activity tolerance this date with the ability to ambulate further distances and complete stair management. Pt requires Min A and HHA for stair management with VC for proper sequencing and safety. Pt able to ambulate without AD this date and CGA. Pt continues to have decreased endurance, requiring frequent rest break. Pt educated on need for assistance from wife for stair management. Recommend continued skilled PT services to maximize functional mobility and independence. Barriers to Learning: none  Activity Tolerance  Activity Tolerance: Patient tolerated treatment well  Activity Tolerance Comments: slight SOB after stair management and ambulation but SpO2 >95% throughout and HR ~ 115- 119     Plan   Physcial Therapy Plan  General Plan:  (2-5)  Current Treatment Recommendations: Strengthening, Functional mobility training, Gait training, Stair training, Endurance training, Equipment evaluation, education, & procurement, Safety education & training, Patient/Caregiver education & training  Safety Devices  Type of Devices: Bed alarm in place, Gait belt, Patient at risk for falls, Left in bed, Nurse notified  Restraints  Restraints Initially in Place: No     Restrictions  Position Activity Restriction  Other position/activity restrictions: up with assist, sternal precautions     Subjective   General  Additional Pertinent Hx: Pt is a 68 y.o. male adm 1/18 with unstable angina. Pt presented to ED with chest pain. Cath lab 1/19:[facility] demonstrated multivessel CAD.   Pt s/p CABG x 3 1/26. PMH:  anxiety, CAD, diverticulosis, CAD, GERD, hyperlipidemia, HTN, OA, bilat rotator cuff repair, L TKR  Subjective  Subjective: Pt sitting EOB with RN following ambulation. Pt agreeable to therapy session to complete stair management. Social/Functional History  Social/Functional History  Lives With: Spouse  Type of Home: House  Home Layout: Able to Live on Main level with bedroom/bathroom  Home Access: Stairs to enter without rails (1+1 YONNY)  Bathroom Shower/Tub: Tub/Shower unit  Bathroom Toilet: Standard  Bathroom Equipment:  (none)  Home Equipment:  (none)  Has the patient had two or more falls in the past year or any fall with injury in the past year?: No  ADL Assistance: Independent  Homemaking Assistance:  (wife primarily performs)  Ambulation Assistance: Independent  Transfer Assistance: Independent  Active : Yes  Occupation: Retired  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 Wife is in good health and can assist at d/c. Vision/Hearing  Vision  Vision: Impaired  Vision Exceptions: Wears glasses for reading  Hearing  H[facility]: [facility]  Hearing Exceptions: Bilateral hearing aid    Cognition   Orientation  Overall Orientation Status: Within Normal Limits  Cognition  Overall Cognitive Status: WFL     Objective     Observation/Palpation  Observation: chest tube, pulse ox, telemetry        Bed Mobility Training  Bed Mobility Training: Yes  Sit to Supine:  Moderate assistance;Minimum assistance;Assist X2  Balance  Sitting: Intact  Standing: Impaired  Standing - Static: Fair  Standing - Dynamic: Occasional  Transfer Training  Transfer Training: Yes  Overall Level of Assistance: Contact-guard assistance  Sit to Stand: Contact-guard assistance  Stand to Sit: Contact-guard assistance  Gait Training  Gait Training: Yes  Gait  Overall Level of Assistance: Contact-guard assistance (Pt ambulates without AD this date.  Pt ambulated to stairwell, to chair to rest, back to stiarwell, back to room and then to/ from restroom.)  Interventions: Verbal cues  Base of Support: Widened  Speed/April: Slow  Step Length: Left shortened;Right shortened  Distance (ft): 60 Feet (60' + 20' + 20' + 60' + 12' + 12' (all ambulation trials throughout session))  Rail Use: None  Stairs - Level of Assistance: Minimum assistance (Pt requires Max VC for step to gait pattern, to use therapist hand for assistance and VC to not use railing.)  Number of Stairs Trained: 2 (2 steps x 2 trials with no HR and HHR with Min A)       Exercise Treatment: x 10 seated marchmoose    AM-PAC Score  AM-PAC Inpatient Mobility Raw Score : 15 (01/31/23 1101)  AM-PAC Inpatient T-Scale Score : 39.45 (01/31/23 1101)  Mobility Inpatient CMS 0-100% Score: 57.7 (01/31/23 1101)  Mobility Inpatient CMS G-Code Modifier : CK (01/31/23 1101)     Goals  Short Term Goals  Time Frame for Short Term Goals: By discharge- all ongoing  Short Term Goal 1: Sit to stand SBA  Short Term Goal 2: Pt will amb >150' with LRAD SBA  Short Term Goal 3: Pt will up/down 1 step with LRAD SBA       Education  Patient Education  Education Given To: Patient  Education Provided: Role of Therapy;Precautions;Plan of Care  Education Provided Comments: Educated on sternal precautions, logrolling for in/out of bed  Education Method: Verbal  Education Outcome: Verbalized understanding;Continued education needed      Therapy Time   Individual Concurrent Group Co-treatment   Time In 0955         Time Out 1020         Minutes 25         Timed Code Treatment Minutes: JOSE J/ [address]mber [address], [address], [place], [facility] [address] [place] [address] PT [id number]

## 2023-02-02 ENCOUNTER — TELEPHONE (OUTPATIENT)
Dept: CARDIOTHORACIC SURGERY | Age: 78
End: 2023-02-02

## 2023-02-02 NOTE — TELEPHONE ENCOUNTER
Marylee Sandifer with [facility] called reporting that patient's BP was a little low this morning at 98/62, HR 84 prior to medication this morning. He has held his morning BP meds. Patient had some dizziness/lightheadedness as well this morning when he stood up. Denies any other symptoms. Instructed for patient to check his BP prior to taking his BP medication. If SBP is < 105 or HR < 60 he is to hold his dose. Patient educated to move positions slowly to prevent dizziness. If low BP continues patient is to contact the office. They are aware and agreeable with plan. 1300- I called patient to check in and see how he was doing. He reports that he is doing good. BP was checked and is now 138/74 HR 88. Instructed that patient could take his Lisinopril 2.5 mg since his systolic is greater than 766. Again reminded him to check his BP/HR prior to taking BP medications and to call the office withy any further questions or concerns. He is aware and agreeable with plan.

## 2023-02-06 ENCOUNTER — TELEPHONE (OUTPATIENT)
Dept: CARDIOTHORACIC SURGERY | Age: 78
End: 2023-02-06

## 2023-02-06 NOTE — TELEPHONE ENCOUNTER
Olegario Kaplan with [facility] called to report that approximately 2-3 days after patient was discharged home he started to develop a rash above his MSI that has now spread to his chest/abdomen and around his SVG site where prineo dressing is present. The rash started around the prineo dressing on the chest.  The patient is reporting that the rash is itchy. He has been applying Benadryl cream with not much relief. Instructed for patient to remove the prineo dressings by applying a thick layer of Vaseline to the prineo and letting it that sit for 30 min prior to removal.  Patient then instructed to clean his chest and pat his incisions with antibacterial soap and water. Patient may take oral Benadryl. Patient also noted that he woke up with a \" lump\" in the right side of his throat. Denies any difficulty swallowing. Patient instructed to proceed to the ED if he develops any difficulty swallowing or with breathing. Patient is scheduled to see Dr. Marta Price in office on 2/8/23. Patient is aware and agreeable with plan.

## 2023-02-07 ENCOUNTER — TELEPHONE (OUTPATIENT)
Dept: CARDIOTHORACIC SURGERY | Age: 78
End: 2023-02-07

## 2023-02-07 NOTE — TELEPHONE ENCOUNTER
I called patient to follow up on rash. He reports that it is improving. He was able to remove the prineo dressing without difficulty. He also notes that the bump he had on the inside of his right throat is now gone too. Will evaluate tomorrow during OV with Dr. Jorge Rojas.

## 2023-02-08 ENCOUNTER — OFFICE VISIT (OUTPATIENT)
Dept: CARDIOTHORACIC SURGERY | Age: 78
End: 2023-02-08

## 2023-02-08 VITALS
OXYGEN SATURATION: 98 % | HEIGHT: 66 IN | WEIGHT: 191 LBS | TEMPERATURE: 97.2 F | SYSTOLIC BLOOD PRESSURE: 128 MMHG | HEART RATE: 74 BPM | BODY MASS INDEX: 30.7 KG/M2 | DIASTOLIC BLOOD PRESSURE: 62 MMHG

## 2023-02-08 DIAGNOSIS — Z09 FOLLOW-UP SURGERY CARE: Primary | ICD-10-CM

## 2023-02-08 PROCEDURE — 99024 POSTOP FOLLOW-UP VISIT: CPT | Performed by: THORACIC SURGERY (CARDIOTHORACIC VASCULAR SURGERY)

## 2023-02-08 NOTE — PROGRESS NOTES
Cardiac Surgery Note    Phyllis Pugh is a 68 y.o. man who presents today for his first postoperative visit after undergoing CABG x3 (LIMA to LAD, reverse aortic coronary saphenous vein graft sequentially to the posterior descending and the second obtuse marginal) on 1/26/2023. He was discharged on postoperative day 5, and overall has been doing well. He has been walking daily. Over the past two to three days he has noted a new rash on his torso and legs that is pruritic. His systolic blood pressure has been ranging between 100-120 mmHg. His pain is well controlled, and he recently stopped taking the gabapentin. On exam his incisions are healing well. He does have a rash consistent with a drug eruption. He has minimal peripheral edema at this point. He is speaking in complete sentences. Other than his rash he is healing appropriately from his recent bypass surgery. Today we stopped his lisinopril and amiodarone in addition to the gabapentin that was already stopped. He was advised to use a later generation antihistamine to limit somnolence which he has been getting with Benadryl. He will return to clinic in 6 weeks, and will call to update the progress of his rash. He also needs to follow up with his Cardiologist.    Marion Thompson MD  Cardiac Surgery PGY-5    I saw and evaluated the patient. I agree with the findings/plan of care in the fellow's note.       Juanita Washington MD  2/8/2023  2:38 PM

## 2023-02-09 NOTE — DISCHARGE SUMMARY
Cardiac, Vascular & Thoracic Surgery  Discharge Summary    Patient:  Delia Mejía 1945 [id number]   Admission Date:  1/18/2023 11:42 AM  Discharge Date:  1/31/2023    Principle Diagnosis:  Unstable angina [facility])    Secondary Diagnosis:  Principal Problem:    Unstable angina ([address]  Active Problems:    Abnormal stress test    Exertional chest pain  Resolved Problems:    * No resolved hospital problems. *      Cardiac Cath: 1/19/23  /57, 74 /7; Left ventricular end diastolic pressure was 20 mmHg. There was no gradient across the aortic valve upon pullback. The left main coronary artery arises from the left coronary cusp giving rise to the left anterior descending artery and the left circumflex artery. The left main reveals mild luminal irregularites. The left anterior descending artery arises in normal fashion from left coronary giving rise to diagonals and septal branches. The LAD reveals >80% ostial stenosis, >70% mid stenosis. The ramus reveals >70% ostial, >60-70% proximal stenosis. The left circumflex reveals up 50-60% ostial stenosis; moderate disease in its midsegment. OM2 (last OM) reveals up to 70% stenosis in its proximal segment. The right coronary artery is a right dominant vessel. The right coronary artery ostium reveals up to 30% ostial stenosis. Distal RCA 60% stenosis. Mid PDA up to 50% stenosis. Procedure:  Procedure:RENETTA; TCPB; CABG x 3, LIMA to LAD, SVG to OM2 and PDA; EVH R thigh; 35 mm Atriclip 2 to LA appendage base    History:  Radha Marley III is a 68 y.o.  male with medical history most notable for hypertension hyperlipidemia and prior stroke presented to [facility] with several episodes of exertional chest pain. Patient was seen at the urgent care and also at a fire station and was referred to the hospital.  Patient stateed that he was lifting boxes and taking the recyclables out when he developed pain.   Pain was in the center of his chest radiating to the left shoulder pressure-like. It resolved with rest and currently patient is completely pain-free. Evidently he had an abnormal stress test as outpatient. Cardiology was consulted in the ED and was admitted for left heart cath. LHC demonstrated multivessel CAD and referred for surgical intervention. Hospital Course: The patient underwent CABGx3/LIMA on 1/26/23 (See above). Pt tolerated the procedure well. Chest tubes were discontinued on POD #5 and was discharged on 1/31/23 in stable and improved condition. Disposition:  home    Condition:  Stable/Improved    Discharge Medications:   mg qd  Metoprolol 25mg BID  Plavix 75mg qd  Crestor 10mg qd    Patient Instructions:    Patient was instructed to adhere strictly to social distancing measures and avoid  close contact with individuals who are at risk for being asymptomatic carries and/or have had known contact with a diagnosed COVID-19 patient over the next few weeks. Patient Instructions: Activity: DO NOT LIFT, PUSH, OR PULL ANYTHING OVER 5 POUNDS FOR 8 WEEK from the day of surgery  Diet:  cardiac diet  Wound Care:  [address] YOUR INCISIONS DAILY WITH A CLEAN WASHCLOTH AND ANTIBACTERIAL SOAP.  Do not wash your incisions after you have cleansed other parts of your body      Follow up with Cardiothoracic Surgeon, Dr. Noemí Ansari   Follow up with Cardiologist, Dr. Daryl Castellon

## 2023-02-14 ENCOUNTER — OFFICE VISIT (OUTPATIENT)
Dept: CARDIOLOGY CLINIC | Age: 78
End: 2023-02-14
Payer: MEDICARE

## 2023-02-14 VITALS
OXYGEN SATURATION: 97 % | DIASTOLIC BLOOD PRESSURE: 66 MMHG | HEIGHT: 66 IN | BODY MASS INDEX: 29.8 KG/M2 | HEART RATE: 69 BPM | WEIGHT: 185.4 LBS | SYSTOLIC BLOOD PRESSURE: 124 MMHG

## 2023-02-14 DIAGNOSIS — E78.2 MIXED HYPERLIPIDEMIA: ICD-10-CM

## 2023-02-14 DIAGNOSIS — I10 HYPERTENSION, UNSPECIFIED TYPE: ICD-10-CM

## 2023-02-14 DIAGNOSIS — Z95.1 S/P CABG (CORONARY ARTERY BYPASS GRAFT): ICD-10-CM

## 2023-02-14 DIAGNOSIS — I25.10 CORONARY ARTERY DISEASE INVOLVING NATIVE HEART, UNSPECIFIED VESSEL OR LESION TYPE, UNSPECIFIED WHETHER ANGINA PRESENT: Primary | ICD-10-CM

## 2023-02-14 PROCEDURE — 1123F ACP DISCUSS/DSCN MKR DOCD: CPT | Performed by: INTERNAL MEDICINE

## 2023-02-14 PROCEDURE — 3074F SYST BP LT 130 MM HG: CPT | Performed by: INTERNAL MEDICINE

## 2023-02-14 PROCEDURE — 3078F DIAST BP <80 MM HG: CPT | Performed by: INTERNAL MEDICINE

## 2023-02-14 PROCEDURE — 99214 OFFICE O/P EST MOD 30 MIN: CPT | Performed by: INTERNAL MEDICINE

## 2023-02-14 RX ORDER — ASPIRIN 81 MG/1
81 TABLET ORAL DAILY
Qty: 90 TABLET | Refills: 1 | Status: SHIPPED | OUTPATIENT
Start: 2023-02-14

## 2023-02-14 NOTE — PROGRESS NOTES
[facility]     Outpatient Cardiology         Patient Name:  Karlos Pablo  Requesting Physician: No admitting provider for patient encounter. Primary Care Physician: Karen Garrido MD    Reason for Consultation/Chief Complaint:   Chief Complaint   Patient presents with    Follow-Up from Hospital       HPI:     68year old male with history of TIA, CAD, HTN, HLD, s/p LHC , s/p CABG,  presents for hospital follow up. Patient denies chest pain, sob, orthopnea, pnd, edema, and palpitations. He still has some mid sternal incision pain. Had a rash covering his chest that started 2 days post op, Dr Félix Coyne discontinued gabapentin, lisinopril, amiodarone last night. Home BP well controlled. Patient presented to [facility] ED on 1/18/23 with chest pain. Underwent LHC on 1/19/23, referred to CT surgery with 3 vessel CAD, underwent CABGx3 with Dr Félix Coyne on 1/26/23. Discharged 1/31/23. First Post op visit 2/8/23 with Dr Félix Coyne. Former tobacco use, quit 20 years ago. Histories:     Past Medical History:   has a past medical history of Allergic rhinitis, Anxiety, BPH (benign prostatic hypertrophy) with urinary obstruction, CAD (coronary artery disease), Chronic back pain, Colon polyp, Diverticulosis of colon, Environmental allergies, FH: CAD (coronary artery disease), Fractures, GERD (gastroesophageal reflux disease), Headache(784.0), Hearing impairment, Hyperlipidemia, Hypertension, Osteoarthritis, and Restless leg syndrome. Surgical History:   has a past surgical history that includes Tonsillectomy and adenoidectomy; nasal/sinus endoscopy; Cholecystectomy (94); Rotator cuff repair (Bilateral); Femur fracture surgery; hernia repair (06); Colonoscopy (08,11/12,11/17/17); Cardiac surgery; Cardiac catheterization; Total knee arthroplasty (Left, 1/22/2019); joint replacement; knee surgery; incision and drainage (Left, 4/24/2019); and Coronary artery bypass graft (N/A, 1/26/2023). Social History:   reports that he quit smoking about 27 years ago. His smoking use included cigarettes. He has a 10.00 pack-year smoking history. He has never used smokeless tobacco. He reports current alcohol use of about 1.0 standard drink per week. He reports that he does not use drugs. Family History:  No evidence for sudden cardiac death or premature CAD    Medications:     Home Medications:  Were reviewed and are listed in nursing record. and/or listed below    Prior to Admission medications    Medication Sig Start Date End Date Taking?  Authorizing Provider   aspirin EC 81 MG EC tablet Take 1 tablet by mouth daily 2/14/23  Yes Samuel Cernshaw MD   metoprolol tartrate (LOPRESSOR) 25 MG tablet Take 0.5 tablets by mouth 2 times daily 1/31/23  Yes Lisa Jimenez MD   clopidogrel (PLAVIX) 75 MG tablet TAKE 1 TABLET DAILY 1/22/23  Yes Oli Murphy MD   rosuvastatin (CRESTOR) 10 MG tablet TAKE 1 TABLET DAILY 1/22/23  Yes Oli Murphy MD   Coenzyme Q10 (COQ10) 100 MG CAPS Take by mouth   Yes Historical Provider, MD   Magnesium Oxide (MAGNESIUM-OXIDE) 250 MG TABS tablet Take 250 mg by mouth daily as needed 9/16/20  Yes Historical Provider, MD   fluticasone (FLONASE) 50 MCG/ACT nasal spray 1 spray by Nasal route daily as needed for Rhinitis 2/22/21  Yes YAYA Soriano CNP   famotidine (PEPCID) 40 MG tablet Take 40 mg by mouth daily   Yes Historical Provider, MD   Cholecalciferol (VITAMIN D3) 2000 UNITS CAPS Take 4,000 Units by mouth daily   Yes Historical Provider, MD        Allergy:     Latex, Bactrim [sulfamethoxazole-trimethoprim], Sulfa antibiotics, and Zocor [simvastatin]     Review of Systems:     Review of Systems    Physical Examination:     Vitals:    02/14/23 1344   BP: 124/66   Pulse: 69   SpO2: 97%   Weight: 185 lb 6.4 oz (84.1 kg)   Height: 5' 6\" (1.676 m)       Wt Readings from Last 3 Encounters:   02/14/23 185 lb 6.4 oz (84.1 kg)   02/08/23 191 lb (86.6 kg)   01/31/23 196 lb 11.2 oz (89.2 kg)       Physical Exam  Constitutional:       Appearance: Normal appearance. HENT:      Head: Normocephalic and atraumatic. Nose: Nose normal.   Eyes:      Conjunctiva/sclera: Conjunctivae normal.   Cardiovascular:      Rate and Rhythm: Normal rate and regular rhythm. Heart sounds: Normal heart sounds. Pulmonary:      Effort: Pulmonary effort is normal.      Breath sounds: Normal breath sounds. Abdominal:      Palpations: Abdomen is soft. Musculoskeletal:      Cervical back: Neck supple. Skin:     General: Skin is warm and dry. Neurological:      General: No focal deficit present. Mental Status: He is alert.          Labs:     Lab Results   Component Value Date    WBC 7.4 01/31/2023    HGB 9.2 (L) 01/31/2023    HCT 26.9 (L) 01/31/2023    MCV 86.9 01/31/2023     01/31/2023     Lab Results   Component Value Date     01/31/2023    K 3.9 01/31/2023     01/31/2023    CO2 23 01/31/2023    BUN 15 01/31/2023    CREATININE 0.8 01/31/2023    GLUCOSE 101 (H) 01/31/2023    CALCIUM 7.7 (L) 01/31/2023    PROT 6.2 (L) 01/25/2023    LABALBU 3.6 01/25/2023    BILITOT 0.9 01/25/2023    ALKPHOS 79 01/25/2023    AST 20 01/25/2023    ALT 17 01/25/2023    LABGLOM >60 01/31/2023    GFRAA >60 07/24/2022    AGRATIO 1.4 01/25/2023    GLOB 2.5 02/10/2021         Lab Results   Component Value Date    CHOL 160 01/19/2023    CHOL 187 03/03/2022    CHOL 164 03/10/2021     Lab Results   Component Value Date    TRIG 80 01/19/2023    TRIG 102 03/03/2022    TRIG 71 03/10/2021     Lab Results   Component Value Date    HDL 48 01/19/2023    HDL 63 (H) 03/03/2022    HDL 64 (H) 03/10/2021     Lab Results   Component Value Date    LDLCALC 96 01/19/2023    LDLCALC 104 (H) 03/03/2022    LDLCALC 86 03/10/2021     Lab Results   Component Value Date    LABVLDL 16 01/19/2023    LABVLDL 20 03/03/2022    LABVLDL 14 03/10/2021     Lab Results   Component Value Date    CHOLHDLRATIO 3.0 02/05/2013 Lab Results   Component Value Date    INR 1.28 (H) 01/27/2023    INR 1.49 (H) 01/26/2023    INR 1.00 01/19/2023    PROTIME 16.0 (H) 01/27/2023    PROTIME 18.0 (H) 01/26/2023    PROTIME 13.1 01/19/2023       The 10-year ASCVD risk score (Shanna MAK, et al., 2019) is: 25.4%    Values used to calculate the score:      Age: 68 years      Sex: Male      Is Non- : No      Diabetic: No      Tobacco smoker: No      Systolic Blood Pressure: 595 mmHg      Is BP treated: No      HDL Cholesterol: 48 mg/dL      Total Cholesterol: 160 mg/dL      Imaging:       ECG (if available, Personally interpreted):    Last Monitor/Holter (if available):    Last Stress : 8/3/22  Conclusions   Summary   ABNORMAL MODERATE RISK STRESS TEST. 1mm horizontal ST depression during recovery. The stress ECG showed no   ischemia at target heart rate. Chun Score of 2 ( Moderate Risk ). Following   stress there was hypokinesis of the apical septal and mid anteroseptal wall. Last Cath : 1/19/23  HEMODYNAMIC / ANGIOGRAPHIC DATA:    /57, 74 /7; Left ventricular end diastolic pressure was 20 mmHg. There was no gradient across the aortic valve upon pullback. The left main coronary artery arises from the left coronary cusp giving rise to the left anterior descending artery and the left circumflex artery. The left main reveals mild luminal irregularites. The left anterior descending artery arises in normal fashion from left coronary giving rise to diagonals and septal branches. The LAD reveals >80% ostial stenosis, >70% mid stenosis. The ramus reveals >70% ostial, >60-70% proximal stenosis. The left circumflex reveals up 50-60% ostial stenosis; moderate disease in its midsegment. OM2 (last OM) reveals up to 70% stenosis in its proximal segment. The right coronary artery is a right dominant vessel. The right coronary artery ostium reveals up to 30% ostial stenosis. Distal RCA 60% stenosis.  Mid PDA up to 50% stenosis. CONCLUSIONS:  Obstructive Multivessel CAD     RECOMMENDATIONS:    Routine post cardiac catheterization care  Aggressive risk factor modification    Last Echo : 1/18/23  Conclusions    Summary   Left ventricular cavity size is normal.Normal left ventricular wall   thickness. Ejection fraction is visually estimated to be 55-60%. No regional wall motion   abnormalities are noted. The right ventricle is normal in size and function. Trivial mitral regurgitation. No evidence of tricuspid regurgitation. IVC size is normal (<2.1cm) and collapses > 50% with respiration consistent   with normal RA pressure (3mmHg). Unable to estimate pulmonary artery pressure secondary to incomplete TR jet envelope. Last CMR  (if available):    Last Coronary Artery Calcium Score: Ankle-brachial index:    Abdominal aortic aneurysm screening:    CT Head and Neck: 2/3/21  Impression   No acute vascular abnormality. No significant stenosis or occlusion. VL Duplex Carotid Bilateral: 1/22/23  Conclusions    Summary    There is <50% stenosis of the bilateral internal carotid arteries. There is bilateral antegrade vertebral flow. CT Chest: 1/25/23  Impression   Query calcifications noted. No evidence of acute abnormality or suspicious parenchymal mass lesion identified. Assessment / Plan:     1. Coronary artery disease involving native heart, unspecified vessel or lesion type, unspecified whether angina present    2. S/P CABG (coronary artery bypass graft)    3. Mixed hyperlipidemia    4. Hypertension, unspecified type       Stop taking 325 mg aspirin  Start Aspirin 81 mg po daily  Cardiac rehab referral   Continue statin, f/u with CMP, Lipids  RTC 3  months    Orders Placed This Encounter   Procedures    Comprehensive Metabolic Panel    [address]          Return in about 3 months (around 5/14/2023). The note was completed using EMR and Dragon dictation system. Every effort was made to ensure accuracy; however, inadvertent computerized transcription errors may be present. All questions and concerns were addressed to the patient. I would like to thank you for providing me the opportunity to participate in the care of your patient. If you have any questions, please do not hesitate to contact me. Daphne Aguilar MD, [facility]  The [address]  [address] [address]  Main Office Phone: [phone]  Fax: [phone]    I, Anya Rios RN, am scribing for and in the presence of Dr. Daphne Aguilar. 02/14/23 5:00 PM  Anya Rios RN    Physician Attestation:  The scribes documentation has been prepared under my direction and personally reviewed by me in its entirety. I confirm the note above accurately reflects all work, treatment, procedures, and medical decision making performed by me.     Electronically signed by Daphne Aguilar MD on 2/14/2023 at 5:00 PM

## 2023-02-14 NOTE — PATIENT INSTRUCTIONS
Stop taking 325 mg aspirin, start taking 81 mg aspirin. Follow up 3 months, have labs drawn before follow up appointment.

## 2023-02-23 DIAGNOSIS — M81.0 AGE-RELATED OSTEOPOROSIS WITHOUT CURRENT PATHOLOGICAL FRACTURE: ICD-10-CM

## 2023-02-23 RX ORDER — ALENDRONATE SODIUM 70 MG/1
70 TABLET ORAL
Qty: 12 TABLET | Refills: 3 | Status: SHIPPED | OUTPATIENT
Start: 2023-02-23

## 2023-03-06 ENCOUNTER — HOSPITAL ENCOUNTER (OUTPATIENT)
Dept: CARDIAC REHAB | Age: 78
Setting detail: THERAPIES SERIES
Discharge: HOME | End: 2023-03-06
Payer: MEDICARE

## 2023-03-08 ENCOUNTER — APPOINTMENT (OUTPATIENT)
Dept: CARDIAC REHAB | Age: 78
End: 2023-03-08
Payer: MEDICARE

## 2023-03-09 ENCOUNTER — TELEPHONE (OUTPATIENT)
Dept: FAMILY MEDICINE CLINIC | Age: 78
End: 2023-03-09

## 2023-03-09 DIAGNOSIS — I70.0 ATHEROSCLEROSIS OF AORTA (HCC): ICD-10-CM

## 2023-03-09 DIAGNOSIS — E78.2 MIXED HYPERLIPIDEMIA: ICD-10-CM

## 2023-03-09 DIAGNOSIS — I10 PRIMARY HYPERTENSION: ICD-10-CM

## 2023-03-09 DIAGNOSIS — Z12.5 PROSTATE CANCER SCREENING: Primary | ICD-10-CM

## 2023-03-09 NOTE — TELEPHONE ENCOUNTER
Pt called because he has an AWV on 3/16/23 and would like blood work done before he comes in on that date. Please send labs and call pt when orders are placed.      LOV: 11/7/22

## 2023-03-10 ENCOUNTER — HOSPITAL ENCOUNTER (OUTPATIENT)
Dept: CARDIAC REHAB | Age: 78
Setting detail: THERAPIES SERIES
Discharge: HOME | End: 2023-03-10
Payer: MEDICARE

## 2023-03-10 DIAGNOSIS — I10 PRIMARY HYPERTENSION: ICD-10-CM

## 2023-03-10 DIAGNOSIS — E78.2 MIXED HYPERLIPIDEMIA: ICD-10-CM

## 2023-03-10 DIAGNOSIS — Z12.5 PROSTATE CANCER SCREENING: ICD-10-CM

## 2023-03-10 LAB
A/G RATIO: 1.6 (ref 1.1–2.2)
ALBUMIN SERPL-MCNC: 4 G/DL (ref 3.4–5)
ALP BLD-CCNC: 90 U/L (ref 40–129)
ALT SERPL-CCNC: 17 U/L (ref 10–40)
ANION GAP SERPL CALCULATED.3IONS-SCNC: 12 MMOL/L (ref 3–16)
AST SERPL-CCNC: 18 U/L (ref 15–37)
BASOPHILS ABSOLUTE: 0 K/UL (ref 0–0.2)
BASOPHILS RELATIVE PERCENT: 0.4 %
BILIRUB SERPL-MCNC: 0.5 MG/DL (ref 0–1)
BUN BLDV-MCNC: 11 MG/DL (ref 7–20)
CALCIUM SERPL-MCNC: 9.1 MG/DL (ref 8.3–10.6)
CHLORIDE BLD-SCNC: 105 MMOL/L (ref 99–110)
CHOLESTEROL, TOTAL: 156 MG/DL (ref 0–199)
CO2: 22 MMOL/L (ref 21–32)
CREAT SERPL-MCNC: 0.9 MG/DL (ref 0.8–1.3)
EOSINOPHILS ABSOLUTE: 0.2 K/UL (ref 0–0.6)
EOSINOPHILS RELATIVE PERCENT: 5.9 %
GFR SERPL CREATININE-BSD FRML MDRD: >60 ML/MIN/{1.73_M2}
GLUCOSE BLD-MCNC: 100 MG/DL (ref 70–99)
HCT VFR BLD CALC: 39.2 % (ref 40.5–52.5)
HDLC SERPL-MCNC: 46 MG/DL (ref 40–60)
HEMOGLOBIN: 13 G/DL (ref 13.5–17.5)
LDL CHOLESTEROL CALCULATED: 90 MG/DL
LYMPHOCYTES ABSOLUTE: 1.2 K/UL (ref 1–5.1)
LYMPHOCYTES RELATIVE PERCENT: 29.2 %
MCH RBC QN AUTO: 28 PG (ref 26–34)
MCHC RBC AUTO-ENTMCNC: 33.1 G/DL (ref 31–36)
MCV RBC AUTO: 84.5 FL (ref 80–100)
MONOCYTES ABSOLUTE: 0.6 K/UL (ref 0–1.3)
MONOCYTES RELATIVE PERCENT: 13.4 %
NEUTROPHILS ABSOLUTE: 2.1 K/UL (ref 1.7–7.7)
NEUTROPHILS RELATIVE PERCENT: 51.1 %
PDW BLD-RTO: 13.9 % (ref 12.4–15.4)
PLATELET # BLD: 270 K/UL (ref 135–450)
PMV BLD AUTO: 6.8 FL (ref 5–10.5)
POTASSIUM SERPL-SCNC: 4.3 MMOL/L (ref 3.5–5.1)
PROSTATE SPECIFIC ANTIGEN: 0.45 NG/ML (ref 0–4)
RBC # BLD: 4.64 M/UL (ref 4.2–5.9)
SODIUM BLD-SCNC: 139 MMOL/L (ref 136–145)
TOTAL PROTEIN: 6.5 G/DL (ref 6.4–8.2)
TRIGL SERPL-MCNC: 98 MG/DL (ref 0–150)
VLDLC SERPL CALC-MCNC: 20 MG/DL
WBC # BLD: 4.2 K/UL (ref 4–11)

## 2023-03-10 PROCEDURE — 93798 PHYS/QHP OP CAR RHAB W/ECG: CPT

## 2023-03-13 ENCOUNTER — HOSPITAL ENCOUNTER (OUTPATIENT)
Dept: CARDIAC REHAB | Age: 78
Setting detail: THERAPIES SERIES
Discharge: HOME | End: 2023-03-13
Payer: MEDICARE

## 2023-03-13 DIAGNOSIS — E78.2 MIXED HYPERLIPIDEMIA: Primary | ICD-10-CM

## 2023-03-13 PROCEDURE — 93798 PHYS/QHP OP CAR RHAB W/ECG: CPT

## 2023-03-13 NOTE — TELEPHONE ENCOUNTER
Pt needs to get CMP AND LIPIDS  in 6 weeks as well as increase his Statin to 20mg        Spoke with pt.  Placed labs

## 2023-03-15 ENCOUNTER — HOSPITAL ENCOUNTER (OUTPATIENT)
Dept: CARDIAC REHAB | Age: 78
Setting detail: THERAPIES SERIES
Discharge: HOME | End: 2023-03-15
Payer: MEDICARE

## 2023-03-15 PROCEDURE — 93798 PHYS/QHP OP CAR RHAB W/ECG: CPT

## 2023-03-16 RX ORDER — ROSUVASTATIN CALCIUM 20 MG/1
20 TABLET, COATED ORAL DAILY
Qty: 90 TABLET | Refills: 1 | Status: SHIPPED | OUTPATIENT
Start: 2023-03-16

## 2023-03-17 ENCOUNTER — HOSPITAL ENCOUNTER (OUTPATIENT)
Dept: CARDIAC REHAB | Age: 78
Setting detail: THERAPIES SERIES
Discharge: HOME | End: 2023-03-17
Payer: MEDICARE

## 2023-03-17 PROCEDURE — 93798 PHYS/QHP OP CAR RHAB W/ECG: CPT

## 2023-03-20 ENCOUNTER — HOSPITAL ENCOUNTER (OUTPATIENT)
Dept: CARDIAC REHAB | Age: 78
Setting detail: THERAPIES SERIES
Discharge: HOME | End: 2023-03-20
Payer: MEDICARE

## 2023-03-20 PROCEDURE — 93798 PHYS/QHP OP CAR RHAB W/ECG: CPT

## 2023-03-22 ENCOUNTER — APPOINTMENT (OUTPATIENT)
Dept: CARDIAC REHAB | Age: 78
End: 2023-03-22
Payer: MEDICARE

## 2023-03-22 ENCOUNTER — OFFICE VISIT (OUTPATIENT)
Dept: CARDIOTHORACIC SURGERY | Age: 78
End: 2023-03-22

## 2023-03-22 VITALS
SYSTOLIC BLOOD PRESSURE: 122 MMHG | BODY MASS INDEX: 29.73 KG/M2 | WEIGHT: 185 LBS | DIASTOLIC BLOOD PRESSURE: 68 MMHG | OXYGEN SATURATION: 97 % | HEIGHT: 66 IN | TEMPERATURE: 98.1 F | HEART RATE: 65 BPM

## 2023-03-22 DIAGNOSIS — Z95.1 S/P CABG (CORONARY ARTERY BYPASS GRAFT): Primary | ICD-10-CM

## 2023-03-22 PROCEDURE — 99024 POSTOP FOLLOW-UP VISIT: CPT | Performed by: PHYSICIAN ASSISTANT

## 2023-03-22 NOTE — PROGRESS NOTES
Progress Note    S/P RENETTA; TCPB; CABG x 3, LIMA to LAD, SVG to OM2 and PDA; EVH R thigh; 35 mm Atriclip 2 to LA appendage base per Dr Katherin Arambula on 1/26/23.  1/31/23 discharged to home. 2/8/23 - 1st postop appt with Dr Brie Mar rash. Lisinopril, amio, and gabapentin stopped. OTC antihistamine. 2/14/23 - Cardiology appt with Dr Monica Person. Seen today for 2nd postop visit with us. No complaints. Denies CP, SOB, leg edema, palpitations, light headedness, N/V/D. Pain controlled. Incisions healing well. Chest tube sutures removed. Sternum stable. Eating and drinking adequately. Bowel movements back to normal. Ambulating everyday. Sleeping well. Medications reviewed. All questions answered. Vital Signs:                                                 /68 (Site: Left Upper Arm, Position: Sitting, Cuff Size: Medium Adult)   Pulse 65   Temp 98.1 °F (36.7 °C) (Temporal)   Ht 5' 6\" (1.676 m)   Wt 185 lb (83.9 kg)   SpO2 97%   BMI 29.86 kg/m²          CV: reg, wound c/d/i, sternum stable  Pulm: clear, decreased at bases  Abd: soft  Ext: warm. No edema. saph incision c/d/i. Medications:  Prior to Admission medications    Medication Sig Start Date End Date Taking?  Authorizing Provider   rosuvastatin (CRESTOR) 20 MG tablet Take 1 tablet by mouth daily 3/16/23  Yes Hafsa Gillis MD   alendronate (FOSAMAX) 70 MG tablet TAKE 1 TABLET BY MOUTH EVERY 7 DAYS 2/23/23  Yes Denny Villagran MD   aspirin EC 81 MG EC tablet Take 1 tablet by mouth daily 2/14/23  Yes Hafsa Gillis MD   metoprolol tartrate (LOPRESSOR) 25 MG tablet Take 0.5 tablets by mouth 2 times daily 1/31/23  Yes Jose Fox MD   clopidogrel (PLAVIX) 75 MG tablet TAKE 1 TABLET DAILY 1/22/23  Yes Jennifer Haro MD   Coenzyme Q10 (COQ10) 100 MG CAPS Take by mouth   Yes Historical Provider, MD   Magnesium Oxide (MAGNESIUM-OXIDE) 250 MG TABS tablet Take 250 mg by mouth daily as needed 9/16/20  Yes Historical

## 2023-03-24 ENCOUNTER — HOSPITAL ENCOUNTER (OUTPATIENT)
Dept: CARDIAC REHAB | Age: 78
Setting detail: THERAPIES SERIES
Discharge: HOME | End: 2023-03-24
Payer: MEDICARE

## 2023-03-24 PROCEDURE — 93798 PHYS/QHP OP CAR RHAB W/ECG: CPT

## 2023-03-27 ENCOUNTER — HOSPITAL ENCOUNTER (OUTPATIENT)
Dept: CARDIAC REHAB | Age: 78
Setting detail: THERAPIES SERIES
Discharge: HOME | End: 2023-03-27
Payer: MEDICARE

## 2023-03-27 PROCEDURE — 93798 PHYS/QHP OP CAR RHAB W/ECG: CPT

## 2023-03-29 ENCOUNTER — HOSPITAL ENCOUNTER (OUTPATIENT)
Dept: CARDIAC REHAB | Age: 78
Setting detail: THERAPIES SERIES
Discharge: HOME | End: 2023-03-29
Payer: MEDICARE

## 2023-03-29 PROCEDURE — 93798 PHYS/QHP OP CAR RHAB W/ECG: CPT

## 2023-03-31 ENCOUNTER — HOSPITAL ENCOUNTER (OUTPATIENT)
Dept: CARDIAC REHAB | Age: 78
Setting detail: THERAPIES SERIES
Discharge: HOME | End: 2023-03-31
Payer: MEDICARE

## 2023-03-31 PROCEDURE — 93798 PHYS/QHP OP CAR RHAB W/ECG: CPT

## 2023-04-03 ENCOUNTER — HOSPITAL ENCOUNTER (OUTPATIENT)
Dept: CARDIAC REHAB | Age: 78
Setting detail: THERAPIES SERIES
Discharge: HOME | End: 2023-04-03
Payer: MEDICARE

## 2023-04-03 PROCEDURE — 93798 PHYS/QHP OP CAR RHAB W/ECG: CPT

## 2023-04-05 ENCOUNTER — HOSPITAL ENCOUNTER (OUTPATIENT)
Dept: CARDIAC REHAB | Age: 78
Setting detail: THERAPIES SERIES
Discharge: HOME | End: 2023-04-05
Payer: MEDICARE

## 2023-04-05 PROCEDURE — 93798 PHYS/QHP OP CAR RHAB W/ECG: CPT

## 2023-04-07 ENCOUNTER — HOSPITAL ENCOUNTER (OUTPATIENT)
Dept: CARDIAC REHAB | Age: 78
Setting detail: THERAPIES SERIES
Discharge: HOME | End: 2023-04-07
Payer: MEDICARE

## 2023-04-07 PROCEDURE — 93798 PHYS/QHP OP CAR RHAB W/ECG: CPT

## 2023-04-17 ENCOUNTER — HOSPITAL ENCOUNTER (OUTPATIENT)
Dept: CARDIAC REHAB | Age: 78
Setting detail: THERAPIES SERIES
Discharge: HOME | End: 2023-04-17
Payer: MEDICARE

## 2023-04-17 PROCEDURE — 93798 PHYS/QHP OP CAR RHAB W/ECG: CPT

## 2023-04-19 ENCOUNTER — HOSPITAL ENCOUNTER (OUTPATIENT)
Dept: CARDIAC REHAB | Age: 78
Setting detail: THERAPIES SERIES
Discharge: HOME | End: 2023-04-19
Payer: MEDICARE

## 2023-04-19 PROCEDURE — 93798 PHYS/QHP OP CAR RHAB W/ECG: CPT

## 2023-04-21 ENCOUNTER — HOSPITAL ENCOUNTER (OUTPATIENT)
Dept: CARDIAC REHAB | Age: 78
Setting detail: THERAPIES SERIES
Discharge: HOME | End: 2023-04-21
Payer: MEDICARE

## 2023-04-21 PROCEDURE — 93798 PHYS/QHP OP CAR RHAB W/ECG: CPT

## 2023-04-24 ENCOUNTER — HOSPITAL ENCOUNTER (OUTPATIENT)
Dept: CARDIAC REHAB | Age: 78
Setting detail: THERAPIES SERIES
Discharge: HOME | End: 2023-04-24
Payer: MEDICARE

## 2023-04-24 PROCEDURE — 93798 PHYS/QHP OP CAR RHAB W/ECG: CPT

## 2023-04-26 ENCOUNTER — HOSPITAL ENCOUNTER (OUTPATIENT)
Dept: CARDIAC REHAB | Age: 78
Setting detail: THERAPIES SERIES
Discharge: HOME | End: 2023-04-26
Payer: MEDICARE

## 2023-04-26 PROCEDURE — 93798 PHYS/QHP OP CAR RHAB W/ECG: CPT

## 2023-04-27 ENCOUNTER — OFFICE VISIT (OUTPATIENT)
Dept: FAMILY MEDICINE CLINIC | Age: 78
End: 2023-04-27
Payer: MEDICARE

## 2023-04-27 VITALS
WEIGHT: 186.6 LBS | BODY MASS INDEX: 29.99 KG/M2 | OXYGEN SATURATION: 97 % | DIASTOLIC BLOOD PRESSURE: 80 MMHG | SYSTOLIC BLOOD PRESSURE: 130 MMHG | HEART RATE: 69 BPM | HEIGHT: 66 IN

## 2023-04-27 DIAGNOSIS — I25.110 CORONARY ARTERY DISEASE INVOLVING NATIVE CORONARY ARTERY OF NATIVE HEART WITH UNSTABLE ANGINA PECTORIS (HCC): ICD-10-CM

## 2023-04-27 DIAGNOSIS — I63.81 LACUNAR INFARCTION (HCC): ICD-10-CM

## 2023-04-27 DIAGNOSIS — Z12.11 COLON CANCER SCREENING: ICD-10-CM

## 2023-04-27 DIAGNOSIS — M81.0 AGE-RELATED OSTEOPOROSIS WITHOUT CURRENT PATHOLOGICAL FRACTURE: ICD-10-CM

## 2023-04-27 DIAGNOSIS — I65.23 CAROTID STENOSIS, BILATERAL: ICD-10-CM

## 2023-04-27 DIAGNOSIS — Z00.00 MEDICARE ANNUAL WELLNESS VISIT, SUBSEQUENT: Primary | ICD-10-CM

## 2023-04-27 DIAGNOSIS — E78.2 MIXED HYPERLIPIDEMIA: ICD-10-CM

## 2023-04-27 PROBLEM — R07.9 EXERTIONAL CHEST PAIN: Status: RESOLVED | Noted: 2023-01-26 | Resolved: 2023-04-27

## 2023-04-27 PROBLEM — I20.0 UNSTABLE ANGINA (HCC): Status: RESOLVED | Noted: 2023-01-18 | Resolved: 2023-04-27

## 2023-04-27 PROBLEM — R94.39 ABNORMAL STRESS TEST: Status: RESOLVED | Noted: 2023-01-20 | Resolved: 2023-04-27

## 2023-04-27 PROCEDURE — G0439 PPPS, SUBSEQ VISIT: HCPCS | Performed by: FAMILY MEDICINE

## 2023-04-27 PROCEDURE — 1123F ACP DISCUSS/DSCN MKR DOCD: CPT | Performed by: FAMILY MEDICINE

## 2023-04-27 SDOH — ECONOMIC STABILITY: HOUSING INSECURITY
IN THE LAST 12 MONTHS, WAS THERE A TIME WHEN YOU DID NOT HAVE A STEADY PLACE TO SLEEP OR SLEPT IN A SHELTER (INCLUDING NOW)?: NO

## 2023-04-27 SDOH — ECONOMIC STABILITY: FOOD INSECURITY: WITHIN THE PAST 12 MONTHS, THE FOOD YOU BOUGHT JUST DIDN'T LAST AND YOU DIDN'T HAVE MONEY TO GET MORE.: NEVER TRUE

## 2023-04-27 SDOH — ECONOMIC STABILITY: FOOD INSECURITY: WITHIN THE PAST 12 MONTHS, YOU WORRIED THAT YOUR FOOD WOULD RUN OUT BEFORE YOU GOT MONEY TO BUY MORE.: NEVER TRUE

## 2023-04-27 SDOH — ECONOMIC STABILITY: INCOME INSECURITY: HOW HARD IS IT FOR YOU TO PAY FOR THE VERY BASICS LIKE FOOD, HOUSING, MEDICAL CARE, AND HEATING?: NOT HARD AT ALL

## 2023-04-27 ASSESSMENT — LIFESTYLE VARIABLES
HOW OFTEN DO YOU HAVE A DRINK CONTAINING ALCOHOL: NEVER
HOW MANY STANDARD DRINKS CONTAINING ALCOHOL DO YOU HAVE ON A TYPICAL DAY: PATIENT DOES NOT DRINK

## 2023-04-27 ASSESSMENT — PATIENT HEALTH QUESTIONNAIRE - PHQ9
SUM OF ALL RESPONSES TO PHQ QUESTIONS 1-9: 0
SUM OF ALL RESPONSES TO PHQ QUESTIONS 1-9: 0
SUM OF ALL RESPONSES TO PHQ9 QUESTIONS 1 & 2: 0
SUM OF ALL RESPONSES TO PHQ QUESTIONS 1-9: 0
1. LITTLE INTEREST OR PLEASURE IN DOING THINGS: 0
SUM OF ALL RESPONSES TO PHQ QUESTIONS 1-9: 0
2. FEELING DOWN, DEPRESSED OR HOPELESS: 0

## 2023-04-27 NOTE — PROGRESS NOTES
visit.    Latest known visit with results is:   Orders Only on 03/10/2023   Component Date Value Ref Range Status    Cholesterol, Total 03/10/2023 156  0 - 199 mg/dL Final    Triglycerides 03/10/2023 98  0 - 150 mg/dL Final    HDL 03/10/2023 46  40 - 60 mg/dL Final    LDL Calculated 03/10/2023 90  <100 mg/dL Final    VLDL Cholesterol Calculated 03/10/2023 20  Not Established mg/dL Final    Sodium 03/10/2023 139  136 - 145 mmol/L Final    Potassium 03/10/2023 4.3  3.5 - 5.1 mmol/L Final    Chloride 03/10/2023 105  99 - 110 mmol/L Final    CO2 03/10/2023 22  21 - 32 mmol/L Final    Anion Gap 03/10/2023 12  3 - 16 Final    Glucose 03/10/2023 100 (H)  70 - 99 mg/dL Final    BUN 03/10/2023 11  7 - 20 mg/dL Final    Creatinine 03/10/2023 0.9  0.8 - 1.3 mg/dL Final    Est, Glom Filt Rate 03/10/2023 >60  >60 Final    Calcium 03/10/2023 9.1  8.3 - 10.6 mg/dL Final    Total Protein 03/10/2023 6.5  6.4 - 8.2 g/dL Final    Albumin 03/10/2023 4.0  3.4 - 5.0 g/dL Final    Albumin/Globulin Ratio 03/10/2023 1.6  1.1 - 2.2 Final    Total Bilirubin 03/10/2023 0.5  0.0 - 1.0 mg/dL Final    Alkaline Phosphatase 03/10/2023 90  40 - 129 U/L Final    ALT 03/10/2023 17  10 - 40 U/L Final    AST 03/10/2023 18  15 - 37 U/L Final    WBC 03/10/2023 4.2  4.0 - 11.0 K/uL Final    RBC 03/10/2023 4.64  4.20 - 5.90 M/uL Final    Hemoglobin 03/10/2023 13.0 (L)  13.5 - 17.5 g/dL Final    Hematocrit 03/10/2023 39.2 (L)  40.5 - 52.5 % Final    MCV 03/10/2023 84.5  80.0 - 100.0 fL Final    MCH 03/10/2023 28.0  26.0 - 34.0 pg Final    MCHC 03/10/2023 33.1  31.0 - 36.0 g/dL Final    RDW 03/10/2023 13.9  12.4 - 15.4 % Final    Platelets 02/21/9764 270  135 - 450 K/uL Final    MPV 03/10/2023 6.8  5.0 - 10.5 fL Final    Neutrophils % 03/10/2023 51.1  % Final    Lymphocytes % 03/10/2023 29.2  % Final    Monocytes % 03/10/2023 13.4  % Final    Eosinophils % 03/10/2023 5.9  % Final    Basophils % 03/10/2023 0.4  % Final    Neutrophils Absolute 03/10/2023 2.1

## 2023-04-28 ENCOUNTER — HOSPITAL ENCOUNTER (OUTPATIENT)
Dept: CARDIAC REHAB | Age: 78
Setting detail: THERAPIES SERIES
Discharge: HOME | End: 2023-04-28
Payer: MEDICARE

## 2023-04-28 PROCEDURE — 93798 PHYS/QHP OP CAR RHAB W/ECG: CPT

## 2023-05-01 ENCOUNTER — HOSPITAL ENCOUNTER (OUTPATIENT)
Dept: CARDIAC REHAB | Age: 78
Setting detail: THERAPIES SERIES
Discharge: HOME | End: 2023-05-01
Payer: MEDICARE

## 2023-05-01 PROCEDURE — 93798 PHYS/QHP OP CAR RHAB W/ECG: CPT

## 2023-05-03 ENCOUNTER — HOSPITAL ENCOUNTER (OUTPATIENT)
Dept: CARDIAC REHAB | Age: 78
Setting detail: THERAPIES SERIES
Discharge: HOME | End: 2023-05-03
Payer: MEDICARE

## 2023-05-03 PROCEDURE — 93798 PHYS/QHP OP CAR RHAB W/ECG: CPT

## 2023-05-05 ENCOUNTER — HOSPITAL ENCOUNTER (OUTPATIENT)
Dept: CARDIAC REHAB | Age: 78
Setting detail: THERAPIES SERIES
Discharge: HOME | End: 2023-05-05
Payer: MEDICARE

## 2023-05-05 PROCEDURE — 93798 PHYS/QHP OP CAR RHAB W/ECG: CPT

## 2023-05-08 ENCOUNTER — HOSPITAL ENCOUNTER (OUTPATIENT)
Dept: CARDIAC REHAB | Age: 78
Setting detail: THERAPIES SERIES
Discharge: HOME | End: 2023-05-08
Payer: OTHER GOVERNMENT

## 2023-05-08 PROCEDURE — 93798 PHYS/QHP OP CAR RHAB W/ECG: CPT

## 2023-05-10 ENCOUNTER — HOSPITAL ENCOUNTER (OUTPATIENT)
Dept: CARDIAC REHAB | Age: 78
Setting detail: THERAPIES SERIES
Discharge: HOME | End: 2023-05-10
Payer: OTHER GOVERNMENT

## 2023-05-10 PROCEDURE — 93798 PHYS/QHP OP CAR RHAB W/ECG: CPT

## 2023-05-11 DIAGNOSIS — E78.2 MIXED HYPERLIPIDEMIA: ICD-10-CM

## 2023-05-11 LAB
ALBUMIN SERPL-MCNC: 4.4 G/DL (ref 3.4–5)
ALBUMIN/GLOB SERPL: 1.9 {RATIO} (ref 1.1–2.2)
ALP SERPL-CCNC: 71 U/L (ref 40–129)
ALT SERPL-CCNC: 17 U/L (ref 10–40)
ANION GAP SERPL CALCULATED.3IONS-SCNC: 10 MMOL/L (ref 3–16)
AST SERPL-CCNC: 21 U/L (ref 15–37)
BILIRUB SERPL-MCNC: 0.6 MG/DL (ref 0–1)
BUN SERPL-MCNC: 11 MG/DL (ref 7–20)
CALCIUM SERPL-MCNC: 9.3 MG/DL (ref 8.3–10.6)
CHLORIDE SERPL-SCNC: 103 MMOL/L (ref 99–110)
CHOLEST SERPL-MCNC: 134 MG/DL (ref 0–199)
CO2 SERPL-SCNC: 25 MMOL/L (ref 21–32)
CREAT SERPL-MCNC: 0.9 MG/DL (ref 0.8–1.3)
GFR SERPLBLD CREATININE-BSD FMLA CKD-EPI: >60 ML/MIN/{1.73_M2}
GLUCOSE SERPL-MCNC: 106 MG/DL (ref 70–99)
HDLC SERPL-MCNC: 58 MG/DL (ref 40–60)
LDLC SERPL CALC-MCNC: 60 MG/DL
POTASSIUM SERPL-SCNC: 4.9 MMOL/L (ref 3.5–5.1)
PROT SERPL-MCNC: 6.7 G/DL (ref 6.4–8.2)
SODIUM SERPL-SCNC: 138 MMOL/L (ref 136–145)
TRIGL SERPL-MCNC: 78 MG/DL (ref 0–150)
VLDLC SERPL CALC-MCNC: 16 MG/DL

## 2023-05-12 ENCOUNTER — TELEPHONE (OUTPATIENT)
Dept: CARDIOLOGY CLINIC | Age: 78
End: 2023-05-12

## 2023-05-12 ENCOUNTER — HOSPITAL ENCOUNTER (OUTPATIENT)
Dept: CARDIAC REHAB | Age: 78
Setting detail: THERAPIES SERIES
Discharge: HOME | End: 2023-05-12
Payer: OTHER GOVERNMENT

## 2023-05-12 LAB — NONINV COLON CA DNA+OCC BLD SCRN STL QL: NEGATIVE

## 2023-05-12 PROCEDURE — 93798 PHYS/QHP OP CAR RHAB W/ECG: CPT

## 2023-05-15 ENCOUNTER — HOSPITAL ENCOUNTER (OUTPATIENT)
Dept: CARDIAC REHAB | Age: 78
Setting detail: THERAPIES SERIES
Discharge: HOME | End: 2023-05-15
Payer: OTHER GOVERNMENT

## 2023-05-15 PROCEDURE — 93798 PHYS/QHP OP CAR RHAB W/ECG: CPT

## 2023-05-16 ENCOUNTER — OFFICE VISIT (OUTPATIENT)
Dept: CARDIOLOGY CLINIC | Age: 78
End: 2023-05-16
Payer: MEDICARE

## 2023-05-16 ENCOUNTER — CLINICAL DOCUMENTATION (OUTPATIENT)
Dept: CARDIOLOGY CLINIC | Age: 78
End: 2023-05-16

## 2023-05-16 VITALS
BODY MASS INDEX: 30.5 KG/M2 | WEIGHT: 189.8 LBS | OXYGEN SATURATION: 96 % | DIASTOLIC BLOOD PRESSURE: 72 MMHG | SYSTOLIC BLOOD PRESSURE: 126 MMHG | HEIGHT: 66 IN | HEART RATE: 54 BPM

## 2023-05-16 DIAGNOSIS — Z95.1 S/P CABG (CORONARY ARTERY BYPASS GRAFT): ICD-10-CM

## 2023-05-16 DIAGNOSIS — I10 HYPERTENSION, UNSPECIFIED TYPE: ICD-10-CM

## 2023-05-16 DIAGNOSIS — R40.4 TRANSIENT ALTERATION OF AWARENESS: ICD-10-CM

## 2023-05-16 DIAGNOSIS — I25.10 CORONARY ARTERY DISEASE INVOLVING NATIVE HEART, UNSPECIFIED VESSEL OR LESION TYPE, UNSPECIFIED WHETHER ANGINA PRESENT: Primary | ICD-10-CM

## 2023-05-16 DIAGNOSIS — E78.2 MIXED HYPERLIPIDEMIA: ICD-10-CM

## 2023-05-16 PROCEDURE — 93000 ELECTROCARDIOGRAM COMPLETE: CPT | Performed by: INTERNAL MEDICINE

## 2023-05-16 PROCEDURE — 1123F ACP DISCUSS/DSCN MKR DOCD: CPT | Performed by: INTERNAL MEDICINE

## 2023-05-16 PROCEDURE — 3074F SYST BP LT 130 MM HG: CPT | Performed by: INTERNAL MEDICINE

## 2023-05-16 PROCEDURE — 3078F DIAST BP <80 MM HG: CPT | Performed by: INTERNAL MEDICINE

## 2023-05-16 PROCEDURE — 99214 OFFICE O/P EST MOD 30 MIN: CPT | Performed by: INTERNAL MEDICINE

## 2023-05-16 NOTE — PROGRESS NOTES
28 day VC placed for bradycardia [id number], care and mailing instructions reviewed with patient and family

## 2023-05-17 ENCOUNTER — HOSPITAL ENCOUNTER (OUTPATIENT)
Dept: CARDIAC REHAB | Age: 78
Setting detail: THERAPIES SERIES
Discharge: HOME | End: 2023-05-17
Payer: OTHER GOVERNMENT

## 2023-05-17 DIAGNOSIS — R40.4 TRANSIENT ALTERATION OF AWARENESS: ICD-10-CM

## 2023-05-17 DIAGNOSIS — G45.9 TIA (TRANSIENT ISCHEMIC ATTACK): Primary | ICD-10-CM

## 2023-05-17 PROCEDURE — 93798 PHYS/QHP OP CAR RHAB W/ECG: CPT

## 2023-05-19 ENCOUNTER — HOSPITAL ENCOUNTER (OUTPATIENT)
Dept: CARDIAC REHAB | Age: 78
Setting detail: THERAPIES SERIES
Discharge: HOME | End: 2023-05-19
Payer: OTHER GOVERNMENT

## 2023-05-19 PROCEDURE — 93798 PHYS/QHP OP CAR RHAB W/ECG: CPT

## 2023-05-22 ENCOUNTER — HOSPITAL ENCOUNTER (OUTPATIENT)
Dept: CARDIAC REHAB | Age: 78
Setting detail: THERAPIES SERIES
Discharge: HOME | End: 2023-05-22
Payer: OTHER GOVERNMENT

## 2023-05-22 PROCEDURE — 93798 PHYS/QHP OP CAR RHAB W/ECG: CPT

## 2023-05-24 ENCOUNTER — HOSPITAL ENCOUNTER (OUTPATIENT)
Dept: CARDIAC REHAB | Age: 78
Setting detail: THERAPIES SERIES
Discharge: HOME | End: 2023-05-24
Payer: OTHER GOVERNMENT

## 2023-05-24 PROCEDURE — 93798 PHYS/QHP OP CAR RHAB W/ECG: CPT

## 2023-05-26 ENCOUNTER — HOSPITAL ENCOUNTER (OUTPATIENT)
Dept: CARDIAC REHAB | Age: 78
Setting detail: THERAPIES SERIES
Discharge: HOME | End: 2023-05-26
Payer: OTHER GOVERNMENT

## 2023-05-26 PROCEDURE — 93798 PHYS/QHP OP CAR RHAB W/ECG: CPT

## 2023-05-31 ENCOUNTER — HOSPITAL ENCOUNTER (OUTPATIENT)
Dept: CARDIAC REHAB | Age: 78
Setting detail: THERAPIES SERIES
Discharge: HOME | End: 2023-05-31
Payer: OTHER GOVERNMENT

## 2023-05-31 PROCEDURE — 93798 PHYS/QHP OP CAR RHAB W/ECG: CPT

## 2023-06-02 ENCOUNTER — HOSPITAL ENCOUNTER (OUTPATIENT)
Dept: CARDIAC REHAB | Age: 78
Setting detail: THERAPIES SERIES
Discharge: HOME | End: 2023-06-02
Payer: OTHER GOVERNMENT

## 2023-06-02 PROCEDURE — 93798 PHYS/QHP OP CAR RHAB W/ECG: CPT

## 2023-06-05 ENCOUNTER — HOSPITAL ENCOUNTER (OUTPATIENT)
Dept: CARDIAC REHAB | Age: 78
Setting detail: THERAPIES SERIES
Discharge: HOME | End: 2023-06-05
Payer: OTHER GOVERNMENT

## 2023-06-05 PROCEDURE — 93798 PHYS/QHP OP CAR RHAB W/ECG: CPT

## 2023-06-07 ENCOUNTER — APPOINTMENT (OUTPATIENT)
Dept: CARDIAC REHAB | Age: 78
End: 2023-06-07
Payer: OTHER GOVERNMENT

## 2023-06-09 ENCOUNTER — APPOINTMENT (OUTPATIENT)
Dept: CARDIAC REHAB | Age: 78
End: 2023-06-09
Payer: OTHER GOVERNMENT

## 2023-06-12 ENCOUNTER — APPOINTMENT (OUTPATIENT)
Dept: CARDIAC REHAB | Age: 78
End: 2023-06-12
Payer: OTHER GOVERNMENT

## 2023-06-14 ENCOUNTER — APPOINTMENT (OUTPATIENT)
Dept: CARDIAC REHAB | Age: 78
End: 2023-06-14
Payer: OTHER GOVERNMENT

## 2023-06-16 ENCOUNTER — APPOINTMENT (OUTPATIENT)
Dept: CARDIAC REHAB | Age: 78
End: 2023-06-16
Payer: OTHER GOVERNMENT

## 2023-06-19 ENCOUNTER — APPOINTMENT (OUTPATIENT)
Dept: CARDIAC REHAB | Age: 78
End: 2023-06-19
Payer: OTHER GOVERNMENT

## 2023-06-20 ENCOUNTER — OFFICE VISIT (OUTPATIENT)
Dept: CARDIOLOGY CLINIC | Age: 78
End: 2023-06-20

## 2023-06-20 VITALS
BODY MASS INDEX: 30.79 KG/M2 | OXYGEN SATURATION: 97 % | HEIGHT: 66 IN | HEART RATE: 64 BPM | SYSTOLIC BLOOD PRESSURE: 124 MMHG | WEIGHT: 191.6 LBS | DIASTOLIC BLOOD PRESSURE: 72 MMHG

## 2023-06-20 DIAGNOSIS — E78.2 MIXED HYPERLIPIDEMIA: ICD-10-CM

## 2023-06-20 DIAGNOSIS — Z95.1 S/P CABG (CORONARY ARTERY BYPASS GRAFT): ICD-10-CM

## 2023-06-20 DIAGNOSIS — I10 HYPERTENSION, UNSPECIFIED TYPE: ICD-10-CM

## 2023-06-20 DIAGNOSIS — I25.10 CORONARY ARTERY DISEASE INVOLVING NATIVE HEART WITHOUT ANGINA PECTORIS, UNSPECIFIED VESSEL OR LESION TYPE: Primary | ICD-10-CM

## 2023-06-20 NOTE — PATIENT INSTRUCTIONS
BP at goal;  Due to fatigue, hold Metoprolol at this time   Continue statin, lipids at goal  Proceed with CT Head without contrast  Proceed with CTA head and neck w/wo contrast  Proceed with Echo  Follow up with Neurology   RTC 3 weeks.

## 2023-06-20 NOTE — PROGRESS NOTES
03/10/2023    LABVLDL 20 03/09/2023     Lab Results   Component Value Date    CHOLHDLRATIO 3.0 02/05/2013       Lab Results   Component Value Date    INR 1.28 (H) 01/27/2023    INR 1.49 (H) 01/26/2023    INR 1.00 01/19/2023    PROTIME 16.0 (H) 01/27/2023    PROTIME 18.0 (H) 01/26/2023    PROTIME 13.1 01/19/2023       The 10-year ASCVD risk score (Shanna MAK, et al., 2019) is: 28.5%    Values used to calculate the score:      Age: 66 years      Sex: Male      Is Non- : No      Diabetic: No      Tobacco smoker: No      Systolic Blood Pressure: 096 mmHg      Is BP treated: Yes      HDL Cholesterol: 58 mg/dL      Total Cholesterol: 134 mg/dL      Imaging:       ECG (if available, Personally interpreted):    Last Monitor/Holter (if available):    Last Stress : 8/3/22  Conclusions   Summary   ABNORMAL MODERATE RISK STRESS TEST. 1mm horizontal ST depression during recovery. The stress ECG showed no   ischemia at target heart rate. Chun Score of 2 ( Moderate Risk ). Following   stress there was hypokinesis of the apical septal and mid anteroseptal wall. Last Cath : 1/19/23  HEMODYNAMIC / ANGIOGRAPHIC DATA:    /57, 74 /7; Left ventricular end diastolic pressure was 20 mmHg. There was no gradient across the aortic valve upon pullback. The left main coronary artery arises from the left coronary cusp giving rise to the left anterior descending artery and the left circumflex artery. The left main reveals mild luminal irregularites. The left anterior descending artery arises in normal fashion from left coronary giving rise to diagonals and septal branches. The LAD reveals >80% ostial stenosis, >70% mid stenosis. The ramus reveals >70% ostial, >60-70% proximal stenosis. The left circumflex reveals up 50-60% ostial stenosis; moderate disease in its midsegment. OM2 (last OM) reveals up to 70% stenosis in its proximal segment. The right coronary artery is a right dominant vessel.  The

## 2023-06-21 ENCOUNTER — APPOINTMENT (OUTPATIENT)
Dept: CARDIAC REHAB | Age: 78
End: 2023-06-21
Payer: OTHER GOVERNMENT

## 2023-06-23 ENCOUNTER — APPOINTMENT (OUTPATIENT)
Dept: CARDIAC REHAB | Age: 78
End: 2023-06-23
Payer: OTHER GOVERNMENT

## 2023-06-26 ENCOUNTER — APPOINTMENT (OUTPATIENT)
Dept: CARDIAC REHAB | Age: 78
End: 2023-06-26
Payer: OTHER GOVERNMENT

## 2023-06-27 DIAGNOSIS — G45.9 TIA (TRANSIENT ISCHEMIC ATTACK): Primary | ICD-10-CM

## 2023-06-28 ENCOUNTER — APPOINTMENT (OUTPATIENT)
Dept: CARDIAC REHAB | Age: 78
End: 2023-06-28
Payer: OTHER GOVERNMENT

## 2023-06-29 ENCOUNTER — TELEPHONE (OUTPATIENT)
Dept: CARDIOLOGY | Age: 78
End: 2023-06-29

## 2023-06-30 ENCOUNTER — APPOINTMENT (OUTPATIENT)
Dept: CARDIAC REHAB | Age: 78
End: 2023-06-30
Payer: OTHER GOVERNMENT

## 2023-07-06 DIAGNOSIS — R00.1 BRADYCARDIA: Primary | ICD-10-CM

## 2023-07-07 ENCOUNTER — HOSPITAL ENCOUNTER (OUTPATIENT)
Dept: CARDIOLOGY | Age: 78
Discharge: HOME | End: 2023-07-07
Payer: MEDICARE

## 2023-07-07 ENCOUNTER — HOSPITAL ENCOUNTER (OUTPATIENT)
Dept: CT IMAGING | Age: 78
Discharge: HOME | End: 2023-07-07
Payer: MEDICARE

## 2023-07-07 DIAGNOSIS — I25.10 CORONARY ARTERY DISEASE INVOLVING NATIVE HEART, UNSPECIFIED VESSEL OR LESION TYPE, UNSPECIFIED WHETHER ANGINA PRESENT: ICD-10-CM

## 2023-07-07 DIAGNOSIS — R40.4 TRANSIENT ALTERATION OF AWARENESS: ICD-10-CM

## 2023-07-07 LAB
LV EF: 55 %
LVEF MODALITY: NORMAL

## 2023-07-07 PROCEDURE — 70450 CT HEAD/BRAIN W/O DYE: CPT

## 2023-07-07 PROCEDURE — 93306 TTE W/DOPPLER COMPLETE: CPT

## 2023-07-18 ENCOUNTER — OFFICE VISIT (OUTPATIENT)
Dept: CARDIOLOGY CLINIC | Age: 78
End: 2023-07-18
Payer: MEDICARE

## 2023-07-18 VITALS
BODY MASS INDEX: 30.12 KG/M2 | SYSTOLIC BLOOD PRESSURE: 124 MMHG | OXYGEN SATURATION: 98 % | HEIGHT: 66 IN | HEART RATE: 59 BPM | WEIGHT: 187.4 LBS | DIASTOLIC BLOOD PRESSURE: 74 MMHG

## 2023-07-18 DIAGNOSIS — E78.2 MIXED HYPERLIPIDEMIA: ICD-10-CM

## 2023-07-18 DIAGNOSIS — Z95.1 S/P CABG (CORONARY ARTERY BYPASS GRAFT): ICD-10-CM

## 2023-07-18 DIAGNOSIS — I25.10 CORONARY ARTERY DISEASE INVOLVING NATIVE HEART WITHOUT ANGINA PECTORIS, UNSPECIFIED VESSEL OR LESION TYPE: Primary | ICD-10-CM

## 2023-07-18 DIAGNOSIS — I10 HYPERTENSION, UNSPECIFIED TYPE: ICD-10-CM

## 2023-07-18 PROCEDURE — 3078F DIAST BP <80 MM HG: CPT | Performed by: INTERNAL MEDICINE

## 2023-07-18 PROCEDURE — 3074F SYST BP LT 130 MM HG: CPT | Performed by: INTERNAL MEDICINE

## 2023-07-18 PROCEDURE — 99214 OFFICE O/P EST MOD 30 MIN: CPT | Performed by: INTERNAL MEDICINE

## 2023-07-18 PROCEDURE — 1123F ACP DISCUSS/DSCN MKR DOCD: CPT | Performed by: INTERNAL MEDICINE

## 2023-07-18 NOTE — PROGRESS NOTES
Date    HDL 58 05/11/2023    HDL 46 03/10/2023    HDL 48 03/09/2023     Lab Results   Component Value Date    LDLCALC 60 05/11/2023    LDLCALC 90 03/10/2023    LDLCALC 93 03/09/2023     Lab Results   Component Value Date    LABVLDL 16 05/11/2023    LABVLDL 20 03/10/2023    LABVLDL 20 03/09/2023     Lab Results   Component Value Date    CHOLHDLRATIO 3.0 02/05/2013       Lab Results   Component Value Date    INR 1.28 (H) 01/27/2023    INR 1.49 (H) 01/26/2023    INR 1.00 01/19/2023    PROTIME 16.0 (H) 01/27/2023    PROTIME 18.0 (H) 01/26/2023    PROTIME 13.1 01/19/2023       The 10-year ASCVD risk score (Shanna MAK, et al., 2019) is: 24.9%    Values used to calculate the score:      Age: 66 years      Sex: Male      Is Non- : No      Diabetic: No      Tobacco smoker: No      Systolic Blood Pressure: 606 mmHg      Is BP treated: No      HDL Cholesterol: 58 mg/dL      Total Cholesterol: 134 mg/dL      Imaging:       ECG (if available, Personally interpreted):    Last Monitor/Holter 30 day monitor from 5/16/23 to 6/13/23 showed nocturnal bradycardia. Greater than 200 SVT episodes, the longest being 33 beats and the fastest was 190. Last Stress : 8/3/22  Conclusions   Summary   ABNORMAL MODERATE RISK STRESS TEST. 1mm horizontal ST depression during recovery. The stress ECG showed no   ischemia at target heart rate. Chun Score of 2 ( Moderate Risk ). Following   stress there was hypokinesis of the apical septal and mid anteroseptal wall. Last Cath : 1/19/23  HEMODYNAMIC / ANGIOGRAPHIC DATA:    /57, 74 /7; Left ventricular end diastolic pressure was 20 mmHg. There was no gradient across the aortic valve upon pullback. The left main coronary artery arises from the left coronary cusp giving rise to the left anterior descending artery and the left circumflex artery. The left main reveals mild luminal irregularites.   The left anterior descending artery arises in normal fashion from

## 2023-07-18 NOTE — PATIENT INSTRUCTIONS
BP at goal;    Continue statin, lipids at goal  Reviewed CT results, monitor results, and ECHO. Ok to be physically active. Follow up in 3 months.

## 2023-07-22 ENCOUNTER — HOSPITAL ENCOUNTER (EMERGENCY)
Age: 78
Discharge: HOME | End: 2023-07-22
Attending: STUDENT IN AN ORGANIZED HEALTH CARE EDUCATION/TRAINING PROGRAM
Payer: MEDICARE

## 2023-07-22 ENCOUNTER — APPOINTMENT (OUTPATIENT)
Dept: GENERAL RADIOLOGY | Age: 78
End: 2023-07-22
Payer: MEDICARE

## 2023-07-22 VITALS
HEIGHT: 66 IN | RESPIRATION RATE: 16 BRPM | OXYGEN SATURATION: 95 % | DIASTOLIC BLOOD PRESSURE: 85 MMHG | TEMPERATURE: 97.6 F | HEART RATE: 68 BPM | WEIGHT: 185.3 LBS | BODY MASS INDEX: 29.78 KG/M2 | SYSTOLIC BLOOD PRESSURE: 151 MMHG

## 2023-07-22 DIAGNOSIS — S20.212A RIB CONTUSION, LEFT, INITIAL ENCOUNTER: ICD-10-CM
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
## 2023-07-22 PROCEDURE — 6370000000 HC RX 637 (ALT 250 FOR IP)

## 2023-07-22 PROCEDURE — 71046 X-RAY EXAM CHEST 2 VIEWS: CPT

## 2023-07-22 PROCEDURE — 99283 EMERGENCY DEPT VISIT LOW MDM: CPT

## 2023-07-22 RX ORDER — ACETAMINOPHEN 500 MG
1000 TABLET ORAL
Status: COMPLETED | OUTPATIENT
Start: 2023-07-22 | End: 2023-07-22

## 2023-07-22 RX ADMIN — ACETAMINOPHEN 1000 MG: 500 TABLET ORAL at 20:55

## 2023-07-22 ASSESSMENT — PAIN DESCRIPTION - DESCRIPTORS: DESCRIPTORS: ACHING

## 2023-07-22 ASSESSMENT — ENCOUNTER SYMPTOMS
ABDOMINAL PAIN: 0
FACIAL SWELLING: 0
VOMITING: 0
BACK PAIN: 0
NAUSEA: 0
SHORTNESS OF BREATH: 0

## 2023-07-22 ASSESSMENT — PAIN DESCRIPTION - PAIN TYPE: TYPE: ACUTE PAIN

## 2023-07-22 ASSESSMENT — PAIN SCALES - GENERAL: PAINLEVEL_OUTOF10: 10

## 2023-07-22 ASSESSMENT — PAIN DESCRIPTION - FREQUENCY: FREQUENCY: CONTINUOUS

## 2023-07-22 ASSESSMENT — PAIN DESCRIPTION - ORIENTATION: ORIENTATION: LEFT

## 2023-07-22 ASSESSMENT — PAIN DESCRIPTION - LOCATION: LOCATION: RIB CAGE

## 2023-07-23 NOTE — ED NOTES
Patient prepared for and ready to be discharged. Patient discharged at this time in no acute distress after verbalizing understanding of discharge instructions. Patient left after receiving After Visit Summary instructions.       Rusty Alvarez RN  07/22/23 [id number]

## 2023-07-23 NOTE — DISCHARGE INSTRUCTIONS
Thank you for visiting [facility] ER. Your chest Xray was negative for any fracture or other acute abnormality. Your exam was reassuring for no other significant injuries. Please take tylenol as needed for pain. You may use your patches over the area as well as directed.  Avoid any strenuous activity for at least 1 week to give your ribs times to heal.
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
## 2023-08-18 RX ORDER — PRAVASTATIN SODIUM 40 MG
TABLET ORAL
Qty: 90 TABLET | Refills: 0 | Status: SHIPPED | OUTPATIENT
Start: 2023-08-18

## 2023-10-09 DIAGNOSIS — M81.0 AGE-RELATED OSTEOPOROSIS WITHOUT CURRENT PATHOLOGICAL FRACTURE: Primary | ICD-10-CM

## 2023-10-10 DIAGNOSIS — E67.3 HYPERVITAMINOSIS D: Primary | ICD-10-CM

## 2023-10-16 ENCOUNTER — TELEPHONE (OUTPATIENT)
Dept: FAMILY MEDICINE CLINIC | Age: 78
End: 2023-10-16

## 2023-10-16 NOTE — TELEPHONE ENCOUNTER
Requested Prescriptions     Pending Prescriptions Disp Refills    rosuvastatin (CRESTOR) 20 MG tablet [Pharmacy Med Name: ROSUVASTATIN TAB 20MG] 90 tablet 3     Sig: TAKE 1 TABLET DAILY              Last Office Visit: 7/18/2023     Next Office Visit: 10/23/2023       Last Labs: [phone]

## 2023-10-18 RX ORDER — ROSUVASTATIN CALCIUM 20 MG/1
20 TABLET, COATED ORAL DAILY
Qty: 90 TABLET | Refills: 3 | Status: SHIPPED | OUTPATIENT
Start: 2023-10-18

## 2023-10-23 ENCOUNTER — OFFICE VISIT (OUTPATIENT)
Dept: CARDIOLOGY CLINIC | Age: 78
End: 2023-10-23

## 2023-10-23 VITALS
OXYGEN SATURATION: 95 % | HEART RATE: 72 BPM | WEIGHT: 185.2 LBS | BODY MASS INDEX: 29.89 KG/M2 | SYSTOLIC BLOOD PRESSURE: 126 MMHG | DIASTOLIC BLOOD PRESSURE: 78 MMHG

## 2023-10-23 DIAGNOSIS — I25.10 CORONARY ARTERY DISEASE INVOLVING NATIVE HEART WITHOUT ANGINA PECTORIS, UNSPECIFIED VESSEL OR LESION TYPE: Primary | ICD-10-CM

## 2023-10-23 DIAGNOSIS — G45.9 TIA (TRANSIENT ISCHEMIC ATTACK): ICD-10-CM

## 2023-10-23 DIAGNOSIS — E78.2 MIXED HYPERLIPIDEMIA: ICD-10-CM

## 2023-10-23 DIAGNOSIS — I10 HYPERTENSION, UNSPECIFIED TYPE: ICD-10-CM

## 2023-10-23 NOTE — PROGRESS NOTES
[facility]     Outpatient Cardiology         Patient Name:  Eunice Scott  Requesting Physician: No admitting provider for patient encounter. Primary Care Physician: Kylie Mayberry MD    Reason for Consultation/Chief Complaint:   Chief Complaint   Patient presents with    3 Month Follow-Up    Coronary Artery Disease    Hypertension    Hyperlipidemia       HPI:     Eunice Scott is a 66 y.o. male with history of TIA, CAD s/p CABG 1/26/2023 Dr. Chantale Carlson, HTN, Agueda Bird  presents today for follow up. Fatigue improved since stopping betablocker. He has been feeling well. The patient denies any chest pain, sob, orthopnea, pnd or edema. CABG 1/26/2023. CT head - microvascular ischemic changes. Echo showed EF 55%. 30 day monitor from 5/16/23 to 6/13/23 showed nocturnal bradycardia, runs of svt. Echo 7/7/23 EF 55%     Former tobacco use, quit 20 years ago. Histories:     Past Medical History:   has a past medical history of Allergic rhinitis, Anxiety, BPH (benign prostatic hypertrophy) with urinary obstruction, CAD (coronary artery disease), Chronic back pain, Colon polyp, Diverticulosis of colon, Environmental allergies, FH: CAD (coronary artery disease), Fractures, GERD (gastroesophageal reflux disease), Headache(784.0), Hearing impairment, Hyperlipidemia, Hypertension, Osteoarthritis, and Restless leg syndrome. Surgical History:   has a past surgical history that includes Tonsillectomy and adenoidectomy; nasal/sinus endoscopy; Cholecystectomy (94); Rotator cuff repair (Bilateral); Femur fracture surgery; hernia repair (06); Colonoscopy (08,11/12,11/17/17); Cardiac surgery; Cardiac catheterization; Total knee arthroplasty (Left, 1/22/2019); joint replacement; knee surgery; incision and drainage (Left, 4/24/2019); and Coronary artery bypass graft (N/A, 1/26/2023). Social History:   reports that he quit smoking about 27 years ago. His smoking use included cigarettes.  He

## 2023-11-14 ENCOUNTER — HOSPITAL ENCOUNTER (OUTPATIENT)
Dept: GENERAL RADIOLOGY | Age: 78
Discharge: HOME | End: 2023-11-14
Payer: MEDICARE

## 2023-11-14 DIAGNOSIS — M81.0 AGE-RELATED OSTEOPOROSIS WITHOUT CURRENT PATHOLOGICAL FRACTURE: ICD-10-CM

## 2023-11-14 PROCEDURE — 77080 DXA BONE DENSITY AXIAL: CPT

## 2023-11-14 PROCEDURE — 77080 DXA BONE DENSITY AXIAL: CPT | Performed by: INTERNAL MEDICINE

## 2023-11-24 DIAGNOSIS — E67.3 HYPERVITAMINOSIS D: ICD-10-CM

## 2023-11-24 LAB — 25(OH)D3 SERPL-MCNC: 109.8 NG/ML

## 2024-01-02 ENCOUNTER — TELEPHONE (OUTPATIENT)
Dept: ENDOCRINOLOGY | Age: 79
End: 2024-01-02

## 2024-01-23 ENCOUNTER — TELEPHONE (OUTPATIENT)
Dept: ENDOCRINOLOGY | Age: 79
End: 2024-01-23

## 2024-01-23 DIAGNOSIS — M81.0 AGE-RELATED OSTEOPOROSIS WITHOUT CURRENT PATHOLOGICAL FRACTURE: ICD-10-CM

## 2024-01-23 RX ORDER — ALENDRONATE SODIUM 70 MG/1
70 TABLET ORAL
Qty: 12 TABLET | Refills: 3 | Status: SHIPPED | OUTPATIENT
Start: 2024-01-23

## 2024-01-23 NOTE — TELEPHONE ENCOUNTER
Pt asking for paper copy of rx to [facility] pt is in lobby        alendronate (FOSAMAX) 70 MG tablet

## 2024-01-23 NOTE — TELEPHONE ENCOUNTER
[facility] needs [facility] in [facility]      alendronate (FOSAMAX) 70 MG tablet

## 2024-04-08 ENCOUNTER — TELEPHONE (OUTPATIENT)
Dept: FAMILY MEDICINE CLINIC | Age: 79
End: 2024-04-08

## 2024-04-08 DIAGNOSIS — Z12.5 PROSTATE CANCER SCREENING: Primary | ICD-10-CM

## 2024-04-08 DIAGNOSIS — I10 PRIMARY HYPERTENSION: ICD-10-CM

## 2024-04-08 DIAGNOSIS — E78.2 MIXED HYPERLIPIDEMIA: ICD-10-CM

## 2024-04-08 NOTE — TELEPHONE ENCOUNTER
Pt said he is supposed to have lab orders in to get done before his physical on 4/29/24 and wants to know if those could be put in for him

## 2024-04-11 DIAGNOSIS — E78.2 MIXED HYPERLIPIDEMIA: ICD-10-CM

## 2024-04-11 DIAGNOSIS — E67.3 HYPERVITAMINOSIS D: ICD-10-CM

## 2024-04-11 DIAGNOSIS — I10 PRIMARY HYPERTENSION: ICD-10-CM

## 2024-04-11 DIAGNOSIS — Z12.5 PROSTATE CANCER SCREENING: ICD-10-CM

## 2024-04-12 LAB
25(OH)D3 SERPL-MCNC: 77 NG/ML
ALBUMIN SERPL-MCNC: 4.5 G/DL (ref 3.4–5)
ALBUMIN/GLOB SERPL: 1.9 {RATIO} (ref 1.1–2.2)
ALP SERPL-CCNC: 117 U/L (ref 40–129)
ALT SERPL-CCNC: 15 U/L (ref 10–40)
ANION GAP SERPL CALCULATED.3IONS-SCNC: 15 MMOL/L (ref 3–16)
AST SERPL-CCNC: 22 U/L (ref 15–37)
BASOPHILS # BLD: 0 K/UL (ref 0–0.2)
BASOPHILS NFR BLD: 0 %
BILIRUB SERPL-MCNC: 0.5 MG/DL (ref 0–1)
BUN SERPL-MCNC: 13 MG/DL (ref 7–20)
CALCIUM SERPL-MCNC: 9.4 MG/DL (ref 8.3–10.6)
CHLORIDE SERPL-SCNC: 101 MMOL/L (ref 99–110)
CHOLEST SERPL-MCNC: 157 MG/DL (ref 0–199)
CO2 SERPL-SCNC: 23 MMOL/L (ref 21–32)
CREAT SERPL-MCNC: 0.8 MG/DL (ref 0.8–1.3)
DEPRECATED RDW RBC AUTO: 14.2 % (ref 12.4–15.4)
EOSINOPHIL # BLD: 0.1 K/UL (ref 0–0.6)
EOSINOPHIL NFR BLD: 2 %
GFR SERPLBLD CREATININE-BSD FMLA CKD-EPI: >90 ML/MIN/{1.73_M2}
GLUCOSE SERPL-MCNC: 101 MG/DL (ref 70–99)
HCT VFR BLD AUTO: 42.1 % (ref 40.5–52.5)
HDLC SERPL-MCNC: 59 MG/DL (ref 40–60)
HGB BLD-MCNC: 14.3 G/DL (ref 13.5–17.5)
LDLC SERPL CALC-MCNC: 78 MG/DL
LYMPHOCYTES # BLD: 1.7 K/UL (ref 1–5.1)
LYMPHOCYTES NFR BLD: 30 %
MCH RBC QN AUTO: 28.1 PG (ref 26–34)
MCHC RBC AUTO-ENTMCNC: 33.9 G/DL (ref 31–36)
MCV RBC AUTO: 82.8 FL (ref 80–100)
MONOCYTES # BLD: 0.9 K/UL (ref 0–1.3)
MONOCYTES NFR BLD: 17 %
NEUTROPHILS # BLD: 2.8 K/UL (ref 1.7–7.7)
NEUTROPHILS NFR BLD: 51 %
PLATELET # BLD AUTO: 257 K/UL (ref 135–450)
PLATELET BLD QL SMEAR: ADEQUATE
PMV BLD AUTO: 7.1 FL (ref 5–10.5)
POTASSIUM SERPL-SCNC: 4.6 MMOL/L (ref 3.5–5.1)
PROT SERPL-MCNC: 6.9 G/DL (ref 6.4–8.2)
PSA SERPL DL<=0.01 NG/ML-MCNC: 0.82 NG/ML (ref 0–4)
RBC # BLD AUTO: 5.09 M/UL (ref 4.2–5.9)
SODIUM SERPL-SCNC: 139 MMOL/L (ref 136–145)
TRIGL SERPL-MCNC: 99 MG/DL (ref 0–150)
VLDLC SERPL CALC-MCNC: 20 MG/DL
WBC # BLD AUTO: 5.5 K/UL (ref 4–11)

## 2024-04-22 ENCOUNTER — OFFICE VISIT (OUTPATIENT)
Dept: CARDIOLOGY CLINIC | Age: 79
End: 2024-04-22
Payer: MEDICARE

## 2024-04-22 VITALS
SYSTOLIC BLOOD PRESSURE: 136 MMHG | OXYGEN SATURATION: 95 % | DIASTOLIC BLOOD PRESSURE: 74 MMHG | WEIGHT: 176 LBS | HEART RATE: 72 BPM | BODY MASS INDEX: 28.41 KG/M2

## 2024-04-22 DIAGNOSIS — I25.10 CORONARY ARTERY DISEASE INVOLVING NATIVE HEART WITHOUT ANGINA PECTORIS, UNSPECIFIED VESSEL OR LESION TYPE: Primary | ICD-10-CM

## 2024-04-22 DIAGNOSIS — Z95.1 S/P CABG (CORONARY ARTERY BYPASS GRAFT): ICD-10-CM

## 2024-04-22 DIAGNOSIS — E78.2 MIXED HYPERLIPIDEMIA: ICD-10-CM

## 2024-04-22 PROCEDURE — 99214 OFFICE O/P EST MOD 30 MIN: CPT | Performed by: INTERNAL MEDICINE

## 2024-04-22 PROCEDURE — 3078F DIAST BP <80 MM HG: CPT | Performed by: INTERNAL MEDICINE

## 2024-04-22 PROCEDURE — 1123F ACP DISCUSS/DSCN MKR DOCD: CPT | Performed by: INTERNAL MEDICINE

## 2024-04-22 PROCEDURE — 3075F SYST BP GE 130 - 139MM HG: CPT | Performed by: INTERNAL MEDICINE

## 2024-04-22 NOTE — PROGRESS NOTES
1. Coronary artery disease involving native heart without angina pectoris, unspecified vessel or lesion type    2. Mixed hyperlipidemia    3. S/P CABG (coronary artery bypass graft)        BP at goal;   Stop Aspirin  Continue Plavix 75 mg daily - okay to stop prior to epidural injection    Discussed stress test and echocardiogram due to shortness of breath - declines at this time.  Discussed further evaluation of sob, declines at this time  He will call us if sob persisits  Lipids not at goal on 4/11/24, increase Crestor to 20 mg nightly  Recheck labs in 6 weeks   Follow up in 6 weeks    Orders Placed This Encounter   Procedures    Comprehensive Metabolic Panel    Lipid Panel      The note was completed using EMR and Dragon dictation system. Every effort was made to ensure accuracy; however, inadvertent computerized transcription errors may be present.    All questions and concerns were addressed to the patient.     I would like to thank you for providing me the opportunity to participate in the care of your patient. If you have any questions, please do not hesitate to contact me.     Nhan Slaughter MD, [place] [address]  Main Office Phone: [phone]  Fax: [phone]    This note was scribed in the presence of Dr. Sukhjinder SALEEM by Nani Weathers RN.      Physician Attestation:  The scribes documentation has been prepared under my direction and personally reviewed by me in its entirety.     I confirm the note above accurately reflects all work, treatment, procedures, and medical decision making performed by me.    Electronically signed by Nhan Slaughter MD on 4/23/2024 at 9:04 AM

## 2024-04-22 NOTE — PATIENT INSTRUCTIONS
Denies angina  BP at goal;   Stop Aspirin  Continue Plavix 75 mg daily - okay to stop prior to epidural injection    Discussed stress test due to shortness of breath - declines at this time. Will continue to monitor breathing   Lipids not at goal on 4/11/24, increase Crestor to 20 mg nightly  Recheck labs in 6 weeks   Follow up in 6 weeks

## 2024-04-24 ENCOUNTER — TELEPHONE (OUTPATIENT)
Dept: CARDIOLOGY CLINIC | Age: 79
End: 2024-04-24

## 2024-04-24 DIAGNOSIS — I25.10 CORONARY ARTERY DISEASE INVOLVING NATIVE HEART WITHOUT ANGINA PECTORIS, UNSPECIFIED VESSEL OR LESION TYPE: Primary | ICD-10-CM

## 2024-04-24 NOTE — TELEPHONE ENCOUNTER
Pt called stating during his recent visit on 4/22 it was mentioned getting a stress test but pt wasn't sure if he wanted to get one or not. He states he does want to proceed with getting a stress test, however I do not see an order put in for one. Pt is also requesting Rx refill:         Medication  rosuvastatin (CRESTOR) 20 MG tablet [[id number]]  rosuvastatin (CRESTOR) 20 MG tablet [[id number]]    Order Details    Dose: 20 mg Route: Oral Frequency: DAILY   Dispense Quantity: 90 tablet Refills: 3          Sig: TAKE 1 TABLET DAILY               Pharmacy  [facility] - [place] [address] - P [phone]  - F [phone] [[id number]]

## 2024-04-25 RX ORDER — ROSUVASTATIN CALCIUM 20 MG/1
20 TABLET, COATED ORAL DAILY
Qty: 90 TABLET | Refills: 3 | Status: SHIPPED | OUTPATIENT
Start: 2024-04-25

## 2024-04-28 SDOH — HEALTH STABILITY: PHYSICAL HEALTH: ON AVERAGE, HOW MANY DAYS PER WEEK DO YOU ENGAGE IN MODERATE TO STRENUOUS EXERCISE (LIKE A BRISK WALK)?: 5 DAYS

## 2024-04-28 SDOH — HEALTH STABILITY: PHYSICAL HEALTH: ON AVERAGE, HOW MANY MINUTES DO YOU ENGAGE IN EXERCISE AT THIS LEVEL?: 30 MIN

## 2024-04-28 ASSESSMENT — PATIENT HEALTH QUESTIONNAIRE - PHQ9
SUM OF ALL RESPONSES TO PHQ QUESTIONS 1-9: 0
SUM OF ALL RESPONSES TO PHQ QUESTIONS 1-9: 0
SUM OF ALL RESPONSES TO PHQ9 QUESTIONS 1 & 2: 0
1. LITTLE INTEREST OR PLEASURE IN DOING THINGS: NOT AT ALL
2. FEELING DOWN, DEPRESSED OR HOPELESS: NOT AT ALL
SUM OF ALL RESPONSES TO PHQ QUESTIONS 1-9: 0
SUM OF ALL RESPONSES TO PHQ QUESTIONS 1-9: 0

## 2024-04-28 ASSESSMENT — LIFESTYLE VARIABLES
HOW OFTEN DO YOU HAVE SIX OR MORE DRINKS ON ONE OCCASION: 1
HOW OFTEN DO YOU HAVE A DRINK CONTAINING ALCOHOL: 2
HOW MANY STANDARD DRINKS CONTAINING ALCOHOL DO YOU HAVE ON A TYPICAL DAY: 1 OR 2
HOW MANY STANDARD DRINKS CONTAINING ALCOHOL DO YOU HAVE ON A TYPICAL DAY: 1
HOW OFTEN DO YOU HAVE A DRINK CONTAINING ALCOHOL: MONTHLY OR LESS

## 2024-04-29 ENCOUNTER — OFFICE VISIT (OUTPATIENT)
Dept: FAMILY MEDICINE CLINIC | Age: 79
End: 2024-04-29
Payer: MEDICARE

## 2024-04-29 ENCOUNTER — TELEPHONE (OUTPATIENT)
Dept: ENDOCRINOLOGY | Age: 79
End: 2024-04-29

## 2024-04-29 VITALS
DIASTOLIC BLOOD PRESSURE: 80 MMHG | SYSTOLIC BLOOD PRESSURE: 140 MMHG | OXYGEN SATURATION: 96 % | HEART RATE: 87 BPM | HEIGHT: 66 IN | WEIGHT: 173.8 LBS | BODY MASS INDEX: 27.93 KG/M2

## 2024-04-29 DIAGNOSIS — Z00.00 MEDICARE ANNUAL WELLNESS VISIT, SUBSEQUENT: Primary | ICD-10-CM

## 2024-04-29 DIAGNOSIS — I70.0 ATHEROSCLEROSIS OF AORTA (HCC): ICD-10-CM

## 2024-04-29 DIAGNOSIS — E55.9 VITAMIN D DEFICIENCY: ICD-10-CM

## 2024-04-29 DIAGNOSIS — E78.2 MIXED HYPERLIPIDEMIA: ICD-10-CM

## 2024-04-29 DIAGNOSIS — I10 HYPERTENSION, UNSPECIFIED TYPE: ICD-10-CM

## 2024-04-29 DIAGNOSIS — E67.3 HYPERVITAMINOSIS D: Primary | ICD-10-CM

## 2024-04-29 DIAGNOSIS — Z96.652 STATUS POST TOTAL LEFT KNEE REPLACEMENT: ICD-10-CM

## 2024-04-29 DIAGNOSIS — K21.9 GASTROESOPHAGEAL REFLUX DISEASE WITHOUT ESOPHAGITIS: ICD-10-CM

## 2024-04-29 DIAGNOSIS — Z86.73 HISTORY OF LACUNAR CEREBROVASCULAR ACCIDENT: ICD-10-CM

## 2024-04-29 DIAGNOSIS — Z95.1 S/P CABG (CORONARY ARTERY BYPASS GRAFT): ICD-10-CM

## 2024-04-29 PROCEDURE — G0439 PPPS, SUBSEQ VISIT: HCPCS | Performed by: FAMILY MEDICINE

## 2024-04-29 PROCEDURE — 3079F DIAST BP 80-89 MM HG: CPT | Performed by: FAMILY MEDICINE

## 2024-04-29 PROCEDURE — 1123F ACP DISCUSS/DSCN MKR DOCD: CPT | Performed by: FAMILY MEDICINE

## 2024-04-29 PROCEDURE — 3077F SYST BP >= 140 MM HG: CPT | Performed by: FAMILY MEDICINE

## 2024-04-29 SDOH — ECONOMIC STABILITY: FOOD INSECURITY: WITHIN THE PAST 12 MONTHS, YOU WORRIED THAT YOUR FOOD WOULD RUN OUT BEFORE YOU GOT MONEY TO BUY MORE.: NEVER TRUE

## 2024-04-29 SDOH — ECONOMIC STABILITY: FOOD INSECURITY: WITHIN THE PAST 12 MONTHS, THE FOOD YOU BOUGHT JUST DIDN'T LAST AND YOU DIDN'T HAVE MONEY TO GET MORE.: NEVER TRUE

## 2024-04-29 SDOH — ECONOMIC STABILITY: INCOME INSECURITY: HOW HARD IS IT FOR YOU TO PAY FOR THE VERY BASICS LIKE FOOD, HOUSING, MEDICAL CARE, AND HEATING?: NOT HARD AT ALL

## 2024-04-29 NOTE — PROGRESS NOTES
Comments)     myositis     Prior to Visit Medications    Medication Sig Taking? Authorizing Provider   NONFORMULARY Vitafusion calcium with vitamin D3 Yes Michael Caputo MD   rosuvastatin (CRESTOR) 20 MG tablet Take 1 tablet by mouth daily Yes Nhan Slaughter MD   alendronate (FOSAMAX) 70 MG tablet Take 1 tablet by mouth every 7 days Yes Javier Jane MD   clopidogrel (PLAVIX) 75 MG tablet TAKE 1 TABLET DAILY Yes Arturo Garcia MD   Coenzyme Q10 (COQ10) 100 MG CAPS Take by mouth Yes ProviderMichael MD   Magnesium Oxide (MAGNESIUM-OXIDE) 250 MG TABS tablet Take 1 tablet by mouth daily as needed Yes Michael Caputo MD   fluticasone (FLONASE) 50 MCG/ACT nasal spray 1 spray by Nasal route daily as needed for Rhinitis Yes Mary Kay Gamble N, APRN - CNP   famotidine (PEPCID) 40 MG tablet Take 1 tablet by mouth daily Yes ProviderMichael MD       CareTeam (Including outside providers/suppliers regularly involved in providing care):   Patient Care Team:  Arturo Garcia MD as PCP - General  Arturo Garcia MD as PCP - Empaneled Provider  Arturo Messina MD as Surgeon (General Surgery)     Reviewed and updated this visit:  Tobacco  Allergies  Meds  Med Hx  Surg Hx  Soc Hx  Fam Hx

## 2024-04-29 NOTE — TELEPHONE ENCOUNTER
Your vitamin D level of 77 ng/mL was too high (desirable is 30-60) but better than before (109.8)  Not sure if this is a problem or not, but, \"better safe than sorry,\" I recommend you cut back on vitamin D.     How much vitamin D are you getting with your calcium and multivitamin? Are you taking any stand-alone vitamin D? PT WAS TAKING 2 VITAFUSION CALCIUM WHICH WAS A TOTAL OF 2000IU AND WAS TAKING A VITAMIND D3 2000IU. PT HAS GONE DOWN TO 1 VITAFUSION AND HAS CUT OUT THE VITD3 ALL TOGETHER.     Are you taking any supplements with large amounts of biotin (for hair/skin/nails) ? Biotin is safe to take but recent ingestion of biotin will give a falsely high results on the vitamin D test. If you are taking biotin, we should repeat the vitamin D test at least 24 hours after your last dose.- PT IS NOT TAKING ANYTHING WITH BIOTIN-     PT stopped in the office and said he has to get labs done for Dr. Slaughter and wants to know if you can add on the VitD so he can do it all once. Pt is getting those labs done middle of May. Please advise. Pt would like a call and not a mychart message.    no

## 2024-04-29 NOTE — TELEPHONE ENCOUNTER
It takes 3 months after a change in vitamin D for the blood test to fully reflect that change. We should check it again, but no sooner than July. I placed an order that is dated July.  He can have this in any [facility] lab.

## 2024-05-02 ENCOUNTER — TELEPHONE (OUTPATIENT)
Dept: ENDOCRINOLOGY | Age: 79
End: 2024-05-02

## 2024-05-02 DIAGNOSIS — M81.0 AGE-RELATED OSTEOPOROSIS WITHOUT CURRENT PATHOLOGICAL FRACTURE: ICD-10-CM

## 2024-05-02 RX ORDER — ALENDRONATE SODIUM 70 MG/1
70 TABLET ORAL
Qty: 12 TABLET | Refills: 2 | Status: SHIPPED | OUTPATIENT
Start: 2024-05-02

## 2024-05-13 ENCOUNTER — TELEPHONE (OUTPATIENT)
Dept: ENDOCRINOLOGY | Age: 79
End: 2024-05-13

## 2024-05-13 NOTE — TELEPHONE ENCOUNTER
Patient aware to get Vit D lab draw expected 7/28/24. NOV: 12/17/24 Patient gets his labs inside of [facility] in [place] with understanding.

## 2024-05-22 ENCOUNTER — OFFICE VISIT (OUTPATIENT)
Dept: FAMILY MEDICINE CLINIC | Age: 79
End: 2024-05-22
Payer: MEDICARE

## 2024-05-22 VITALS
BODY MASS INDEX: 28.25 KG/M2 | WEIGHT: 175 LBS | HEART RATE: 72 BPM | SYSTOLIC BLOOD PRESSURE: 116 MMHG | DIASTOLIC BLOOD PRESSURE: 70 MMHG | OXYGEN SATURATION: 96 %

## 2024-05-22 DIAGNOSIS — Z96.652 STATUS POST TOTAL LEFT KNEE REPLACEMENT: ICD-10-CM

## 2024-05-22 DIAGNOSIS — I25.10 CORONARY ARTERY DISEASE INVOLVING NATIVE HEART WITHOUT ANGINA PECTORIS, UNSPECIFIED VESSEL OR LESION TYPE: ICD-10-CM

## 2024-05-22 DIAGNOSIS — I10 HYPERTENSION, UNSPECIFIED TYPE: ICD-10-CM

## 2024-05-22 DIAGNOSIS — E78.2 MIXED HYPERLIPIDEMIA: ICD-10-CM

## 2024-05-22 DIAGNOSIS — G45.9 TIA (TRANSIENT ISCHEMIC ATTACK): Primary | ICD-10-CM

## 2024-05-22 LAB
ALBUMIN SERPL-MCNC: 4 G/DL (ref 3.4–5)
ALBUMIN/GLOB SERPL: 1.5 {RATIO} (ref 1.1–2.2)
ALP SERPL-CCNC: 89 U/L (ref 40–129)
ALT SERPL-CCNC: 27 U/L (ref 10–40)
ANION GAP SERPL CALCULATED.3IONS-SCNC: 12 MMOL/L (ref 3–16)
AST SERPL-CCNC: 22 U/L (ref 15–37)
BILIRUB SERPL-MCNC: 0.5 MG/DL (ref 0–1)
BUN SERPL-MCNC: 16 MG/DL (ref 7–20)
CALCIUM SERPL-MCNC: 9.5 MG/DL (ref 8.3–10.6)
CHLORIDE SERPL-SCNC: 105 MMOL/L (ref 99–110)
CHOLEST SERPL-MCNC: 147 MG/DL (ref 0–199)
CO2 SERPL-SCNC: 23 MMOL/L (ref 21–32)
CREAT SERPL-MCNC: 0.7 MG/DL (ref 0.8–1.3)
GFR SERPLBLD CREATININE-BSD FMLA CKD-EPI: >90 ML/MIN/{1.73_M2}
GLUCOSE SERPL-MCNC: 89 MG/DL (ref 70–99)
HDLC SERPL-MCNC: 69 MG/DL (ref 40–60)
LDLC SERPL CALC-MCNC: 69 MG/DL
POTASSIUM SERPL-SCNC: 4.6 MMOL/L (ref 3.5–5.1)
PROT SERPL-MCNC: 6.6 G/DL (ref 6.4–8.2)
SODIUM SERPL-SCNC: 140 MMOL/L (ref 136–145)
TRIGL SERPL-MCNC: 45 MG/DL (ref 0–150)
VLDLC SERPL CALC-MCNC: 9 MG/DL

## 2024-05-22 PROCEDURE — 3078F DIAST BP <80 MM HG: CPT | Performed by: FAMILY MEDICINE

## 2024-05-22 PROCEDURE — G2211 COMPLEX E/M VISIT ADD ON: HCPCS | Performed by: FAMILY MEDICINE

## 2024-05-22 PROCEDURE — 1123F ACP DISCUSS/DSCN MKR DOCD: CPT | Performed by: FAMILY MEDICINE

## 2024-05-22 PROCEDURE — 99214 OFFICE O/P EST MOD 30 MIN: CPT | Performed by: FAMILY MEDICINE

## 2024-05-22 PROCEDURE — 3074F SYST BP LT 130 MM HG: CPT | Performed by: FAMILY MEDICINE

## 2024-05-22 ASSESSMENT — ENCOUNTER SYMPTOMS
SHORTNESS OF BREATH: 0
ORTHOPNEA: 0
BLURRED VISION: 0

## 2024-05-22 NOTE — PROGRESS NOTES
Subjective:     Patient ID:Francisco Velez III is a 79 y.o. male.    Hypertension  This is a chronic problem. The current episode started today. The problem is unchanged. The problem is controlled. Pertinent negatives include no anxiety, blurred vision, chest pain, headaches, malaise/fatigue, neck pain, orthopnea, palpitations, peripheral edema, PND, shortness of breath or sweats. There are no associated agents to hypertension. Risk factors for coronary artery disease include dyslipidemia. Past treatments include lifestyle changes. The current treatment provides significant improvement. There are no compliance problems.  Hypertensive end-organ damage includes CAD/MI. There is no history of angina, kidney disease, CVA, heart failure, left ventricular hypertrophy or PVD. There is no history of chronic renal disease.   Hyperlipidemia  This is a chronic problem. The current episode started more than 1 year ago. The problem is controlled. Recent lipid tests were reviewed and are normal. He has no history of chronic renal disease, diabetes, hypothyroidism, liver disease, obesity or nephrotic syndrome. There are no known factors aggravating his hyperlipidemia. Pertinent negatives include no chest pain, focal sensory loss, focal weakness, leg pain, myalgias or shortness of breath. Current antihyperlipidemic treatment includes statins. The current treatment provides significant improvement of lipids. There are no compliance problems.  Risk factors for coronary artery disease include dyslipidemia and male sex.       Allergies   Allergen Reactions    Latex Rash     Blisters and rash    Bactrim [Sulfamethoxazole-Trimethoprim] Other (See Comments)     Blister on end of penis    Milk (Cow)      Other reaction(s): upset stomach, lactose intoleran    Sulfa Antibiotics Dermatitis    Trimethoprim      Other reaction(s): Blistering on skin    Zocor [Simvastatin] Other (See Comments)     myositis       Current Outpatient Medications

## 2024-05-31 ENCOUNTER — OFFICE VISIT (OUTPATIENT)
Dept: CARDIOLOGY CLINIC | Age: 79
End: 2024-05-31
Payer: MEDICARE

## 2024-05-31 VITALS
BODY MASS INDEX: 28.28 KG/M2 | OXYGEN SATURATION: 98 % | DIASTOLIC BLOOD PRESSURE: 78 MMHG | SYSTOLIC BLOOD PRESSURE: 138 MMHG | HEART RATE: 59 BPM | WEIGHT: 175.2 LBS

## 2024-05-31 DIAGNOSIS — I10 HYPERTENSION, UNSPECIFIED TYPE: ICD-10-CM

## 2024-05-31 DIAGNOSIS — E78.2 MIXED HYPERLIPIDEMIA: ICD-10-CM

## 2024-05-31 DIAGNOSIS — I25.10 CORONARY ARTERY DISEASE INVOLVING NATIVE HEART WITHOUT ANGINA PECTORIS, UNSPECIFIED VESSEL OR LESION TYPE: Primary | ICD-10-CM

## 2024-05-31 PROCEDURE — 3075F SYST BP GE 130 - 139MM HG: CPT | Performed by: INTERNAL MEDICINE

## 2024-05-31 PROCEDURE — 99214 OFFICE O/P EST MOD 30 MIN: CPT | Performed by: INTERNAL MEDICINE

## 2024-05-31 PROCEDURE — 3078F DIAST BP <80 MM HG: CPT | Performed by: INTERNAL MEDICINE

## 2024-05-31 PROCEDURE — 1123F ACP DISCUSS/DSCN MKR DOCD: CPT | Performed by: INTERNAL MEDICINE

## 2024-05-31 NOTE — PATIENT INSTRUCTIONS
BP at goal;   Continue Plavix 75 mg daily   Lipids at goal, continue Crestor to 20 mg nightly  Follow up with Arturo Garcia MD regarding sleep aid  Follow up with me pending stress test results, if normal follow up in 6 months

## 2024-05-31 NOTE — PROGRESS NOTES
[facility]     Outpatient Cardiology         Patient Name:  Francisco Velez III  Requesting Physician: No admitting provider for patient encounter.  Primary Care Physician: Arturo Garcia MD    Reason for Consultation/Chief Complaint:   Chief Complaint   Patient presents with    Follow-up    Coronary Artery Disease    Hypertension    Hyperlipidemia       HPI:     Francisco Velez III is a 79 y.o. male with history of TIA, CAD s/p CABG 1/26/2023 Dr. Whitehead, HTN, HLD  presents today for follow up.     He reports that he continues to have shortness of breath when he initially starts any activity. He reports that he has been having increased trouble sleeping. Patient currently denies any weight gain, edema, palpitations, chest pain, dizziness, and syncope.     CABG 1/26/2023. CT head - microvascular ischemic changes. Echo showed EF 55%. 30 day monitor from 5/16/23 to 6/13/23 showed nocturnal bradycardia, runs of svt. Echo 7/7/23 EF 55%     Former tobacco use, quit 20 years ago.     LDL Cholesterol <100 mg/dL 69  5/22/24 11:20       Histories:     Past Medical History:   has a past medical history of Allergic rhinitis, Anxiety, BPH (benign prostatic hypertrophy) with urinary obstruction, CAD (coronary artery disease), Cerebrovascular disease, Chronic back pain, Colon polyp, Diverticulosis of colon, Environmental allergies, FH: CAD (coronary artery disease), Fractures, GERD (gastroesophageal reflux disease), Headache(784.0), Hearing impairment, Hyperlipidemia, Hypertension, Osteoarthritis, and Restless leg syndrome.    Surgical History:   has a past surgical history that includes Tonsillectomy and adenoidectomy; nasal/sinus endoscopy; Cholecystectomy (94); Rotator cuff repair (Bilateral); Femur fracture surgery; hernia repair (06); Colonoscopy (08,11/12,11/17/17); Cardiac surgery; Cardiac catheterization; Total knee arthroplasty (Left, 1/22/2019); joint replacement; knee surgery; incision and

## 2024-06-07 ENCOUNTER — HOSPITAL ENCOUNTER (OUTPATIENT)
Dept: CARDIOLOGY | Age: 79
Discharge: HOME | End: 2024-06-07
Attending: INTERNAL MEDICINE
Payer: MEDICARE

## 2024-06-07 DIAGNOSIS — I25.10 CORONARY ARTERY DISEASE INVOLVING NATIVE HEART WITHOUT ANGINA PECTORIS, UNSPECIFIED VESSEL OR LESION TYPE: ICD-10-CM

## 2024-06-07 LAB
NUC STRESS EJECTION FRACTION: 68 %
NUC STRESS LV EDV: 100 ML (ref 67–155)
NUC STRESS LV ESV: 32 ML (ref 22–58)
NUC STRESS LV MASS: 146 G
STRESS ANGINA INDEX: 0
STRESS BASELINE DIAS BP: 64 MMHG
STRESS BASELINE HR: 58 BPM
STRESS BASELINE SYS BP: 128 MMHG
STRESS ESTIMATED WORKLOAD: 6.6 METS
STRESS EXERCISE DUR MIN: 8 MIN
STRESS PEAK DIAS BP: 78 MMHG
STRESS PEAK SYS BP: 183 MMHG
STRESS PERCENT HR ACHIEVED: 85 %
STRESS POST PEAK HR: 120 BPM
STRESS RATE PRESSURE PRODUCT: NORMAL BPM*MMHG
STRESS TARGET HR: 141 BPM
TID: 0.92

## 2024-06-07 PROCEDURE — 93018 CV STRESS TEST I&R ONLY: CPT | Performed by: INTERNAL MEDICINE

## 2024-06-07 PROCEDURE — 93016 CV STRESS TEST SUPVJ ONLY: CPT | Performed by: INTERNAL MEDICINE

## 2024-06-07 PROCEDURE — 3430000000 HC RX DIAGNOSTIC RADIOPHARMACEUTICAL: Performed by: INTERNAL MEDICINE

## 2024-06-07 PROCEDURE — 93017 CV STRESS TEST TRACING ONLY: CPT

## 2024-06-07 PROCEDURE — 78452 HT MUSCLE IMAGE SPECT MULT: CPT | Performed by: INTERNAL MEDICINE

## 2024-06-07 PROCEDURE — 6360000002 HC RX W HCPCS: Performed by: INTERNAL MEDICINE

## 2024-06-07 PROCEDURE — A9502 TC99M TETROFOSMIN: HCPCS | Performed by: INTERNAL MEDICINE

## 2024-06-07 RX ORDER — REGADENOSON 0.08 MG/ML
0.4 INJECTION, SOLUTION INTRAVENOUS
Status: DISCONTINUED | OUTPATIENT
Start: 2024-06-07 | End: 2024-06-10 | Stop reason: HOSPADM

## 2024-06-07 RX ADMIN — TETROFOSMIN 11.1 MILLICURIE: 1.38 INJECTION, POWDER, LYOPHILIZED, FOR SOLUTION INTRAVENOUS at 08:21

## 2024-06-07 RX ADMIN — TETROFOSMIN 31 MILLICURIE: 1.38 INJECTION, POWDER, LYOPHILIZED, FOR SOLUTION INTRAVENOUS at 10:07

## 2024-06-17 ENCOUNTER — TELEPHONE (OUTPATIENT)
Dept: FAMILY MEDICINE CLINIC | Age: 79
End: 2024-06-17

## 2024-06-17 NOTE — TELEPHONE ENCOUNTER
Pt said that he is having trouble with feeling run down and drowsy during the day and wants to know if he should change when he takes his meds or if there is a medication to help him with it. Pt said he wanted to possibly try \"Geratol\"

## 2024-07-29 DIAGNOSIS — E67.3 HYPERVITAMINOSIS D: ICD-10-CM

## 2024-07-29 LAB — 25(OH)D3 SERPL-MCNC: 62.5 NG/ML

## 2024-08-06 ENCOUNTER — TELEPHONE (OUTPATIENT)
Dept: ENDOCRINOLOGY | Age: 79
End: 2024-08-06

## 2024-08-06 ENCOUNTER — TELEPHONE (OUTPATIENT)
Dept: CARDIOLOGY CLINIC | Age: 79
End: 2024-08-06

## 2024-08-06 RX ORDER — CARVEDILOL 3.12 MG/1
3.12 TABLET ORAL 2 TIMES DAILY
Qty: 60 TABLET | Refills: 2 | Status: SHIPPED | OUTPATIENT
Start: 2024-08-06

## 2024-08-06 NOTE — TELEPHONE ENCOUNTER
Pt called to report high BP. His Metoprolol 25mg has been d/c, however he did have some leftover and took 0.5 tab this morning. He says he has been active and has been outside in the sun the past few days. He has a jigl softball game this morning but is concerned about playing with his BP being high. He has been having some SOB but denies any other symptoms.     [phone]     Blood Pressure Problems:      Hypertension (High BP)    1.What are your BP readings within the last week?   8/3- 134/70  8/4- 149/76  8/5- 138/63  8/6- 148/69  8/6 @ 8:37AM- 166/74    2.What bloodpressure medications are you taking: None at the moment, was previously taking Metoprolol 25mg. Pt took a leftover 0.5mg tab this morning at 8:37AM    Dose     Frequency      3. What symptoms are you experiencing?   Do you have dizziness when standing? NO    Headache?  No    Blurred vision? No    SOB? A little bit of SOB, is also very active     CP? No    4. How long have had these symptoms?

## 2024-08-06 NOTE — TELEPHONE ENCOUNTER
LVM for pt. Spoke with Elizabeth Licea CNP, start Coreg 3.125 mg BID, monitor BP. Script sent to pharmacy. Asked pt to call back with any questions.

## 2024-08-06 NOTE — TELEPHONE ENCOUNTER
The patient called  back to inform our office that his B/P is normal now 126/68 and he feels better.  The patient is going to continue taking his current medication. The patient can be reached at [phone]

## 2024-08-06 NOTE — TELEPHONE ENCOUNTER
Pt states he stopped taking his vitamin d 3 he is feeling much better has more energy asking if he needs another blood test done

## 2024-08-07 NOTE — TELEPHONE ENCOUNTER
We can do it when he comes in December. It needs to be at least 3 months after he stopped the vitamin D.

## 2024-10-25 ENCOUNTER — TELEPHONE (OUTPATIENT)
Dept: FAMILY MEDICINE CLINIC | Age: 79
End: 2024-10-25

## 2024-11-19 ENCOUNTER — OFFICE VISIT (OUTPATIENT)
Dept: ENDOCRINOLOGY | Age: 79
End: 2024-11-19
Payer: MEDICARE

## 2024-11-19 ENCOUNTER — HOSPITAL ENCOUNTER (OUTPATIENT)
Dept: GENERAL RADIOLOGY | Age: 79
Discharge: HOME | End: 2024-11-19
Payer: MEDICARE

## 2024-11-19 VITALS — BODY MASS INDEX: 28.64 KG/M2 | WEIGHT: 178.2 LBS | HEIGHT: 66 IN

## 2024-11-19 DIAGNOSIS — M81.0 AGE-RELATED OSTEOPOROSIS WITHOUT CURRENT PATHOLOGICAL FRACTURE: Primary | ICD-10-CM

## 2024-11-19 DIAGNOSIS — M81.0 AGE-RELATED OSTEOPOROSIS WITHOUT CURRENT PATHOLOGICAL FRACTURE: ICD-10-CM

## 2024-11-19 DIAGNOSIS — E67.3 HYPERVITAMINOSIS D: ICD-10-CM

## 2024-11-19 DIAGNOSIS — Z51.81 MEDICATION MONITORING ENCOUNTER: ICD-10-CM

## 2024-11-19 PROCEDURE — G2211 COMPLEX E/M VISIT ADD ON: HCPCS | Performed by: INTERNAL MEDICINE

## 2024-11-19 PROCEDURE — 77080 DXA BONE DENSITY AXIAL: CPT

## 2024-11-19 PROCEDURE — 99214 OFFICE O/P EST MOD 30 MIN: CPT | Performed by: INTERNAL MEDICINE

## 2024-11-19 PROCEDURE — 1123F ACP DISCUSS/DSCN MKR DOCD: CPT | Performed by: INTERNAL MEDICINE

## 2024-11-19 PROCEDURE — 1159F MED LIST DOCD IN RCRD: CPT | Performed by: INTERNAL MEDICINE

## 2024-11-19 RX ORDER — ALENDRONATE SODIUM 70 MG/1
70 TABLET ORAL
Qty: 12 TABLET | Refills: 4 | Status: SHIPPED | OUTPATIENT
Start: 2024-11-19
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
## 2025-01-13 ENCOUNTER — TELEPHONE (OUTPATIENT)
Dept: FAMILY MEDICINE CLINIC | Age: 80
End: 2025-01-13

## 2025-01-13 DIAGNOSIS — Z12.5 PROSTATE CANCER SCREENING: ICD-10-CM

## 2025-01-13 DIAGNOSIS — E78.2 MIXED HYPERLIPIDEMIA: ICD-10-CM

## 2025-01-13 DIAGNOSIS — I10 HYPERTENSION, UNSPECIFIED TYPE: Primary | ICD-10-CM

## 2025-01-13 DIAGNOSIS — K21.9 GASTROESOPHAGEAL REFLUX DISEASE WITHOUT ESOPHAGITIS: ICD-10-CM

## 2025-01-13 NOTE — TELEPHONE ENCOUNTER
----- Message from Arnaldo GIRALDO sent at 1/13/2025 11:35 AM EST -----  Regarding: [facility] Referral Request  [facility] Referral Request    Reason for referral request: Lab/Test Order    Specialist/Lab/Test patient is requesting (if known):    Specialist Phone Number (if applicable):    Additional Information : Patient is requesting for a referral signed by Arturo Garcia MD (PCP) before the upcoming appointment on 3/31/2025 at 8:30 am.  --------------------------------------------------------------------------------------------------------------------------    Relationship to Patient: Self     Call Back Information: OK to leave message on voicemail  Preferred Call Back Number: Phone   [phone]

## 2025-03-07 DIAGNOSIS — Z12.5 PROSTATE CANCER SCREENING: ICD-10-CM

## 2025-03-07 DIAGNOSIS — I10 HYPERTENSION, UNSPECIFIED TYPE: ICD-10-CM

## 2025-03-07 LAB
ALBUMIN SERPL-MCNC: 4.4 G/DL (ref 3.4–5)
ALBUMIN/GLOB SERPL: 1.8 {RATIO} (ref 1.1–2.2)
ALP SERPL-CCNC: 106 U/L (ref 40–129)
ALT SERPL-CCNC: 23 U/L (ref 10–40)
ANION GAP SERPL CALCULATED.3IONS-SCNC: 12 MMOL/L (ref 3–16)
AST SERPL-CCNC: 25 U/L (ref 15–37)
BASOPHILS # BLD: 0 K/UL (ref 0–0.2)
BASOPHILS NFR BLD: 0.4 %
BILIRUB SERPL-MCNC: 0.8 MG/DL (ref 0–1)
BUN SERPL-MCNC: 16 MG/DL (ref 7–20)
CALCIUM SERPL-MCNC: 9.9 MG/DL (ref 8.3–10.6)
CHLORIDE SERPL-SCNC: 103 MMOL/L (ref 99–110)
CHOLEST SERPL-MCNC: 173 MG/DL (ref 0–199)
CO2 SERPL-SCNC: 27 MMOL/L (ref 21–32)
CREAT SERPL-MCNC: 0.9 MG/DL (ref 0.8–1.3)
DEPRECATED RDW RBC AUTO: 13.7 % (ref 12.4–15.4)
EOSINOPHIL # BLD: 0.1 K/UL (ref 0–0.6)
EOSINOPHIL NFR BLD: 1.2 %
GFR SERPLBLD CREATININE-BSD FMLA CKD-EPI: 86 ML/MIN/{1.73_M2}
GLUCOSE SERPL-MCNC: 106 MG/DL (ref 70–99)
HCT VFR BLD AUTO: 46.1 % (ref 40.5–52.5)
HDLC SERPL-MCNC: 60 MG/DL (ref 40–60)
HGB BLD-MCNC: 15.7 G/DL (ref 13.5–17.5)
LDLC SERPL CALC-MCNC: 94 MG/DL
LYMPHOCYTES # BLD: 1.3 K/UL (ref 1–5.1)
LYMPHOCYTES NFR BLD: 22.7 %
MCH RBC QN AUTO: 29 PG (ref 26–34)
MCHC RBC AUTO-ENTMCNC: 34.2 G/DL (ref 31–36)
MCV RBC AUTO: 85 FL (ref 80–100)
MONOCYTES # BLD: 0.7 K/UL (ref 0–1.3)
MONOCYTES NFR BLD: 11.7 %
NEUTROPHILS # BLD: 3.6 K/UL (ref 1.7–7.7)
NEUTROPHILS NFR BLD: 64 %
PLATELET # BLD AUTO: 222 K/UL (ref 135–450)
PMV BLD AUTO: 7.3 FL (ref 5–10.5)
POTASSIUM SERPL-SCNC: 4.3 MMOL/L (ref 3.5–5.1)
PROT SERPL-MCNC: 6.9 G/DL (ref 6.4–8.2)
PSA SERPL DL<=0.01 NG/ML-MCNC: 0.91 NG/ML (ref 0–4)
RBC # BLD AUTO: 5.42 M/UL (ref 4.2–5.9)
SODIUM SERPL-SCNC: 142 MMOL/L (ref 136–145)
TRIGL SERPL-MCNC: 94 MG/DL (ref 0–150)
VLDLC SERPL CALC-MCNC: 19 MG/DL
WBC # BLD AUTO: 5.6 K/UL (ref 4–11)

## 2025-03-08 ENCOUNTER — RESULTS FOLLOW-UP (OUTPATIENT)
Dept: FAMILY MEDICINE CLINIC | Age: 80
End: 2025-03-08

## 2025-03-31 ENCOUNTER — OFFICE VISIT (OUTPATIENT)
Dept: FAMILY MEDICINE CLINIC | Age: 80
End: 2025-03-31
Payer: MEDICARE

## 2025-03-31 VITALS
BODY MASS INDEX: 29.47 KG/M2 | HEART RATE: 71 BPM | OXYGEN SATURATION: 97 % | SYSTOLIC BLOOD PRESSURE: 130 MMHG | HEIGHT: 66 IN | DIASTOLIC BLOOD PRESSURE: 80 MMHG | WEIGHT: 183.4 LBS

## 2025-03-31 DIAGNOSIS — Z00.00 MEDICARE ANNUAL WELLNESS VISIT, SUBSEQUENT: Primary | ICD-10-CM

## 2025-03-31 DIAGNOSIS — I25.10 CORONARY ARTERY DISEASE INVOLVING NATIVE HEART WITHOUT ANGINA PECTORIS, UNSPECIFIED VESSEL OR LESION TYPE: ICD-10-CM

## 2025-03-31 DIAGNOSIS — E55.9 VITAMIN D DEFICIENCY: ICD-10-CM

## 2025-03-31 DIAGNOSIS — K57.30 DIVERTICULOSIS OF COLON: ICD-10-CM

## 2025-03-31 DIAGNOSIS — Z00.00 WELL ADULT EXAM: ICD-10-CM

## 2025-03-31 DIAGNOSIS — M81.0 AGE-RELATED OSTEOPOROSIS WITHOUT CURRENT PATHOLOGICAL FRACTURE: ICD-10-CM

## 2025-03-31 DIAGNOSIS — I10 HYPERTENSION, UNSPECIFIED TYPE: ICD-10-CM

## 2025-03-31 DIAGNOSIS — I65.23 CAROTID STENOSIS, BILATERAL: ICD-10-CM

## 2025-03-31 DIAGNOSIS — E78.2 MIXED HYPERLIPIDEMIA: ICD-10-CM

## 2025-03-31 PROBLEM — S06.0X0A CONCUSSION WITHOUT LOSS OF CONSCIOUSNESS: Status: RESOLVED | Noted: 2021-09-03 | Resolved: 2025-03-31

## 2025-03-31 PROCEDURE — G0439 PPPS, SUBSEQ VISIT: HCPCS | Performed by: FAMILY MEDICINE

## 2025-03-31 PROCEDURE — 1160F RVW MEDS BY RX/DR IN RCRD: CPT | Performed by: FAMILY MEDICINE

## 2025-03-31 PROCEDURE — 3075F SYST BP GE 130 - 139MM HG: CPT | Performed by: FAMILY MEDICINE

## 2025-03-31 PROCEDURE — 1123F ACP DISCUSS/DSCN MKR DOCD: CPT | Performed by: FAMILY MEDICINE

## 2025-03-31 PROCEDURE — 3079F DIAST BP 80-89 MM HG: CPT | Performed by: FAMILY MEDICINE

## 2025-03-31 PROCEDURE — 1159F MED LIST DOCD IN RCRD: CPT | Performed by: FAMILY MEDICINE

## 2025-03-31 SDOH — ECONOMIC STABILITY: FOOD INSECURITY: WITHIN THE PAST 12 MONTHS, THE FOOD YOU BOUGHT JUST DIDN'T LAST AND YOU DIDN'T HAVE MONEY TO GET MORE.: NEVER TRUE

## 2025-03-31 SDOH — ECONOMIC STABILITY: FOOD INSECURITY: WITHIN THE PAST 12 MONTHS, YOU WORRIED THAT YOUR FOOD WOULD RUN OUT BEFORE YOU GOT MONEY TO BUY MORE.: NEVER TRUE

## 2025-03-31 ASSESSMENT — PATIENT HEALTH QUESTIONNAIRE - PHQ9
3. TROUBLE FALLING OR STAYING ASLEEP: NOT AT ALL
SUM OF ALL RESPONSES TO PHQ QUESTIONS 1-9: 1
6. FEELING BAD ABOUT YOURSELF - OR THAT YOU ARE A FAILURE OR HAVE LET YOURSELF OR YOUR FAMILY DOWN: NOT AT ALL
9. THOUGHTS THAT YOU WOULD BE BETTER OFF DEAD, OR OF HURTING YOURSELF: NOT AT ALL
5. POOR APPETITE OR OVEREATING: NOT AT ALL
8. MOVING OR SPEAKING SO SLOWLY THAT OTHER PEOPLE COULD HAVE NOTICED. OR THE OPPOSITE, BEING SO FIGETY OR RESTLESS THAT YOU HAVE BEEN MOVING AROUND A LOT MORE THAN USUAL: NOT AT ALL
2. FEELING DOWN, DEPRESSED OR HOPELESS: NOT AT ALL
SUM OF ALL RESPONSES TO PHQ QUESTIONS 1-9: 1
7. TROUBLE CONCENTRATING ON THINGS, SUCH AS READING THE NEWSPAPER OR WATCHING TELEVISION: NOT AT ALL
10. IF YOU CHECKED OFF ANY PROBLEMS, HOW DIFFICULT HAVE THESE PROBLEMS MADE IT FOR YOU TO DO YOUR WORK, TAKE CARE OF THINGS AT HOME, OR GET ALONG WITH OTHER PEOPLE: NOT DIFFICULT AT ALL
4. FEELING TIRED OR HAVING LITTLE ENERGY: SEVERAL DAYS
1. LITTLE INTEREST OR PLEASURE IN DOING THINGS: NOT AT ALL

## 2025-03-31 ASSESSMENT — LIFESTYLE VARIABLES
HOW OFTEN DO YOU HAVE A DRINK CONTAINING ALCOHOL: MONTHLY OR LESS
HOW MANY STANDARD DRINKS CONTAINING ALCOHOL DO YOU HAVE ON A TYPICAL DAY: 1 OR 2

## 2025-03-31 NOTE — PROGRESS NOTES
Medicare Annual Wellness Visit    Francisco Velez III is here for Medicare AWV    Assessment & Plan   Age-related osteoporosis without current pathological fracture  Assessment & Plan:   Chronic, at goal (stable), continue current treatment plan  Carotid stenosis, bilateral  Assessment & Plan:   Chronic, not at goal (unstable), continue current treatment plan-recheck  Coronary artery disease involving native heart without angina pectoris, unspecified vessel or lesion type  Assessment & Plan:   Chronic, at goal (stable), continue current treatment plan-labs  Diverticulosis of colon  Assessment & Plan:   Chronic, at goal (stable), continue current treatment plan  Mixed hyperlipidemia  Assessment & Plan:   Chronic, at goal (stable), continue current treatment plan  Hypertension, unspecified type  Assessment & Plan:   Chronic, at goal (stable), continue current treatment plan  Vitamin D deficiency  Assessment & Plan:   Chronic, at goal (stable), continue current treatment plan       No follow-ups on file.     Subjective   The following acute and/or chronic problems were also addressed today:  Age-related osteoporosis without current pathological fracture   Chronic, at goal (stable), continue current treatment plan    Carotid stenosis, bilateral   Chronic, not at goal (unstable), continue current treatment plan-recheck    Coronary artery disease involving native heart   Chronic, at goal (stable), continue current treatment plan-labs    Diverticulosis of colon   Chronic, at goal (stable), continue current treatment plan    Hyperlipidemia   Chronic, at goal (stable), continue current treatment plan    Hypertension   Chronic, at goal (stable), continue current treatment plan    Vitamin D deficiency   Chronic, at goal (stable), continue current treatment plan      Patient's complete Health Risk Assessment and screening values have been reviewed and are found in Flowsheets. The following problems were reviewed today and where

## 2025-04-02 ENCOUNTER — OFFICE VISIT (OUTPATIENT)
Dept: CARDIOLOGY CLINIC | Age: 80
End: 2025-04-02
Payer: MEDICARE

## 2025-04-02 VITALS
WEIGHT: 182 LBS | BODY MASS INDEX: 29.64 KG/M2 | SYSTOLIC BLOOD PRESSURE: 138 MMHG | HEART RATE: 71 BPM | DIASTOLIC BLOOD PRESSURE: 82 MMHG

## 2025-04-02 DIAGNOSIS — E78.2 MIXED HYPERLIPIDEMIA: Primary | ICD-10-CM

## 2025-04-02 DIAGNOSIS — I10 PRIMARY HYPERTENSION: ICD-10-CM

## 2025-04-02 DIAGNOSIS — I25.10 CORONARY ARTERY DISEASE INVOLVING NATIVE HEART, UNSPECIFIED VESSEL OR LESION TYPE, UNSPECIFIED WHETHER ANGINA PRESENT: ICD-10-CM

## 2025-04-02 DIAGNOSIS — Z95.1 S/P CABG (CORONARY ARTERY BYPASS GRAFT): ICD-10-CM

## 2025-04-02 PROCEDURE — 3075F SYST BP GE 130 - 139MM HG: CPT | Performed by: NURSE PRACTITIONER

## 2025-04-02 PROCEDURE — 93000 ELECTROCARDIOGRAM COMPLETE: CPT | Performed by: NURSE PRACTITIONER

## 2025-04-02 PROCEDURE — 99214 OFFICE O/P EST MOD 30 MIN: CPT | Performed by: NURSE PRACTITIONER

## 2025-04-02 PROCEDURE — 1159F MED LIST DOCD IN RCRD: CPT | Performed by: NURSE PRACTITIONER

## 2025-04-02 PROCEDURE — 1123F ACP DISCUSS/DSCN MKR DOCD: CPT | Performed by: NURSE PRACTITIONER

## 2025-04-02 PROCEDURE — 3079F DIAST BP 80-89 MM HG: CPT | Performed by: NURSE PRACTITIONER

## 2025-04-02 RX ORDER — CARBOXYMETHYLCELLULOSE SODIUM 5 MG/ML
SOLUTION/ DROPS OPHTHALMIC
COMMUNITY
Start: 2024-07-12

## 2025-04-02 NOTE — PROGRESS NOTES
PVC. No AF or pauses      Medications:   Current Outpatient Medications   Medication Sig Dispense Refill    alendronate (FOSAMAX) 70 MG tablet Take 1 tablet by mouth every 7 days 12 tablet 4    rosuvastatin (CRESTOR) 20 MG tablet Take 1 tablet by mouth daily 90 tablet 3    clopidogrel (PLAVIX) 75 MG tablet TAKE 1 TABLET DAILY 90 tablet 3    Coenzyme Q10 (COQ10) 100 MG CAPS Take by mouth      Magnesium Oxide (MAGNESIUM-OXIDE) 250 MG TABS tablet Take 1 tablet by mouth daily as needed      fluticasone (FLONASE) 50 MCG/ACT nasal spray 1 spray by Nasal route daily as needed for Rhinitis 1 Bottle 2    famotidine (PEPCID) 40 MG tablet Take 1 tablet by mouth daily       No current facility-administered medications for this visit.       Assessment:  1. Mixed hyperlipidemia    2. Primary hypertension    3. Coronary artery disease involving native heart, unspecified vessel or lesion type, unspecified whether angina present    4. S/P CABG (coronary artery bypass graft)        Plan:    ECG: SInus     CAD/CABG: stable    Plavix 75 mg daily    Crestor 20 mg daily    Metoprolol previously stopped d/t bradycardia     HTN: stable    /86 then 138/82    HLD; stable    LDL 94   Crestor 20 mg daily    CoQ10    Follow up in 6 months with Dr Alberto    He also follows with [facility].

## 2025-05-30 ENCOUNTER — TELEPHONE (OUTPATIENT)
Dept: FAMILY MEDICINE CLINIC | Age: 80
End: 2025-05-30

## 2025-05-30 NOTE — TELEPHONE ENCOUNTER
Patient calling to inquire over recent results that he would like to go over with clinical staff. He states he had his \"veins\" [facility] recently through [facility]\" He also mentioned that he dropped off the paperwork RE: his \"veins\" to clinically review the results about a week ago.    For clarification and to address his concerns, his phone number is [phone].

## 2025-07-22 ENCOUNTER — TELEPHONE (OUTPATIENT)
Dept: CARDIOLOGY CLINIC | Age: 80
End: 2025-07-22

## (undated) DEVICE — NON-ADHERENT STRIPS,OIL EMULSION: Brand: CURITY

## (undated) DEVICE — STOCKINETTE,DOUBLE PLY,6X48,STERILE: Brand: MEDLINE

## (undated) DEVICE — SWAN-GANZ CCOMBO V THERMODILUTION CATHETER: Brand: SWAN-GANZ CCOMBO V

## (undated) DEVICE — SOLUTION IRRIG 3000ML 0.9% SOD CHL USP UROMATIC PLAS CONT

## (undated) DEVICE — BANDAGE COMPR ELASTIC 5 YDX4 IN LT

## (undated) DEVICE — GLOVE SURG SZ 65 THK91MIL LTX FREE SYN POLYISOPRENE

## (undated) DEVICE — CANNULA PERF L15IN DIA29FR VEN 3 STG THN WALL DSGN W  VENT

## (undated) DEVICE — CONNECTOR PERF L5 IN OD1 2 IN SAT HCT ST FEATURING B CARE 5

## (undated) DEVICE — CUTTER SURG OD42MM PAT KNEE DISP FOR RM SYS XCELERATE

## (undated) DEVICE — CATHETERIZATION TRAY 16 FR 5 CC FOL STR TIP URIMTR BARDX IC

## (undated) DEVICE — BLOOD TRANSFUSION FILTER: Brand: HAEMONETICS

## (undated) DEVICE — [HIGH FLOW INSUFFLATOR,  DO NOT USE IF PACKAGE IS DAMAGED,  KEEP DRY,  KEEP AWAY FROM SUNLIGHT,  PROTECT FROM HEAT AND RADIOACTIVE SOURCES.]: Brand: PNEUMOSURE

## (undated) DEVICE — KIT COMPL CK0289R4  BB9L99R8

## (undated) DEVICE — KIT TRK KNEE PROC VIZADISC

## (undated) DEVICE — SURE SET-DOUBLE BASIN-LF: Brand: MEDLINE INDUSTRIES, INC.

## (undated) DEVICE — SUTURE PERMA-HAND SZ 4-0 L144IN NONABSORBABLE BLK LIGAPAK LA53G

## (undated) DEVICE — SOLUTION NORMOSOL-R PH 7.4   X

## (undated) DEVICE — CANNULA PERF 24FR 51CML 45DEG TIP 3 8IN CONN 20CML SUT RNG

## (undated) DEVICE — PADDING CAST W4INXL4YD NONSTERILE COT RAYON MICROPLEATED

## (undated) DEVICE — PADDING CAST W4INXL4YD ST COT RAYON MICROPLEATED HIGHLY

## (undated) DEVICE — SURGICAL PROCEDURE PACK RESTORIS MCK CAPMAKOPFJ] MAKO]

## (undated) DEVICE — KIT DRP FOR RIO ROBOTIC ARM ASST SYS

## (undated) DEVICE — SOLUTION IV SODIUM CHL 0.9% 500 ML INJ FLX CONTAINER

## (undated) DEVICE — 3M™ COBAN™ SELF-ADHERENT WRAP, 1583S, STERILE, 3 IN X 5 YD (7,5 CM X 4,5 M), 24 ROLLS/CASE: Brand: 3M™ COBAN™

## (undated) DEVICE — Device

## (undated) DEVICE — OPEN HRT CDS

## (undated) DEVICE — HANDPIECE SET WITH HIGH FLOW TIP AND SUCTION TUBE: Brand: INTERPULSE

## (undated) DEVICE — PIN BNE FIX L140MM DIA3.2MM SELF DRL

## (undated) DEVICE — SYRINGE MED 10ML LUERLOCK TIP W/O SFTY DISP

## (undated) DEVICE — CLIP SM RED INTERN HMOCLP TITAN LIGATING

## (undated) DEVICE — TUBING, SUCTION, 1/4" X 12', STRAIGHT: Brand: MEDLINE

## (undated) DEVICE — CONNECTOR PERF W3 8XH3 8XL3 8IN BASE EQL Y SHP W O LUERLOCK

## (undated) DEVICE — SUTURE NONABSORBABLE MFIL TAPR PNT 3/8 CIR BLU 8.0M BV175-6 8734H

## (undated) DEVICE — DRESSING FOAM W4XL12IN SIL RECT ADH WTRPRF FLM BK W/ BORD

## (undated) DEVICE — PIN BONE FIX L110MM DIA3.2MM

## (undated) DEVICE — DUAL LUMEN STOMACH TUBE: Brand: SALEM SUMP

## (undated) DEVICE — E-Z CLEAN, NON-STICK, PTFE COATED, ELECTROSURGICAL BLADE ELECTRODE, 4 INCH (10.2 CM): Brand: MEGADYNE

## (undated) DEVICE — SUTURE VCRL SZ 3-0 L27IN ABSRB UD L24MM FS-1 3/8 CIR REV J442H

## (undated) DEVICE — OCCLUSIVE GAUZE STRIP,3% BISMUTH TRIBROMOPHENATE IN PETROLATUM BLEND: Brand: XEROFORM

## (undated) DEVICE — PEEL-AWAY TOGA, 2X LARGE: Brand: FLYTE

## (undated) DEVICE — 3M™ TEGADERM™ CHG CHLORHEXIDINE GLUCONATE GEL PAD 1664, 25 EACH/CARTON, 4 CARTONS/CASE: Brand: 3M™ TEGADERM™

## (undated) DEVICE — SOLUTION IV 1000ML 140MEQ/L SOD 5MEQ/L K 3MEQ/L MG 27MEQ/L

## (undated) DEVICE — SMARTGOWN SURGICAL GOWN, XL: Brand: CONVERTORS

## (undated) DEVICE — PENCIL SMK EVAC ALL IN 1 DSGN ENH VISIBILITY IMPROVED AIR

## (undated) DEVICE — Z INACTIVE USE 2854097 SPONGE GZ W4XL4IN COT 12 PLY TYP VII WVN C FLD DSGN

## (undated) DEVICE — CORD RETRCT SIL

## (undated) DEVICE — CONNECTOR PERF 3/8X3/8X3/8IN EQL WYE

## (undated) DEVICE — 3M™ TEGADERM™ CHG DRESSING 25/CARTON 4 CARTONS/CASE 1657: Brand: TEGADERM™

## (undated) DEVICE — ELECTROSURGICAL PENCIL ROCKER SWITCH NON COATED BLADE ELECTRODE 10 FT (3 M) CORD HOLSTER: Brand: MEGADYNE

## (undated) DEVICE — SUTURE NONABSORBABLE MONOFILAMENT 5-0 C-1 1X24 IN PROLENE 8725H

## (undated) DEVICE — SOLUTION IV IRRIG POUR BRL 0.9% SODIUM CHL 2F7124

## (undated) DEVICE — GOWN,SIRUS,POLYRNF,BRTHSLV,LG,30/CS: Brand: MEDLINE

## (undated) DEVICE — CANNULA VES L25IN RADPQ BODY W  1 W VLV 3MM BLNT TIP DLP

## (undated) DEVICE — HOOD, PEEL-AWAY: Brand: FLYTE

## (undated) DEVICE — SYSTEM VES HARV ENDOSCP VASOVIEW HEMOPRO

## (undated) DEVICE — SYSTEM SKIN CLSR 22CM DERMBND PRINEO

## (undated) DEVICE — PROVE COVER: Brand: UNBRANDED

## (undated) DEVICE — KIT INT FIX FEM TIB CKPT MAKOPLASTY

## (undated) DEVICE — RESERVOIR AUTOTRANSFUSION 225/120 CC GS FILTERED XTRA

## (undated) DEVICE — SOLUTION IRRIG 2000ML 0.9% SOD CHL USP UROMATIC PLAS CONT

## (undated) DEVICE — STERNUM SAW BLADE, 9.4MM X 34MM X 1MM: Brand: MICROAIRE®

## (undated) DEVICE — PUNCH AORT DIA4MM LNG HNDL

## (undated) DEVICE — VESSEL HARVESTING KIT 7 MM ENDOSCP FOR PWR SUPL

## (undated) DEVICE — GAUZE,SPONGE,4"X4",8PLY,STRL,LF,10/TRAY: Brand: MEDLINE

## (undated) DEVICE — SET PERF L15IN BLU CLMP MULT FEM LUER ON SGL INLET LEG DLP

## (undated) DEVICE — SUTURE NONABSORBABLE MONOFILAMENT 3-0 PS-1 18 IN BLK ETHILON 1663H

## (undated) DEVICE — SUTURE ETHBND EXCEL 2-0 L30IN NONABSORBABLE GRN L26MM SH MX563

## (undated) DEVICE — FOGARTY - HYDRAGRIP SURGICAL - CLAMP INSERTS: Brand: FOGARTY SOFTJAW

## (undated) DEVICE — BLADE SURG SAW STD S STL OSC W/ SERR EDGE DISP

## (undated) DEVICE — Device: Brand: TEMPORARY MYOCARDIAL HEARTWIRE

## (undated) DEVICE — SUTURE ETHBND 4-0 L30IN NONABSORBABLE GRN L17MM RB-1 1/2 X551H

## (undated) DEVICE — SUTURE S STL SZ 6 L18IN NONABSORBABLE SIL L48MM V-40 1/2 M649G

## (undated) DEVICE — PIN FIX L170MM DIA4MM BNE SELF DRL

## (undated) DEVICE — GLOVE SURG SZ 75 L12IN FNGR THK94MIL TRNSLUC YEL LTX

## (undated) DEVICE — NEURO FUSION ADD-ON PACK: Brand: MEDLINE INDUSTRIES, INC.

## (undated) DEVICE — GLOVE ORANGE PI 8   MSG9080

## (undated) DEVICE — 3 BONE CEMENT MIXER: Brand: MIXEVAC

## (undated) DEVICE — BLADE SURG NO15 S STL STR DISP GLASSVAN

## (undated) DEVICE — GUIDE SURG SELECTION FOR GILLINOV COSGROVE LAA EXCLUSION SYS

## (undated) DEVICE — GLOVE ORANGE PI 8 1/2   MSG9085

## (undated) DEVICE — GLOVE ORANGE PI 7 1/2   MSG9075

## (undated) DEVICE — LOOP,VESSEL,MAXI,BLUE,2/PK,STERILE: Brand: MEDLINE

## (undated) DEVICE — INTENDED FOR TISSUE SEPARATION, AND OTHER PROCEDURES THAT REQUIRE A SHARP SURGICAL BLADE TO PUNCTURE OR CUT.: Brand: BARD-PARKER ® STAINLESS STEEL BLADES

## (undated) DEVICE — BOOT POS LEG DEMAYO

## (undated) DEVICE — BLADE SURG SAW S STL NAR OSC W/ SERR EDGE DISP

## (undated) DEVICE — ROYALSILK SURGICAL GOWN, L: Brand: CONVERTORS

## (undated) DEVICE — SUTURE PERMA-HAND 0 L18IN NONABSORBABLE BLK SILK BRAID W/O SA66G

## (undated) DEVICE — SUTURE GOR TX SZ 2-0 L36IN NONABSORBABLE L18MM TH-18 1/2 3N04B

## (undated) DEVICE — SOLUTION IV SODIUM CHL 0.9% 1000 ML 12/PK

## (undated) DEVICE — SYRINGE MED 30ML STD CLR PLAS LUERLOCK TIP N CTRL DISP

## (undated) DEVICE — PIN FIX L140MM DIA4MM BNE

## (undated) DEVICE — STERILE PVP: Brand: MEDLINE INDUSTRIES, INC.

## (undated) DEVICE — SUTURE PDS II SZ 0 L27IN ABSRB VLT L36MM CT-1 1/2 CIR Z340H

## (undated) DEVICE — FOGARTY SPRING CLIPS 6MM: Brand: FOGARTY SOFTJAW

## (undated) DEVICE — CANNULA PERF L125IN OD15FR POLYVI CHL VEN RG AUTO INFL SMOOT

## (undated) DEVICE — AIR SHEET,LAT,COMFORT GLIDE, BLEND 40X80: Brand: MEDLINE

## (undated) DEVICE — 35 ML SYRINGE LUER-LOCK TIP: Brand: MONOJECT

## (undated) DEVICE — ADAPTER,CATHETER/SYRINGE/LUER,STERILE: Brand: MEDLINE

## (undated) DEVICE — SUTURE PERMA-HAND SZ 2 L60IN NONABSORBABLE BLK SILK BRAID SA8H

## (undated) DEVICE — DUAL CUT SAGITTAL BLADE

## (undated) DEVICE — 19 IN KNEE IMMOBILIZER: Brand: DEROYAL

## (undated) DEVICE — SUTURE MCRYL + SZ 4-0 L18IN ABSRB UD L19MM PS-2 3/8 CIR MCP496G

## (undated) DEVICE — 3M™ TEGADERM™ TRANSPARENT FILM DRESSING FRAME STYLE, 1628, 6 IN X 8 IN (15 CM X 20 CM), 10/CT 8CT/CASE: Brand: 3M™ TEGADERM™

## (undated) DEVICE — CATHETER,URETHRAL,REDRUBBER,STERILE,8FR: Brand: MEDLINE

## (undated) DEVICE — SUTURE VCRL SZ 0 L18IN ABSRB VLT L36MM CT-1 1/2 CIR J740D

## (undated) DEVICE — SOLUTION IV CARDPLG PLEGISOL

## (undated) DEVICE — E-Z CLEAN, NON-STICK, PTFE COATED, ELECTROSURGICAL BLADE ELECTRODE, 2.5 INCH (6.35 CM): Brand: EZ CLEAN

## (undated) DEVICE — COUNTER NDL 40 COUNT HLD 70 NUM FOAM BLK SGL MAG W BLDE REMV

## (undated) DEVICE — PAD,ABDOMINAL,8"X10",ST,LF: Brand: MEDLINE

## (undated) DEVICE — CANNULA PERF 7FR L5.5IN AORT ROOT RADPQ STD TIP W/ VENT LN

## (undated) DEVICE — SUTURE ABSORBABLE BRAIDED 2-0 CT-1 27 IN UD VICRYL J259H